# Patient Record
Sex: FEMALE | Race: WHITE | NOT HISPANIC OR LATINO
[De-identification: names, ages, dates, MRNs, and addresses within clinical notes are randomized per-mention and may not be internally consistent; named-entity substitution may affect disease eponyms.]

---

## 2017-01-10 ENCOUNTER — APPOINTMENT (OUTPATIENT)
Dept: INTERNAL MEDICINE | Facility: CLINIC | Age: 27
End: 2017-01-10

## 2017-01-10 ENCOUNTER — LABORATORY RESULT (OUTPATIENT)
Age: 27
End: 2017-01-10

## 2017-01-13 LAB
AMPHET UR-MCNC: NEGATIVE
BARBITURATES UR-MCNC: NEGATIVE
BENZODIAZ UR-MCNC: NEGATIVE
COCAINE METAB.OTHER UR-MCNC: NEGATIVE
CREATININE, URINE: 74.3 MG/DL
METHADONE UR-MCNC: NEGATIVE
METHAQUALONE UR-MCNC: NEGATIVE
OPIATES UR-MCNC: NEGATIVE
PCP UR-MCNC: NEGATIVE
PROPOXYPH UR QL: NEGATIVE
THC UR QL: NEGATIVE

## 2017-01-15 LAB — SUBOXONE: NORMAL

## 2017-02-10 ENCOUNTER — APPOINTMENT (OUTPATIENT)
Dept: INTERNAL MEDICINE | Facility: CLINIC | Age: 27
End: 2017-02-10

## 2017-03-11 ENCOUNTER — APPOINTMENT (OUTPATIENT)
Dept: INTERNAL MEDICINE | Facility: CLINIC | Age: 27
End: 2017-03-11

## 2017-04-08 ENCOUNTER — APPOINTMENT (OUTPATIENT)
Dept: INTERNAL MEDICINE | Facility: CLINIC | Age: 27
End: 2017-04-08

## 2017-05-06 ENCOUNTER — APPOINTMENT (OUTPATIENT)
Dept: INTERNAL MEDICINE | Facility: CLINIC | Age: 27
End: 2017-05-06

## 2017-06-05 ENCOUNTER — APPOINTMENT (OUTPATIENT)
Dept: INTERNAL MEDICINE | Facility: CLINIC | Age: 27
End: 2017-06-05

## 2017-07-03 ENCOUNTER — APPOINTMENT (OUTPATIENT)
Dept: INTERNAL MEDICINE | Facility: CLINIC | Age: 27
End: 2017-07-03

## 2017-08-01 ENCOUNTER — APPOINTMENT (OUTPATIENT)
Dept: INTERNAL MEDICINE | Facility: CLINIC | Age: 27
End: 2017-08-01
Payer: SELF-PAY

## 2017-08-01 PROCEDURE — 99499D: CUSTOM

## 2017-09-05 ENCOUNTER — APPOINTMENT (OUTPATIENT)
Dept: INTERNAL MEDICINE | Facility: CLINIC | Age: 27
End: 2017-09-05
Payer: SELF-PAY

## 2017-09-05 ENCOUNTER — LABORATORY RESULT (OUTPATIENT)
Age: 27
End: 2017-09-05

## 2017-09-05 PROCEDURE — 99499D: CUSTOM

## 2017-09-14 LAB
AMPHET UR-MCNC: NEGATIVE
BARBITURATES UR-MCNC: NEGATIVE
BENZODIAZ UR-MCNC: NEGATIVE
COCAINE METAB.OTHER UR-MCNC: NEGATIVE
CREATININE, URINE: >200 MG/DL
METHADONE UR-MCNC: NEGATIVE
METHAQUALONE UR-MCNC: NEGATIVE
OPIATES UR-MCNC: NEGATIVE
PCP UR-MCNC: NEGATIVE
PROPOXYPH UR QL: NEGATIVE
SUBOXONE: NORMAL
THC UR QL: NEGATIVE

## 2017-10-02 ENCOUNTER — APPOINTMENT (OUTPATIENT)
Dept: INTERNAL MEDICINE | Facility: CLINIC | Age: 27
End: 2017-10-02
Payer: SELF-PAY

## 2017-10-02 PROCEDURE — 99499D: CUSTOM

## 2017-11-04 ENCOUNTER — APPOINTMENT (OUTPATIENT)
Dept: INTERNAL MEDICINE | Facility: CLINIC | Age: 27
End: 2017-11-04
Payer: SELF-PAY

## 2017-11-04 PROCEDURE — 99499D: CUSTOM

## 2017-11-24 ENCOUNTER — APPOINTMENT (OUTPATIENT)
Dept: ULTRASOUND IMAGING | Facility: CLINIC | Age: 27
End: 2017-11-24

## 2017-11-27 ENCOUNTER — APPOINTMENT (OUTPATIENT)
Dept: MAMMOGRAPHY | Facility: CLINIC | Age: 27
End: 2017-11-27

## 2017-12-01 ENCOUNTER — APPOINTMENT (OUTPATIENT)
Dept: INTERNAL MEDICINE | Facility: CLINIC | Age: 27
End: 2017-12-01
Payer: SELF-PAY

## 2017-12-01 PROCEDURE — 99499D: CUSTOM

## 2017-12-19 ENCOUNTER — APPOINTMENT (OUTPATIENT)
Dept: BREAST CENTER | Facility: CLINIC | Age: 27
End: 2017-12-19

## 2018-01-02 ENCOUNTER — APPOINTMENT (OUTPATIENT)
Dept: INTERNAL MEDICINE | Facility: CLINIC | Age: 28
End: 2018-01-02
Payer: SELF-PAY

## 2018-01-02 PROCEDURE — 99499D: CUSTOM

## 2018-02-10 ENCOUNTER — APPOINTMENT (OUTPATIENT)
Dept: INTERNAL MEDICINE | Facility: CLINIC | Age: 28
End: 2018-02-10
Payer: SELF-PAY

## 2018-02-10 PROCEDURE — 99499D: CUSTOM

## 2018-02-27 ENCOUNTER — MEDICATION RENEWAL (OUTPATIENT)
Age: 28
End: 2018-02-27

## 2018-02-27 RX ORDER — CLONAZEPAM 1 MG/1
1 TABLET ORAL
Qty: 60 | Refills: 0 | Status: DISCONTINUED | COMMUNITY
Start: 2018-01-03

## 2018-02-27 RX ORDER — CLONAZEPAM 0.5 MG/1
0.5 TABLET ORAL
Qty: 30 | Refills: 0 | Status: ACTIVE | COMMUNITY
Start: 2018-01-03

## 2018-03-03 ENCOUNTER — LABORATORY RESULT (OUTPATIENT)
Age: 28
End: 2018-03-03

## 2018-03-03 ENCOUNTER — APPOINTMENT (OUTPATIENT)
Dept: INTERNAL MEDICINE | Facility: CLINIC | Age: 28
End: 2018-03-03
Payer: SELF-PAY

## 2018-03-03 PROCEDURE — 99499D: CUSTOM

## 2018-03-10 LAB — SUBOXONE: NORMAL

## 2018-03-11 LAB
AMPHET UR-MCNC: NEGATIVE
BARBITURATES UR-MCNC: NEGATIVE
BENZODIAZ UR-MCNC: NEGATIVE
COCAINE METAB.OTHER UR-MCNC: NEGATIVE
CREATININE, URINE: >200 MG/DL
METHADONE UR-MCNC: NEGATIVE
METHAQUALONE UR-MCNC: NEGATIVE
OPIATES UR-MCNC: NEGATIVE
PCP UR-MCNC: NEGATIVE
PROPOXYPH UR QL: NEGATIVE
THC UR QL: NEGATIVE

## 2018-03-20 ENCOUNTER — APPOINTMENT (OUTPATIENT)
Dept: BREAST CENTER | Facility: CLINIC | Age: 28
End: 2018-03-20
Payer: MEDICARE

## 2018-03-20 VITALS
SYSTOLIC BLOOD PRESSURE: 102 MMHG | HEIGHT: 69 IN | DIASTOLIC BLOOD PRESSURE: 64 MMHG | WEIGHT: 190 LBS | BODY MASS INDEX: 28.14 KG/M2 | HEART RATE: 80 BPM

## 2018-03-20 DIAGNOSIS — F17.200 NICOTINE DEPENDENCE, UNSPECIFIED, UNCOMPLICATED: ICD-10-CM

## 2018-03-20 DIAGNOSIS — Z80.1 FAMILY HISTORY OF MALIGNANT NEOPLASM OF TRACHEA, BRONCHUS AND LUNG: ICD-10-CM

## 2018-03-20 DIAGNOSIS — Z86.59 PERSONAL HISTORY OF OTHER MENTAL AND BEHAVIORAL DISORDERS: ICD-10-CM

## 2018-03-20 DIAGNOSIS — Z80.7 FAMILY HISTORY OF OTHER MALIGNANT NEOPLASMS OF LYMPHOID, HEMATOPOIETIC AND RELATED TISSUES: ICD-10-CM

## 2018-03-20 DIAGNOSIS — Z80.8 FAMILY HISTORY OF MALIGNANT NEOPLASM OF OTHER ORGANS OR SYSTEMS: ICD-10-CM

## 2018-03-20 PROCEDURE — 99204 OFFICE O/P NEW MOD 45 MIN: CPT

## 2018-03-20 RX ORDER — CLONIDINE HYDROCHLORIDE 0.2 MG/1
0.2 TABLET ORAL
Qty: 60 | Refills: 0 | Status: ACTIVE | COMMUNITY
Start: 2017-11-20

## 2018-03-20 RX ORDER — CLONIDINE HYDROCHLORIDE 0.1 MG/1
0.1 TABLET ORAL TWICE DAILY
Qty: 60 | Refills: 2 | Status: DISCONTINUED | COMMUNITY
Start: 2018-01-03 | End: 2018-03-20

## 2018-03-20 RX ORDER — ARIPIPRAZOLE 5 MG/1
5 TABLET ORAL
Qty: 14 | Refills: 0 | Status: DISCONTINUED | COMMUNITY
Start: 2017-12-05

## 2018-03-20 RX ORDER — OXCARBAZEPINE 150 MG/1
150 TABLET, FILM COATED ORAL
Qty: 15 | Refills: 0 | Status: DISCONTINUED | COMMUNITY
Start: 2017-11-03

## 2018-03-20 RX ORDER — MIRTAZAPINE 7.5 MG/1
7.5 TABLET, FILM COATED ORAL
Qty: 15 | Refills: 0 | Status: DISCONTINUED | COMMUNITY
Start: 2017-11-03

## 2018-03-20 RX ORDER — TOPIRAMATE 25 MG/1
25 TABLET, FILM COATED ORAL
Qty: 15 | Refills: 0 | Status: DISCONTINUED | COMMUNITY
Start: 2017-10-27

## 2018-03-30 ENCOUNTER — EMERGENCY (EMERGENCY)
Facility: HOSPITAL | Age: 28
LOS: 0 days | Discharge: HOME | End: 2018-03-30
Attending: EMERGENCY MEDICINE | Admitting: EMERGENCY MEDICINE
Payer: MEDICARE

## 2018-03-30 VITALS
HEART RATE: 98 BPM | OXYGEN SATURATION: 100 % | SYSTOLIC BLOOD PRESSURE: 122 MMHG | DIASTOLIC BLOOD PRESSURE: 80 MMHG | TEMPERATURE: 98 F | RESPIRATION RATE: 15 BRPM

## 2018-03-30 DIAGNOSIS — R44.1 VISUAL HALLUCINATIONS: ICD-10-CM

## 2018-03-30 PROCEDURE — 99283 EMERGENCY DEPT VISIT LOW MDM: CPT

## 2018-03-30 NOTE — ED PROVIDER NOTE - OBJECTIVE STATEMENT
26 y/o female with PMHx of substance abuse, depression presents to the ED for evaluation of visual hallucinations x5 months (since October 2017). Pt reports seeing "something like drops of water" but that they are when she touches them; also reports that the drops are not falling from up, they seem to fall from "wherever." Pt is fatigued. Neurology f/u scheduled Monday (3 days from now) with Dr. Feliciano. Mother reports drug abuse in the past, 6 years clean. Denies SI or HI.

## 2018-03-30 NOTE — ED PROVIDER NOTE - MEDICAL DECISION MAKING DETAILS
Pt without any suicidal or homicidal ideation, has f/u on Monday for outpatient imaging and blood work and neurologist evaluation. Pt is appropriate for d/c at this time as pt does not want any studies done in the ED.

## 2018-03-30 NOTE — ED ADULT TRIAGE NOTE - CHIEF COMPLAINT QUOTE
patient sent from [place] outpatient hospitalization program, to [facility] ER for delusional thoughts.  No suicidal or homicidal ideation.  Needs psych eval. and neuro eval patient sent from [facility] outpatient [facility] program, to [facility] ER for delusional thoughts.  No suicidal or homicidal ideation.  Needs psych eval. and neuro eval.  Mom states they doubled dose of risperidol today, patient with delayed speech.  Chart from [facility] has been faxed to ER

## 2018-03-30 NOTE — ED ADULT NURSE NOTE - CHIEF COMPLAINT QUOTE
patient sent from [facility] outpatient hospitalization program, to [facility] ER for delusional thoughts.  No suicidal or homicidal ideation.  Needs psych eval. and neuro eval.  Mom states they doubled dose of risperidol today, patient with delayed speech.  Chart from [facility] has been faxed to ER

## 2018-03-30 NOTE — ED ADULT NURSE NOTE - OBJECTIVE STATEMENT
Pt states she has been having mild visual/tactile hallucinations for approx. 3 months - sent to ER by out patient clinic at [place] - Pt states that she already has neuro appt on monday and has changed mind and would rather just go to out patient appt.  Pt denies SI and HI.  Pt is alert and oriented x 4 and acting appropriately.

## 2018-04-07 ENCOUNTER — APPOINTMENT (OUTPATIENT)
Dept: INTERNAL MEDICINE | Facility: CLINIC | Age: 28
End: 2018-04-07
Payer: SELF-PAY

## 2018-04-07 PROCEDURE — 99499D: CUSTOM

## 2018-05-05 ENCOUNTER — APPOINTMENT (OUTPATIENT)
Dept: INTERNAL MEDICINE | Facility: CLINIC | Age: 28
End: 2018-05-05
Payer: SELF-PAY

## 2018-05-05 PROCEDURE — 99499D: CUSTOM

## 2018-06-02 ENCOUNTER — RX RENEWAL (OUTPATIENT)
Age: 28
End: 2018-06-02

## 2018-06-07 ENCOUNTER — APPOINTMENT (OUTPATIENT)
Dept: INTERNAL MEDICINE | Facility: CLINIC | Age: 28
End: 2018-06-07
Payer: SELF-PAY

## 2018-06-07 PROCEDURE — 99499D: CUSTOM

## 2018-06-29 ENCOUNTER — APPOINTMENT (OUTPATIENT)
Dept: INTERNAL MEDICINE | Facility: CLINIC | Age: 28
End: 2018-06-29
Payer: SELF-PAY

## 2018-06-29 PROCEDURE — 99499D: CUSTOM

## 2018-07-26 ENCOUNTER — APPOINTMENT (OUTPATIENT)
Dept: INTERNAL MEDICINE | Facility: CLINIC | Age: 28
End: 2018-07-26
Payer: SELF-PAY

## 2018-07-26 PROCEDURE — 99499D: CUSTOM

## 2018-07-30 ENCOUNTER — RX RENEWAL (OUTPATIENT)
Age: 28
End: 2018-07-30

## 2018-08-28 ENCOUNTER — RX RENEWAL (OUTPATIENT)
Age: 28
End: 2018-08-28

## 2018-08-30 ENCOUNTER — LABORATORY RESULT (OUTPATIENT)
Age: 28
End: 2018-08-30

## 2018-08-30 ENCOUNTER — APPOINTMENT (OUTPATIENT)
Dept: INTERNAL MEDICINE | Facility: CLINIC | Age: 28
End: 2018-08-30
Payer: SELF-PAY

## 2018-08-30 PROCEDURE — 99499D: CUSTOM

## 2018-08-31 RX ORDER — BUPRENORPHINE HYDROCHLORIDE 8 MG/1
8 TABLET SUBLINGUAL
Qty: 83 | Refills: 0 | Status: DISCONTINUED | COMMUNITY
Start: 2018-07-26 | End: 2018-08-31

## 2018-09-01 LAB
AMPHET UR-MCNC: NEGATIVE
BARBITURATES UR-MCNC: NEGATIVE
BENZODIAZ UR-MCNC: NEGATIVE
COCAINE METAB.OTHER UR-MCNC: NEGATIVE
CREATININE, URINE: 124.7 MG/DL
METHADONE UR-MCNC: NEGATIVE
METHAQUALONE UR-MCNC: NEGATIVE
OPIATES UR-MCNC: NEGATIVE
PCP UR-MCNC: NEGATIVE
PROPOXYPH UR QL: NEGATIVE
THC UR QL: NEGATIVE

## 2018-09-07 LAB — SUBOXONE: NORMAL

## 2018-09-28 ENCOUNTER — APPOINTMENT (OUTPATIENT)
Dept: INTERNAL MEDICINE | Facility: CLINIC | Age: 28
End: 2018-09-28
Payer: SELF-PAY

## 2018-09-28 PROCEDURE — 99499D: CUSTOM

## 2018-09-28 RX ORDER — BUPRENORPHINE AND NALOXONE 8; 2 MG/1; MG/1
8-2 TABLET SUBLINGUAL
Qty: 83 | Refills: 0 | Status: DISCONTINUED | COMMUNITY
Start: 2018-07-30 | End: 2018-09-28

## 2018-10-25 ENCOUNTER — APPOINTMENT (OUTPATIENT)
Dept: INTERNAL MEDICINE | Facility: CLINIC | Age: 28
End: 2018-10-25
Payer: SELF-PAY

## 2018-10-25 PROCEDURE — 99499D: CUSTOM

## 2018-11-08 ENCOUNTER — APPOINTMENT (OUTPATIENT)
Dept: BREAST CENTER | Facility: CLINIC | Age: 28
End: 2018-11-08
Payer: MEDICARE

## 2018-11-08 VITALS — HEART RATE: 104 BPM | SYSTOLIC BLOOD PRESSURE: 124 MMHG | DIASTOLIC BLOOD PRESSURE: 87 MMHG

## 2018-11-08 DIAGNOSIS — N60.02 SOLITARY CYST OF RIGHT BREAST: ICD-10-CM

## 2018-11-08 DIAGNOSIS — N63.20 UNSPECIFIED LUMP IN THE LEFT BREAST, UNSPECIFIED QUADRANT: ICD-10-CM

## 2018-11-08 DIAGNOSIS — N60.01 SOLITARY CYST OF RIGHT BREAST: ICD-10-CM

## 2018-11-08 PROCEDURE — 99213 OFFICE O/P EST LOW 20 MIN: CPT

## 2018-12-13 ENCOUNTER — APPOINTMENT (OUTPATIENT)
Dept: INTERNAL MEDICINE | Facility: CLINIC | Age: 28
End: 2018-12-13
Payer: SELF-PAY

## 2018-12-13 DIAGNOSIS — R21 RASH AND OTHER NONSPECIFIC SKIN ERUPTION: ICD-10-CM

## 2018-12-13 PROCEDURE — 99499D: CUSTOM

## 2019-01-05 ENCOUNTER — APPOINTMENT (OUTPATIENT)
Dept: INTERNAL MEDICINE | Facility: CLINIC | Age: 29
End: 2019-01-05
Payer: SELF-PAY

## 2019-01-05 PROCEDURE — 99499D: CUSTOM

## 2019-02-02 ENCOUNTER — APPOINTMENT (OUTPATIENT)
Dept: INTERNAL MEDICINE | Facility: CLINIC | Age: 29
End: 2019-02-02
Payer: SELF-PAY

## 2019-02-02 PROCEDURE — 99499D: CUSTOM

## 2019-03-02 ENCOUNTER — APPOINTMENT (OUTPATIENT)
Dept: INTERNAL MEDICINE | Facility: CLINIC | Age: 29
End: 2019-03-02
Payer: SELF-PAY

## 2019-03-02 PROCEDURE — 99499D: CUSTOM

## 2019-03-07 ENCOUNTER — RX RENEWAL (OUTPATIENT)
Age: 29
End: 2019-03-07

## 2019-03-08 RX ORDER — PERPHENAZINE 8 MG
8 TABLET ORAL TWICE DAILY
Refills: 0 | Status: ACTIVE | COMMUNITY

## 2019-03-08 RX ORDER — GABAPENTIN 300 MG/1
300 CAPSULE ORAL TWICE DAILY
Qty: 180 | Refills: 1 | Status: ACTIVE | COMMUNITY
Start: 2018-01-03

## 2019-04-01 ENCOUNTER — APPOINTMENT (OUTPATIENT)
Dept: INTERNAL MEDICINE | Facility: CLINIC | Age: 29
End: 2019-04-01
Payer: SELF-PAY

## 2019-04-01 PROCEDURE — 99499D: CUSTOM

## 2019-04-29 ENCOUNTER — APPOINTMENT (OUTPATIENT)
Dept: INTERNAL MEDICINE | Facility: CLINIC | Age: 29
End: 2019-04-29
Payer: SELF-PAY

## 2019-04-29 PROCEDURE — 99499D: CUSTOM

## 2019-05-30 ENCOUNTER — APPOINTMENT (OUTPATIENT)
Dept: INTERNAL MEDICINE | Facility: CLINIC | Age: 29
End: 2019-05-30
Payer: SELF-PAY

## 2019-05-30 PROCEDURE — 99499D: CUSTOM

## 2019-06-03 LAB — DRUG ABUSE PANEL-9, SERUM: NORMAL

## 2019-06-28 ENCOUNTER — APPOINTMENT (OUTPATIENT)
Dept: INTERNAL MEDICINE | Facility: CLINIC | Age: 29
End: 2019-06-28
Payer: SELF-PAY

## 2019-06-28 ENCOUNTER — LABORATORY RESULT (OUTPATIENT)
Age: 29
End: 2019-06-28

## 2019-06-28 PROCEDURE — 99499D: CUSTOM

## 2019-07-03 LAB
AMPHET UR-MCNC: NEGATIVE
BARBITURATES UR-MCNC: NEGATIVE
BENZODIAZ UR-MCNC: NEGATIVE
COCAINE METAB.OTHER UR-MCNC: NEGATIVE
CREATININE, URINE: 62.6 MG/DL
METHADONE UR-MCNC: NEGATIVE
METHAQUALONE UR-MCNC: NEGATIVE
OPIATES UR-MCNC: NEGATIVE
PCP UR-MCNC: NEGATIVE
PROPOXYPH UR QL: NEGATIVE
THC UR QL: NEGATIVE

## 2019-07-08 LAB — SUBOXONE: NORMAL

## 2019-07-29 ENCOUNTER — APPOINTMENT (OUTPATIENT)
Dept: INTERNAL MEDICINE | Facility: CLINIC | Age: 29
End: 2019-07-29
Payer: SELF-PAY

## 2019-07-29 PROCEDURE — 99499D: CUSTOM

## 2019-08-02 ENCOUNTER — INPATIENT (INPATIENT)
Facility: HOSPITAL | Age: 29
LOS: 1 days | Discharge: LEFT BEFORE DISCHARGE | DRG: 64 | End: 2019-08-04
Attending: HOSPITALIST | Admitting: HOSPITALIST
Payer: MEDICARE

## 2019-08-02 VITALS
SYSTOLIC BLOOD PRESSURE: 119 MMHG | HEART RATE: 88 BPM | DIASTOLIC BLOOD PRESSURE: 68 MMHG | HEIGHT: 69 IN | TEMPERATURE: 98 F | OXYGEN SATURATION: 97 % | RESPIRATION RATE: 17 BRPM | WEIGHT: 205.03 LBS

## 2019-08-02 DIAGNOSIS — I67.1 CEREBRAL ANEURYSM, NONRUPTURED: ICD-10-CM

## 2019-08-02 DIAGNOSIS — R74.0 NONSPECIFIC ELEVATION OF LEVELS OF TRANSAMINASE AND LACTIC ACID DEHYDROGENASE [LDH]: ICD-10-CM

## 2019-08-02 DIAGNOSIS — F19.10 OTHER PSYCHOACTIVE SUBSTANCE ABUSE, UNCOMPLICATED: ICD-10-CM

## 2019-08-02 DIAGNOSIS — F41.9 ANXIETY DISORDER, UNSPECIFIED: ICD-10-CM

## 2019-08-02 DIAGNOSIS — R73.09 OTHER ABNORMAL GLUCOSE: ICD-10-CM

## 2019-08-02 DIAGNOSIS — F32.9 MAJOR DEPRESSIVE DISORDER, SINGLE EPISODE, UNSPECIFIED: ICD-10-CM

## 2019-08-02 DIAGNOSIS — Z98.890 OTHER SPECIFIED POSTPROCEDURAL STATES: Chronic | ICD-10-CM

## 2019-08-02 DIAGNOSIS — I63.9 CEREBRAL INFARCTION, UNSPECIFIED: ICD-10-CM

## 2019-08-02 LAB
ALBUMIN SERPL ELPH-MCNC: 3.8 G/DL — SIGNIFICANT CHANGE UP (ref 3.3–5)
ALP SERPL-CCNC: 90 U/L — SIGNIFICANT CHANGE UP (ref 40–120)
ALT FLD-CCNC: 26 U/L — SIGNIFICANT CHANGE UP (ref 12–78)
ANION GAP SERPL CALC-SCNC: 8 MMOL/L — SIGNIFICANT CHANGE UP (ref 5–17)
APTT BLD: 30.4 SEC — SIGNIFICANT CHANGE UP (ref 27.5–36.3)
AST SERPL-CCNC: 39 U/L — HIGH (ref 15–37)
BASOPHILS # BLD AUTO: 0.04 K/UL — SIGNIFICANT CHANGE UP (ref 0–0.2)
BASOPHILS NFR BLD AUTO: 0.6 % — SIGNIFICANT CHANGE UP (ref 0–2)
BILIRUB SERPL-MCNC: 0.3 MG/DL — SIGNIFICANT CHANGE UP (ref 0.2–1.2)
BUN SERPL-MCNC: 10 MG/DL — SIGNIFICANT CHANGE UP (ref 7–23)
CALCIUM SERPL-MCNC: 9.2 MG/DL — SIGNIFICANT CHANGE UP (ref 8.5–10.1)
CHLORIDE SERPL-SCNC: 101 MMOL/L — SIGNIFICANT CHANGE UP (ref 96–108)
CO2 SERPL-SCNC: 26 MMOL/L — SIGNIFICANT CHANGE UP (ref 22–31)
CREAT SERPL-MCNC: 1.19 MG/DL — SIGNIFICANT CHANGE UP (ref 0.5–1.3)
EOSINOPHIL # BLD AUTO: 0.07 K/UL — SIGNIFICANT CHANGE UP (ref 0–0.5)
EOSINOPHIL NFR BLD AUTO: 1.1 % — SIGNIFICANT CHANGE UP (ref 0–6)
GLUCOSE SERPL-MCNC: 129 MG/DL — HIGH (ref 70–99)
HCT VFR BLD CALC: 37.8 % — SIGNIFICANT CHANGE UP (ref 34.5–45)
HGB BLD-MCNC: 12.6 G/DL — SIGNIFICANT CHANGE UP (ref 11.5–15.5)
IMM GRANULOCYTES NFR BLD AUTO: 0.2 % — SIGNIFICANT CHANGE UP (ref 0–1.5)
INR BLD: 1.03 RATIO — SIGNIFICANT CHANGE UP (ref 0.88–1.16)
LYMPHOCYTES # BLD AUTO: 2.2 K/UL — SIGNIFICANT CHANGE UP (ref 1–3.3)
LYMPHOCYTES # BLD AUTO: 34.3 % — SIGNIFICANT CHANGE UP (ref 13–44)
MCHC RBC-ENTMCNC: 28.4 PG — SIGNIFICANT CHANGE UP (ref 27–34)
MCHC RBC-ENTMCNC: 33.3 GM/DL — SIGNIFICANT CHANGE UP (ref 32–36)
MCV RBC AUTO: 85.1 FL — SIGNIFICANT CHANGE UP (ref 80–100)
MONOCYTES # BLD AUTO: 0.36 K/UL — SIGNIFICANT CHANGE UP (ref 0–0.9)
MONOCYTES NFR BLD AUTO: 5.6 % — SIGNIFICANT CHANGE UP (ref 2–14)
NEUTROPHILS # BLD AUTO: 3.74 K/UL — SIGNIFICANT CHANGE UP (ref 1.8–7.4)
NEUTROPHILS NFR BLD AUTO: 58.2 % — SIGNIFICANT CHANGE UP (ref 43–77)
PLATELET # BLD AUTO: 300 K/UL — SIGNIFICANT CHANGE UP (ref 150–400)
POTASSIUM SERPL-MCNC: 4.6 MMOL/L — SIGNIFICANT CHANGE UP (ref 3.5–5.3)
POTASSIUM SERPL-SCNC: 4.6 MMOL/L — SIGNIFICANT CHANGE UP (ref 3.5–5.3)
PROT SERPL-MCNC: 7.7 GM/DL — SIGNIFICANT CHANGE UP (ref 6–8.3)
PROTHROM AB SERPL-ACNC: 11.5 SEC — SIGNIFICANT CHANGE UP (ref 10–12.9)
RBC # BLD: 4.44 M/UL — SIGNIFICANT CHANGE UP (ref 3.8–5.2)
RBC # FLD: 12.5 % — SIGNIFICANT CHANGE UP (ref 10.3–14.5)
SODIUM SERPL-SCNC: 135 MMOL/L — SIGNIFICANT CHANGE UP (ref 135–145)
WBC # BLD: 6.42 K/UL — SIGNIFICANT CHANGE UP (ref 3.8–10.5)
WBC # FLD AUTO: 6.42 K/UL — SIGNIFICANT CHANGE UP (ref 3.8–10.5)

## 2019-08-02 PROCEDURE — 86901 BLOOD TYPING SEROLOGIC RH(D): CPT

## 2019-08-02 PROCEDURE — 85730 THROMBOPLASTIN TIME PARTIAL: CPT

## 2019-08-02 PROCEDURE — 85025 COMPLETE CBC W/AUTO DIFF WBC: CPT

## 2019-08-02 PROCEDURE — 93306 TTE W/DOPPLER COMPLETE: CPT | Mod: 26

## 2019-08-02 PROCEDURE — 97116 GAIT TRAINING THERAPY: CPT | Mod: GP

## 2019-08-02 PROCEDURE — 80061 LIPID PANEL: CPT

## 2019-08-02 PROCEDURE — 70450 CT HEAD/BRAIN W/O DYE: CPT

## 2019-08-02 PROCEDURE — 84100 ASSAY OF PHOSPHORUS: CPT

## 2019-08-02 PROCEDURE — 70498 CT ANGIOGRAPHY NECK: CPT

## 2019-08-02 PROCEDURE — 99285 EMERGENCY DEPT VISIT HI MDM: CPT

## 2019-08-02 PROCEDURE — 93010 ELECTROCARDIOGRAM REPORT: CPT

## 2019-08-02 PROCEDURE — 97162 PT EVAL MOD COMPLEX 30 MIN: CPT | Mod: GP

## 2019-08-02 PROCEDURE — 80053 COMPREHEN METABOLIC PANEL: CPT

## 2019-08-02 PROCEDURE — 70496 CT ANGIOGRAPHY HEAD: CPT | Mod: 26

## 2019-08-02 PROCEDURE — 96374 THER/PROPH/DIAG INJ IV PUSH: CPT

## 2019-08-02 PROCEDURE — 85027 COMPLETE CBC AUTOMATED: CPT

## 2019-08-02 PROCEDURE — 70498 CT ANGIOGRAPHY NECK: CPT | Mod: 26

## 2019-08-02 PROCEDURE — 80074 ACUTE HEPATITIS PANEL: CPT

## 2019-08-02 PROCEDURE — 86039 ANTINUCLEAR ANTIBODIES (ANA): CPT

## 2019-08-02 PROCEDURE — 85610 PROTHROMBIN TIME: CPT

## 2019-08-02 PROCEDURE — 87040 BLOOD CULTURE FOR BACTERIA: CPT

## 2019-08-02 PROCEDURE — 80307 DRUG TEST PRSMV CHEM ANLYZR: CPT

## 2019-08-02 PROCEDURE — 93005 ELECTROCARDIOGRAM TRACING: CPT

## 2019-08-02 PROCEDURE — 93306 TTE W/DOPPLER COMPLETE: CPT

## 2019-08-02 PROCEDURE — 70496 CT ANGIOGRAPHY HEAD: CPT

## 2019-08-02 PROCEDURE — 71045 X-RAY EXAM CHEST 1 VIEW: CPT

## 2019-08-02 PROCEDURE — 84702 CHORIONIC GONADOTROPIN TEST: CPT

## 2019-08-02 PROCEDURE — 81001 URINALYSIS AUTO W/SCOPE: CPT

## 2019-08-02 PROCEDURE — 36415 COLL VENOUS BLD VENIPUNCTURE: CPT

## 2019-08-02 PROCEDURE — 86900 BLOOD TYPING SEROLOGIC ABO: CPT

## 2019-08-02 PROCEDURE — 83036 HEMOGLOBIN GLYCOSYLATED A1C: CPT

## 2019-08-02 PROCEDURE — 80048 BASIC METABOLIC PNL TOTAL CA: CPT

## 2019-08-02 PROCEDURE — 86850 RBC ANTIBODY SCREEN: CPT

## 2019-08-02 PROCEDURE — 83735 ASSAY OF MAGNESIUM: CPT

## 2019-08-02 PROCEDURE — 85652 RBC SED RATE AUTOMATED: CPT

## 2019-08-02 PROCEDURE — 86140 C-REACTIVE PROTEIN: CPT

## 2019-08-02 PROCEDURE — 96361 HYDRATE IV INFUSION ADD-ON: CPT

## 2019-08-02 RX ORDER — NICOTINE POLACRILEX 2 MG
1 GUM BUCCAL DAILY
Refills: 0 | Status: DISCONTINUED | OUTPATIENT
Start: 2019-08-02 | End: 2019-08-04

## 2019-08-02 RX ORDER — VENLAFAXINE HCL 75 MG
75 CAPSULE, EXT RELEASE 24 HR ORAL DAILY
Refills: 0 | Status: DISCONTINUED | OUTPATIENT
Start: 2019-08-02 | End: 2019-08-04

## 2019-08-02 RX ORDER — CLONAZEPAM 1 MG
0.25 TABLET ORAL DAILY
Refills: 0 | Status: DISCONTINUED | OUTPATIENT
Start: 2019-08-02 | End: 2019-08-04

## 2019-08-02 RX ORDER — GABAPENTIN 400 MG/1
600 CAPSULE ORAL AT BEDTIME
Refills: 0 | Status: DISCONTINUED | OUTPATIENT
Start: 2019-08-02 | End: 2019-08-04

## 2019-08-02 RX ORDER — VENLAFAXINE HCL 75 MG
150 CAPSULE, EXT RELEASE 24 HR ORAL DAILY
Refills: 0 | Status: DISCONTINUED | OUTPATIENT
Start: 2019-08-02 | End: 2019-08-04

## 2019-08-02 RX ORDER — BUPRENORPHINE AND NALOXONE 2; .5 MG/1; MG/1
1 TABLET SUBLINGUAL
Refills: 0 | Status: DISCONTINUED | OUTPATIENT
Start: 2019-08-02 | End: 2019-08-04

## 2019-08-02 RX ORDER — SODIUM CHLORIDE 9 MG/ML
1000 INJECTION INTRAMUSCULAR; INTRAVENOUS; SUBCUTANEOUS ONCE
Refills: 0 | Status: COMPLETED | OUTPATIENT
Start: 2019-08-02 | End: 2019-08-02

## 2019-08-02 RX ORDER — LAMOTRIGINE 25 MG/1
150 TABLET, ORALLY DISINTEGRATING ORAL
Refills: 0 | Status: DISCONTINUED | OUTPATIENT
Start: 2019-08-02 | End: 2019-08-04

## 2019-08-02 RX ORDER — ATORVASTATIN CALCIUM 80 MG/1
20 TABLET, FILM COATED ORAL AT BEDTIME
Refills: 0 | Status: DISCONTINUED | OUTPATIENT
Start: 2019-08-02 | End: 2019-08-04

## 2019-08-02 RX ORDER — PANTOPRAZOLE SODIUM 20 MG/1
40 TABLET, DELAYED RELEASE ORAL
Refills: 0 | Status: DISCONTINUED | OUTPATIENT
Start: 2019-08-02 | End: 2019-08-04

## 2019-08-02 RX ORDER — CLONAZEPAM 1 MG
1 TABLET ORAL AT BEDTIME
Refills: 0 | Status: DISCONTINUED | OUTPATIENT
Start: 2019-08-02 | End: 2019-08-04

## 2019-08-02 RX ORDER — GABAPENTIN 400 MG/1
300 CAPSULE ORAL
Refills: 0 | Status: DISCONTINUED | OUTPATIENT
Start: 2019-08-02 | End: 2019-08-04

## 2019-08-02 RX ORDER — PERPHENAZINE 8 MG/1
16 TABLET, FILM COATED ORAL
Refills: 0 | Status: DISCONTINUED | OUTPATIENT
Start: 2019-08-02 | End: 2019-08-04

## 2019-08-02 RX ADMIN — Medication 1 PATCH: at 17:44

## 2019-08-02 RX ADMIN — ATORVASTATIN CALCIUM 20 MILLIGRAM(S): 80 TABLET, FILM COATED ORAL at 23:30

## 2019-08-02 RX ADMIN — SODIUM CHLORIDE 1000 MILLILITER(S): 9 INJECTION INTRAMUSCULAR; INTRAVENOUS; SUBCUTANEOUS at 19:00

## 2019-08-02 RX ADMIN — Medication 1 PATCH: at 20:56

## 2019-08-02 NOTE — H&P ADULT - NSICDXPASTMEDICALHX_GEN_ALL_CORE_FT
PAST MEDICAL HISTORY:  Aneurysm of right internal carotid artery     Anxiety     Cerebrovascular accident (CVA)     Cyst, breast     Depression     PTSD (post-traumatic stress disorder)     Substance abuse     Transaminitis

## 2019-08-02 NOTE — H&P ADULT - HISTORY OF PRESENT ILLNESS
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 of facial numbness and tingling on left side. Pt boyfriend at bedside states that she is normally AAOx4 and speaks w/o difficulty however en route to ED pt admits to taking Klonopin and clonidine due to 'nerves' and is now lethargic. History mostly taken from boyfriend. Pt expresses concern over receiving more Klonopin and suboxone and clonidine for her anxiety and after explanation that due to hypotension and lethargy that sedative medications would be held, pt states she wanted to leave AMA. Pt later stayed for admission after in depth conversation with boyfriend and patient regarding increased risk of death and further complications. Pt declined further questions and physical exam. Declined to answer ROS.

## 2019-08-02 NOTE — H&P ADULT - PROBLEM SELECTOR PLAN 2
Vascular consult appreciated  US carotid ordered  hold antiplts/ASA due to concern for conversion given micro hemorrhage, f/u with neurology in AM for further recs

## 2019-08-02 NOTE — ED PROVIDER NOTE - TIMING
gradual onset
I will START or STAY ON the medications listed below when I get home from the hospital:    finasteride 5 mg oral tablet  -- 1 tab(s) by mouth once a day  -- Indication: For BPH     budesonide 0.5 mg/2 mL inhalation suspension  -- 1 puff(s) inhaled 2 times a day   -- Indication: For SOB / Wheezing     oxyCODONE-acetaminophen 5 mg-325 mg oral tablet  -- 2 tab(s) by mouth every 6 hours, As needed, Moderate Pain (4 - 6) MDD:8 tabs  -- Indication: For Pain     aspirin 81 mg oral delayed release tablet  -- 1 tab(s) by mouth once a day  -- Indication: For Blood thinner     cloNIDine 0.1 mg oral tablet  -- 1 tab(s) by mouth every 8 hours  -- Indication: For Blood Pressure control     tamsulosin 0.4 mg oral capsule  -- 1 cap(s) by mouth once a day (at bedtime)  -- Indication: For BPH     verapamil 240 mg/12 hours oral tablet, extended release  -- 1 tab(s) by mouth once a day  -- Indication: For Blood pressure and heart rate control     rOPINIRole 0.25 mg oral tablet  -- 1 tab(s) by mouth 3 times a day  -- Indication: For Parkinson     ProAir HFA 90 mcg/inh inhalation aerosol  -- 2 puff(s) inhaled 4 times a day, As Needed -for bronchospasm   -- For inhalation only.  It is very important that you take or use this exactly as directed.  Do not skip doses or discontinue unless directed by your doctor.  Obtain medical advice before taking any non-prescription drugs as some may affect the action of this medication.  Shake well before use.    -- Indication: For SOB / Wheeze as needed     docusate sodium 100 mg oral capsule  -- 1 cap(s) by mouth 3 times a day, As Needed -for constipation   -- Indication: For Stool Softner as needed for constipation     pantoprazole 40 mg oral delayed release tablet  -- 1 tab(s) by mouth once a day (before a meal)  -- Indication: For Acid Reflux

## 2019-08-02 NOTE — ED PROVIDER NOTE - NS_ ATTENDINGSCRIBEDETAILS _ED_A_ED_FT
I, Nimesh Shen MD,  performed the initial face to face bedside interview with this patient regarding history of present illness, review of symptoms and relevant past medical, social and family history.  I completed an independent physical examination.    The history, relevant review of systems, past medical and surgical history, medical decision making, and physical examination was documented by the scribe in my presence and I attest to the accuracy of the documentation.

## 2019-08-02 NOTE — H&P ADULT - PROBLEM SELECTOR PLAN 1
sub acute  admit to Tele  Neurology consult appreciated  Cardiology consult placed-h/o IVDA, concern for vegetation as etiology  f/u CT head in AM  TTE ordered  Neurochecks q 4 hrs  hold sedative medications in setting of lethargy  PT consult  SW consult  f/u lipid panel and HbA1c  low dose statin started  ASA and plavix held due to microhemorrhage  f/u AM cbc, cmp  u/a + tox screen  fall and aspiration precautions sub acute  admit to Tele  Neurology consult appreciated  Cardiology consult placed-h/o IVDA, concern for vegetation as etiology  EKG with non specific T wave inversions in anterolateral and inferior leads when compared to prior  f/u CT head in AM  TTE ordered  Neurochecks q 4 hrs  hold sedative medications in setting of lethargy  PT consult  SW consult  f/u lipid panel and HbA1c  low dose statin started  ASA and plavix held due to microhemorrhage  f/u AM cbc, cmp  u/a + tox screen  fall and aspiration precautions

## 2019-08-02 NOTE — H&P ADULT - ADDITIONAL PE
PT DECLINED PHYSICAL EXAM  Pt notably lethargic during interview, easily arousable and intermittently closing eyes, Pt AAOX4

## 2019-08-02 NOTE — ED PROVIDER NOTE - CONSTITUTIONAL, MLM
normal... sleepy, well nourished, awake, alert, oriented to person, place, time/situation and in no apparent distress.

## 2019-08-02 NOTE — H&P ADULT - NSICDXFAMILYHX_GEN_ALL_CORE_FT
FAMILY HISTORY:  Family history of drug use, IN MOTHER, LIVING FAMILY HISTORY:  Family history of drug use, IN MOTHER, LIVING  Medical history unknown, in father, unknown if living or

## 2019-08-02 NOTE — H&P ADULT - ASSESSMENT
Pt is a 28 yo female who is an everyday smoker and has a h/o IVDA, who is admitted for acute CVA with micro hemorrhage. Pt is a 30 yo female who is an everyday smoker and has a h/o IVDA, who is admitted for sub-acute CVA with micro hemorrhage and chronic proximal R ICA high grade stenosis/dissection.

## 2019-08-02 NOTE — H&P ADULT - PROBLEM SELECTOR PROBLEM 2
Subjective:       Tamanna Waters is a 11 y.o. female who presents for evaluation of fatigue. Symptoms began a week ago. The patient feels the fatigue began with: nasal congestion, possibly a cold symptoms.  . Symptoms of her fatigue have been change in appetite and not eating dinner in the evening.  eats slowly and for the last few weeks hasn't been finishing meal. Patient describes the following psychological symptoms: none.  Self conscious a lot regarding body and weight.  Drinking ok.  Patient denies no change in activity.   Symptoms have progressed to a point and plateaued. Symptom severity: symptoms bothersome, but easily able to carry out all usual work/school/family activities. Previous visits for this problem: none.   Still having acne, more vaginal clear/mucus discharge no menses yet.    Review of Systems  no vomiting, diarrhea, no joint swelling, erythema or pain in upper or lower extremities noted      Objective:        /69   Pulse 81   Temp 98.4 °F (36.9 °C) (Oral)   Resp 20   Wt 42.7 kg (94 lb 2.2 oz)     General Appearance:    Alert, cooperative, no distress, appears stated age, no pallor, awake interactive, smiling, talkative.     Head:    Normocephalic, without obvious abnormality, atraumatic   Eyes:    PERRL, conjunctiva/corneas clear, EOM's intact, fundi     benign, both eyes   Ears:    Normal TM's and external ear canals, both ears   Nose:   Nares normal, septum midline, mucosa normal, no drainage    or sinus tenderness   Throat:   Lips, mucosa, and tongue normal; teeth and gums normal           Lungs:     Clear to auscultation bilaterally, respirations unlabored        Heart:    Regular rate and rhythm, S1 and S2 normal, no murmur, rub   or gallop       Abdomen:     Soft, non-tender, bowel sounds active all four quadrants,     no masses, no organomegaly           Extremities:   Extremities normal, atraumatic, no cyanosis or edema   Pulses:   2+ and symmetric all extremities   Skin:    Skin color, texture, turgor normal, + combination acne forehead, nasal bridge, chin  Oily skin   Lymph nodes:   Cervical, supraclavicular, and axillary nodes normal   Neurologic:   CNII-XII intact, normal strength, sensation and reflexes     throughout         Assessment:      Fatigue nonspecific    Premenstrual      Plan:      Discussed diagnosis with patient.  Reassured that serious underlying cause for the fatigue is very unlikely.  Discussed lifestyle modification as means of resolving problem.  note given for snack at school midmorning, encouraged breakfast daily with protein    Fluids, salty snacks, reasonable bedtime.   I expect menses to begin this schoolyear.     Aneurysm of right internal carotid artery

## 2019-08-02 NOTE — H&P ADULT - PROBLEM SELECTOR PLAN 6
cont klonopin as directed  clonidine 0.2 bid being used by pt for anxiety- pt took dose at 2:30 pm prior to arrival in ED, since BP has been 90's over 50's  1L bolus ordered STAT

## 2019-08-02 NOTE — ED ADULT TRIAGE NOTE - CHIEF COMPLAINT QUOTE
pt sent in by micheal ortega  for stroke ( MRI this morning) dr. bhatt symptoms have been for two months, nothing new today pt just went for an MRI, left sided facial numbness and left hand numbness . Pt has slowed speech 2* psych medications Effexor, lamictal, and neurotin

## 2019-08-02 NOTE — ED PROVIDER NOTE - OBJECTIVE STATEMENT
30 y/o female with a PMHx of anxiety, depression, PTSD, substance abuse presents to the ED c/o left facial numbness and left hand numbness. +slurring speech +trouble walking Pt got an MRI today at [facility] and Dr. Ahumada (neurologist) called pt and said she had two new strokes and she should go to the hospital. PCP: Dr. Villanueva

## 2019-08-02 NOTE — ED ADULT NURSE NOTE - OBJECTIVE STATEMENT
pt a/ox3 BIB mother for c/o of left sided facial numbness and left sided hand numbness. pt reports slurred speech and difficulty walking. pt reports getting MRI today out-pt. pt reports neurologist then instructed pt to come to ED. pt reports "having two new strokes". upon assessment in ED stroke scale negative. pt denies SOB,CP,dizziness, fever, chills, N/V/D. code stroke NOT initiated s/p evaluation by ED MD/RN. pt direct bed to MAIN.

## 2019-08-02 NOTE — ED PROVIDER NOTE - PROGRESS NOTE DETAILS
Scribe Alexa Bromberg for attending Dr. Shen: Spoke with Dr. Durán neur pt get CTA head and neck for potential dissection which was the concern to Dr. Mckenzie

## 2019-08-03 LAB
ADD ON TEST-SPECIMEN IN LAB: SIGNIFICANT CHANGE UP
ALBUMIN SERPL ELPH-MCNC: 3.7 G/DL — SIGNIFICANT CHANGE UP (ref 3.3–5)
ALP SERPL-CCNC: 83 U/L — SIGNIFICANT CHANGE UP (ref 40–120)
ALT FLD-CCNC: 27 U/L — SIGNIFICANT CHANGE UP (ref 12–78)
ANION GAP SERPL CALC-SCNC: 6 MMOL/L — SIGNIFICANT CHANGE UP (ref 5–17)
ANION GAP SERPL CALC-SCNC: 6 MMOL/L — SIGNIFICANT CHANGE UP (ref 5–17)
APPEARANCE UR: CLEAR — SIGNIFICANT CHANGE UP
APTT BLD: 31.5 SEC — SIGNIFICANT CHANGE UP (ref 27.5–36.3)
AST SERPL-CCNC: 22 U/L — SIGNIFICANT CHANGE UP (ref 15–37)
BILIRUB SERPL-MCNC: 0.2 MG/DL — SIGNIFICANT CHANGE UP (ref 0.2–1.2)
BILIRUB UR-MCNC: NEGATIVE — SIGNIFICANT CHANGE UP
BUN SERPL-MCNC: 8 MG/DL — SIGNIFICANT CHANGE UP (ref 7–23)
BUN SERPL-MCNC: 8 MG/DL — SIGNIFICANT CHANGE UP (ref 7–23)
CALCIUM SERPL-MCNC: 8.8 MG/DL — SIGNIFICANT CHANGE UP (ref 8.5–10.1)
CALCIUM SERPL-MCNC: 8.9 MG/DL — SIGNIFICANT CHANGE UP (ref 8.5–10.1)
CHLORIDE SERPL-SCNC: 104 MMOL/L — SIGNIFICANT CHANGE UP (ref 96–108)
CHLORIDE SERPL-SCNC: 106 MMOL/L — SIGNIFICANT CHANGE UP (ref 96–108)
CHOLEST SERPL-MCNC: 209 MG/DL — HIGH (ref 10–199)
CO2 SERPL-SCNC: 29 MMOL/L — SIGNIFICANT CHANGE UP (ref 22–31)
CO2 SERPL-SCNC: 29 MMOL/L — SIGNIFICANT CHANGE UP (ref 22–31)
COLOR SPEC: YELLOW — SIGNIFICANT CHANGE UP
CREAT SERPL-MCNC: 1.02 MG/DL — SIGNIFICANT CHANGE UP (ref 0.5–1.3)
CREAT SERPL-MCNC: 1.05 MG/DL — SIGNIFICANT CHANGE UP (ref 0.5–1.3)
CRP SERPL-MCNC: 1.18 MG/DL — HIGH (ref 0–0.4)
DIFF PNL FLD: NEGATIVE — SIGNIFICANT CHANGE UP
ERYTHROCYTE [SEDIMENTATION RATE] IN BLOOD: 22 MM/HR — HIGH (ref 0–15)
ESTIMATED AVERAGE GLUCOSE: 114 MG/DL — SIGNIFICANT CHANGE UP (ref 68–114)
GLUCOSE SERPL-MCNC: 102 MG/DL — HIGH (ref 70–99)
GLUCOSE SERPL-MCNC: 97 MG/DL — SIGNIFICANT CHANGE UP (ref 70–99)
GLUCOSE UR QL: NEGATIVE MG/DL — SIGNIFICANT CHANGE UP
HBA1C BLD-MCNC: 5.6 % — SIGNIFICANT CHANGE UP (ref 4–5.6)
HBA1C BLD-MCNC: 5.6 % — SIGNIFICANT CHANGE UP (ref 4–5.6)
HCT VFR BLD CALC: 35.5 % — SIGNIFICANT CHANGE UP (ref 34.5–45)
HDLC SERPL-MCNC: 27 MG/DL — LOW
HGB BLD-MCNC: 11.7 G/DL — SIGNIFICANT CHANGE UP (ref 11.5–15.5)
INR BLD: 1.09 RATIO — SIGNIFICANT CHANGE UP (ref 0.88–1.16)
KETONES UR-MCNC: NEGATIVE — SIGNIFICANT CHANGE UP
LEUKOCYTE ESTERASE UR-ACNC: ABNORMAL
LIPID PNL WITH DIRECT LDL SERPL: 132 MG/DL — HIGH
MAGNESIUM SERPL-MCNC: 2.4 MG/DL — SIGNIFICANT CHANGE UP (ref 1.6–2.6)
MAGNESIUM SERPL-MCNC: 2.5 MG/DL — SIGNIFICANT CHANGE UP (ref 1.6–2.6)
MCHC RBC-ENTMCNC: 28.3 PG — SIGNIFICANT CHANGE UP (ref 27–34)
MCHC RBC-ENTMCNC: 33 GM/DL — SIGNIFICANT CHANGE UP (ref 32–36)
MCV RBC AUTO: 86 FL — SIGNIFICANT CHANGE UP (ref 80–100)
NITRITE UR-MCNC: NEGATIVE — SIGNIFICANT CHANGE UP
PCP SPEC-MCNC: SIGNIFICANT CHANGE UP
PCP SPEC-MCNC: SIGNIFICANT CHANGE UP
PH UR: 6 — SIGNIFICANT CHANGE UP (ref 5–8)
PHOSPHATE SERPL-MCNC: 4.4 MG/DL — SIGNIFICANT CHANGE UP (ref 2.5–4.5)
PHOSPHATE SERPL-MCNC: 4.5 MG/DL — SIGNIFICANT CHANGE UP (ref 2.5–4.5)
PLATELET # BLD AUTO: 260 K/UL — SIGNIFICANT CHANGE UP (ref 150–400)
POTASSIUM SERPL-MCNC: 4.1 MMOL/L — SIGNIFICANT CHANGE UP (ref 3.5–5.3)
POTASSIUM SERPL-MCNC: 4.1 MMOL/L — SIGNIFICANT CHANGE UP (ref 3.5–5.3)
POTASSIUM SERPL-SCNC: 4.1 MMOL/L — SIGNIFICANT CHANGE UP (ref 3.5–5.3)
POTASSIUM SERPL-SCNC: 4.1 MMOL/L — SIGNIFICANT CHANGE UP (ref 3.5–5.3)
PROT SERPL-MCNC: 7 GM/DL — SIGNIFICANT CHANGE UP (ref 6–8.3)
PROT UR-MCNC: NEGATIVE MG/DL — SIGNIFICANT CHANGE UP
PROTHROM AB SERPL-ACNC: 12.1 SEC — SIGNIFICANT CHANGE UP (ref 10–12.9)
RBC # BLD: 4.13 M/UL — SIGNIFICANT CHANGE UP (ref 3.8–5.2)
RBC # FLD: 12.5 % — SIGNIFICANT CHANGE UP (ref 10.3–14.5)
SODIUM SERPL-SCNC: 139 MMOL/L — SIGNIFICANT CHANGE UP (ref 135–145)
SODIUM SERPL-SCNC: 141 MMOL/L — SIGNIFICANT CHANGE UP (ref 135–145)
SP GR SPEC: 1.01 — SIGNIFICANT CHANGE UP (ref 1.01–1.02)
TOTAL CHOLESTEROL/HDL RATIO MEASUREMENT: 7.7 RATIO — HIGH (ref 3.3–7.1)
TRIGL SERPL-MCNC: 249 MG/DL — HIGH (ref 10–149)
UROBILINOGEN FLD QL: NEGATIVE MG/DL — SIGNIFICANT CHANGE UP
WBC # BLD: 5.17 K/UL — SIGNIFICANT CHANGE UP (ref 3.8–10.5)
WBC # FLD AUTO: 5.17 K/UL — SIGNIFICANT CHANGE UP (ref 3.8–10.5)

## 2019-08-03 PROCEDURE — 99233 SBSQ HOSP IP/OBS HIGH 50: CPT

## 2019-08-03 PROCEDURE — 70450 CT HEAD/BRAIN W/O DYE: CPT | Mod: 26

## 2019-08-03 PROCEDURE — 93010 ELECTROCARDIOGRAM REPORT: CPT

## 2019-08-03 PROCEDURE — 71045 X-RAY EXAM CHEST 1 VIEW: CPT | Mod: 26

## 2019-08-03 RX ORDER — ASPIRIN/CALCIUM CARB/MAGNESIUM 324 MG
81 TABLET ORAL DAILY
Refills: 0 | Status: DISCONTINUED | OUTPATIENT
Start: 2019-08-03 | End: 2019-08-04

## 2019-08-03 RX ADMIN — PERPHENAZINE 16 MILLIGRAM(S): 8 TABLET, FILM COATED ORAL at 07:06

## 2019-08-03 RX ADMIN — PERPHENAZINE 16 MILLIGRAM(S): 8 TABLET, FILM COATED ORAL at 17:21

## 2019-08-03 RX ADMIN — Medication 1 PATCH: at 09:41

## 2019-08-03 RX ADMIN — Medication 150 MILLIGRAM(S): at 09:43

## 2019-08-03 RX ADMIN — BUPRENORPHINE AND NALOXONE 1 TABLET(S): 2; .5 TABLET SUBLINGUAL at 21:06

## 2019-08-03 RX ADMIN — GABAPENTIN 300 MILLIGRAM(S): 400 CAPSULE ORAL at 17:22

## 2019-08-03 RX ADMIN — BUPRENORPHINE AND NALOXONE 1 TABLET(S): 2; .5 TABLET SUBLINGUAL at 07:06

## 2019-08-03 RX ADMIN — PANTOPRAZOLE SODIUM 40 MILLIGRAM(S): 20 TABLET, DELAYED RELEASE ORAL at 07:06

## 2019-08-03 RX ADMIN — LAMOTRIGINE 150 MILLIGRAM(S): 25 TABLET, ORALLY DISINTEGRATING ORAL at 07:06

## 2019-08-03 RX ADMIN — Medication 0.25 MILLIGRAM(S): at 17:21

## 2019-08-03 RX ADMIN — BUPRENORPHINE AND NALOXONE 1 TABLET(S): 2; .5 TABLET SUBLINGUAL at 15:58

## 2019-08-03 RX ADMIN — Medication 0.2 MILLIGRAM(S): at 17:22

## 2019-08-03 RX ADMIN — ATORVASTATIN CALCIUM 20 MILLIGRAM(S): 80 TABLET, FILM COATED ORAL at 20:59

## 2019-08-03 RX ADMIN — LAMOTRIGINE 150 MILLIGRAM(S): 25 TABLET, ORALLY DISINTEGRATING ORAL at 17:22

## 2019-08-03 RX ADMIN — Medication 1 PATCH: at 21:08

## 2019-08-03 RX ADMIN — Medication 75 MILLIGRAM(S): at 09:42

## 2019-08-03 RX ADMIN — Medication 1 MILLIGRAM(S): at 20:59

## 2019-08-03 RX ADMIN — GABAPENTIN 600 MILLIGRAM(S): 400 CAPSULE ORAL at 20:59

## 2019-08-03 RX ADMIN — GABAPENTIN 300 MILLIGRAM(S): 400 CAPSULE ORAL at 07:10

## 2019-08-03 NOTE — PROGRESS NOTE ADULT - SUBJECTIVE AND OBJECTIVE BOX
No new complaints since consult last night. Continued Left upper extremity weakness, pending carotid duplex.      Vital Signs Last 24 Hrs  T(C): 36.4 (03 Aug 2019 05:18), Max: 36.9 (02 Aug 2019 15:22)  T(F): 97.5 (03 Aug 2019 05:18), Max: 98.5 (02 Aug 2019 15:22)  HR: 76 (03 Aug 2019 05:18) (62 - 88)  BP: 96/46 (03 Aug 2019 05:18) (93/50 - 119/68)  BP(mean): --  RR: 16 (03 Aug 2019 05:18) (14 - 17)  SpO2: 95% (03 Aug 2019 05:18) (94% - 97%)    Gen: NAD, AAOx3  HEENT: PEARLA, EOMI, no facial droop noted  CV: RRR, normal S1 and S2  Pulm: CTAB, no wheezing  Abd: soft, obese, non tender  Ext: 4/5 strength LUE, 5/5 strength RUE/BLE, warm, well perfused

## 2019-08-03 NOTE — PHYSICAL THERAPY INITIAL EVALUATION ADULT - PERTINENT HX OF CURRENT PROBLEM, REHAB EVAL
28 yo F admitted  on referral of Neurologist due to abnormal outpatient imaging which was performed due to >6mo. of facial numbness and tingling on left side.   MRi performed as outpt that showed R watershed infarct and possible R ICA occlusion vs dissection. CT head in house: Small right MCA branch infarcts is trace petechial hemorrhage, stable

## 2019-08-03 NOTE — CHART NOTE - NSCHARTNOTEFT_GEN_A_CORE
RN called due to tachy > 155 in telemetry     28 y/o F with hx of anxiety and IVDA on heroin and admitted due to CVA is evaluated bedside and found sleeping and in no acute distress.  Telemetry have reported multiple events of tachy > 155 that resolved spontaneously. Pt reports palpitation on/off when she has anxiety but denies palpitations, SOB and chest pain at evaluation.  EKG , BMP , Mg , Phos levels was ordered and we will continuing monitoring pt closely.     VS:   BP 96/53   HR 62     Physical examination:   General:  Sleepy, cooperative  Heart: RRR  Lungs : CTAx 2     Plan:  - EKG stat  - BMP, Mg, Phos level   - Continue monitoring pt closely

## 2019-08-03 NOTE — PHYSICAL THERAPY INITIAL EVALUATION ADULT - MODALITIES TREATMENT COMMENTS
Patient returned to bed by request, call bell in reach. Patient independent in functional mobility, no skilled physical therapy need in this setting.  May ambulate per nursing.  Will d/c from PT. RN,  informed.

## 2019-08-03 NOTE — PHYSICAL THERAPY INITIAL EVALUATION ADULT - MANUAL MUSCLE TESTING RESULTS, REHAB EVAL
except L hand intrinsics 3+/5, opposition grasp: 3+/5  Facial muscles intact./no strength deficits were identified

## 2019-08-03 NOTE — PROVIDER CONTACT NOTE (MEDICATION) - ACTION/TREATMENT ORDERED:
Physician stated to continue to monitor and hold medications until patient is more awake Physician stated to continue to monitor and hold medications until patient is more awake. Afterwards also spoke with Dr. Rollins about situation and no new orders given, will come to assess patient.

## 2019-08-03 NOTE — PROVIDER CONTACT NOTE (MEDICATION) - SITUATION
Patient having short periods- 30 seconds or less of tachycardia then resuming back to normal sinus rhythm. Patient is asymptomatic. Patient is also very lethargic

## 2019-08-03 NOTE — PROGRESS NOTE ADULT - SUBJECTIVE AND OBJECTIVE BOX
HPI: Pt admitted with 2 month H/o Left sided numbness and seen by out side Neurologist and sent to ED with Abnormal MRI scan     ROS:   Pt admitted with Left side Numbness arm and face  for 2 months seen by Dr. Servin and MRI done reported Rt side infarcts with Hemorrhagic component and Rt carotid stenosis or Occlusion. Admitted seen and evaluated by Dr. Melvin.  No new c/o today. Repeat CT done. No new changes. Pt wants to go home. No Headaches, dizziness ambulating. Seen by Vascular surgery and Cardiology. No intervention recommended. Medical management with Antiplatelet therapy. No change in her symptoms for 2 months.     MEDICATIONS  (STANDING):  atorvastatin 20 milliGRAM(s) Oral at bedtime  buprenorphine 8 mG/naloxone 2 mG SL  Tablet 1 Tablet(s) SubLingual <User Schedule>  clonazePAM  Tablet 1 milliGRAM(s) Oral at bedtime  cloNIDine 0.2 milliGRAM(s) Oral two times a day  gabapentin 300 milliGRAM(s) Oral two times a day  gabapentin 600 milliGRAM(s) Oral at bedtime  lamoTRIgine 150 milliGRAM(s) Oral two times a day  nicotine - 21 mG/24Hr(s) Patch 1 patch Transdermal daily  pantoprazole    Tablet 40 milliGRAM(s) Oral before breakfast  perphenazine 16 milliGRAM(s) Oral two times a day  venlafaxine 75 milliGRAM(s) Oral daily  venlafaxine XR. 150 milliGRAM(s) Oral daily      Vital Signs Last 24 Hrs  T(C): 36.4 (03 Aug 2019 05:18), Max: 36.9 (02 Aug 2019 15:22)  T(F): 97.5 (03 Aug 2019 05:18), Max: 98.5 (02 Aug 2019 15:22)  HR: 76 (03 Aug 2019 05:18) (62 - 88)  BP: 96/46 (03 Aug 2019 05:18) (93/50 - 119/68)  BP(mean): --  RR: 16 (03 Aug 2019 05:18) (14 - 17)  SpO2: 95% (03 Aug 2019 05:18) (94% - 97%)    Neurological exam:  HF: A x O x 3. Appropriately interactive, normal affect. Speech fluent, No Aphasia or paraphasic errors. Naming /repetition intact   CN: ARIA, EOMI, VFF, decreased facial sensation on left, no NLFD, tongue midline, Palate moves equally, SCM equal bilaterally, + dysarthria  Motor: No pronator drift, Strength 5/5 in all 4 ext, normal bulk and tone, no tremor, rigidity or bradykinesia.    Sens: Patchy decreased sensation on left upper extremity  Reflexes: Symmetric and normal . BJ 2+, BR 2+, KJ 2+, AJ 2+, downgoing toes b/l  Coord:  No FNFA, dysmetria, YULI intact                         11.7   5.17  )-----------( 260      ( 03 Aug 2019 07:59 )             35.5     08-03    139  |  104  |  8   ----------------------------<  102<H>  4.1   |  29  |  1.02    Ca    8.8      03 Aug 2019 07:59  Phos  4.4     08-03  Mg     2.5     08-03    TPro  7.0  /  Alb  3.7  /  TBili  0.2  /  DBili  x   /  AST  22  /  ALT  27  /  AlkPhos  83  08-03      Radiology report:  - CT Head  < from: CT Head No Cont (08.03.19 @ 08:37) >  IMPRESSION:    Small right MCA branch infarcts is trace petechial hemorrhage, stable    < from: CT Angio Neck w/ IV Cont (08.02.19 @ 17:32) >  IMPRESSION:      Focal eccentric high-grade stenosis of the proximal right internal   carotid artery of approximately 75%, most suspicious for a stenosis   related to dissection. There is normal reconstitution of flow distal to   this stenosis.    Rest of the cervical and intracranial arteries appear unremarkable.    Small focal area of hypoattenuation in the right centrum semiovale,   suggestive of a chronic appearing infarct. There is a small hyperdensity   in the region of hypoattenuation measuring approximately 0.6 x 0.4 cm,   and may reflect hemorrhage versus a calcified vessel, and comparison with   prior outside CT head imaging is needed.

## 2019-08-03 NOTE — PROGRESS NOTE ADULT - SUBJECTIVE AND OBJECTIVE BOX
Pt seen and evaluated with resident. Admitted for recently discovered R sided cortical stroke.   From questioning, symptoms of slurred speech and L hand weakness began 2 months ago, likely indicating the time of stroke. Symptoms have persisted without worsening or improvement.  Embolic workup demonstrates evidence of R proximal ICA mild stenosis with what likely is a chronic and occluded focal dissection  Previously on no medical therapy, now started on ASA  Pending carotid duplex  Also awaiting BRENTON to r/o cardiac embolic source    Plan:  Medical management of her carotid disease assuming duplex velocities are not in high grade range  Would also add statin  Will follow Vascular surgery attending:    Pt seen and evaluated with resident. Admitted for recently discovered R sided cortical stroke.   From questioning, symptoms of slurred speech and L hand weakness began 2 months ago, likely indicating the time of stroke. Symptoms have persisted without worsening or improvement.  Embolic workup demonstrates evidence of R proximal ICA mild stenosis with what likely is a chronic and occluded focal dissection  Previously on no medical therapy, now started on ASA  Pending carotid duplex  Also awaiting BR[place] to r/o cardiac embolic source    Plan:  Medical management of her carotid disease assuming duplex velocities are not in high grade range  Would also add statin  Encourage smoking cessation  Will follow

## 2019-08-03 NOTE — CONSULT NOTE ADULT - ASSESSMENT
Ms. Freeman is a 29 year old woman with left sided paresthesias found to have right sided infarcts related to carotid stenosis/dissection.  At this time there is a question of a hemorrhagic component in the right centrum semiovale.  The patient had a CT scan in June at [facility] and there was no report of hyperdensity which suggests that this is hemorrhage as opposed to calcification.  As per Dr. Mckenzie, MRI performed at [facility] today also raised question of hemorrhagic component.  Would get non-contrast head CT in AM for confirmation.  Hold off on anticoagulation and anti-platelets for now.  Vascular surgery consult.  Will sign out to Dr. Stack who will be covering neuro service as of 8/3/19.
30 yo F presenting with a stroke, subacute rather than acute. Found to have possible R ICA stenosis vs dissection.     - recc Neurology evaluation   - recc anti-platelet therapy (at least aspirin), but based on neurology comment on possible hemorrhagic conversion of stroke  - please obtain a carotid duplex study to visualize anatomy  - F/U ECHO, r/o endocarditis  - no acute vascular surgical intervention indicated at this time. Will follow along  - medical mgmt per primary team    Plan discussed with Dr. Orr
CVA- CT head recommended per neurology team.  Vascular team consultation for carotid artery occlusion vs dissection.  so far pt does not have any fever.  Recommend blood cx check if there is concern for endocarditis.  Check 2 D echo.  Rest of management per neurology team.    SVT- long RP. looks like AT vs AVRT.  one episode.  She is on clonidine  Will not contemplate any AVnodal blockers since she has episodes of bradycardia as well.'No concern for tachy johnathan syndrome now.  Observation on tele recommended.    Discussed plan of care with pt and mother at length.     Other medical issues- Management per primary team.    Thank you for allowing me to participate in the care of this patient. Please feel free to contact me with any questions.

## 2019-08-03 NOTE — PHYSICAL THERAPY INITIAL EVALUATION ADULT - GENERAL OBSERVATIONS, REHAB EVAL
Patient received in bed, +HM.  Patient denied pain, c/o L facial numbness and R hand numbness x 2 months.

## 2019-08-03 NOTE — PROGRESS NOTE ADULT - SUBJECTIVE AND OBJECTIVE BOX
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 of facial numbness and tingling on left side.  As per Pts boyfriend,  she is normally AAOx4 and speaks w/o difficulty however en route to ED had spurred speech and lethargic,  pt admitted  to taking Klonopin and clonidine due to 'nerves' and is now lethargic. History mostly taken from boyfriend. Pt expressed  concern over receiving more Klonopin and suboxone and clonidine for her anxiety and after explanation that due to hypotension and lethargy that sedative medications would be held, pt stated  she wanted to leave AMA. Pt later stayed for admission after in depth conversation with boyfriend and patient regarding increased risk of death and further complications. Pt declined further questions and physical exam by admitting physician  INTERVAL HPI/ OVERNIGHT EVENTS:  Chart reviewed, Pt was seen and examined, lethargic, wakes up, but has difficulty to stay alert and keep conversation, but answers qs appropriately.  Confirms that has L sided numbness of L face and UE for at least few months. Aware of stroke diagnosis. Asking about discharge. Explained that still has pending work up   Denies fevers ot chills, no recent illness     Vital Signs Last 24 Hrs  T(C): 36.8 (03 Aug 2019 11:33), Max: 36.8 (03 Aug 2019 11:33)  T(F): 98.2 (03 Aug 2019 11:33), Max: 98.2 (03 Aug 2019 11:33)  HR: 72 (03 Aug 2019 11:33) (62 - 83)  BP: 113/60 (03 Aug 2019 11:33) (93/50 - 113/60)  RR: 17 (03 Aug 2019 11:33) (14 - 17)  SpO2: 96% (03 Aug 2019 11:33) (94% - 97%)      REVIEW OF SYSTEMS:   Unable to fully obtain due to drowsiness     PHYSICAL EXAM:  General: Well developed; obese,  in no acute distress  Eyes: EOMI; conjunctiva and sclera clear  Head: Normocephalic; atraumatic  ENMT: No nasal discharge; airway clear  Neck: Supple; non tender; no masses  Respiratory: Good air entry. No wheezes, rales or rhonchi  Cardiovascular: Regular rate and rhythm. S1 and S2 Normal; No murmurs  Gastrointestinal: Soft non-tender non-distended; Normal bowel sounds  Genitourinary: No suprapubic  tenderness  Extremities: Normal range of motion, No  edema  Vascular: Peripheral pulses palpable 2+ bilaterally  Neurological: Alert and oriented x3, non focal, + l facial and UE numbness   Skin: Warm and dry. No acute rash  Musculoskeletal: Normal muscle  tone, without deformities  Psychiatric: Cooperative and appropriate    LAbs:                           11.7   5.17  )-----------( 260      ( 03 Aug 2019 07:59 )             35.5     03 Aug 2019 07:59    139    |  104    |  8      ----------------------------<  102    4.1     |  29     |  1.02     Ca    8.8        03 Aug 2019 07:59  Phos  4.4       03 Aug 2019 07:59  Mg     2.5       03 Aug 2019 07:59    TPro  7.0    /  Alb  3.7    /  TBili  0.2    /  DBili  x      /  AST  22     /  ALT  27     /  AlkPhos  83     03 Aug 2019 07:59    PT/INR - ( 03 Aug 2019 07:59 )   PT: 12.1 sec;   INR: 1.09 ratio         PTT - ( 03 Aug 2019 07:59 )  PTT:31.5 sec  CAPILLARY BLOOD GLUCOSE        LIVER FUNCTIONS - ( 03 Aug 2019 07:59 )  Alb: 3.7 g/dL / Pro: 7.0 gm/dL / ALK PHOS: 83 U/L / ALT: 27 U/L / AST: 22 U/L / GGT: x               MEDICATIONS  (STANDING):  aspirin  chewable 81 milliGRAM(s) Oral daily  atorvastatin 20 milliGRAM(s) Oral at bedtime  buprenorphine 8 mG/naloxone 2 mG SL  Tablet 1 Tablet(s) SubLingual <User Schedule>  clonazePAM  Tablet 1 milliGRAM(s) Oral at bedtime  cloNIDine 0.2 milliGRAM(s) Oral two times a day  gabapentin 300 milliGRAM(s) Oral two times a day  gabapentin 600 milliGRAM(s) Oral at bedtime  lamoTRIgine 150 milliGRAM(s) Oral two times a day  nicotine - 21 mG/24Hr(s) Patch 1 patch Transdermal daily  pantoprazole    Tablet 40 milliGRAM(s) Oral before breakfast  perphenazine 16 milliGRAM(s) Oral two times a day  venlafaxine 75 milliGRAM(s) Oral daily  venlafaxine XR. 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 of facial numbness and tingling on left side.  As per Pts boyfriend,  she is normally AAOx4 and speaks w/o difficulty however en route to ED had spurred speech and lethargic,  pt admitted  to taking Klonopin and clonidine due to 'nerves' and is now lethargic. History mostly taken from boyfriend. Pt expressed  concern over receiving more Klonopin and suboxone and clonidine for her anxiety and after explanation that due to hypotension and lethargy that sedative medications would be held, pt stated  she wanted to leave AMA. Pt later stayed for admission after in depth conversation with boyfriend and patient regarding increased risk of death and further complications. Pt declined further questions and physical exam by admitting physician  INTERVAL HPI/ OVERNIGHT EVENTS:  Chart reviewed, Pt was seen and examined, lethargic, wakes up, but has difficulty to stay alert and keep conversation, but answers qs appropriately.  Confirms that has L sided numbness of L face and UE for at least few months. Aware of stroke diagnosis. Asking about discharge. Explained that still has pending work up   Denies fevers ot chills, no recent illness     Vital Signs Last 24 Hrs  T(C): 36.8 (03 Aug 2019 11:33), Max: 36.8 (03 Aug 2019 11:33)  T(F): 98.2 (03 Aug 2019 11:33), Max: 98.2 (03 Aug 2019 11:33)  HR: 72 (03 Aug 2019 11:33) (62 - 83)  BP: 113/60 (03 Aug 2019 11:33) (93/50 - 113/60)  RR: 17 (03 Aug 2019 11:33) (14 - 17)  SpO2: 96% (03 Aug 2019 11:33) (94% - 97%)      REVIEW OF SYSTEMS:   Unable to fully obtain due to drowsiness     PHYSICAL EXAM:  General: Well developed; obese,  in no acute distress  Eyes: EOMI; conjunctiva and sclera clear  Head: Normocephalic; atraumatic  ENMT: No nasal discharge; airway clear  Neck: Supple; non tender; no masses  Respiratory: Good air entry. No wheezes, rales or rhonchi  Cardiovascular: Regular rate and rhythm. S1 and S2 Normal; No murmurs  Gastrointestinal: Soft non-tender non-distended; Normal bowel sounds  Genitourinary: No suprapubic  tenderness  Extremities: Normal range of motion, No  edema  Vascular: Peripheral pulses palpable 2+ bilaterally  Neurological: Alert and oriented x3, somnolent,  , + l facial and UE numbness   Skin: Warm and dry. No acute rash  Musculoskeletal: Normal muscle  tone, without deformities  Psychiatric: Cooperative and appropriate    LAbs:                           11.7   5.17  )-----------( 260      ( 03 Aug 2019 07:59 )             35.5     03 Aug 2019 07:59    139    |  104    |  8      ----------------------------<  102    4.1     |  29     |  1.02     Ca    8.8        03 Aug 2019 07:59  Phos  4.4       03 Aug 2019 07:59  Mg     2.5       03 Aug 2019 07:59    TPro  7.0    /  Alb  3.7    /  TBili  0.2    /  DBili  x      /  AST  22     /  ALT  27     /  AlkPhos  83     03 Aug 2019 07:59    PT/INR - ( 03 Aug 2019 07:59 )   PT: 12.1 sec;   INR: 1.09 ratio      PTT - ( 03 Aug 2019 07:59 )  PTT:31.5 sec    LIVER FUNCTIONS - ( 03 Aug 2019 07:59 )  Alb: 3.7 g/dL / Pro: 7.0 gm/dL / ALK PHOS: 83 U/L / ALT: 27 U/L / AST: 22 U/L / GGT: x               MEDICATIONS  (STANDING):  aspirin  chewable 81 milliGRAM(s) Oral daily  atorvastatin 20 milliGRAM(s) Oral at bedtime  buprenorphine 8 mG/naloxone 2 mG SL  Tablet 1 Tablet(s) SubLingual <User Schedule>  clonazePAM  Tablet 1 milliGRAM(s) Oral at bedtime  cloNIDine 0.2 milliGRAM(s) Oral two times a day  gabapentin 300 milliGRAM(s) Oral two times a day  gabapentin 600 milliGRAM(s) Oral at bedtime  lamoTRIgine 150 milliGRAM(s) Oral two times a day  nicotine - 21 mG/24Hr(s) Patch 1 patch Transdermal daily  pantoprazole    Tablet 40 milliGRAM(s) Oral before breakfast  perphenazine 16 milliGRAM(s) Oral two times a day  venlafaxine 75 milliGRAM(s) Oral daily  venlafaxine XR. 150 milliGRAM(s) Oral daily    MEDICATIONS  (PRN):  clonazePAM Oral Disintegrating Tablet 0.25 milliGRAM(s) Oral daily PRN anxiety        RADIOLOGY & ADDITIONAL TESTS:  < from: CT Head No Cont (08.03.19 @ 08:37) >  EXAM:  CT BRAIN                            PROCEDURE DATE:  08/03/2019          INTERPRETATION:  CT brain without contrast    History hemorrhagic infarct    Comparison yesterday    Mild right high parietal cytotoxic edema and trace petechial hemorrhage   is again noted, not significantly changed since the prior exam. There is   maturation of mild cytotoxic edema involving the posterior operculum   without hemorrhage. There is no mass effect or hydrocephalus or skull   fracture.    IMPRESSION:    Small right MCA branch infarcts is trace petechial hemorrhage, stable

## 2019-08-03 NOTE — CONSULT NOTE ADULT - SUBJECTIVE AND OBJECTIVE BOX
Patient is a 29y old  Female who presents with a chief complaint of CVA.     HPI:  Pt is a 28 yo female with a pmh/o Anxiety on clonidine bid, IVDA with heroin now on suboxone (last used in her 20's), ETOHism (last drink >1 yr ago), who presents on referral of Neurologist due to abnormal outpatient imaging which was performed due to >6mo. of facial numbness and tingling on left side.   Per neurolgy team documentation she had a MRi performed as outpt that showed R watershed infarct adn possible R ICA occlusion vs dissection.  Pt c/o Facial numbness and L UE numbness.  she denies any cardiac history in past.  Mother at bedside assisting in history.     PAST MEDICAL & SURGICAL HISTORY:  Cyst, breast  Transaminitis  Aneurysm of right internal carotid artery  Cerebrovascular accident (CVA)  Substance abuse  Anxiety  Depression  PTSD (post-traumatic stress disorder)  H/O breast biopsy      MEDICATIONS  (STANDING):  atorvastatin 20 milliGRAM(s) Oral at bedtime  buprenorphine 8 mG/naloxone 2 mG SL  Tablet 1 Tablet(s) SubLingual <User Schedule>  clonazePAM  Tablet 1 milliGRAM(s) Oral at bedtime  cloNIDine 0.2 milliGRAM(s) Oral two times a day  gabapentin 300 milliGRAM(s) Oral two times a day  gabapentin 600 milliGRAM(s) Oral at bedtime  lamoTRIgine 150 milliGRAM(s) Oral two times a day  nicotine - 21 mG/24Hr(s) Patch 1 patch Transdermal daily  pantoprazole    Tablet 40 milliGRAM(s) Oral before breakfast  perphenazine 16 milliGRAM(s) Oral two times a day  venlafaxine 75 milliGRAM(s) Oral daily  venlafaxine XR. 150 milliGRAM(s) Oral daily    MEDICATIONS  (PRN):  clonazePAM Oral Disintegrating Tablet 0.25 milliGRAM(s) Oral daily PRN anxiety      FAMILY HISTORY:  Medical history unknown: in father, unknown if living or   Family history of drug use: IN MOTHER, LIVING      SOCIAL HISTORY:  active smoker    REVIEW OF SYSTEMS:  CONSTITUTIONAL:    No fatigue, malaise, lethargy.  No fever or chills.  HEENT:  Eyes:  No visual changes.     ENT:  No epistaxis.  No sinus pain.    RESPIRATORY:  No cough.  No wheeze.  No hemoptysis.  No shortness of breath.  CARDIOVASCULAR:  No chest pains.  No palpitations. No shortness of breath, No orthopnea or PND.  GASTROINTESTINAL:  No abdominal pain.  No nausea or vomiting.    GENITOURINARY:    No hematuria.    MUSCULOSKELETAL:  No musculoskeletal pain.  No joint swelling.  No arthritis.  NEUROLOGICAL:  per HPI        Vital Signs Last 24 Hrs  T(C): 36.4 (03 Aug 2019 05:18), Max: 36.9 (02 Aug 2019 15:22)  T(F): 97.5 (03 Aug 2019 05:18), Max: 98.5 (02 Aug 2019 15:22)  HR: 76 (03 Aug 2019 05:18) (62 - 88)  BP: 96/46 (03 Aug 2019 05:18) (93/50 - 119/68)  BP(mean): --  RR: 16 (03 Aug 2019 05:18) (14 - 17)  SpO2: 95% (03 Aug 2019 05:18) (94% - 97%)    PHYSICAL EXAM-    Constitutional: The patient appears to be normal, well developed, well nourished and alert and oriented to time, place and person. The patient does not appear acutely ill.      Head: Head is normocephalic and atraumatic.      Neck:  No JVD.     Cardiovascular: Regular rate and rhythm without S3, S4. No murmurs or rubs are appreciated.      Respiratory: Breathsounds are normal. No rales. No wheezing.    Abdomen: Soft, nontender, nondistended with positive bowel sounds.      Extremity: No tenderness. No  pitting edema     Neurologic: The patient is alert and oriented.      Skin: No rash, no obvious lesions noted.      Psychiatric: The patient appears to be emotionally stable. Flat effect       INTERPRETATION OF TELEMETRY: Sr and sinus johnathan /min , SVT    ECG: Sinus rythm , L axis     I&O's Detail      LABS:                        11.7   5.17  )-----------( 260      ( 03 Aug 2019 07:59 )             35.5     08-03    139  |  104  |  8   ----------------------------<  102<H>  4.1   |  29  |  1.02    Ca    8.8      03 Aug 2019 07:59  Phos  4.4     08-03  Mg     2.5     08-03    TPro  7.0  /  Alb  3.7  /  TBili  0.2  /  DBili  x   /  AST  22  /  ALT  27  /  AlkPhos  83  08-03        PT/INR - ( 03 Aug 2019 07:59 )   PT: 12.1 sec;   INR: 1.09 ratio         PTT - ( 03 Aug 2019 07:59 )  PTT:31.5 sec    I&O's Summary    BNP  RADIOLOGY & ADDITIONAL STUDIES:
Patient is a 30 yo F who was sent to the ED by her neurologist due to MRI findings of stroke, possible hemorrhagic component. Patient and partner present to give history. She states that she has been feeling left face and left hand numbness for several months, as well as left sided weakness. She was being worked up outpatient by her neurologist (Dr. Durán) to r/o MS, and was getting an outpatient MRI performed today. She has not had any acute changes in her symptoms in the past few hours/days. Patient states she currently has a headache, but no vision changes or neck pain. She is able to ambulate, although with occasional imbalance.     PAST MEDICAL & SURGICAL HISTORY:  Cyst, breast  Transaminitis  Aneurysm of right internal carotid artery  Cerebrovascular accident (CVA)  Substance abuse (10yrs ago)  Anxiety  Depression  PTSD (post-traumatic stress disorder)  H/O breast biopsy    Vital Signs Last 24 Hrs  T(C): 36.6 (02 Aug 2019 18:23), Max: 36.9 (02 Aug 2019 15:22)  T(F): 97.8 (02 Aug 2019 18:23), Max: 98.5 (02 Aug 2019 15:22)  HR: 74 (02 Aug 2019 19:58) (72 - 88)  BP: 100/63 (02 Aug 2019 19:58) (96/61 - 119/68)  BP(mean): --  RR: 16 (02 Aug 2019 19:42) (16 - 17)  SpO2: 97% (02 Aug 2019 19:42) (97% - 97%)    Gen: NAD, AAOx3  HEENT: PEARLA, EOMI, no facial droop noted  CV: RRR, normal S1 and S2  Pulm: CTAB, no wheezing  Abd: soft, obese, non tender  Ext: 4/5 strength LUE, 5/5 strength RUE/BLE, warm, well perfused                          12.6   6.42  )-----------( 300      ( 02 Aug 2019 15:56 )             37.8   08-02    135  |  101  |  10  ----------------------------<  129<H>  4.6   |  26  |  1.19    Ca    9.2      02 Aug 2019 15:56    TPro  7.7  /  Alb  3.8  /  TBili  0.3  /  DBili  x   /  AST  39<H>  /  ALT  26  /  AlkPhos  90  08-02          < from: CT Angio Neck w/ IV Cont (08.02.19 @ 17:32) >  EXAM:  CT ANGIO NECK (W)AW IC                          EXAM:  CT ANGIO BRAIN (W)AW IC                            PROCEDURE DATE:  08/02/2019          INTERPRETATION:  Exam Date: 8/2/2019 5:31 PM    Three examinations were performed on this patient:  1. CT Angiography of the carotid arteries with and without IV contrast   2. CT Angiography of the intracranial circulation with and without IV   contrast      CLINICAL INFORMATION:  Weakness    TECHNIQUE:   Preceding intravenous contrast contiguous axial 4 mm   sections were obtained through the head. CT angiography images at 1 mm   thickness were acquired from the aortic arch to the vertex of the skull.   3-D reformatted images were obtained. Images were acquired during rapid   bolus intravenous administration of 90 mL of Omnipaque 350 contrast with   10 mL discarded.  This data set was reconstructed axial 1 mm sections.   Post processing angiographic 3D MIP reconstruction of images was   performed. This data set was  reconstructed  and reviewed in multiple   projections to evaluate carotid morphology and intracranial vessels.    This scan was performed using automatic exposure control (radiation dose   reduction software) to obtain a diagnostic image quality scan with   patient dose aslow as reasonably achievable.    FINDINGS:   No previous examinations are available for review.    The right common carotid artery is unremarkable. There is a focal   eccentric high-grade stenosis of the proximal right internal carotid   artery of approximately 75%, most suspicious for a stenosis related to   dissection. There is normal reconstitution of flow distal to this   stenosis. The right external carotid artery is unremarkable.    The left common carotid artery, left carotid bulb, left internal carotid   artery, and left external carotid artery are unremarkable without   hemodynamically significant stenosis or dissection.    Bilateral cervical vertebral arteries appear unremarkable without   hemodynamically significant stenosis or dissection.    Intracranial vertebral arteries are intact without evidence of dissection   or hemodynamically significant stenosis. The basilar artery and posterior   cerebral arteries and are normal without hemodynamically significant   stenosis. Bilateral superior cerebellar arteries are intact. The   intracranial internal carotid arteries, middle cerebral arteries, and   anterior cerebral arteries are intact without hemodynamically significant   stenosis.  There is no evidence of aneurysm or vascular malformation.    Small focal area of hypoattenuation in the right centrum semiovale,   suggestive of a chronic appearing infarct. There is a small hyperdensity   in the region of hypoattenuation measuring approximately 0.6 x 0.4 cm,   and may reflect hemorrhage versus a calcified vessel, and comparison with   prior outside CT head imaging is needed. No evidence of midline shift.    The ventricles, sulci and basal cisterns appear unremarkable.      The paranasal sinuses are clear.        IMPRESSION:      Focal eccentric high-grade stenosis of the proximal right internal   carotid artery of approximately 75%, most suspicious for a stenosis   related to dissection. There is normal reconstitution of flow distal to   this stenosis.    Rest of the cervical and intracranial arteries appear unremarkable.    Small focal area of hypoattenuation in the right centrum semiovale,   suggestive of a chronic appearing infarct. There is a small hyperdensity   in the region of hypoattenuation measuring approximately 0.6 x 0.4 cm,   and may reflect hemorrhage versus a calcified vessel, and comparison with   prior outside CT head imaging is needed.     < end of copied text >
Patient is a 29y old  Female who presents with a chief complaint of numbness.    HPI: Ms. Freeman is a 29 year old woman who was sent to the ED by Dr. Mckenzie. She recently saw Dr. Mckenzie for numbness of her left face and upper extremity.  She was sent for MRI brain and c-spine with and without contrast to rule out demyelinating disease.  She had MRIs today at [facility] which showed right sided watershed infarcts with a possible hemorrhagic component and possible right carotid occlusion vs dissection. She was sent to ED for CTA head and neck which confirms high grade stenosis of right carotid artery and possible dissection.  The patient denies any new symptoms since her symptoms first began about 1.5 months ago.       PAST MEDICAL & SURGICAL HISTORY:  Substance abuse  Anxiety  Depression  PTSD (post-traumatic stress disorder)  No significant past surgical history      FAMILY HISTORY: maternal cousin with MS      Social Hx:  denies current etoh use. + cigarette + vape use    MEDICATIONS  (STANDING):  Effexor 225 mg daily  Lamotrigine 150 mg BID  gabapentin 300-300-600  clonidine  Klonopin 0.25 mg in the afternoon and 1 mg at night  suboxone 24 mg daily       Allergies    Geodon (Other)  Seroquel (Unknown)  Zyprexa (Other)    Intolerances        ROS: Pertinent positives in HPI, all other ROS were reviewed and are negative.      Vital Signs Last 24 Hrs  T(C): 36.9 (02 Aug 2019 15:22), Max: 36.9 (02 Aug 2019 15:22)  T(F): 98.5 (02 Aug 2019 15:22), Max: 98.5 (02 Aug 2019 15:22)  HR: 88 (02 Aug 2019 15:22) (88 - 88)  BP: 119/68 (02 Aug 2019 15:22) (119/68 - 119/68)  BP(mean): --  RR: 17 (02 Aug 2019 15:22) (17 - 17)  SpO2: 97% (02 Aug 2019 15:22) (97% - 97%)        Constitutional: awake and alert.  HEENT: PERRLA, EOMI,   Neck: Supple.  Respiratory: Breath sounds are clear bilaterally  Cardiovascular: S1 and S2, regular / irregular rhythm  Gastrointestinal: soft, nontender  Extremities:  no edema  Vascular: Caritid Bruit - no  Musculoskeletal: no joint swelling/tenderness, no abnormal movements  Skin: No rashes    Neurological exam:  HF: A x O x 3. Appropriately interactive, normal affect. Speech fluent, No Aphasia or paraphasic errors. Naming /repetition intact   CN: ARIA, EOMI, VFF, decreased facial sensation on left, no NLFD, tongue midline, Palate moves equally, SCM equal bilaterally, + dysarthria  Motor: No pronator drift, Strength 5/5 in all 4 ext, normal bulk and tone, no tremor, rigidity or bradykinesia.    Sens: Patchy decreased sensation on left upper extremity  Reflexes: Symmetric and normal . BJ 2+, BR 2+, KJ 2+, AJ 2+, downgoing toes b/l  Coord:  No FNFA, dysmetria, YULI intact     Labs:   08-02    135  |  101  |  10  ----------------------------<  129<H>  4.6   |  26  |  1.19    Ca    9.2      02 Aug 2019 15:56    TPro  7.7  /  Alb  3.8  /  TBili  0.3  /  DBili  x   /  AST  39<H>  /  ALT  26  /  AlkPhos  90  08-02                              12.6   6.42  )-----------( 300      ( 02 Aug 2019 15:56 )             37.8       Radiology:    Focal eccentric high-grade stenosis of the proximal right internal   carotid artery of approximately 75%, most suspicious for a stenosis   related to dissection. There is normal reconstitution of flow distal to   this stenosis.    Rest of the cervical and intracranial arteries appear unremarkable.    Small focal area of hypoattenuation in the right centrum semiovale,   suggestive of a chronic appearing infarct. There is a small hyperdensity   in the region of hypoattenuation measuring approximately 0.6 x 0.4 cm,   and may reflect hemorrhage versus a calcified vessel, and comparison with   prior outside CT head imaging is needed.

## 2019-08-04 VITALS
SYSTOLIC BLOOD PRESSURE: 140 MMHG | HEART RATE: 99 BPM | RESPIRATION RATE: 18 BRPM | DIASTOLIC BLOOD PRESSURE: 86 MMHG | OXYGEN SATURATION: 99 %

## 2019-08-04 DIAGNOSIS — F11.20 OPIOID DEPENDENCE, UNCOMPLICATED: ICD-10-CM

## 2019-08-04 DIAGNOSIS — G46.4 CEREBELLAR STROKE SYNDROME: ICD-10-CM

## 2019-08-04 DIAGNOSIS — F43.10 POST-TRAUMATIC STRESS DISORDER, UNSPECIFIED: ICD-10-CM

## 2019-08-04 DIAGNOSIS — F33.42 MAJOR DEPRESSIVE DISORDER, RECURRENT, IN FULL REMISSION: ICD-10-CM

## 2019-08-04 LAB
AMMONIA BLD-MCNC: 36 UMOL/L — HIGH (ref 11–32)
ANION GAP SERPL CALC-SCNC: 4 MMOL/L — LOW (ref 5–17)
BUN SERPL-MCNC: 8 MG/DL — SIGNIFICANT CHANGE UP (ref 7–23)
CALCIUM SERPL-MCNC: 9.2 MG/DL — SIGNIFICANT CHANGE UP (ref 8.5–10.1)
CHLORIDE SERPL-SCNC: 104 MMOL/L — SIGNIFICANT CHANGE UP (ref 96–108)
CO2 SERPL-SCNC: 31 MMOL/L — SIGNIFICANT CHANGE UP (ref 22–31)
CREAT SERPL-MCNC: 1.16 MG/DL — SIGNIFICANT CHANGE UP (ref 0.5–1.3)
GLUCOSE SERPL-MCNC: 105 MG/DL — HIGH (ref 70–99)
POTASSIUM SERPL-MCNC: 4 MMOL/L — SIGNIFICANT CHANGE UP (ref 3.5–5.3)
POTASSIUM SERPL-SCNC: 4 MMOL/L — SIGNIFICANT CHANGE UP (ref 3.5–5.3)
SODIUM SERPL-SCNC: 139 MMOL/L — SIGNIFICANT CHANGE UP (ref 135–145)

## 2019-08-04 PROCEDURE — 99233 SBSQ HOSP IP/OBS HIGH 50: CPT

## 2019-08-04 PROCEDURE — 90792 PSYCH DIAG EVAL W/MED SRVCS: CPT

## 2019-08-04 RX ORDER — BUPRENORPHINE AND NALOXONE 2; .5 MG/1; MG/1
1 TABLET SUBLINGUAL
Refills: 0 | Status: DISCONTINUED | OUTPATIENT
Start: 2019-08-04 | End: 2019-08-04

## 2019-08-04 RX ORDER — GABAPENTIN 400 MG/1
300 CAPSULE ORAL THREE TIMES A DAY
Refills: 0 | Status: DISCONTINUED | OUTPATIENT
Start: 2019-08-04 | End: 2019-08-04

## 2019-08-04 RX ORDER — CLONAZEPAM 1 MG
0.5 TABLET ORAL EVERY 12 HOURS
Refills: 0 | Status: DISCONTINUED | OUTPATIENT
Start: 2019-08-04 | End: 2019-08-04

## 2019-08-04 RX ORDER — PERPHENAZINE 8 MG/1
16 TABLET, FILM COATED ORAL AT BEDTIME
Refills: 0 | Status: DISCONTINUED | OUTPATIENT
Start: 2019-08-04 | End: 2019-08-04

## 2019-08-04 RX ADMIN — LAMOTRIGINE 150 MILLIGRAM(S): 25 TABLET, ORALLY DISINTEGRATING ORAL at 05:45

## 2019-08-04 RX ADMIN — Medication 0.5 MILLIGRAM(S): at 17:53

## 2019-08-04 RX ADMIN — Medication 75 MILLIGRAM(S): at 12:45

## 2019-08-04 RX ADMIN — GABAPENTIN 300 MILLIGRAM(S): 400 CAPSULE ORAL at 05:44

## 2019-08-04 RX ADMIN — Medication 0.2 MILLIGRAM(S): at 17:50

## 2019-08-04 RX ADMIN — GABAPENTIN 300 MILLIGRAM(S): 400 CAPSULE ORAL at 17:53

## 2019-08-04 RX ADMIN — Medication 1 PATCH: at 12:45

## 2019-08-04 RX ADMIN — BUPRENORPHINE AND NALOXONE 1 TABLET(S): 2; .5 TABLET SUBLINGUAL at 17:48

## 2019-08-04 RX ADMIN — Medication 81 MILLIGRAM(S): at 12:46

## 2019-08-04 RX ADMIN — Medication 1 PATCH: at 05:51

## 2019-08-04 RX ADMIN — Medication 150 MILLIGRAM(S): at 12:45

## 2019-08-04 RX ADMIN — GABAPENTIN 300 MILLIGRAM(S): 400 CAPSULE ORAL at 17:52

## 2019-08-04 RX ADMIN — PERPHENAZINE 16 MILLIGRAM(S): 8 TABLET, FILM COATED ORAL at 05:44

## 2019-08-04 RX ADMIN — BUPRENORPHINE AND NALOXONE 1 TABLET(S): 2; .5 TABLET SUBLINGUAL at 08:27

## 2019-08-04 RX ADMIN — PANTOPRAZOLE SODIUM 40 MILLIGRAM(S): 20 TABLET, DELAYED RELEASE ORAL at 05:44

## 2019-08-04 RX ADMIN — PERPHENAZINE 16 MILLIGRAM(S): 8 TABLET, FILM COATED ORAL at 17:50

## 2019-08-04 RX ADMIN — LAMOTRIGINE 150 MILLIGRAM(S): 25 TABLET, ORALLY DISINTEGRATING ORAL at 17:49

## 2019-08-04 NOTE — BEHAVIORAL HEALTH ASSESSMENT NOTE - RISK ASSESSMENT
MODERATE long term risk, considering hx fo multiple hospitalizations, hx of a SA, psychiatric illness, chr anxiety and insomnia.   On protective side pt is complaint with treatment, sees her psychiatrist, Has supportive network, residential stability,. no current SI, No behavioral disturbances, future oriented, wants help.

## 2019-08-04 NOTE — PROGRESS NOTE ADULT - SUBJECTIVE AND OBJECTIVE BOX
Patient is a 29y old  Female who presents with a chief complaint of CVA.     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 of facial numbness and tingling on left side.   Per neurolgy team documentation she had a MRi performed as outpt that showed R watershed infarct adn possible R ICA occlusion vs dissection.  8/3-   Pt c/o Facial numbness and L UE numbness.  she denies any cardiac history in past.  Mother at bedside assisting in history.     - pt seen and examined by me this am. She is somnolent but arousable.  Per staff when pts boyfriend comes she had been doing vap smoking yesterday and her HR noted to increase at that time on tele.    PAST MEDICAL & SURGICAL HISTORY:  Cyst, breast  Transaminitis  Aneurysm of right internal carotid artery  Cerebrovascular accident (CVA)  Substance abuse  Anxiety  Depression  PTSD (post-traumatic stress disorder)  H/O breast biopsy      MEDICATIONS  (STANDING):  atorvastatin 20 milliGRAM(s) Oral at bedtime  buprenorphine 8 mG/naloxone 2 mG SL  Tablet 1 Tablet(s) SubLingual <User Schedule>  clonazePAM  Tablet 1 milliGRAM(s) Oral at bedtime  cloNIDine 0.2 milliGRAM(s) Oral two times a day  gabapentin 300 milliGRAM(s) Oral two times a day  gabapentin 600 milliGRAM(s) Oral at bedtime  lamoTRIgine 150 milliGRAM(s) Oral two times a day  nicotine - 21 mG/24Hr(s) Patch 1 patch Transdermal daily  pantoprazole    Tablet 40 milliGRAM(s) Oral before breakfast  perphenazine 16 milliGRAM(s) Oral two times a day  venlafaxine 75 milliGRAM(s) Oral daily  venlafaxine XR. 150 milliGRAM(s) Oral daily    MEDICATIONS  (PRN):  clonazePAM Oral Disintegrating Tablet 0.25 milliGRAM(s) Oral daily PRN anxiety      FAMILY HISTORY:  Medical history unknown: in father, unknown if living or   Family history of drug use: IN MOTHER, LIVING      SOCIAL HISTORY:  active smoker    REVIEW OF SYSTEMS:  CONSTITUTIONAL:    No fatigue, malaise, lethargy.  No fever or chills.  HEENT:  Eyes:  No visual changes.     ENT:  No epistaxis.  No sinus pain.    RESPIRATORY:  No cough.  No wheeze.  No hemoptysis.  No shortness of breath.  CARDIOVASCULAR:  No chest pains.  No palpitations. No shortness of breath, No orthopnea or PND.  GASTROINTESTINAL:  No abdominal pain.  No nausea or vomiting.    GENITOURINARY:    No hematuria.    MUSCULOSKELETAL:  No musculoskeletal pain.  No joint swelling.  No arthritis.  NEUROLOGICAL:  per HPI    ICU Vital Signs Last 24 Hrs  T(C): 36.7 (04 Aug 2019 10:44), Max: 36.9 (03 Aug 2019 22:22)  T(F): 98.1 (04 Aug 2019 10:44), Max: 98.5 (03 Aug 2019 22:22)  HR: 68 (04 Aug 2019 10:44) (54 - 74)  BP: 107/56 (04 Aug 2019 10:44) (105/54 - 121/64)  BP(mean): --  ABP: --  ABP(mean): --  RR: 18 (04 Aug 2019 10:44) (17 - 18)  SpO2: 99% (04 Aug 2019 10:44) (96% - 99%)      PHYSICAL EXAM-    Constitutional: no acute distress , somnolent      Head: Head is normocephalic and atraumatic.      Neck:  No JVD.     Cardiovascular: Regular rate and rhythm without S3, S4. No murmurs or rubs are appreciated.      Respiratory: Breathsounds are normal. No rales. No wheezing.    Abdomen: Soft, nontender, nondistended with positive bowel sounds.      Extremity: No tenderness. No  pitting edema     Neurologic: The patient is alert and oriented.      Skin: No rash, no obvious lesions noted.      Psychiatric: The patient appears to be emotionally stable. Flat effect       INTERPRETATION OF TELEMETRY: Sr and sinus johnathan /min , SVT- long RP tachycardia     ECG: Sinus rythm , L axis     I&O's Detail      LABS:                                   11.7   5.17  )-----------( 260      ( 03 Aug 2019 07:59 )             35.5     08-04    139  |  104  |  8   ----------------------------<  105<H>  4.0   |  31  |  1.16    Ca    9.2      04 Aug 2019 06:47  Phos  4.4     08-  Mg     2.5     08-    TPro  7.0  /  Alb  3.7  /  TBili  0.2  /  DBili  x   /  AST  22  /  ALT  27  /  AlkPhos  83  08-03        LIVER FUNCTIONS - ( 03 Aug 2019 07:59 )  Alb: 3.7 g/dL / Pro: 7.0 gm/dL / ALK PHOS: 83 U/L / ALT: 27 U/L / AST: 22 U/L / GGT: x           PT/INR - ( 03 Aug 2019 07:59 )   PT: 12.1 sec;   INR: 1.09 ratio         PTT - ( 03 Aug 2019 07:59 )  PTT:31.5 sec  08- Chol 209<H> <H> HDL 27<L> Trig 249<H>         PTT - ( 03 Aug 2019 07:59 )  PTT:31.5 sec    I&O's Summary    BNP  RADIOLOGY & ADDITIONAL STUDIES:  < from: Transthoracic Echocardiogram (19 @ 21:32) >     EXAM:  ECHO TTE WO CON COMP W DOP         PROCEDURE DATE:  2019        INTERPRETATION:  Transthoracic Echocardiography Report (TTE)     Demographics     Patient name        BRYAN GREEN      Age           29 year(s)     Med Rec #           [id number]         Gender        Female     Account #           [id number]           Date of Birth 1990     Interpreting        Fer Gomez MD     Room Number   0001   Physician     Referring Physician PROMISE HIGGINS Sonographer   Jason Russo                                                       [facility]     Date of study       2019 08:19                       PM     Height              68.9 in           Weight        209.44 pounds    Type of Study:     TTE procedure: ECHO TTE WO CON COMP W DOP     BP: 96/46 mmHg     [facility]    Indications   1) I63.9 - Cerebral infarction    M-Mode Measurements (cm)     LVEDd: 4.55 cm              LVESd: 3 cm   IVSEd: 0.94 cm   LVPWd: 0.72 cm              AO Root Dimension: 3 cm                               ACS: 1.9 cm                               LA: 3.8 cm    Doppler Measurements:     AV Velocity:133 cm/s                MV Peak E-Wave: 91.8 cm/s   AV Peak Gradient: 7.08 mmHg         MV Peak A-Wave: 56.8 cm/s                                       MV E/A Ratio: 1.62 %                                       MV Peak Gradient: 3.37 mmHg     Findings     Mitral Valve   Normal appearing mitral valve structure and function.   Mild (1+) mitral regurgitation is present.     Aortic Valve   Normal aortic valve structure and function.     Tricuspid Valve   Normal appearing tricuspid valve structure and function.     Pulmonic Valve   Normal appearing pulmonic valve structure and function.   Trace pulmonic valvular regurgitation is present.     Left Atrium   Normal appearing left atrium.     Left Ventricle   The left ventricle is normal in size, wall thickness, wall motion and   contractility.   Estimated left ventricular ejection fraction is 55 -60%.     Right Atrium   Normal appearing right atrium.     Right Ventricle   Normal appearing right ventricle structure and function.     Pericardial Effusion   No evidence of pericardial effusion.     Pleural Effusion   No evidence of pleural effusion.     Miscellaneous   The IVC appears normal.     Impression     Summary     The left ventricle is normal in size, wall thickness, wall motion and   contractility.   Estimated left ventricular ejection fraction is 55 -60%.   Normal appearing left atrium.   Normalappearing right atrium.   Normal appearing right ventricle structure and function.   Normal aortic valve structure and function.   Normal appearing mitral valve structure and function.   Mild (1+) mitral regurgitation is present.   Normal appearing tricuspid valve structure and function.   No evidence of pericardial effusion.     Signature     ----------------------------------------------------------------   Electronically signed by Fer Gomez MD(Interpreting physician)   on 2019 02:14 PM   ----------------------------------------------------------------    < end of copied text >  < from: Transthoracic Echocardiogram (19 @ 21:32) >

## 2019-08-04 NOTE — PROGRESS NOTE ADULT - SUBJECTIVE AND OBJECTIVE BOX
No new complaints. Continued Left upper extremity weakness, pending carotid duplex.    Vital Signs Last 24 Hrs  T(C): 36.7 (04 Aug 2019 05:40), Max: 36.9 (03 Aug 2019 22:22)  T(F): 98 (04 Aug 2019 05:40), Max: 98.5 (03 Aug 2019 22:22)  HR: 54 (04 Aug 2019 05:40) (54 - 74)  BP: 105/54 (04 Aug 2019 05:40) (105/54 - 121/64)  BP(mean): --  RR: 18 (04 Aug 2019 05:40) (17 - 18)  SpO2: 98% (04 Aug 2019 05:40) (96% - 98%)    Gen: NAD, AAOx3  HEENT: PEARLA, EOMI, no facial droop noted  CV: RRR, normal S1 and S2  Pulm: CTAB, no wheezing  Abd: soft, obese, non tender  Ext: 4/5 strength LUE, 5/5 strength RUE/BLE, warm, well perfused

## 2019-08-04 NOTE — BEHAVIORAL HEALTH ASSESSMENT NOTE - NSBHCONSULTRECOMMENDOTHER_PSY_A_CORE FT
change Klonopin to 0.5 mg po q 12.   d/c Klonpin PRN,   Lower neurontin to 300mg po TID,     Continue Effexor  mg po qd,   Continue Trilafon 16 mg po BID   Continue Lamictal 150mg po BID

## 2019-08-04 NOTE — BEHAVIORAL HEALTH ASSESSMENT NOTE - HPI (INCLUDE ILLNESS QUALITY, SEVERITY, DURATION, TIMING, CONTEXT, MODIFYING FACTORS, ASSOCIATED SIGNS AND SYMPTOMS)
Pt is a 29 YOWSW with hx fo PTSD, MDD, anxiety, ON Suboxone, hx fo multiple admission (per pt 15-20) in the past, including [facility] and HH (5 years ago) and one SA at age 16, admitted to medicine s/p stroke wit residual left side face and arm numbness and tingling. Pt is followed by Dr. Anton in [facility], she takes Effexor XR, Lamictal, Neurontin, Klonopin, Perphenazine (Trilafon) , Clonidine, Suboxone   She denies feeling depressed , hopeless, helpless, denies appetite problems sleep is interrupted. Denies any type of SI, HI, AH, Vh. Denies PI.   Feels tired and sleepy , thinks it is from medication.   Reports compliance,. Pt is a 29 YOWSW with hx fo PTSD, MDD, anxiety, ON Suboxone8/2mg BID, hx fo multiple admission (per pt 15-20) in the past, including [facility] and HH (5 years ago) and one SA at age 16, admitted to medicine s/p stroke wit residual left side face and arm numbness and tingling. Pt is followed by Dr. Anton in [facility], she takes Effexor XR 225mg pom qd, Lamictal 150mg PO BID, Neurontin 300mg BID and 600mg at HS, Klonopin 1mg po QHS and 0.25 mg PRN anxiety, , Perphenazine (Trilafon)  16mg po BID , Clonidine 0.4 mg, Suboxone 8mg/2mg BID   She denies feeling depressed , hopeless, helpless, denies appetite problems sleep is interrupted. Denies any type of SI, HI, AH, Vh. Denies PI.   Feels tired and sleepy , thinks it is from medication.   Reports compliance,.

## 2019-08-04 NOTE — PROGRESS NOTE ADULT - ASSESSMENT
1. CVA- CT head findings showed R MCA infarct.  Vascular team consultation for  R carotid artery occlusion vs dissection reviewed.  Carotid US pending.   so far pt does not have any fever.  Recommend blood cx check if there is concern for endocarditis.  2 D echo did not reveal any intracardiac masses.  I discussed the case and the need for BRENTON extensively with Dr Stack this am - neurology attending.  She expressed the need for BRENTON and hypercoaguable workup for this pt.  Keep pt NPO PMN.  Not clear if pt will be able to consent with her somnolence.     2. SVT- long RP. looks like AT vs AVRT.  She is on clonidine  Will not contemplate any AVnodal blockers since she has episodes of bradycardia as well. No concern for tachy johnathan syndrome now.  Observation on tele recommended.  Smoking cessation of the vap cigarettes here recommend - not clear of the content of these.     3. Neurology team recommending psychiatry evaluation to adjust her psych meds.     Other medical issues- Management per primary team.    Thank you for allowing me to participate in the care of this patient. Please feel free to contact me with any questions.
· Assessment		  Ms. Freeman is a 29 year old woman with left sided paresthesias found to have right sided infarcts related to carotid stenosis/dissection.  Repeat CT brain done this morning + ve for small RT MCA branch infarcts with petechial  hemorrhage  which is stable.  Seen by Vascular surgery and recommended no surgical intervention but medical management.  Pt appears  Lethargic today Medication effect / underlying neurological pathology  REc start on Aspirin 81 mg daily if no other contraindication.  Pt needs Carotid dopplers per vascular surgery.  Rec Repeat MRI with and without conrast  BRENTON to be done prior to D/c.  Psych consult for adjusting meds.  reduce Suboxone dose.  OOB ambulate Patient.  Discussed with Pt and Gil Booth
28 yo F presenting with subacute stroke. Found to have possible R ICA stenosis vs dissection on CTA    - Continue medical management  - f/u Neurology recs  - recc anti-platelet therapy if cleared by neurology  - Recommend statin  - Smoking cessation  - please obtain a carotid duplex study to visualize anatomy  - F/U ECHO, r/o endocarditis  - no acute vascular surgical intervention indicated at this time. Will follow along  - medical mgmt per primary team    Discussed with Dr. Orr
28 yo F presenting with subacute stroke. Found to have possible R ICA stenosis vs dissection on CTA    - f/u Neurology recs  - recc anti-platelet therapy if cleared by neurology  - please obtain a carotid duplex study to visualize anatomy  - F/U ECHO, r/o endocarditis  - no acute vascular surgical intervention indicated at this time. Will follow along  - medical mgmt per primary team    Discussed with Dr. Orr
Ms. Freeman is a 29 year old woman with left sided paresthesias found to have right sided infarcts related to carotid stenosis/dissection.  Repeat CT brain done this morning + ve for small RT MCA branch infarcts with petechial  hemorrhage  which is stable.  Seen by Vascular surgery and recommended no surgical intervention but medical management.   REc start on Aspirin 81 mg daily if no other contraindication.  Pt needs Carotid dopplers per vascular surgery.  BRENTON/ Hyper coag w/u can be done as out Pt.  OOB ambulate Patient.  Needs f/u with her Neurologist after d/c.  Discussed with Pt and Hospitalist.
30 yo female who is an everyday smoker and has a h/o IVDA in the past, who is admitted for chronic  CVA with micro hemorrhage and chronic proximal R ICA high grade stenosis/dissection.        1. Cerebrovascular accident (CVA), not new, with small  petechial hemorrhage, likely new   due to R ICA stenosis/dissection, r/o embolic etiology   Tele: episodes of PAT   TTE neg for vegetation. Normal EF, + 1MR   CT head repeat: stable   Neurochecks q 4 hrs  hold sedative medications in setting of lethargy  lipid panel reviewed,  HLD, Pt was started on Lipitor   HbA1c pending   Will order ESR/CRP, RORO, Lupus work up. BCX sent   D/w DR Stack, start low dose ASA, will need hypercoagulable work up  outPt   CArdio eval appreciated, will continue to monitor on tele as Pt has paroxysms ot atrial tachycardia . BRENTON?         2Aneurysm of right internal carotid artery.    US carotid ordered  Started on ASA/statins   VAscular f/u appreciated         3. Altered mentation   unlcear etiology, mental status waxing and waning, meds misuse?   Klonopin was held yesterday   tox screen sent this am neg? Will re send    Pt reports that takes her Klonopin daily   R/o infection Check UA and CXR, BCX sent   Check ammonia level     4.  Elevated glucose,  w/o h/o DMII  f/u HbA1c      5. Minimal elevation of AST  back to normal this am   f/u hepatitis panel     6. Substance abuse.    cont suboxone if not lethargic   SW consult  Nicotine patch   cessation counseling refused by pt for tobacco.     7. Anxiety.  Cont klonopin as directed  clonidine 0.2 bid being used by pt for anxiety   QUIN borderline but improved       8.  Depression, unspecified depression type.  Plan: cont venlafaxine  SW consulted.       Dispo: c/w tele, labs in am,  check UA and Utox, carotis duplex
30 yo female who is an everyday smoker and has a h/o IVDA in the past, who is admitted for chronic  CVA with micro hemorrhage and chronic proximal R ICA high grade stenosis/dissection.        1. Cerebrovascular accident (CVA), not acute,  with small  petechial hemorrhage, likely new   due to R ICA stenosis/dissection, r/o embolic etiology   Tele: episodes of PAT    TTE neg for vegetation. Normal EF, + 1MR   CT head repeat: stable   Neurochecks q 4 hrs  hold sedative medications in setting of lethargy  lipid panel reviewed,  HLD, Pt was started on Lipitor   Started on low dose ASA  ESR/CRP slightly elevated, f/u RORO, Lupus work up. BCX sent   D/w DR Stack, will  repeat MRI w and w/o contrast , pt will  need hypercoagulable work up  outPt   D/w Dr Akers: plan for BRENTON in am         2. Aneurysm of right internal carotid artery.    US carotid ordered  Started on ASA/statins   VAscular f/u appreciated         3. Altered mentation   suspect overmedication, unclear if Pt is compliant with all her meds  outPt. Mental status waxing and waning, as per RN became sedated after dose of Suboxone   I cant explain her utox screen neg x 2?  meds tapered down with psych recs   UA  and CXR neg   BCX : NGTD   Ammonia level min elevated, doubt hepatic etiology of mentals status changes     4.  Elevated glucose, resolved   f/u HbA1c: 5.6  Monitor       5. Minimal elevation of AST  back to normal   f/u hepatitis panel: negative     6. Substance abuse.    cont suboxone dose titrated to BID, hold if Pt sedated   Klonopin, gabapentin and pherphenazine tapered down   SW consult  Nicotine patch   cessation counseling refused by pt for tobacco.     7. Bipolar Depression.  Anxiety. PTSD  Cont klonopin decreased to 0.5mg BOD, d/c PRN dose   C/w clonidine 0.2 bid being used for craving   Perphenazine 16mg QD   Gabapentin 300mg BID   and C/w lamotrigine and Effexor same dose   D/w DR Logan and recs appreciated             Dispo: c/w tele, MRI, carotid  duplex, BRENTON

## 2019-08-04 NOTE — BEHAVIORAL HEALTH ASSESSMENT NOTE - NSBHCHARTREVIEWLAB_PSY_A_CORE FT
11.7   5.17  )-----------( 260      ( 03 Aug 2019 07:59 )             35.5   08-04    139  |  104  |  8   ----------------------------<  105<H>  4.0   |  31  |  1.16    Ca    9.2      04 Aug 2019 06:47  Phos  4.4     08-03  Mg     2.5     08-03    TPro  7.0  /  Alb  3.7  /  TBili  0.2  /  DBili  x   /  AST  22  /  ALT  27  /  AlkPhos  83  08-03

## 2019-08-04 NOTE — BEHAVIORAL HEALTH ASSESSMENT NOTE - NSBHCONSULTFOLLOWAFTERCARE_PSY_A_CORE FT
PT CAN CONTINUE TREATEMENT IN [facility] (dR [facility]),., csw TO ARRANGE F/U looks stated age; appropriate grooming and hygiene; well-nourished. PT CAN CONTINUE TREATEMENT IN [facility] (dR [facility]),., CSW TO ARRANGE F/U looks stated age; appropriate grooming and hygiene; well-nourished.

## 2019-08-04 NOTE — PROGRESS NOTE ADULT - SUBJECTIVE AND OBJECTIVE BOX
CC: CVA (03 Aug 2019 13:09)    HPI:  30 yo female with a pmh/o  HTN,  Anxiety on Clonopin, IVDA with heroin in the past  now on suboxone (last used in her 20's), alcoholism  (last drink >1 yr ago),  who was referred by  Neurologist due to abnormal outpatient imaging which was performed due to >6mo. of facial numbness and tingling on left side.  As per Pts boyfriend,  she is normally AAOx4 and speaks w/o difficulty however en route to ED had spurred speech and lethargic,  pt admitted  to taking Klonopin and clonidine due to 'nerves' and is now lethargic. History mostly taken from boyfriend. Pt expressed  concern over receiving more Klonopin and suboxone and clonidine for her anxiety and after explanation that due to hypotension and lethargy that sedative medications would be held, pt stated  she wanted to leave AMA. Pt later stayed for admission after in depth conversation with boyfriend and patient regarding increased risk of death and further complications. Pt declined further questions and physical exam by admitting physician      INTERVAL HPI/ OVERNIGHT EVENTS:   Pt was seen and examined, was again drowsy and had difficulty to keep conversation but reported no new complains Also Pt was seen later and Pts mother was at bedside. Pt awake and c/o feeling tired and it always happens when she is in the hospital. Meds discussed explained that likely due to overmedicated and needs titration of her meds. Psych will manage meds. Pt is very anxious about her suboxon and klonopin.       Vital Signs Last 24 Hrs  T(C): 36.7 (04 Aug 2019 10:44), Max: 36.9 (03 Aug 2019 22:22)  T(F): 98.1 (04 Aug 2019 10:44), Max: 98.5 (03 Aug 2019 22:22)  HR: 68 (04 Aug 2019 10:44) (54 - 68)  BP: 107/56 (04 Aug 2019 10:44) (105/54 - 121/64)  RR: 18 (04 Aug 2019 10:44) (18 - 18)  SpO2: 99% (04 Aug 2019 10:44) (96% - 99%)      REVIEW OF SYSTEMS:   Unable to fully obtain due to drowsiness     PHYSICAL EXAM:  General: Well developed; obese,  in no acute distress  Eyes: EOMI; conjunctiva and sclera clear  Head: Normocephalic; atraumatic  ENMT: No nasal discharge; airway clear  Neck: Supple; non tender; no masses  Respiratory: Good air entry. No wheezes, rales or rhonchi  Cardiovascular: Regular rate and rhythm. S1 and S2 Normal; No murmurs  Gastrointestinal: Soft non-tender non-distended; Normal bowel sounds  Genitourinary: No suprapubic  tenderness  Extremities: Normal range of motion, No  edema  Vascular: Peripheral pulses palpable 2+ bilaterally  Neurological: Alert and oriented x3, awake  + l facial and UE numbness   Skin: Warm and dry. No acute rash  Musculoskeletal: Normal muscle  tone, without deformities  Psychiatric: Cooperative and appropriate    LAbs:                         11.7   5.17  )-----------( 260      ( 03 Aug 2019 07:59 )             35.5     08-04    139  |  104  |  8   ----------------------------<  105<H>  4.0   |  31  |  1.16    Ca    9.2      04 Aug 2019 06:47  Phos  4.4     08-03  Mg     2.5     08-03    TPro  7.0  /  Alb  3.7  /  TBili  0.2  /  DBili  x   /  AST  22  /  ALT  27  /  AlkPhos  83  08-03    LIVER FUNCTIONS - ( 03 Aug 2019 07:59 )  Alb: 3.7 g/dL / Pro: 7.0 gm/dL / ALK PHOS: 83 U/L / ALT: 27 U/L / AST: 22 U/L / GGT: x           PT/INR - ( 03 Aug 2019 07:59 )   PT: 12.1 sec;   INR: 1.09 ratio                                 11.7   5.17  )-----------( 260      ( 03 Aug 2019 07:59 )             35.5     03 Aug 2019 07:59    139    |  104    |  8      ----------------------------<  102    4.1     |  29     |  1.02     Ca    8.8        03 Aug 2019 07:59  Phos  4.4       03 Aug 2019 07:59  Mg     2.5       03 Aug 2019 07:59    TPro  7.0    /  Alb  3.7    /  TBili  0.2    /  DBili  x      /  AST  22     /  ALT  27     /  AlkPhos  83     03 Aug 2019 07:59    PT/INR - ( 03 Aug 2019 07:59 )   PT: 12.1 sec;   INR: 1.09 ratio      PTT - ( 03 Aug 2019 07:59 )  PTT:31.5 sec    LIVER FUNCTIONS - ( 03 Aug 2019 07:59 )  Alb: 3.7 g/dL / Pro: 7.0 gm/dL / ALK PHOS: 83 U/L / ALT: 27 U/L / AST: 22 U/L / GGT: x               MEDICATIONS  (STANDING):  aspirin  chewable 81 milliGRAM(s) Oral daily  atorvastatin 20 milliGRAM(s) Oral at bedtime  buprenorphine 8 mG/naloxone 2 mG SL  Tablet 1 Tablet(s) SubLingual <User Schedule>  clonazePAM  Tablet 0.5 milliGRAM(s) Oral every 12 hours  cloNIDine 0.2 milliGRAM(s) Oral two times a day  gabapentin 300 milliGRAM(s) Oral three times a day  gabapentin 300 milliGRAM(s) Oral two times a day  lamoTRIgine 150 milliGRAM(s) Oral two times a day  nicotine - 21 mG/24Hr(s) Patch 1 patch Transdermal daily  pantoprazole    Tablet 40 milliGRAM(s) Oral before breakfast  perphenazine 16 milliGRAM(s) Oral at bedtime  perphenazine 16 milliGRAM(s) Oral two times a day  venlafaxine 75 milliGRAM(s) Oral daily  venlafaxine XR. 150 milliGRAM(s) Oral daily    MEDICATIONS  (PRN):      RADIOLOGY & ADDITIONAL TESTS:    EXAM:  CT BRAIN                        PROCEDURE DATE:  08/03/2019      INTERPRETATION:  CT brain without contrast    History hemorrhagic infarct    Comparison yesterday    Mild right high parietal cytotoxic edema and trace petechial hemorrhage   is again noted, not significantly changed since the prior exam. There is   maturation of mild cytotoxic edema involving the posterior operculum   without hemorrhage. There is no mass effect or hydrocephalus or skull   fracture.    IMPRESSION:    Small right MCA branch infarcts is trace petechial hemorrhage, stable

## 2019-08-04 NOTE — BEHAVIORAL HEALTH ASSESSMENT NOTE - NSBHCONSULTMEDS_PSY_A_CORE FT
Plan:  change Klonopin to 0.5 mg po q 12.   d/c Klonpin PRN,   Lower neurontin to 300mg po TID,   Lower  Trilafon  to 16 mg po QHS      Continue Effexor  mg po qd,   Continue Lamictal 150mg po BID

## 2019-08-04 NOTE — BEHAVIORAL HEALTH ASSESSMENT NOTE - NSBHMEDSOTHERFT_PSY_A_CORE
Home Medications:  albuterol 90 mcg/inh inhalation aerosol: 2 puff(s) inhaled 4 times a day, As Needed - for shortness of breath and/or wheezing (02 Aug 2019 19:04)  buprenorphine-naloxone 8 mg-2 mg sublingual tablet: 1 tab(s) sublingual 3 times a day (02 Aug 2019 19:04)  clonazePAM 0.25 mg oral tablet, disintegratin tab(s) orally once a day (02 Aug 2019 19:04)  clonazePAM 1 mg oral tablet: 1 tab(s) orally once a day (at bedtime) (02 Aug 2019 19:04)  cloNIDine 0.2 mg oral tablet: 1 tab(s) orally 2 times a day (02 Aug 2019 19:04)  gabapentin 300 mg oral capsule: 1 cap(s) orally 2 times a day (02 Aug 2019 19:04)  gabapentin 600 mg oral tablet: 1 tab(s) orally once a day (at bedtime) (02 Aug 2019 19:04)  lamoTRIgine 150 mg oral tablet: 1 tab(s) orally 2 times a day (02 Aug 2019 19:04)  perphenazine 16 mg oral tablet: 1 tab(s) orally 2 times a day (02 Aug 2019 19:04)  tretinoin 0.1% topical cream: Apply topically to affected area once a day (at bedtime)  ***Face*** (02 Aug 2019 19:04)  venlafaxine 150 mg oral capsule, extended release: 1 cap(s) orally once a day  ***combined with the 75mg to equal 225mg*** (02 Aug 2019 19:04)  venlafaxine 75 mg oral tablet: 1 tab(s) orally once a day  ***combined with the 150mg to equal 225mg*** (02 Aug 2019 19:04)

## 2019-08-04 NOTE — BEHAVIORAL HEALTH ASSESSMENT NOTE - NSBHCHARTREVIEWVS_PSY_A_CORE FT
Vital Signs Last 24 Hrs  T(C): 36.7 (04 Aug 2019 10:44), Max: 36.9 (03 Aug 2019 22:22)  T(F): 98.1 (04 Aug 2019 10:44), Max: 98.5 (03 Aug 2019 22:22)  HR: 68 (04 Aug 2019 10:44) (54 - 74)  BP: 107/56 (04 Aug 2019 10:44) (105/54 - 121/64)  BP(mean): --  RR: 18 (04 Aug 2019 10:44) (18 - 18)  SpO2: 99% (04 Aug 2019 10:44) (96% - 99%)
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
## 2019-08-04 NOTE — PROGRESS NOTE ADULT - SUBJECTIVE AND OBJECTIVE BOX
· Subjective and Objective: 	  HPI: Pt admitted with 2 month H/o Left sided numbness and seen by out side Neurologist and sent to ED with Abnormal MRI scan   Pt admitted with Left side Numbness arm and face  for 2 months seen by Dr. Servin and MRI done reported Rt side infarcts with Hemorrhagic component and Rt carotid stenosis or Occlusion. Admitted seen and evaluated by Dr. Melvin.  No new c/o today. Repeat CT done. No new changes. Pt wants to go home. No Headaches, dizziness ambulating. Seen by Vascular surgery and Cardiology. No intervention recommended. Medical management with Antiplatelet therapy. No change in her symptoms for 2 months.   8/4/19 : Pt drowsy, was given dose of suboxone and very tired. Pt Denies of Headaches. Left side numbness persisting . Forgetful and can not remeber.    MEDICATIONS  (STANDING):  aspirin  chewable 81 milliGRAM(s) Oral daily  atorvastatin 20 milliGRAM(s) Oral at bedtime  buprenorphine 8 mG/naloxone 2 mG SL  Tablet 1 Tablet(s) SubLingual <User Schedule>  clonazePAM  Tablet 1 milliGRAM(s) Oral at bedtime  cloNIDine 0.2 milliGRAM(s) Oral two times a day  gabapentin 300 milliGRAM(s) Oral two times a day  gabapentin 600 milliGRAM(s) Oral at bedtime  lamoTRIgine 150 milliGRAM(s) Oral two times a day  nicotine - 21 mG/24Hr(s) Patch 1 patch Transdermal daily  pantoprazole    Tablet 40 milliGRAM(s) Oral before breakfast  perphenazine 16 milliGRAM(s) Oral two times a day  venlafaxine 75 milliGRAM(s) Oral daily  venlafaxine XR. 150 milliGRAM(s) Oral daily      Vital Signs Last 24 Hrs  T(C): 36.7 (04 Aug 2019 05:40), Max: 36.9 (03 Aug 2019 22:22)  T(F): 98 (04 Aug 2019 05:40), Max: 98.5 (03 Aug 2019 22:22)  HR: 54 (04 Aug 2019 05:40) (54 - 74)  BP: 105/54 (04 Aug 2019 05:40) (105/54 - 121/64)  BP(mean): --  RR: 18 (04 Aug 2019 05:40) (17 - 18)  SpO2: 98% (04 Aug 2019 05:40) (96% - 98%)    Neurological exam:  HF: Lethargic, mumbling words, able to follow simple commands, does not recall what she did this morning.  CN: ARIA, EOMI, VFF, decreased facial sensation on left, no NLFD, tongue midline, Palate moves equally, SCM equal bilaterally, + dysarthria  Motor: No pronator drift, Strength 5/5 in all 4 ext, normal bulk and tone, no tremor, rigidity or bradykinesia.    Sens: Patchy decreased sensation on left upper extremity  Reflexes: Symmetric and normal . BJ 2+, BR 2+, KJ 2+, AJ 2+, downgoing toes b/l  Coord:  No FNFA, dysmetria, YULI intact                                    11.7   5.17  )-----------( 260      ( 03 Aug 2019 07:59 )             35.5     08-04    139  |  104  |  8   ----------------------------<  105<H>  4.0   |  31  |  1.16    Ca    9.2      04 Aug 2019 06:47  Phos  4.4     08-03  Mg     2.5     08-03    TPro  7.0  /  Alb  3.7  /  TBili  0.2  /  DBili  x   /  AST  22  /  ALT  27  /  AlkPhos  83  08-03    08-03 [id number] 5.6    08-03 Chol 209<H> <H> HDL 27<L> Trig 249<H>    Radiology report:  - CT Head  < from: CT Head No Cont (08.03.19 @ 08:37) >  IMPRESSION:    Small right MCA branch infarcts is trace petechial hemorrhage, stable    < from: CT Angio Neck w/ IV Cont (08.02.19 @ 17:32) >  IMPRESSION:      Focal eccentric high-grade stenosis of the proximal right internal   carotid artery of approximately 75%, most suspicious for a stenosis   related to dissection. There is normal reconstitution of flow distal to   this stenosis.    Rest of the cervical and intracranial arteries appear unremarkable.    Small focal area of hypoattenuation in the right centrum semiovale,   suggestive of a chronic appearing infarct. There is a small hyperdensity   in the region of hypoattenuation measuring approximately 0.6 x 0.4 cm,   and may reflect hemorrhage versus a calcified vessel, and comparison with   prior outside CT head imaging is needed.

## 2019-08-05 ENCOUNTER — EMERGENCY (EMERGENCY)
Facility: HOSPITAL | Age: 29
LOS: 0 days | Discharge: HOME | End: 2019-08-05
Attending: EMERGENCY MEDICINE
Payer: MEDICARE

## 2019-08-05 VITALS
HEART RATE: 73 BPM | RESPIRATION RATE: 18 BRPM | TEMPERATURE: 98 F | DIASTOLIC BLOOD PRESSURE: 56 MMHG | SYSTOLIC BLOOD PRESSURE: 102 MMHG | OXYGEN SATURATION: 98 %

## 2019-08-05 VITALS
OXYGEN SATURATION: 98 % | TEMPERATURE: 98 F | SYSTOLIC BLOOD PRESSURE: 110 MMHG | HEART RATE: 70 BPM | DIASTOLIC BLOOD PRESSURE: 62 MMHG | RESPIRATION RATE: 18 BRPM

## 2019-08-05 DIAGNOSIS — I63.9 CEREBRAL INFARCTION, UNSPECIFIED: ICD-10-CM

## 2019-08-05 DIAGNOSIS — R53.1 WEAKNESS: ICD-10-CM

## 2019-08-05 DIAGNOSIS — F19.10 OTHER PSYCHOACTIVE SUBSTANCE ABUSE, UNCOMPLICATED: ICD-10-CM

## 2019-08-05 DIAGNOSIS — Z86.73 PERSONAL HISTORY OF TRANSIENT ISCHEMIC ATTACK (TIA), AND CEREBRAL INFARCTION WITHOUT RESIDUAL DEFICITS: ICD-10-CM

## 2019-08-05 DIAGNOSIS — Z98.890 OTHER SPECIFIED POSTPROCEDURAL STATES: Chronic | ICD-10-CM

## 2019-08-05 PROBLEM — I67.1 CEREBRAL ANEURYSM, NONRUPTURED: Chronic | Status: ACTIVE | Noted: 2019-08-02

## 2019-08-05 PROBLEM — N60.09 SOLITARY CYST OF UNSPECIFIED BREAST: Chronic | Status: ACTIVE | Noted: 2019-08-02

## 2019-08-05 PROBLEM — R74.0 NONSPECIFIC ELEVATION OF LEVELS OF TRANSAMINASE AND LACTIC ACID DEHYDROGENASE [LDH]: Chronic | Status: ACTIVE | Noted: 2019-08-02

## 2019-08-05 LAB
ALBUMIN SERPL ELPH-MCNC: 4.1 G/DL — SIGNIFICANT CHANGE UP (ref 3.3–5)
ALP SERPL-CCNC: 94 U/L — SIGNIFICANT CHANGE UP (ref 40–120)
ALT FLD-CCNC: 27 U/L — SIGNIFICANT CHANGE UP (ref 12–78)
ANION GAP SERPL CALC-SCNC: 4 MMOL/L — LOW (ref 5–17)
APTT BLD: 28.7 SEC — SIGNIFICANT CHANGE UP (ref 27.5–36.3)
AST SERPL-CCNC: 23 U/L — SIGNIFICANT CHANGE UP (ref 15–37)
BASOPHILS # BLD AUTO: 0.03 K/UL — SIGNIFICANT CHANGE UP (ref 0–0.2)
BASOPHILS NFR BLD AUTO: 0.4 % — SIGNIFICANT CHANGE UP (ref 0–2)
BILIRUB SERPL-MCNC: 0.2 MG/DL — SIGNIFICANT CHANGE UP (ref 0.2–1.2)
BUN SERPL-MCNC: 8 MG/DL — SIGNIFICANT CHANGE UP (ref 7–23)
CALCIUM SERPL-MCNC: 9.4 MG/DL — SIGNIFICANT CHANGE UP (ref 8.5–10.1)
CHLORIDE SERPL-SCNC: 104 MMOL/L — SIGNIFICANT CHANGE UP (ref 96–108)
CO2 SERPL-SCNC: 30 MMOL/L — SIGNIFICANT CHANGE UP (ref 22–31)
CREAT SERPL-MCNC: 1.09 MG/DL — SIGNIFICANT CHANGE UP (ref 0.5–1.3)
DRVVT SCREEN TO CONFIRM RATIO: SIGNIFICANT CHANGE UP
EOSINOPHIL # BLD AUTO: 0.06 K/UL — SIGNIFICANT CHANGE UP (ref 0–0.5)
EOSINOPHIL NFR BLD AUTO: 0.8 % — SIGNIFICANT CHANGE UP (ref 0–6)
GLUCOSE SERPL-MCNC: 100 MG/DL — HIGH (ref 70–99)
HCT VFR BLD CALC: 37.7 % — SIGNIFICANT CHANGE UP (ref 34.5–45)
HGB BLD-MCNC: 12.7 G/DL — SIGNIFICANT CHANGE UP (ref 11.5–15.5)
IMM GRANULOCYTES NFR BLD AUTO: 0.1 % — SIGNIFICANT CHANGE UP (ref 0–1.5)
INR BLD: 1.03 RATIO — SIGNIFICANT CHANGE UP (ref 0.88–1.16)
LA NT DPL PPP QL: 29.6 SEC — SIGNIFICANT CHANGE UP
LYMPHOCYTES # BLD AUTO: 2.1 K/UL — SIGNIFICANT CHANGE UP (ref 1–3.3)
LYMPHOCYTES # BLD AUTO: 28.5 % — SIGNIFICANT CHANGE UP (ref 13–44)
MCHC RBC-ENTMCNC: 28.5 PG — SIGNIFICANT CHANGE UP (ref 27–34)
MCHC RBC-ENTMCNC: 33.7 GM/DL — SIGNIFICANT CHANGE UP (ref 32–36)
MCV RBC AUTO: 84.7 FL — SIGNIFICANT CHANGE UP (ref 80–100)
MONOCYTES # BLD AUTO: 0.5 K/UL — SIGNIFICANT CHANGE UP (ref 0–0.9)
MONOCYTES NFR BLD AUTO: 6.8 % — SIGNIFICANT CHANGE UP (ref 2–14)
NEUTROPHILS # BLD AUTO: 4.66 K/UL — SIGNIFICANT CHANGE UP (ref 1.8–7.4)
NEUTROPHILS NFR BLD AUTO: 63.4 % — SIGNIFICANT CHANGE UP (ref 43–77)
NORMALIZED SCT PPP-RTO: 0.93 RATIO — SIGNIFICANT CHANGE UP (ref 0–1.16)
NORMALIZED SCT PPP-RTO: SIGNIFICANT CHANGE UP
PLATELET # BLD AUTO: 295 K/UL — SIGNIFICANT CHANGE UP (ref 150–400)
POTASSIUM SERPL-MCNC: 4.3 MMOL/L — SIGNIFICANT CHANGE UP (ref 3.5–5.3)
POTASSIUM SERPL-SCNC: 4.3 MMOL/L — SIGNIFICANT CHANGE UP (ref 3.5–5.3)
PROT SERPL-MCNC: 7.8 GM/DL — SIGNIFICANT CHANGE UP (ref 6–8.3)
PROTHROM AB SERPL-ACNC: 11.5 SEC — SIGNIFICANT CHANGE UP (ref 10–12.9)
RBC # BLD: 4.45 M/UL — SIGNIFICANT CHANGE UP (ref 3.8–5.2)
RBC # FLD: 12.5 % — SIGNIFICANT CHANGE UP (ref 10.3–14.5)
SODIUM SERPL-SCNC: 138 MMOL/L — SIGNIFICANT CHANGE UP (ref 135–145)
WBC # BLD: 7.36 K/UL — SIGNIFICANT CHANGE UP (ref 3.8–10.5)
WBC # FLD AUTO: 7.36 K/UL — SIGNIFICANT CHANGE UP (ref 3.8–10.5)

## 2019-08-05 PROCEDURE — 86900 BLOOD TYPING SEROLOGIC ABO: CPT

## 2019-08-05 PROCEDURE — 86850 RBC ANTIBODY SCREEN: CPT

## 2019-08-05 PROCEDURE — 86901 BLOOD TYPING SEROLOGIC RH(D): CPT

## 2019-08-05 PROCEDURE — 85025 COMPLETE CBC W/AUTO DIFF WBC: CPT

## 2019-08-05 PROCEDURE — 85730 THROMBOPLASTIN TIME PARTIAL: CPT

## 2019-08-05 PROCEDURE — 99284 EMERGENCY DEPT VISIT MOD MDM: CPT

## 2019-08-05 PROCEDURE — 99283 EMERGENCY DEPT VISIT LOW MDM: CPT

## 2019-08-05 PROCEDURE — 85610 PROTHROMBIN TIME: CPT

## 2019-08-05 PROCEDURE — 36415 COLL VENOUS BLD VENIPUNCTURE: CPT

## 2019-08-05 PROCEDURE — 80053 COMPREHEN METABOLIC PANEL: CPT

## 2019-08-05 NOTE — ED PROVIDER NOTE - NSFOLLOWUPINSTRUCTIONS_ED_ALL_ED_FT
Please Call and follow up with Dr. Servin this Friday.  Please call and follow up with your doctor.    You need to have a repeat MRI brain and a Transesophageal echocardiography ([facility]).

## 2019-08-05 NOTE — ED PROVIDER NOTE - CLINICAL SUMMARY MEDICAL DECISION MAKING FREE TEXT BOX
30 y/o female sent by MD for inpatient workup of stroke. Pt appears stable, accompanied by mother. Will discuss with neuro team and hospitalist team regarding indication for inpatient vs outpatient workup.

## 2019-08-05 NOTE — ED PROVIDER NOTE - PROGRESS NOTE DETAILS
Rashad Zhou for attending Dr. Flores: Discussed with Dr. Stack. MRI can be done outpatient. Rashad Zhou for attending Dr. Flores: Discussed with Dr. Pritchett. TE can be done outpatient. Rashad Zhou for attending Dr. Flores: Case discussed with Dr. Mckenzie who hopes to get copies of labs. Has follow up with pt next Friday. Rashad Zhou for attending Dr. Flores: 28 y/o female sent by neurologist for further testing. Both neurology and cardiology agree that further testing can be done outpatient.

## 2019-08-05 NOTE — ED PROVIDER NOTE - PMH
Aneurysm of right internal carotid artery    Anxiety    Cerebrovascular accident (CVA)    Cyst, breast    Depression    PTSD (post-traumatic stress disorder)    Substance abuse    Transaminitis

## 2019-08-05 NOTE — ED ADULT TRIAGE NOTE - CHIEF COMPLAINT QUOTE
pt sent to ED by Dr syed for DVT to carotid artery and tear in brain. pt was seen in ED last week for stroke like symptoms, advised she had two old strokes and two new strokes causing slurred speech and impaired motor and cogntiive function. pt had multiple test done and eived call back advising pt they needed to be reevaluated in ED tpday

## 2019-08-05 NOTE — ED ADULT NURSE NOTE - OBJECTIVE STATEMENT
A&Ox3, Patient sent in from neurologist stating she has a clot in her carotid artery and has a "tear in her brain". Patient states she had a stroke 2 weeks ago and has L sided facial and L arm numbness since stroke. No signs of acute distress noted at this time. Mother at bedside.

## 2019-08-05 NOTE — ED ADULT NURSE NOTE - NSIMPLEMENTINTERV_GEN_ALL_ED
Implemented All Fall Risk Interventions:  [place] to call system. Call bell, personal items and telephone within reach. Instruct patient to call for assistance. Room bathroom lighting operational. Non-slip footwear when patient is off stretcher. Physically safe environment: no spills, clutter or unnecessary equipment. Stretcher in lowest position, wheels locked, appropriate side rails in place. Provide visual cue, wrist band, yellow gown, etc. Monitor gait and stability. Monitor for mental status changes and reorient to person, place, and time. Review medications for side effects contributing to fall risk. Reinforce activity limits and safety measures with patient and family.

## 2019-08-06 DIAGNOSIS — F17.200 NICOTINE DEPENDENCE, UNSPECIFIED, UNCOMPLICATED: ICD-10-CM

## 2019-08-06 DIAGNOSIS — E66.9 OBESITY, UNSPECIFIED: ICD-10-CM

## 2019-08-06 DIAGNOSIS — F41.9 ANXIETY DISORDER, UNSPECIFIED: ICD-10-CM

## 2019-08-06 DIAGNOSIS — I61.1 NONTRAUMATIC INTRACEREBRAL HEMORRHAGE IN HEMISPHERE, CORTICAL: ICD-10-CM

## 2019-08-06 DIAGNOSIS — I47.1 SUPRAVENTRICULAR TACHYCARDIA: ICD-10-CM

## 2019-08-06 DIAGNOSIS — R73.9 HYPERGLYCEMIA, UNSPECIFIED: ICD-10-CM

## 2019-08-06 DIAGNOSIS — F33.42 MAJOR DEPRESSIVE DISORDER, RECURRENT, IN FULL REMISSION: ICD-10-CM

## 2019-08-06 DIAGNOSIS — I67.1 CEREBRAL ANEURYSM, NONRUPTURED: ICD-10-CM

## 2019-08-06 DIAGNOSIS — R74.0 NONSPECIFIC ELEVATION OF LEVELS OF TRANSAMINASE AND LACTIC ACID DEHYDROGENASE [LDH]: ICD-10-CM

## 2019-08-06 DIAGNOSIS — I77.71 DISSECTION OF CAROTID ARTERY: ICD-10-CM

## 2019-08-06 DIAGNOSIS — I65.21 OCCLUSION AND STENOSIS OF RIGHT CAROTID ARTERY: ICD-10-CM

## 2019-08-06 DIAGNOSIS — I69.30 UNSPECIFIED SEQUELAE OF CEREBRAL INFARCTION: ICD-10-CM

## 2019-08-06 DIAGNOSIS — R00.1 BRADYCARDIA, UNSPECIFIED: ICD-10-CM

## 2019-08-06 DIAGNOSIS — Z88.8 ALLERGY STATUS TO OTHER DRUGS, MEDICAMENTS AND BIOLOGICAL SUBSTANCES STATUS: ICD-10-CM

## 2019-08-06 DIAGNOSIS — R20.0 ANESTHESIA OF SKIN: ICD-10-CM

## 2019-08-06 DIAGNOSIS — F43.10 POST-TRAUMATIC STRESS DISORDER, UNSPECIFIED: ICD-10-CM

## 2019-08-06 DIAGNOSIS — F11.20 OPIOID DEPENDENCE, UNCOMPLICATED: ICD-10-CM

## 2019-08-07 LAB — ANA TITR SER: NEGATIVE — SIGNIFICANT CHANGE UP

## 2019-08-08 LAB
CULTURE RESULTS: SIGNIFICANT CHANGE UP
CULTURE RESULTS: SIGNIFICANT CHANGE UP
SPECIMEN SOURCE: SIGNIFICANT CHANGE UP
SPECIMEN SOURCE: SIGNIFICANT CHANGE UP

## 2019-08-20 NOTE — ED ADULT NURSE NOTE - CAS TRG GENERAL NORM CIRC DET
Problem: Potential for Falls  Goal: Patient will remain free of falls  Description  INTERVENTIONS:  - Assess patient frequently for physical needs  -  Identify cognitive and physical deficits and behaviors that affect risk of falls  -  Lysite fall precautions as indicated by assessment   - Educate patient/family on patient safety including physical limitations  - Instruct patient to call for assistance with activity based on assessment  - Modify environment to reduce risk of injury  - Consider OT/PT consult to assist with strengthening/mobility  Outcome: Progressing     Problem: Nutrition/Hydration-ADULT  Goal: Nutrient/Hydration intake appropriate for improving, restoring or maintaining nutritional needs  Description  Monitor and assess patient's nutrition/hydration status for malnutrition  Collaborate with interdisciplinary team and initiate plan and interventions as ordered  Monitor patient's weight and dietary intake as ordered or per policy  Utilize nutrition screening tool and intervene as necessary  Determine patient's food preferences and provide high-protein, high-caloric foods as appropriate       INTERVENTIONS:  - Monitor oral intake, urinary output, labs, and treatment plans  - Assess nutrition and hydration status and recommend course of action  - Evaluate amount of meals eaten  - Assist patient with eating if necessary   - Allow adequate time for meals  - Recommend/ encourage appropriate diets, oral nutritional supplements, and vitamin/mineral supplements  - Order, calculate, and assess calorie counts as needed  - Recommend, monitor, and adjust tube feedings and TPN/PPN based on assessed needs  - Assess need for intravenous fluids  - Provide specific nutrition/hydration education as appropriate  - Include patient/family/caregiver in decisions related to nutrition  Outcome: Progressing Capillary refill less/equal to 2 seconds/Bounding peripheral pulses/Strong peripheral pulses

## 2019-08-22 ENCOUNTER — APPOINTMENT (OUTPATIENT)
Dept: INTERNAL MEDICINE | Facility: CLINIC | Age: 29
End: 2019-08-22
Payer: SELF-PAY

## 2019-08-22 PROCEDURE — 99499D: CUSTOM

## 2019-09-02 PROBLEM — Z86.59 HISTORY OF POST TRAUMATIC STRESS DISORDER: Status: RESOLVED | Noted: 2018-03-20 | Resolved: 2019-09-02

## 2019-09-20 ENCOUNTER — APPOINTMENT (OUTPATIENT)
Dept: INTERNAL MEDICINE | Facility: CLINIC | Age: 29
End: 2019-09-20
Payer: SELF-PAY

## 2019-09-20 PROCEDURE — 99499D: CUSTOM

## 2019-10-21 ENCOUNTER — APPOINTMENT (OUTPATIENT)
Dept: INTERNAL MEDICINE | Facility: CLINIC | Age: 29
End: 2019-10-21
Payer: SELF-PAY

## 2019-10-21 PROCEDURE — 99499D: CUSTOM

## 2019-11-18 ENCOUNTER — RX RENEWAL (OUTPATIENT)
Age: 29
End: 2019-11-18

## 2019-11-19 ENCOUNTER — APPOINTMENT (OUTPATIENT)
Dept: INTERNAL MEDICINE | Facility: CLINIC | Age: 29
End: 2019-11-19
Payer: SELF-PAY

## 2019-11-19 PROCEDURE — 99499D: CUSTOM

## 2019-12-14 ENCOUNTER — APPOINTMENT (OUTPATIENT)
Dept: INTERNAL MEDICINE | Facility: CLINIC | Age: 29
End: 2019-12-14
Payer: SELF-PAY

## 2019-12-14 PROCEDURE — 99499D: CUSTOM

## 2020-01-04 ENCOUNTER — RX RENEWAL (OUTPATIENT)
Age: 30
End: 2020-01-04

## 2020-01-14 ENCOUNTER — APPOINTMENT (OUTPATIENT)
Dept: INTERNAL MEDICINE | Facility: CLINIC | Age: 30
End: 2020-01-14
Payer: SELF-PAY

## 2020-01-14 PROCEDURE — 99499D: CUSTOM

## 2020-02-01 ENCOUNTER — OUTPATIENT (OUTPATIENT)
Dept: OUTPATIENT SERVICES | Facility: HOSPITAL | Age: 30
LOS: 1 days | End: 2020-02-01
Payer: MEDICARE

## 2020-02-01 DIAGNOSIS — Z98.890 OTHER SPECIFIED POSTPROCEDURAL STATES: Chronic | ICD-10-CM

## 2020-02-01 PROCEDURE — G9001: CPT

## 2020-02-11 ENCOUNTER — APPOINTMENT (OUTPATIENT)
Dept: INTERNAL MEDICINE | Facility: CLINIC | Age: 30
End: 2020-02-11
Payer: SELF-PAY

## 2020-02-11 PROCEDURE — 99499D: CUSTOM

## 2020-02-20 ENCOUNTER — INPATIENT (INPATIENT)
Facility: HOSPITAL | Age: 30
LOS: 14 days | Discharge: NURSING, REHAB OR LONG-TERM CARE | DRG: 492 | End: 2020-03-06
Attending: INTERNAL MEDICINE | Admitting: INTERNAL MEDICINE
Payer: MEDICARE

## 2020-02-20 VITALS
TEMPERATURE: 98 F | RESPIRATION RATE: 18 BRPM | SYSTOLIC BLOOD PRESSURE: 84 MMHG | HEIGHT: 68 IN | DIASTOLIC BLOOD PRESSURE: 43 MMHG | HEART RATE: 92 BPM | OXYGEN SATURATION: 100 % | WEIGHT: 199.96 LBS

## 2020-02-20 DIAGNOSIS — Z98.890 OTHER SPECIFIED POSTPROCEDURAL STATES: Chronic | ICD-10-CM

## 2020-02-20 LAB
ANION GAP SERPL CALC-SCNC: 4 MMOL/L — LOW (ref 5–17)
APTT BLD: 30.5 SEC — SIGNIFICANT CHANGE UP (ref 27.5–36.3)
BASOPHILS # BLD AUTO: 0.06 K/UL — SIGNIFICANT CHANGE UP (ref 0–0.2)
BASOPHILS NFR BLD AUTO: 0.5 % — SIGNIFICANT CHANGE UP (ref 0–2)
BUN SERPL-MCNC: 7 MG/DL — SIGNIFICANT CHANGE UP (ref 7–23)
CALCIUM SERPL-MCNC: 9.1 MG/DL — SIGNIFICANT CHANGE UP (ref 8.5–10.1)
CHLORIDE SERPL-SCNC: 104 MMOL/L — SIGNIFICANT CHANGE UP (ref 96–108)
CO2 SERPL-SCNC: 30 MMOL/L — SIGNIFICANT CHANGE UP (ref 22–31)
CREAT SERPL-MCNC: 1.03 MG/DL — SIGNIFICANT CHANGE UP (ref 0.5–1.3)
EOSINOPHIL # BLD AUTO: 0.1 K/UL — SIGNIFICANT CHANGE UP (ref 0–0.5)
EOSINOPHIL NFR BLD AUTO: 0.8 % — SIGNIFICANT CHANGE UP (ref 0–6)
GLUCOSE SERPL-MCNC: 100 MG/DL — HIGH (ref 70–99)
HCT VFR BLD CALC: 40.2 % — SIGNIFICANT CHANGE UP (ref 34.5–45)
HGB BLD-MCNC: 12.9 G/DL — SIGNIFICANT CHANGE UP (ref 11.5–15.5)
IMM GRANULOCYTES NFR BLD AUTO: 0.3 % — SIGNIFICANT CHANGE UP (ref 0–1.5)
INR BLD: 1.07 RATIO — SIGNIFICANT CHANGE UP (ref 0.88–1.16)
LYMPHOCYTES # BLD AUTO: 2.53 K/UL — SIGNIFICANT CHANGE UP (ref 1–3.3)
LYMPHOCYTES # BLD AUTO: 21.4 % — SIGNIFICANT CHANGE UP (ref 13–44)
MCHC RBC-ENTMCNC: 27.9 PG — SIGNIFICANT CHANGE UP (ref 27–34)
MCHC RBC-ENTMCNC: 32.1 GM/DL — SIGNIFICANT CHANGE UP (ref 32–36)
MCV RBC AUTO: 87 FL — SIGNIFICANT CHANGE UP (ref 80–100)
MONOCYTES # BLD AUTO: 0.63 K/UL — SIGNIFICANT CHANGE UP (ref 0–0.9)
MONOCYTES NFR BLD AUTO: 5.3 % — SIGNIFICANT CHANGE UP (ref 2–14)
NEUTROPHILS # BLD AUTO: 8.48 K/UL — HIGH (ref 1.8–7.4)
NEUTROPHILS NFR BLD AUTO: 71.7 % — SIGNIFICANT CHANGE UP (ref 43–77)
PLATELET # BLD AUTO: 377 K/UL — SIGNIFICANT CHANGE UP (ref 150–400)
POTASSIUM SERPL-MCNC: 4.6 MMOL/L — SIGNIFICANT CHANGE UP (ref 3.5–5.3)
POTASSIUM SERPL-SCNC: 4.6 MMOL/L — SIGNIFICANT CHANGE UP (ref 3.5–5.3)
PROTHROM AB SERPL-ACNC: 11.9 SEC — SIGNIFICANT CHANGE UP (ref 10–12.9)
RBC # BLD: 4.62 M/UL — SIGNIFICANT CHANGE UP (ref 3.8–5.2)
RBC # FLD: 14.1 % — SIGNIFICANT CHANGE UP (ref 10.3–14.5)
SODIUM SERPL-SCNC: 138 MMOL/L — SIGNIFICANT CHANGE UP (ref 135–145)
WBC # BLD: 11.83 K/UL — HIGH (ref 3.8–10.5)
WBC # FLD AUTO: 11.83 K/UL — HIGH (ref 3.8–10.5)

## 2020-02-20 PROCEDURE — 73590 X-RAY EXAM OF LOWER LEG: CPT | Mod: 26,RT

## 2020-02-20 PROCEDURE — 73610 X-RAY EXAM OF ANKLE: CPT | Mod: 26,RT

## 2020-02-20 PROCEDURE — 73610 X-RAY EXAM OF ANKLE: CPT | Mod: 26,RT,77

## 2020-02-20 PROCEDURE — 93010 ELECTROCARDIOGRAM REPORT: CPT

## 2020-02-20 PROCEDURE — 73590 X-RAY EXAM OF LOWER LEG: CPT | Mod: 26,RT,77

## 2020-02-20 PROCEDURE — 71045 X-RAY EXAM CHEST 1 VIEW: CPT | Mod: 26

## 2020-02-20 RX ORDER — ACETAMINOPHEN 500 MG
1000 TABLET ORAL ONCE
Refills: 0 | Status: COMPLETED | OUTPATIENT
Start: 2020-02-20 | End: 2020-02-20

## 2020-02-20 RX ORDER — SODIUM CHLORIDE 9 MG/ML
1000 INJECTION INTRAMUSCULAR; INTRAVENOUS; SUBCUTANEOUS ONCE
Refills: 0 | Status: COMPLETED | OUTPATIENT
Start: 2020-02-20 | End: 2020-02-20

## 2020-02-20 RX ADMIN — SODIUM CHLORIDE 1000 MILLILITER(S): 9 INJECTION INTRAMUSCULAR; INTRAVENOUS; SUBCUTANEOUS at 23:56

## 2020-02-20 RX ADMIN — Medication 400 MILLIGRAM(S): at 23:49

## 2020-02-20 RX ADMIN — SODIUM CHLORIDE 1000 MILLILITER(S): 9 INJECTION INTRAMUSCULAR; INTRAVENOUS; SUBCUTANEOUS at 22:56

## 2020-02-20 RX ADMIN — SODIUM CHLORIDE 1000 MILLILITER(S): 9 INJECTION INTRAMUSCULAR; INTRAVENOUS; SUBCUTANEOUS at 23:49

## 2020-02-20 NOTE — ED ADULT NURSE NOTE - CHIEF COMPLAINT QUOTE
tripped and fell on carpeted floor. + head injury or loc. c/o right ankle pain. Took Seroquel @ 6:30pm tonight. no obvious deformity noted. hx stroke

## 2020-02-20 NOTE — ED PROVIDER NOTE - PROGRESS NOTE DETAILS
JG:  Received signout from Dr. Shen to follow up Ortho dispo.  Ortho ordered post reduction Xray as reduction of ankle attempted.  Patient awake, alert and in a lot of pain.  Patient took multiple medications prior to arrival including opiates (Suboxone),  clonidine, benzodiazepines, trazodone, gabapentin.  IV ofirmev ordered for pain control at this time. JG:  Patient with multiple comorbidities with ankle fracture.  Ortho needs medical clearance. Possible OR tomorrow to repair tib/fib fracture. Requests CT of lower extremity prior to admission. JG:  CT head and C spine negative for traumatic injury.  Apices of lungs with patchy opacities. CT chest/abd/pelvis ordered as well as patient has elevated WBC and was intoxicated from multiple med ingestion on arrival.  No known vomiting, but will order Zosyn to cover for possible aspiration pneumonia. JG:  Empiric dose of antibiotics given, but CT chest/abd/pelvis without any injury and without evidence of opacities.  Likely just atelectasis.  This was discussed with Dr. Whittington, hospitalist. Ok to bridge patient with adequate pain control.  Patient to be seen by hospitalist team in AM on the floor.

## 2020-02-20 NOTE — ED PROVIDER NOTE - PRINCIPAL DIAGNOSIS
Closed fracture of distal end of tibia, unspecified fracture morphology, unspecified laterality, initial encounter Closed fracture of ankle, trimalleolar, right, initial encounter Closed displaced pilon fracture of right tibia, initial encounter

## 2020-02-20 NOTE — ED PROVIDER NOTE - CARE PLAN
Principal Discharge DX:	Closed fracture of distal end of tibia, unspecified fracture morphology, unspecified laterality, initial encounter Principal Discharge DX:	Closed fracture of ankle, trimalleolar, right, initial encounter Principal Discharge DX:	Closed fracture of ankle, trimalleolar, right, initial encounter  Secondary Diagnosis:	Polysubstance abuse Principal Discharge DX:	Closed displaced pilon fracture of right tibia, initial encounter  Secondary Diagnosis:	Polysubstance abuse  Secondary Diagnosis:	Other closed fracture of distal end of right fibula, initial encounter Principal Discharge DX:	Closed displaced pilon fracture of right tibia, initial encounter  Secondary Diagnosis:	Polysubstance abuse  Secondary Diagnosis:	Other closed fracture of distal end of right fibula, initial encounter  Secondary Diagnosis:	Atelectasis

## 2020-02-20 NOTE — ED ADULT NURSE NOTE - OBJECTIVE STATEMENT
pt on stretcher, c/o fall in kitchen.  Pt on several medications, slurring words, lethargic, low BP noted and attending aware.  Pt c/o right ankle pain.

## 2020-02-20 NOTE — ED ADULT TRIAGE NOTE - CHIEF COMPLAINT QUOTE
tripped and fell on carpeted floor. + head injury or loc. c/o right ankle pain. Took Seroquel @ 6:30pm tonight. no obvious deformity noted. tripped and fell on carpeted floor. + head injury or loc. c/o right ankle pain. Took Seroquel @ 6:30pm tonight. no obvious deformity noted. hx stroke

## 2020-02-20 NOTE — ED PROVIDER NOTE - SECONDARY DIAGNOSIS.
Polysubstance abuse Other closed fracture of distal end of right fibula, initial encounter Atelectasis

## 2020-02-21 DIAGNOSIS — S82.871A DISPLACED PILON FRACTURE OF RIGHT TIBIA, INITIAL ENCOUNTER FOR CLOSED FRACTURE: ICD-10-CM

## 2020-02-21 DIAGNOSIS — F43.10 POST-TRAUMATIC STRESS DISORDER, UNSPECIFIED: ICD-10-CM

## 2020-02-21 DIAGNOSIS — F11.21 OPIOID DEPENDENCE, IN REMISSION: ICD-10-CM

## 2020-02-21 DIAGNOSIS — F41.0 PANIC DISORDER [EPISODIC PAROXYSMAL ANXIETY]: ICD-10-CM

## 2020-02-21 DIAGNOSIS — F32.5 MAJOR DEPRESSIVE DISORDER, SINGLE EPISODE, IN FULL REMISSION: ICD-10-CM

## 2020-02-21 DIAGNOSIS — F41.1 GENERALIZED ANXIETY DISORDER: ICD-10-CM

## 2020-02-21 LAB
ADD ON TEST-SPECIMEN IN LAB: SIGNIFICANT CHANGE UP
ANION GAP SERPL CALC-SCNC: 5 MMOL/L — SIGNIFICANT CHANGE UP (ref 5–17)
APPEARANCE UR: CLEAR — SIGNIFICANT CHANGE UP
APTT BLD: 30.8 SEC — SIGNIFICANT CHANGE UP (ref 27.5–36.3)
BILIRUB UR-MCNC: NEGATIVE — SIGNIFICANT CHANGE UP
BUN SERPL-MCNC: 6 MG/DL — LOW (ref 7–23)
CALCIUM SERPL-MCNC: 8 MG/DL — LOW (ref 8.5–10.1)
CHLORIDE SERPL-SCNC: 108 MMOL/L — SIGNIFICANT CHANGE UP (ref 96–108)
CO2 SERPL-SCNC: 26 MMOL/L — SIGNIFICANT CHANGE UP (ref 22–31)
COLOR SPEC: YELLOW — SIGNIFICANT CHANGE UP
CREAT SERPL-MCNC: 0.83 MG/DL — SIGNIFICANT CHANGE UP (ref 0.5–1.3)
DIFF PNL FLD: NEGATIVE — SIGNIFICANT CHANGE UP
GLUCOSE SERPL-MCNC: 107 MG/DL — HIGH (ref 70–99)
GLUCOSE UR QL: NEGATIVE MG/DL — SIGNIFICANT CHANGE UP
HCT VFR BLD CALC: 37.6 % — SIGNIFICANT CHANGE UP (ref 34.5–45)
HCT VFR BLD CALC: 38 % — SIGNIFICANT CHANGE UP (ref 34.5–45)
HGB BLD-MCNC: 11.8 G/DL — SIGNIFICANT CHANGE UP (ref 11.5–15.5)
HGB BLD-MCNC: 12.3 G/DL — SIGNIFICANT CHANGE UP (ref 11.5–15.5)
INR BLD: 1.13 RATIO — SIGNIFICANT CHANGE UP (ref 0.88–1.16)
KETONES UR-MCNC: NEGATIVE — SIGNIFICANT CHANGE UP
LACTATE SERPL-SCNC: 1 MMOL/L — SIGNIFICANT CHANGE UP (ref 0.7–2)
LEUKOCYTE ESTERASE UR-ACNC: ABNORMAL
MCHC RBC-ENTMCNC: 27.4 PG — SIGNIFICANT CHANGE UP (ref 27–34)
MCHC RBC-ENTMCNC: 28.2 PG — SIGNIFICANT CHANGE UP (ref 27–34)
MCHC RBC-ENTMCNC: 31.4 GM/DL — LOW (ref 32–36)
MCHC RBC-ENTMCNC: 32.4 GM/DL — SIGNIFICANT CHANGE UP (ref 32–36)
MCV RBC AUTO: 87.2 FL — SIGNIFICANT CHANGE UP (ref 80–100)
MCV RBC AUTO: 87.2 FL — SIGNIFICANT CHANGE UP (ref 80–100)
NITRITE UR-MCNC: NEGATIVE — SIGNIFICANT CHANGE UP
PCP SPEC-MCNC: SIGNIFICANT CHANGE UP
PH UR: 6 — SIGNIFICANT CHANGE UP (ref 5–8)
PLATELET # BLD AUTO: 341 K/UL — SIGNIFICANT CHANGE UP (ref 150–400)
PLATELET # BLD AUTO: 353 K/UL — SIGNIFICANT CHANGE UP (ref 150–400)
POTASSIUM SERPL-MCNC: 3.9 MMOL/L — SIGNIFICANT CHANGE UP (ref 3.5–5.3)
POTASSIUM SERPL-SCNC: 3.9 MMOL/L — SIGNIFICANT CHANGE UP (ref 3.5–5.3)
PROT UR-MCNC: NEGATIVE MG/DL — SIGNIFICANT CHANGE UP
PROTHROM AB SERPL-ACNC: 12.6 SEC — SIGNIFICANT CHANGE UP (ref 10–12.9)
RBC # BLD: 4.31 M/UL — SIGNIFICANT CHANGE UP (ref 3.8–5.2)
RBC # BLD: 4.36 M/UL — SIGNIFICANT CHANGE UP (ref 3.8–5.2)
RBC # FLD: 14 % — SIGNIFICANT CHANGE UP (ref 10.3–14.5)
RBC # FLD: 14.2 % — SIGNIFICANT CHANGE UP (ref 10.3–14.5)
SODIUM SERPL-SCNC: 139 MMOL/L — SIGNIFICANT CHANGE UP (ref 135–145)
SP GR SPEC: 1 — LOW (ref 1.01–1.02)
UROBILINOGEN FLD QL: NEGATIVE MG/DL — SIGNIFICANT CHANGE UP
WBC # BLD: 9.34 K/UL — SIGNIFICANT CHANGE UP (ref 3.8–10.5)
WBC # BLD: 9.6 K/UL — SIGNIFICANT CHANGE UP (ref 3.8–10.5)
WBC # FLD AUTO: 9.34 K/UL — SIGNIFICANT CHANGE UP (ref 3.8–10.5)
WBC # FLD AUTO: 9.6 K/UL — SIGNIFICANT CHANGE UP (ref 3.8–10.5)

## 2020-02-21 PROCEDURE — 71045 X-RAY EXAM CHEST 1 VIEW: CPT

## 2020-02-21 PROCEDURE — 97164 PT RE-EVAL EST PLAN CARE: CPT | Mod: GP

## 2020-02-21 PROCEDURE — 97530 THERAPEUTIC ACTIVITIES: CPT | Mod: GP

## 2020-02-21 PROCEDURE — 85730 THROMBOPLASTIN TIME PARTIAL: CPT

## 2020-02-21 PROCEDURE — 86901 BLOOD TYPING SEROLOGIC RH(D): CPT

## 2020-02-21 PROCEDURE — 85027 COMPLETE CBC AUTOMATED: CPT

## 2020-02-21 PROCEDURE — 99221 1ST HOSP IP/OBS SF/LOW 40: CPT

## 2020-02-21 PROCEDURE — 70498 CT ANGIOGRAPHY NECK: CPT

## 2020-02-21 PROCEDURE — 99233 SBSQ HOSP IP/OBS HIGH 50: CPT

## 2020-02-21 PROCEDURE — 71260 CT THORAX DX C+: CPT | Mod: 26

## 2020-02-21 PROCEDURE — 74177 CT ABD & PELVIS W/CONTRAST: CPT | Mod: 26

## 2020-02-21 PROCEDURE — 85610 PROTHROMBIN TIME: CPT

## 2020-02-21 PROCEDURE — C1713: CPT

## 2020-02-21 PROCEDURE — 71275 CT ANGIOGRAPHY CHEST: CPT

## 2020-02-21 PROCEDURE — 70498 CT ANGIOGRAPHY NECK: CPT | Mod: 26

## 2020-02-21 PROCEDURE — 73700 CT LOWER EXTREMITY W/O DYE: CPT | Mod: RT

## 2020-02-21 PROCEDURE — 36415 COLL VENOUS BLD VENIPUNCTURE: CPT

## 2020-02-21 PROCEDURE — C1889: CPT

## 2020-02-21 PROCEDURE — 81025 URINE PREGNANCY TEST: CPT

## 2020-02-21 PROCEDURE — 86850 RBC ANTIBODY SCREEN: CPT

## 2020-02-21 PROCEDURE — 93005 ELECTROCARDIOGRAM TRACING: CPT

## 2020-02-21 PROCEDURE — 80307 DRUG TEST PRSMV CHEM ANLYZR: CPT

## 2020-02-21 PROCEDURE — 84100 ASSAY OF PHOSPHORUS: CPT

## 2020-02-21 PROCEDURE — 70450 CT HEAD/BRAIN W/O DYE: CPT | Mod: 26

## 2020-02-21 PROCEDURE — 72125 CT NECK SPINE W/O DYE: CPT | Mod: 26

## 2020-02-21 PROCEDURE — 82962 GLUCOSE BLOOD TEST: CPT

## 2020-02-21 PROCEDURE — 97162 PT EVAL MOD COMPLEX 30 MIN: CPT | Mod: GP

## 2020-02-21 PROCEDURE — 76376 3D RENDER W/INTRP POSTPROCES: CPT

## 2020-02-21 PROCEDURE — 76000 FLUOROSCOPY <1 HR PHYS/QHP: CPT

## 2020-02-21 PROCEDURE — 81001 URINALYSIS AUTO W/SCOPE: CPT

## 2020-02-21 PROCEDURE — 97116 GAIT TRAINING THERAPY: CPT | Mod: GP

## 2020-02-21 PROCEDURE — 80048 BASIC METABOLIC PNL TOTAL CA: CPT

## 2020-02-21 PROCEDURE — 99223 1ST HOSP IP/OBS HIGH 75: CPT

## 2020-02-21 PROCEDURE — 73700 CT LOWER EXTREMITY W/O DYE: CPT | Mod: 26,RT

## 2020-02-21 PROCEDURE — 86900 BLOOD TYPING SEROLOGIC ABO: CPT

## 2020-02-21 PROCEDURE — 83735 ASSAY OF MAGNESIUM: CPT

## 2020-02-21 PROCEDURE — 84484 ASSAY OF TROPONIN QUANT: CPT

## 2020-02-21 RX ORDER — ALBUTEROL 90 UG/1
2 AEROSOL, METERED ORAL
Qty: 0 | Refills: 0 | DISCHARGE

## 2020-02-21 RX ORDER — ONDANSETRON 8 MG/1
4 TABLET, FILM COATED ORAL EVERY 6 HOURS
Refills: 0 | Status: DISCONTINUED | OUTPATIENT
Start: 2020-02-21 | End: 2020-02-21

## 2020-02-21 RX ORDER — VENLAFAXINE HCL 75 MG
75 CAPSULE, EXT RELEASE 24 HR ORAL DAILY
Refills: 0 | Status: DISCONTINUED | OUTPATIENT
Start: 2020-02-21 | End: 2020-02-21

## 2020-02-21 RX ORDER — ACETAMINOPHEN 500 MG
650 TABLET ORAL EVERY 4 HOURS
Refills: 0 | Status: DISCONTINUED | OUTPATIENT
Start: 2020-02-21 | End: 2020-02-21

## 2020-02-21 RX ORDER — PIPERACILLIN AND TAZOBACTAM 4; .5 G/20ML; G/20ML
3.38 INJECTION, POWDER, LYOPHILIZED, FOR SOLUTION INTRAVENOUS ONCE
Refills: 0 | Status: COMPLETED | OUTPATIENT
Start: 2020-02-21 | End: 2020-02-21

## 2020-02-21 RX ORDER — ASPIRIN/CALCIUM CARB/MAGNESIUM 324 MG
81 TABLET ORAL DAILY
Refills: 0 | Status: DISCONTINUED | OUTPATIENT
Start: 2020-02-21 | End: 2020-02-21

## 2020-02-21 RX ORDER — VENLAFAXINE HCL 75 MG
375 CAPSULE, EXT RELEASE 24 HR ORAL DAILY
Refills: 0 | Status: DISCONTINUED | OUTPATIENT
Start: 2020-02-21 | End: 2020-02-21

## 2020-02-21 RX ORDER — BUPRENORPHINE AND NALOXONE 2; .5 MG/1; MG/1
1 TABLET SUBLINGUAL
Refills: 0 | Status: DISCONTINUED | OUTPATIENT
Start: 2020-02-22 | End: 2020-02-24

## 2020-02-21 RX ORDER — OXYCODONE HYDROCHLORIDE 5 MG/1
5 TABLET ORAL EVERY 4 HOURS
Refills: 0 | Status: DISCONTINUED | OUTPATIENT
Start: 2020-02-22 | End: 2020-02-28

## 2020-02-21 RX ORDER — OXYCODONE HYDROCHLORIDE 5 MG/1
10 TABLET ORAL ONCE
Refills: 0 | Status: DISCONTINUED | OUTPATIENT
Start: 2020-02-21 | End: 2020-02-21

## 2020-02-21 RX ORDER — TRAZODONE HCL 50 MG
50 TABLET ORAL AT BEDTIME
Refills: 0 | Status: DISCONTINUED | OUTPATIENT
Start: 2020-02-21 | End: 2020-02-21

## 2020-02-21 RX ORDER — CLONAZEPAM 1 MG
1 TABLET ORAL DAILY
Refills: 0 | Status: DISCONTINUED | OUTPATIENT
Start: 2020-02-21 | End: 2020-02-21

## 2020-02-21 RX ORDER — GABAPENTIN 400 MG/1
600 CAPSULE ORAL AT BEDTIME
Refills: 0 | Status: DISCONTINUED | OUTPATIENT
Start: 2020-02-22 | End: 2020-03-06

## 2020-02-21 RX ORDER — LAMOTRIGINE 25 MG/1
150 TABLET, ORALLY DISINTEGRATING ORAL EVERY 12 HOURS
Refills: 0 | Status: DISCONTINUED | OUTPATIENT
Start: 2020-02-21 | End: 2020-02-21

## 2020-02-21 RX ORDER — GABAPENTIN 400 MG/1
300 CAPSULE ORAL
Refills: 0 | Status: DISCONTINUED | OUTPATIENT
Start: 2020-02-21 | End: 2020-02-21

## 2020-02-21 RX ORDER — HYDROMORPHONE HYDROCHLORIDE 2 MG/ML
0.5 INJECTION INTRAMUSCULAR; INTRAVENOUS; SUBCUTANEOUS
Refills: 0 | Status: DISCONTINUED | OUTPATIENT
Start: 2020-02-21 | End: 2020-02-22

## 2020-02-21 RX ORDER — SENNA PLUS 8.6 MG/1
2 TABLET ORAL AT BEDTIME
Refills: 0 | Status: DISCONTINUED | OUTPATIENT
Start: 2020-02-21 | End: 2020-02-21

## 2020-02-21 RX ORDER — CLONAZEPAM 1 MG
0.5 TABLET ORAL DAILY
Refills: 0 | Status: DISCONTINUED | OUTPATIENT
Start: 2020-02-22 | End: 2020-02-23

## 2020-02-21 RX ORDER — QUETIAPINE FUMARATE 200 MG/1
25 TABLET, FILM COATED ORAL
Refills: 0 | Status: DISCONTINUED | OUTPATIENT
Start: 2020-02-22 | End: 2020-02-24

## 2020-02-21 RX ORDER — LAMOTRIGINE 25 MG/1
1 TABLET, ORALLY DISINTEGRATING ORAL
Qty: 0 | Refills: 0 | DISCHARGE

## 2020-02-21 RX ORDER — HYDROMORPHONE HYDROCHLORIDE 2 MG/ML
0.5 INJECTION INTRAMUSCULAR; INTRAVENOUS; SUBCUTANEOUS EVERY 4 HOURS
Refills: 0 | Status: DISCONTINUED | OUTPATIENT
Start: 2020-02-21 | End: 2020-02-21

## 2020-02-21 RX ORDER — CALCIUM CARBONATE 500(1250)
3 TABLET ORAL EVERY 6 HOURS
Refills: 0 | Status: DISCONTINUED | OUTPATIENT
Start: 2020-02-21 | End: 2020-02-21

## 2020-02-21 RX ORDER — CALCIUM CARBONATE 500(1250)
3 TABLET ORAL EVERY 6 HOURS
Refills: 0 | Status: DISCONTINUED | OUTPATIENT
Start: 2020-02-22 | End: 2020-03-06

## 2020-02-21 RX ORDER — FOLIC ACID 0.8 MG
1 TABLET ORAL DAILY
Refills: 0 | Status: DISCONTINUED | OUTPATIENT
Start: 2020-02-22 | End: 2020-03-06

## 2020-02-21 RX ORDER — SODIUM CHLORIDE 9 MG/ML
1000 INJECTION, SOLUTION INTRAVENOUS
Refills: 0 | Status: DISCONTINUED | OUTPATIENT
Start: 2020-02-21 | End: 2020-02-21

## 2020-02-21 RX ORDER — VENLAFAXINE HCL 75 MG
375 CAPSULE, EXT RELEASE 24 HR ORAL DAILY
Refills: 0 | Status: DISCONTINUED | OUTPATIENT
Start: 2020-02-22 | End: 2020-03-04

## 2020-02-21 RX ORDER — GABAPENTIN 400 MG/1
300 CAPSULE ORAL
Refills: 0 | Status: DISCONTINUED | OUTPATIENT
Start: 2020-02-22 | End: 2020-03-06

## 2020-02-21 RX ORDER — OXYCODONE HYDROCHLORIDE 5 MG/1
10 TABLET ORAL EVERY 4 HOURS
Refills: 0 | Status: DISCONTINUED | OUTPATIENT
Start: 2020-02-22 | End: 2020-02-28

## 2020-02-21 RX ORDER — SODIUM CHLORIDE 9 MG/ML
1000 INJECTION, SOLUTION INTRAVENOUS
Refills: 0 | Status: DISCONTINUED | OUTPATIENT
Start: 2020-02-21 | End: 2020-02-22

## 2020-02-21 RX ORDER — ENOXAPARIN SODIUM 100 MG/ML
40 INJECTION SUBCUTANEOUS EVERY 24 HOURS
Refills: 0 | Status: DISCONTINUED | OUTPATIENT
Start: 2020-02-22 | End: 2020-02-22

## 2020-02-21 RX ORDER — BUPRENORPHINE AND NALOXONE 2; .5 MG/1; MG/1
1 TABLET SUBLINGUAL
Refills: 0 | Status: DISCONTINUED | OUTPATIENT
Start: 2020-02-21 | End: 2020-02-21

## 2020-02-21 RX ORDER — PERPHENAZINE 8 MG/1
1 TABLET, FILM COATED ORAL
Qty: 0 | Refills: 0 | DISCHARGE

## 2020-02-21 RX ORDER — ATORVASTATIN CALCIUM 80 MG/1
10 TABLET, FILM COATED ORAL AT BEDTIME
Refills: 0 | Status: DISCONTINUED | OUTPATIENT
Start: 2020-02-22 | End: 2020-03-06

## 2020-02-21 RX ORDER — ONDANSETRON 8 MG/1
4 TABLET, FILM COATED ORAL EVERY 6 HOURS
Refills: 0 | Status: DISCONTINUED | OUTPATIENT
Start: 2020-02-22 | End: 2020-03-06

## 2020-02-21 RX ORDER — CLONAZEPAM 1 MG
0.25 TABLET ORAL DAILY
Refills: 0 | Status: DISCONTINUED | OUTPATIENT
Start: 2020-02-21 | End: 2020-02-21

## 2020-02-21 RX ORDER — FERROUS SULFATE 325(65) MG
325 TABLET ORAL
Refills: 0 | Status: DISCONTINUED | OUTPATIENT
Start: 2020-02-22 | End: 2020-03-06

## 2020-02-21 RX ORDER — VENLAFAXINE HCL 75 MG
300 CAPSULE, EXT RELEASE 24 HR ORAL DAILY
Refills: 0 | Status: DISCONTINUED | OUTPATIENT
Start: 2020-02-21 | End: 2020-02-21

## 2020-02-21 RX ORDER — MORPHINE SULFATE 50 MG/1
4 CAPSULE, EXTENDED RELEASE ORAL EVERY 4 HOURS
Refills: 0 | Status: DISCONTINUED | OUTPATIENT
Start: 2020-02-21 | End: 2020-02-21

## 2020-02-21 RX ORDER — ASCORBIC ACID 60 MG
500 TABLET,CHEWABLE ORAL
Refills: 0 | Status: DISCONTINUED | OUTPATIENT
Start: 2020-02-22 | End: 2020-03-06

## 2020-02-21 RX ORDER — VENLAFAXINE HCL 75 MG
1 CAPSULE, EXT RELEASE 24 HR ORAL
Qty: 0 | Refills: 0 | DISCHARGE

## 2020-02-21 RX ORDER — ACETAMINOPHEN 500 MG
650 TABLET ORAL EVERY 6 HOURS
Refills: 0 | Status: DISCONTINUED | OUTPATIENT
Start: 2020-02-22 | End: 2020-03-06

## 2020-02-21 RX ORDER — SODIUM CHLORIDE 9 MG/ML
1000 INJECTION, SOLUTION INTRAVENOUS
Refills: 0 | Status: DISCONTINUED | OUTPATIENT
Start: 2020-02-22 | End: 2020-03-05

## 2020-02-21 RX ORDER — ATORVASTATIN CALCIUM 80 MG/1
10 TABLET, FILM COATED ORAL AT BEDTIME
Refills: 0 | Status: DISCONTINUED | OUTPATIENT
Start: 2020-02-21 | End: 2020-02-21

## 2020-02-21 RX ORDER — KETOROLAC TROMETHAMINE 30 MG/ML
30 SYRINGE (ML) INJECTION ONCE
Refills: 0 | Status: DISCONTINUED | OUTPATIENT
Start: 2020-02-21 | End: 2020-02-21

## 2020-02-21 RX ORDER — CLONAZEPAM 1 MG
1 TABLET ORAL
Qty: 0 | Refills: 0 | DISCHARGE

## 2020-02-21 RX ORDER — SENNA PLUS 8.6 MG/1
2 TABLET ORAL AT BEDTIME
Refills: 0 | Status: DISCONTINUED | OUTPATIENT
Start: 2020-02-22 | End: 2020-03-06

## 2020-02-21 RX ORDER — ASPIRIN/CALCIUM CARB/MAGNESIUM 324 MG
81 TABLET ORAL DAILY
Refills: 0 | Status: DISCONTINUED | OUTPATIENT
Start: 2020-02-22 | End: 2020-02-22

## 2020-02-21 RX ORDER — ACETAMINOPHEN 500 MG
1000 TABLET ORAL ONCE
Refills: 0 | Status: COMPLETED | OUTPATIENT
Start: 2020-02-21 | End: 2020-02-21

## 2020-02-21 RX ORDER — INFLUENZA VIRUS VACCINE 15; 15; 15; 15 UG/.5ML; UG/.5ML; UG/.5ML; UG/.5ML
0.5 SUSPENSION INTRAMUSCULAR ONCE
Refills: 0 | Status: COMPLETED | OUTPATIENT
Start: 2020-02-21 | End: 2020-02-21

## 2020-02-21 RX ORDER — HYDROMORPHONE HYDROCHLORIDE 2 MG/ML
0.5 INJECTION INTRAMUSCULAR; INTRAVENOUS; SUBCUTANEOUS EVERY 6 HOURS
Refills: 0 | Status: DISCONTINUED | OUTPATIENT
Start: 2020-02-22 | End: 2020-02-28

## 2020-02-21 RX ORDER — CLONAZEPAM 1 MG
0.5 TABLET ORAL DAILY
Refills: 0 | Status: DISCONTINUED | OUTPATIENT
Start: 2020-02-21 | End: 2020-02-21

## 2020-02-21 RX ORDER — TRAZODONE HCL 50 MG
50 TABLET ORAL AT BEDTIME
Refills: 0 | Status: DISCONTINUED | OUTPATIENT
Start: 2020-02-22 | End: 2020-03-06

## 2020-02-21 RX ORDER — ONDANSETRON 8 MG/1
4 TABLET, FILM COATED ORAL ONCE
Refills: 0 | Status: DISCONTINUED | OUTPATIENT
Start: 2020-02-21 | End: 2020-02-22

## 2020-02-21 RX ORDER — SODIUM CHLORIDE 9 MG/ML
1000 INJECTION INTRAMUSCULAR; INTRAVENOUS; SUBCUTANEOUS ONCE
Refills: 0 | Status: COMPLETED | OUTPATIENT
Start: 2020-02-21 | End: 2020-02-21

## 2020-02-21 RX ORDER — KETOROLAC TROMETHAMINE 30 MG/ML
30 SYRINGE (ML) INJECTION EVERY 6 HOURS
Refills: 0 | Status: DISCONTINUED | OUTPATIENT
Start: 2020-02-21 | End: 2020-02-21

## 2020-02-21 RX ORDER — GABAPENTIN 400 MG/1
600 CAPSULE ORAL AT BEDTIME
Refills: 0 | Status: DISCONTINUED | OUTPATIENT
Start: 2020-02-21 | End: 2020-02-21

## 2020-02-21 RX ORDER — QUETIAPINE FUMARATE 200 MG/1
25 TABLET, FILM COATED ORAL
Refills: 0 | Status: DISCONTINUED | OUTPATIENT
Start: 2020-02-21 | End: 2020-02-21

## 2020-02-21 RX ORDER — HYDROMORPHONE HYDROCHLORIDE 2 MG/ML
0.5 INJECTION INTRAMUSCULAR; INTRAVENOUS; SUBCUTANEOUS ONCE
Refills: 0 | Status: DISCONTINUED | OUTPATIENT
Start: 2020-02-21 | End: 2020-02-21

## 2020-02-21 RX ADMIN — OXYCODONE HYDROCHLORIDE 10 MILLIGRAM(S): 5 TABLET ORAL at 02:12

## 2020-02-21 RX ADMIN — MORPHINE SULFATE 4 MILLIGRAM(S): 50 CAPSULE, EXTENDED RELEASE ORAL at 16:07

## 2020-02-21 RX ADMIN — PIPERACILLIN AND TAZOBACTAM 200 GRAM(S): 4; .5 INJECTION, POWDER, LYOPHILIZED, FOR SOLUTION INTRAVENOUS at 04:01

## 2020-02-21 RX ADMIN — MORPHINE SULFATE 4 MILLIGRAM(S): 50 CAPSULE, EXTENDED RELEASE ORAL at 20:22

## 2020-02-21 RX ADMIN — HYDROMORPHONE HYDROCHLORIDE 0.5 MILLIGRAM(S): 2 INJECTION INTRAMUSCULAR; INTRAVENOUS; SUBCUTANEOUS at 09:16

## 2020-02-21 RX ADMIN — Medication 1000 MILLIGRAM(S): at 00:18

## 2020-02-21 RX ADMIN — HYDROMORPHONE HYDROCHLORIDE 0.5 MILLIGRAM(S): 2 INJECTION INTRAMUSCULAR; INTRAVENOUS; SUBCUTANEOUS at 23:28

## 2020-02-21 RX ADMIN — Medication 30 MILLIGRAM(S): at 04:01

## 2020-02-21 RX ADMIN — MORPHINE SULFATE 4 MILLIGRAM(S): 50 CAPSULE, EXTENDED RELEASE ORAL at 06:36

## 2020-02-21 RX ADMIN — HYDROMORPHONE HYDROCHLORIDE 0.5 MILLIGRAM(S): 2 INJECTION INTRAMUSCULAR; INTRAVENOUS; SUBCUTANEOUS at 23:53

## 2020-02-21 RX ADMIN — HYDROMORPHONE HYDROCHLORIDE 0.5 MILLIGRAM(S): 2 INJECTION INTRAMUSCULAR; INTRAVENOUS; SUBCUTANEOUS at 23:12

## 2020-02-21 RX ADMIN — SODIUM CHLORIDE 1000 MILLILITER(S): 9 INJECTION INTRAMUSCULAR; INTRAVENOUS; SUBCUTANEOUS at 04:02

## 2020-02-21 RX ADMIN — HYDROMORPHONE HYDROCHLORIDE 0.5 MILLIGRAM(S): 2 INJECTION INTRAMUSCULAR; INTRAVENOUS; SUBCUTANEOUS at 23:14

## 2020-02-21 RX ADMIN — OXYCODONE HYDROCHLORIDE 10 MILLIGRAM(S): 5 TABLET ORAL at 03:02

## 2020-02-21 RX ADMIN — Medication 400 MILLIGRAM(S): at 22:57

## 2020-02-21 RX ADMIN — MORPHINE SULFATE 4 MILLIGRAM(S): 50 CAPSULE, EXTENDED RELEASE ORAL at 16:45

## 2020-02-21 RX ADMIN — Medication 1000 MILLIGRAM(S): at 23:14

## 2020-02-21 RX ADMIN — BUPRENORPHINE AND NALOXONE 1 TABLET(S): 2; .5 TABLET SUBLINGUAL at 18:27

## 2020-02-21 RX ADMIN — Medication 30 MILLIGRAM(S): at 14:15

## 2020-02-21 RX ADMIN — Medication 30 MILLIGRAM(S): at 06:31

## 2020-02-21 RX ADMIN — BUPRENORPHINE AND NALOXONE 1 TABLET(S): 2; .5 TABLET SUBLINGUAL at 13:59

## 2020-02-21 RX ADMIN — SODIUM CHLORIDE 75 MILLILITER(S): 9 INJECTION, SOLUTION INTRAVENOUS at 23:30

## 2020-02-21 RX ADMIN — HYDROMORPHONE HYDROCHLORIDE 0.5 MILLIGRAM(S): 2 INJECTION INTRAMUSCULAR; INTRAVENOUS; SUBCUTANEOUS at 09:01

## 2020-02-21 RX ADMIN — Medication 30 MILLIGRAM(S): at 13:59

## 2020-02-21 RX ADMIN — HYDROMORPHONE HYDROCHLORIDE 0.5 MILLIGRAM(S): 2 INJECTION INTRAMUSCULAR; INTRAVENOUS; SUBCUTANEOUS at 23:31

## 2020-02-21 RX ADMIN — MORPHINE SULFATE 4 MILLIGRAM(S): 50 CAPSULE, EXTENDED RELEASE ORAL at 19:52

## 2020-02-21 RX ADMIN — HYDROMORPHONE HYDROCHLORIDE 0.5 MILLIGRAM(S): 2 INJECTION INTRAMUSCULAR; INTRAVENOUS; SUBCUTANEOUS at 23:02

## 2020-02-21 RX ADMIN — SODIUM CHLORIDE 1000 MILLILITER(S): 9 INJECTION INTRAMUSCULAR; INTRAVENOUS; SUBCUTANEOUS at 01:38

## 2020-02-21 RX ADMIN — OXYCODONE HYDROCHLORIDE 10 MILLIGRAM(S): 5 TABLET ORAL at 23:27

## 2020-02-21 RX ADMIN — Medication 1000 MILLIGRAM(S): at 00:17

## 2020-02-21 NOTE — PROGRESS NOTE ADULT - SUBJECTIVE AND OBJECTIVE BOX
CTA Neck Reviewed.  Right  carotid intimal flap healed completely.  From a vascular surgery standpoint patient  has no contraindication for surgery. Discussed with  Orthopedic team.  may resume aspirin after surgery     Plan d/w  vascular surgery attending Dr. Marie

## 2020-02-21 NOTE — CHART NOTE - NSCHARTNOTEFT_GEN_A_CORE
Patient was evaluated at bedside. Pt's compartments are soft and compressible. No pain with passive stretch. At present suspicion of compartment syndrome is low.    Ortho
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
## 2020-02-21 NOTE — CONSULT NOTE ADULT - SUBJECTIVE AND OBJECTIVE BOX
Vascular surgery was called to evaluate this 29  Y/F for right carotid artery pathology. Pt has a history of right carotid dissection back in august 2019 with residual left sided weakness. Pt admitted s/p mechanical  fall injury   at home yesterday. She is  unsure if she blacked out and fell. Pt sustained right distal  tibia/fibula  fracture during the fall.  On my encounter patient appears drowsy, poor historian with fiance at bedside who provided most of the history.       Admitting HPI:   29F, pmh of Depression, PTSD, Generalized anxiety d/o, heroin abuse now on suboxone, HTN, Stroke with left sided weakness, RIght carotid A dissection/stenosis on asa/statin, paroxysmal atrial tachycardia, who presented to the ER with right leg pain inability to ambulate after a fall. She states she felt dizzy and fell onto her right leg. Denies sob/chest pain/n/v/d/abd pain. Felt fine prior to falling. no loc.   IN ED had imaging which showed right distal tib/fib fracture. Scheduled for surgery later today. C/o severe pain at fracture site.    ROS: all 10 systems reviewed and is as above otherwise negative.   PMH: as above  PSH: denies  F/H: denies significant medical conditions in immediate family members.  Social: smokes 1ppd , drinks ETOH once in a while, heroin abuse in past     Vital Signs Last 24 Hrs  T(C): 36.6 (21 Feb 2020 06:55), Max: 36.6 (21 Feb 2020 06:55)  T(F): 97.8 (21 Feb 2020 06:55), Max: 97.8 (21 Feb 2020 06:55)  HR: 84 (21 Feb 2020 06:55) (81 - 92)  BP: 118/68 (21 Feb 2020 06:55) (84/43 - 123/77)  BP(mean): --  RR: 17 (21 Feb 2020 06:55) (16 - 18)  SpO2: 94% (21 Feb 2020 06:55) (94% - 100%)    Exam  Appears Drowsy   room air, non labored breathing  S1,S2 present   Right carotid pulse  Right LE splint is in place    02-21    139  |  108  |  6<L>  ----------------------------<  107<H>  3.9   |  26  |  0.83    < from: CT Lower Extremity No Cont, Right (02.21.20 @ 01:04) >  NTERPRETATION:  Clinical indication: Distal tibial and fibular fracture..    Technique: CT axial images of the right lower extremity were obtained without intravenous contrast. Coronal and sagittal reformatted images were also obtained. 3-D volume rendering images were obtained from a separate workstation  .    Comparison: Earlier radiographs of the same date.     Findings:    Bones: There is a comminuted fracture in the tibial plafond extending to the distal shaft. There is fragment displacement and impaction with articular involvement. There is a displaced fracture of the distal fibula without involving the articular surface. There is no fracture in the proximal tibia or fibula.    Soft tissue: Trace right knee effusion. Extensive subcutaneous edema/hematoma anteromedially around the tibial fracture site and laterally around the fibular fracture site. There is no disruption of overlying skin. Small ankle joint effusion is seen.      Impression:    Comminuted intra-articular fracture of the distal tibia extending from the tibial plafond to the distal shaft..    Displaced spiral fracture of the distal fibula without articular involvement.    No fracture in the proximal tibia or fibula.    Small knee and ankle effusion with subcutaneous edema/hematoma around the fracture sites.                MARIO CHAND   This document has been electronically signed. Feb 21 2020  1:25AM        < end of copied text >    Ca    8.0<L>      21 Feb 2020 07:36                          11.8   9.60  )-----------( 353      ( 21 Feb 2020 07:36 )             37.6

## 2020-02-21 NOTE — BEHAVIORAL HEALTH ASSESSMENT NOTE - THOUGHT CONTENT
Patient with elevated BNP 4000+, Dimer 2000+, unable to r/o PE with CTA given dec. GFR, will do B/L Perivasc US and give 1 dose of Lovenox in ED. Patient to be admitted for shortness of breath in the setting of likely cardiac etiology/dCHF. Supplemental O2 provided as patient noted to be desaturating to 82% on RA while sleeping.
Unremarkable

## 2020-02-21 NOTE — CONSULT NOTE ADULT - ASSESSMENT
29 Y/F with history of right carotid dissection back in august 2019 with residual left sided weakness. Pt admitted s/p mechanical fall with right distal tibial and fibula  fracture, pending surgery today.     Plan   Pt Will need a CTA to evaluate carotid artery status  will continue to follow   Medical management per primary team    Plan d/w Dr. Marie

## 2020-02-21 NOTE — H&P ADULT - ASSESSMENT
29f, pmh as above a/w:    1. Fall/Right tib fib fracture:  -admit to medicine.   -pain control  -for OR later today  -request vascular consult for Right carotid Dissection prior to OR.   -physical therapy post op  -incentive spirometry  -bowel regimen.     2. H/o right carotid A dissection/stenosis, CVA/mild lue weakness:  -on asa/statin  -vascular eval requested as above    3. HTN:  -on clonidine.    4. Heroin abuse in past:  -continue suboxone.    5. Depression/anxiety/PTSD:  -continue effexor, klonipin, lamictal, trazodone, gabapentin, seroquel  -consult psychiatry to assist with management.     6. DVT px  start post op    7. Adv directives: full code. 29f, pmh as above a/w:    1. Fall/Right tib fib fracture:  -admit to medicine.   -pain control  -for OR later today  -request vascular consult for Right carotid Dissection prior to OR.   -physical therapy post op  -incentive spirometry  -bowel regimen.     2.Dizziness:  -could be related to multiple sedative meds  -check orthostatics when able.    2. H/o right carotid A dissection/stenosis, CVA/mild lue weakness:  -on asa/statin  -vascular eval requested as above    3. HTN:  -on clonidine.    4. Heroin abuse in past:  -continue suboxone.    5. Depression/anxiety/PTSD:  -continue effexor, klonipin, lamictal, trazodone, gabapentin, seroquel  -consult psychiatry to assist with management.     6. DVT px  start post op    7. Adv directives: full code.

## 2020-02-21 NOTE — CONSULT NOTE ADULT - SUBJECTIVE AND OBJECTIVE BOX
29y Female community ambulator presents c/o Right lower extremity pain s/p fall at home.  Pt states she took her seroquel and then felt lightheaded and fell backwards, catching her foot on the cabinet. Pt is a community ambulatory at baseline. Pt is unable to bear weight on extremity. Pt denies numbness tingling paresthesias in affected limb. Pt denies headstrike or LOC and denies any other orthopedic injuries at this time.  Note: pt is on Suboxone at baseline from previous narcotic abuse history    PAST MEDICAL & SURGICAL HISTORY:  Transaminitis  Aneurysm of right internal carotid artery  Cerebrovascular accident (CVA)  Substance abuse  Anxiety  Depression  PTSD (post-traumatic stress disorder)    MEDICATIONS  (STANDING):  lactated ringers. 1000 milliLiter(s) IV Continuous <Continuous>    Allergies    Geodon (Other)  Seroquel (Unknown)  Zyprexa (Other)                          12.9   11.83 )-----------( 377      ( 20 Feb 2020 22:51 )             40.2     20 Feb 2020 22:51    138    |  104    |  7      ----------------------------<  100    4.6     |  30     |  1.03     Ca    9.1        20 Feb 2020 22:51      PT/INR - ( 20 Feb 2020 22:51 )   PT: 11.9 sec;   INR: 1.07 ratio         PTT - ( 20 Feb 2020 22:51 )  PTT:30.5 sec  Vital Signs Last 24 Hrs  T(C): 36.4 (02-20-20 @ 21:09), Max: 36.4 (02-20-20 @ 21:09)  T(F): 97.5 (02-20-20 @ 21:09), Max: 97.5 (02-20-20 @ 21:09)  HR: 92 (02-20-20 @ 21:09) (92 - 92)  BP: 84/43 (02-20-20 @ 21:09) (84/43 - 84/43)  RR: 18 (02-20-20 @ 21:09) (18 - 18)  SpO2: 100% (02-20-20 @ 21:09) (100% - 100%)    Imaging: XR was personally reviewed which demonstrates Right tibia fibula fracture with posterior angulation, malrotation    Physical Exam    Gen: NAD, AAOx2, lethargic but answering questions and following commands  Right LE: Skin intact, visible deformity of ankle/LE, +swelling at fracture site along distal tibia, +TTP over fracture site, no bony TTP at Knee/Foot/Toes, neg logroll, +EHL/FHL/TA/GS, SILT L3-S1, +DP/PT Pulses, compartments soft.  Knee exam: non tender to palpation along joint line, no gross edema or ecchymosis skin intact.      Secondary Survey: Full ROM of unaffected extremities, SILT globally, compartments soft, no bony TTP over bony prominences, no calf TTP, able to SLR with contralateral leg, no TTP along axial spine.     Procedure:  Reduction performed. Pt placed in long leg splint at 30degrees of knee flexion. NVI post splint    A/P: 29y Female with RIght distal tibia and fibula fracture  - Admit to medical team  - plan for possible OR tomorrow for ORIF vs Exfix  -NPO, IV fluids while NPO, FU preop labs  - hold chemical dvt ppx, SCD's   - will need documented clearance for OR  - FU CT RLE for preop planning   -pain control  -keep splint clean dry intact  -rest ice elevate affected leg  -NWB on affected leg  -Post reduction XR reveal adequate reduction  -discussed signs symptoms of compartment syndrome  -discussed possible need for surgical fixation with patient and boyfriend at bedside, answered all questions  - Will advise in AM regarding operative plan

## 2020-02-21 NOTE — BEHAVIORAL HEALTH ASSESSMENT NOTE - NS ED BHA MSE SPEECH RATE
Problem: Gas Exchange - Impaired:  Goal: Levels of oxygenation will improve  Levels of oxygenation will improve   Outcome: Ongoing  Pt cont on vapotherm, O2 remains in lower to mid 90's. Pt rounded on hourly. Call light in reach. Normal

## 2020-02-21 NOTE — CHART NOTE - NSCHARTNOTEFT_GEN_A_CORE
HOSPITALIST ADDENDUM:  VASCULAR CONSULT APPRECIATED  NO MEDICAL CONTRAINDICATIONS FOR OR AT THIS TIME.

## 2020-02-21 NOTE — H&P ADULT - NSHPLABSRESULTS_GEN_ALL_CORE
LABS:                        11.8   9.60  )-----------( 353      ( 2020 07:36 )             37.6     02-21    139  |  108  |  6<L>  ----------------------------<  107<H>  3.9   |  26  |  0.83    Ca    8.0<L>      2020 07:36      PT/INR - ( 2020 07:36 )   PT: 12.6 sec;   INR: 1.13 ratio         PTT - ( 2020 07:36 )  PTT:30.8 sec  Urinalysis Basic - ( 2020 04:13 )    Color: Yellow / Appearance: Clear / S.005 / pH: x  Gluc: x / Ketone: Negative  / Bili: Negative / Urobili: Negative mg/dL   Blood: x / Protein: Negative mg/dL / Nitrite: Negative   Leuk Esterase: Trace / RBC: 0-2 /HPF / WBC 6-10   Sq Epi: x / Non Sq Epi: Few / Bacteria: Moderate      EKG: sinus, no acute ischemic changes

## 2020-02-21 NOTE — H&P ADULT - HISTORY OF PRESENT ILLNESS
c/c: fall/ right leg pain    HPI: 29F, pmh of Depression, PTSD, Generalized anxiety d/o, heroin abuse now on suboxone, HTN, Stroke with left sided weakness, RIght carotid A dissection/stenosis on asa/statin, paroxysmal atrial tachycardia, who presented to the ER with right leg pain inability to ambulate after a fall. She states she felt dizzy and fell onto her right leg. Denies sob/chest pain/n/v/d/abd pain. Felt fine prior to falling. no loc.   IN ED had imaging which showed right distal tib/fib fracture. Scheduled for surgery later today. C/o severe pain at fracture site.    ROS: all 10 systems reviewed and is as above otherwise negative.   PMH: as above  PSH: denies  F/H: denies significant medical conditions in immediate family members.  Social: smokes 1ppd , drinks ETOH once in a while, heroin abuse in past

## 2020-02-21 NOTE — ED ADULT NURSE REASSESSMENT NOTE - NS ED NURSE REASSESS COMMENT FT1
Pt remains NPO since midnight.  Pt resting comfortably. Pt on stretcher, NAD noted.  Pt updated on/aware of POC. Pt needs met.  Safety maintained.

## 2020-02-21 NOTE — H&P ADULT - NSHPPHYSICALEXAM_GEN_ALL_CORE
Vital Signs Last 24 Hrs  T(C): 36.6 (21 Feb 2020 06:55), Max: 36.6 (21 Feb 2020 06:55)  T(F): 97.8 (21 Feb 2020 06:55), Max: 97.8 (21 Feb 2020 06:55)  HR: 84 (21 Feb 2020 06:55) (81 - 92)  BP: 118/68 (21 Feb 2020 06:55) (84/43 - 123/77)  RR: 17 (21 Feb 2020 06:55) (16 - 18)  SpO2: 94% (21 Feb 2020 06:55) (94% - 100%)    PHYSICAL EXAM:    GENERAL: Comfortable, no acute distress   HEAD:  Normocephalic, atraumatic  EYES: EOMI, PERRLA  HEENT: Moist mucous membranes  NECK: Supple, No JVD  NERVOUS SYSTEM:  Alert & Oriented X3, mild left facial droop, mild LUE drift otherwise non focal.  CHEST/LUNG: Clear to auscultation bilaterally  HEART: Regular rate and rhythm  ABDOMEN: Soft, Nontender, Nondistended, Bowel sounds present  GENITOURINARY: Voiding, no palpable bladder  EXTREMITIES:   No clubbing, cyanosis, or edema  MUSCULOSKELETAL- RLE in splint.  SKIN-no rash

## 2020-02-21 NOTE — CONSULT NOTE ADULT - ATTENDING COMMENTS
All images reviewed overnight.  Agree with closed reduction and long leg splint.  planned discussed with oncall resident.
29 year old female with previous right hemispheric stroke secondary to right carotid dissection  Now admitted with lower extremity fracture  No new neurological findings  Will obtain CTA neck
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
## 2020-02-22 ENCOUNTER — TRANSCRIPTION ENCOUNTER (OUTPATIENT)
Age: 30
End: 2020-02-22

## 2020-02-22 LAB
ANION GAP SERPL CALC-SCNC: 4 MMOL/L — LOW (ref 5–17)
ANION GAP SERPL CALC-SCNC: 6 MMOL/L — SIGNIFICANT CHANGE UP (ref 5–17)
BUN SERPL-MCNC: 7 MG/DL — SIGNIFICANT CHANGE UP (ref 7–23)
BUN SERPL-MCNC: 7 MG/DL — SIGNIFICANT CHANGE UP (ref 7–23)
CALCIUM SERPL-MCNC: 8.4 MG/DL — LOW (ref 8.5–10.1)
CALCIUM SERPL-MCNC: 8.8 MG/DL — SIGNIFICANT CHANGE UP (ref 8.5–10.1)
CHLORIDE SERPL-SCNC: 107 MMOL/L — SIGNIFICANT CHANGE UP (ref 96–108)
CHLORIDE SERPL-SCNC: 110 MMOL/L — HIGH (ref 96–108)
CO2 SERPL-SCNC: 26 MMOL/L — SIGNIFICANT CHANGE UP (ref 22–31)
CO2 SERPL-SCNC: 27 MMOL/L — SIGNIFICANT CHANGE UP (ref 22–31)
CREAT SERPL-MCNC: 0.84 MG/DL — SIGNIFICANT CHANGE UP (ref 0.5–1.3)
CREAT SERPL-MCNC: 0.92 MG/DL — SIGNIFICANT CHANGE UP (ref 0.5–1.3)
GLUCOSE SERPL-MCNC: 123 MG/DL — HIGH (ref 70–99)
GLUCOSE SERPL-MCNC: 94 MG/DL — SIGNIFICANT CHANGE UP (ref 70–99)
HCT VFR BLD CALC: 40.1 % — SIGNIFICANT CHANGE UP (ref 34.5–45)
HGB BLD-MCNC: 12.6 G/DL — SIGNIFICANT CHANGE UP (ref 11.5–15.5)
MCHC RBC-ENTMCNC: 27.3 PG — SIGNIFICANT CHANGE UP (ref 27–34)
MCHC RBC-ENTMCNC: 31.4 GM/DL — LOW (ref 32–36)
MCV RBC AUTO: 86.8 FL — SIGNIFICANT CHANGE UP (ref 80–100)
PLATELET # BLD AUTO: 401 K/UL — HIGH (ref 150–400)
POTASSIUM SERPL-MCNC: 4.2 MMOL/L — SIGNIFICANT CHANGE UP (ref 3.5–5.3)
POTASSIUM SERPL-MCNC: 4.4 MMOL/L — SIGNIFICANT CHANGE UP (ref 3.5–5.3)
POTASSIUM SERPL-SCNC: 4.2 MMOL/L — SIGNIFICANT CHANGE UP (ref 3.5–5.3)
POTASSIUM SERPL-SCNC: 4.4 MMOL/L — SIGNIFICANT CHANGE UP (ref 3.5–5.3)
RBC # BLD: 4.62 M/UL — SIGNIFICANT CHANGE UP (ref 3.8–5.2)
RBC # FLD: 13.9 % — SIGNIFICANT CHANGE UP (ref 10.3–14.5)
SODIUM SERPL-SCNC: 140 MMOL/L — SIGNIFICANT CHANGE UP (ref 135–145)
SODIUM SERPL-SCNC: 140 MMOL/L — SIGNIFICANT CHANGE UP (ref 135–145)
WBC # BLD: 8.93 K/UL — SIGNIFICANT CHANGE UP (ref 3.8–10.5)
WBC # FLD AUTO: 8.93 K/UL — SIGNIFICANT CHANGE UP (ref 3.8–10.5)

## 2020-02-22 PROCEDURE — 76376 3D RENDER W/INTRP POSTPROCES: CPT | Mod: 26

## 2020-02-22 PROCEDURE — 73700 CT LOWER EXTREMITY W/O DYE: CPT | Mod: 26,RT

## 2020-02-22 PROCEDURE — 99223 1ST HOSP IP/OBS HIGH 75: CPT

## 2020-02-22 PROCEDURE — 99232 SBSQ HOSP IP/OBS MODERATE 35: CPT

## 2020-02-22 PROCEDURE — 99233 SBSQ HOSP IP/OBS HIGH 50: CPT

## 2020-02-22 RX ORDER — CEFAZOLIN SODIUM 1 G
2000 VIAL (EA) INJECTION EVERY 8 HOURS
Refills: 0 | Status: COMPLETED | OUTPATIENT
Start: 2020-02-22 | End: 2020-02-22

## 2020-02-22 RX ORDER — ASPIRIN/CALCIUM CARB/MAGNESIUM 324 MG
81 TABLET ORAL DAILY
Refills: 0 | Status: DISCONTINUED | OUTPATIENT
Start: 2020-02-22 | End: 2020-03-06

## 2020-02-22 RX ORDER — ACETAMINOPHEN 500 MG
1000 TABLET ORAL ONCE
Refills: 0 | Status: COMPLETED | OUTPATIENT
Start: 2020-02-22 | End: 2020-02-22

## 2020-02-22 RX ORDER — ASPIRIN/CALCIUM CARB/MAGNESIUM 324 MG
325 TABLET ORAL
Refills: 0 | Status: DISCONTINUED | OUTPATIENT
Start: 2020-02-22 | End: 2020-02-22

## 2020-02-22 RX ORDER — ENOXAPARIN SODIUM 100 MG/ML
40 INJECTION SUBCUTANEOUS DAILY
Refills: 0 | Status: COMPLETED | OUTPATIENT
Start: 2020-02-22 | End: 2020-02-25

## 2020-02-22 RX ADMIN — QUETIAPINE FUMARATE 25 MILLIGRAM(S): 200 TABLET, FILM COATED ORAL at 05:27

## 2020-02-22 RX ADMIN — Medication 500 MILLIGRAM(S): at 05:28

## 2020-02-22 RX ADMIN — Medication 500 MILLIGRAM(S): at 17:49

## 2020-02-22 RX ADMIN — QUETIAPINE FUMARATE 25 MILLIGRAM(S): 200 TABLET, FILM COATED ORAL at 17:50

## 2020-02-22 RX ADMIN — OXYCODONE HYDROCHLORIDE 10 MILLIGRAM(S): 5 TABLET ORAL at 00:08

## 2020-02-22 RX ADMIN — Medication 325 MILLIGRAM(S): at 16:38

## 2020-02-22 RX ADMIN — Medication 375 MILLIGRAM(S): at 14:18

## 2020-02-22 RX ADMIN — HYDROMORPHONE HYDROCHLORIDE 0.5 MILLIGRAM(S): 2 INJECTION INTRAMUSCULAR; INTRAVENOUS; SUBCUTANEOUS at 13:49

## 2020-02-22 RX ADMIN — GABAPENTIN 300 MILLIGRAM(S): 400 CAPSULE ORAL at 05:28

## 2020-02-22 RX ADMIN — HYDROMORPHONE HYDROCHLORIDE 0.5 MILLIGRAM(S): 2 INJECTION INTRAMUSCULAR; INTRAVENOUS; SUBCUTANEOUS at 05:38

## 2020-02-22 RX ADMIN — ENOXAPARIN SODIUM 40 MILLIGRAM(S): 100 INJECTION SUBCUTANEOUS at 05:25

## 2020-02-22 RX ADMIN — Medication 1000 MILLIGRAM(S): at 16:53

## 2020-02-22 RX ADMIN — OXYCODONE HYDROCHLORIDE 10 MILLIGRAM(S): 5 TABLET ORAL at 01:25

## 2020-02-22 RX ADMIN — HYDROMORPHONE HYDROCHLORIDE 0.5 MILLIGRAM(S): 2 INJECTION INTRAMUSCULAR; INTRAVENOUS; SUBCUTANEOUS at 00:06

## 2020-02-22 RX ADMIN — Medication 1 TABLET(S): at 13:49

## 2020-02-22 RX ADMIN — Medication 1 MILLIGRAM(S): at 13:49

## 2020-02-22 RX ADMIN — OXYCODONE HYDROCHLORIDE 10 MILLIGRAM(S): 5 TABLET ORAL at 15:59

## 2020-02-22 RX ADMIN — ATORVASTATIN CALCIUM 10 MILLIGRAM(S): 80 TABLET, FILM COATED ORAL at 22:13

## 2020-02-22 RX ADMIN — GABAPENTIN 300 MILLIGRAM(S): 400 CAPSULE ORAL at 17:49

## 2020-02-22 RX ADMIN — BUPRENORPHINE AND NALOXONE 1 TABLET(S): 2; .5 TABLET SUBLINGUAL at 17:49

## 2020-02-22 RX ADMIN — Medication 400 MILLIGRAM(S): at 16:38

## 2020-02-22 RX ADMIN — HYDROMORPHONE HYDROCHLORIDE 0.5 MILLIGRAM(S): 2 INJECTION INTRAMUSCULAR; INTRAVENOUS; SUBCUTANEOUS at 14:04

## 2020-02-22 RX ADMIN — GABAPENTIN 600 MILLIGRAM(S): 400 CAPSULE ORAL at 22:13

## 2020-02-22 RX ADMIN — Medication 0.1 MILLIGRAM(S): at 22:13

## 2020-02-22 RX ADMIN — Medication 0.1 MILLIGRAM(S): at 05:27

## 2020-02-22 RX ADMIN — HYDROMORPHONE HYDROCHLORIDE 0.5 MILLIGRAM(S): 2 INJECTION INTRAMUSCULAR; INTRAVENOUS; SUBCUTANEOUS at 00:36

## 2020-02-22 RX ADMIN — BUPRENORPHINE AND NALOXONE 1 TABLET(S): 2; .5 TABLET SUBLINGUAL at 05:28

## 2020-02-22 RX ADMIN — HYDROMORPHONE HYDROCHLORIDE 0.5 MILLIGRAM(S): 2 INJECTION INTRAMUSCULAR; INTRAVENOUS; SUBCUTANEOUS at 19:41

## 2020-02-22 RX ADMIN — Medication 100 MILLIGRAM(S): at 05:28

## 2020-02-22 RX ADMIN — Medication 81 MILLIGRAM(S): at 16:38

## 2020-02-22 RX ADMIN — OXYCODONE HYDROCHLORIDE 10 MILLIGRAM(S): 5 TABLET ORAL at 01:55

## 2020-02-22 RX ADMIN — Medication 325 MILLIGRAM(S): at 13:49

## 2020-02-22 RX ADMIN — Medication 0.1 MILLIGRAM(S): at 14:18

## 2020-02-22 RX ADMIN — HYDROMORPHONE HYDROCHLORIDE 0.5 MILLIGRAM(S): 2 INJECTION INTRAMUSCULAR; INTRAVENOUS; SUBCUTANEOUS at 19:56

## 2020-02-22 RX ADMIN — HYDROMORPHONE HYDROCHLORIDE 0.5 MILLIGRAM(S): 2 INJECTION INTRAMUSCULAR; INTRAVENOUS; SUBCUTANEOUS at 05:23

## 2020-02-22 RX ADMIN — OXYCODONE HYDROCHLORIDE 10 MILLIGRAM(S): 5 TABLET ORAL at 15:29

## 2020-02-22 RX ADMIN — Medication 100 MILLIGRAM(S): at 13:48

## 2020-02-22 RX ADMIN — BUPRENORPHINE AND NALOXONE 1 TABLET(S): 2; .5 TABLET SUBLINGUAL at 13:49

## 2020-02-22 NOTE — DISCHARGE NOTE PROVIDER - NSDCFUADDINST_GEN_ALL_CORE_FT
ORIF DC Instructions:    1.	Analgesia  2.	Non-Weight Bearing  right lower Extremity, with assistive device/rolling walker  3.	Continue DVT/PE Prophylaxis. Per AC team, please see med rec.  4. Take Protonix if on Lovenox  5.	Out of Bed Daily, no sitting around.  6.	Follow up with Orthopedic Surgeon Dr. Tyson in 14 Days. Call Office For Appointment. Repeat x-rays in office.  7.	RN can Remove Staples/Sutures Post-Op Day 14 (**insert date) if they are accessible ie. not coverd by cast or splint.   8.	Elevate the extremity as much as possible  9.	Keep bandage/Splint Clean and dry. Do not get it wet. Do not walk or put any body weight on splint because it will break.

## 2020-02-22 NOTE — PROGRESS NOTE BEHAVIORAL HEALTH - NSBHFUPINTERVALHXFT_PSY_A_CORE
Patient presents lethargic, sedated, minimally engaged in interview, stating feeling "okay," denying anxiety, panic, depressed mood or anhedonia, hopelessness or suicidal ideation. Denies manic / psychotic symptoms. No delusions elicited. Reports being able to sleep last night. Denies other concerns. Reports tolerating recent medication changes.

## 2020-02-22 NOTE — PROGRESS NOTE ADULT - SUBJECTIVE AND OBJECTIVE BOX
c/c: fall/ right leg pain    HPI: 29F, pmh of Depression, PTSD, Generalized anxiety d/o, heroin abuse now on suboxone, HTN, Stroke with left sided weakness, RIght carotid A dissection/stenosis on asa/statin, paroxysmal atrial tachycardia, who presented to the ER with right leg pain inability to ambulate after a fall. She states she felt dizzy and fell onto her right leg. Denies sob/chest pain/n/v/d/abd pain. Felt fine prior to falling. no loc.   IN ED had imaging which showed right distal tib/fib fracture.   Underwent ex-fix .    : pt seen and examined this am. Felt ok. Had some pain at surgical site. Hadn't eaten yet. Denied sob/chest pain        Review of system- All 10 systems reviewed and is as per HPI otherwise negative.       VITALS  T(C): 36.6 (20 @ 05:17), Max: 36.8 (20 @ 17:32)  HR: 90 (20 @ 05:17) (76 - 90)  BP: 136/87 (20 @ 05:17) (115/70 - 154/103)  RR: 16 (20 @ 05:17) (14 - 18)  SpO2: 94% (20 @ 05:17) (94% - 100%)    PHYSICAL EXAM:    GENERAL: Comfortable, no acute distress  HEAD:  Atraumatic, Normocephalic  EYES: EOMI, PERRLA  HEENT: Moist mucous membranes  NECK: Supple, No JVD  NERVOUS SYSTEM:  Alert & Oriented X3, 5/5 power b/l UE, LLE  CHEST/LUNG: Clear to auscultation bilaterally  HEART: Regular rate and rhythm  ABDOMEN: Soft, Nontender, Nondistended; Bowel sounds present  GENITOURINARY- Voiding, no palpable bladder  EXTREMITIES:  No clubbing, cyanosis, or edema  MUSCULOSKELETAL-LLE in splint/ace-wrap  SKIN-no rash        LABS:                        12.6   8.93  )-----------( 401      ( 2020 06:25 )             40.1         140  |  107  |  7   ----------------------------<  123<H>  4.4   |  27  |  0.92    Ca    8.8      2020 06:25      PT/INR - ( 2020 07:36 )   PT: 12.6 sec;   INR: 1.13 ratio         PTT - ( 2020 07:36 )  PTT:30.8 sec  Urinalysis Basic - ( 2020 04:13 )    Color: Yellow / Appearance: Clear / S.005 / pH: x  Gluc: x / Ketone: Negative  / Bili: Negative / Urobili: Negative mg/dL   Blood: x / Protein: Negative mg/dL / Nitrite: Negative   Leuk Esterase: Trace / RBC: 0-2 /HPF / WBC 6-10   Sq Epi: x / Non Sq Epi: Few / Bacteria: Moderate            MEDS  acetaminophen   Tablet .. 650 milliGRAM(s) Oral every 6 hours PRN  ascorbic acid 500 milliGRAM(s) Oral two times a day  aspirin enteric coated 81 milliGRAM(s) Oral daily  atorvastatin 10 milliGRAM(s) Oral at bedtime  buprenorphine 8 mG/naloxone 2 mG SL  Tablet 1 Tablet(s) SubLingual <User Schedule>  calcium carbonate    500 mG (Tums) Chewable 3 Tablet(s) Chew every 6 hours PRN  ceFAZolin   IVPB 2000 milliGRAM(s) IV Intermittent every 8 hours  clonazePAM  Tablet 0.5 milliGRAM(s) Oral daily PRN  cloNIDine 0.1 milliGRAM(s) Oral three times a day  enoxaparin Injectable 40 milliGRAM(s) SubCutaneous every 24 hours  ferrous    sulfate 325 milliGRAM(s) Oral three times a day with meals  folic acid 1 milliGRAM(s) Oral daily  gabapentin 300 milliGRAM(s) Oral two times a day  gabapentin 600 milliGRAM(s) Oral at bedtime  HYDROmorphone  Injectable 0.5 milliGRAM(s) IV Push every 6 hours PRN  influenza   Vaccine 0.5 milliLiter(s) IntraMuscular once  lactated ringers. 1000 milliLiter(s) IV Continuous <Continuous>  multivitamin 1 Tablet(s) Oral daily  ondansetron Injectable 4 milliGRAM(s) IV Push every 6 hours PRN  oxyCODONE    IR 10 milliGRAM(s) Oral every 4 hours PRN  oxyCODONE    IR 5 milliGRAM(s) Oral every 4 hours PRN  QUEtiapine 25 milliGRAM(s) Oral two times a day  senna 2 Tablet(s) Oral at bedtime PRN  traZODone 50 milliGRAM(s) Oral at bedtime PRN  venlafaxine XR. 375 milliGRAM(s) Oral daily    ASSESSMENT AND PLAN:  29f, pmh as above a/w:    1. Fall/Right tib fib fracture:  -S/P ex fix POD#1  -pain control  -physical therapy  -incentive spirometry  -definitive surgical mx next week.    2.Dizziness:  -could be related to multiple sedative meds/high clonidine dosing.  -clonidine decreased per psychiatry  -check orthostatics when able.    3. H/o right carotid A dissection/stenosis, CVA/mild lue weakness:  -on asa/statin  -vascular eval appreciated  -repeat CTA with healed carotid dissection    3. HTN:  -on clonidine.    4. Heroin abuse in past:  -continue suboxone.    5. Depression/anxiety/PTSD:  -continue effexor, klonipin, lamictal, trazodone, gabapentin, seroquel  -psychiatry eval appreciated, meds being titrated down d/t sedations.    6. DVT px  -lovenox

## 2020-02-22 NOTE — PROGRESS NOTE BEHAVIORAL HEALTH - NSBHCHARTREVIEWINVESTIGATE_PSY_A_CORE FT
Ventricular Rate 79 BPM    Atrial Rate 80 BPM    P-R Interval 164 ms    QRS Duration 96 ms    Q-T Interval 368 ms    QTC Calculation(Bezet) 421 ms    P Axis 38 degrees    R Axis 18 degrees    T Axis 36 degrees    Diagnosis Line Normal sinus rhythm  Normal ECG  Confirmed by Shakir Anguiano ([id number]) on 2/21/2020 1:37:56 PM
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
## 2020-02-22 NOTE — PROGRESS NOTE BEHAVIORAL HEALTH - NSBHCHARTREVIEWVS_PSY_A_CORE FT
Vital Signs Last 24 Hrs  T(C): 36.6 (22 Feb 2020 05:17), Max: 36.8 (21 Feb 2020 17:32)  T(F): 97.8 (22 Feb 2020 05:17), Max: 98.2 (21 Feb 2020 17:32)  HR: 90 (22 Feb 2020 05:17) (76 - 98)  BP: 136/87 (22 Feb 2020 05:17) (115/70 - 154/103)  BP(mean): 105 (22 Feb 2020 00:30) (82 - 114)  RR: 16 (22 Feb 2020 05:17) (14 - 18)  SpO2: 94% (22 Feb 2020 05:17) (94% - 100%)

## 2020-02-22 NOTE — CONSULT NOTE ADULT - SUBJECTIVE AND OBJECTIVE BOX
HPI: This is a 30 y/o woman with pmhx of Depression, PTSD, Generalized anxiety d/o, heroin abuse now on subloxone, HTN, Stroke with left sided weakness, right carotid A dissection/stenosis on asa/statin, paroxysmal atrial tachycardia, who presented to the ER with right leg pain inability to ambulate after a fall. She states she felt dizzy and fell onto her right leg. Denies sob/chest pain/n/v/d/abd pain. Felt fine prior to falling. no loc.   IN ED had imaging which showed right distal tib/fib fracture. Scheduled for surgery later today. C/o severe pain at fracture site.    2020: Pt seen at bedside on 2N, she is AAOx3, delayed response. Informed her of VTE ppx with ASA. Inquired about prior use of oral AC and patient reports "I don't know."     Patient is a 29y old  Female who presents with a chief complaint of tib/fib fracture (2020 13:03)    Consulted by Dr. Urbano for VTE prophylaxis, risk stratification, and anticoagulation management.    PAST MEDICAL & SURGICAL HISTORY:  Cyst, breast  Transaminitis  Aneurysm of right internal carotid artery  Cerebrovascular accident (CVA)  Substance abuse  Anxiety  Depression  PTSD (post-traumatic stress disorder)  H/O breast biopsy    BMI: 30.4    CrCL: 126.86    CAPRINI SCORE  AGE RELATED RISK FACTORS                                                       MOBILITY RELATED FACTORS  [ ] Age 41-60 years                                            (1 Point)                  [ ] Bed rest                                                        (1 Point)  [ ] Age: 61-74 years                                           (2 Points)                [ ] Plaster cast                                                   (2 Points)  [ ] Age= 75 years                                              (3 Points)                 [ ] Bed bound for more than 72 hours                   (2 Points)    DISEASE RELATED RISK FACTORS                                               GENDER SPECIFIC FACTORS  [ ] Edema in the lower extremities                       (1 Point)           [ ] Pregnancy                                                            (1 Point)  [ ] Varicose veins                                               (1 Point)                  [ ] Post-partum < 6 weeks                                      (1 Point)             [X ] BMI > 25 Kg/m2                                            (1 Point)                  [ ] Hormonal therapy or oral contraception       (1 Point)                 [ ] Sepsis (in the previous month)                        (1 Point)             [ ] History of pregnancy complications                (1Point)  [ ] Pneumonia or serious lung disease                                             [ ] Unexplained or recurrent  (=3), premature                                 (In the previous month)                               (1 Point)                birth with toxemia or growth-restricted infant (1 Point)  [ ] Abnormal pulmonary function test            (1 Point)                                   SURGERY RELATED RISK FACTORS  [ ] Acute myocardial infarction                       (1 Point)                  [ ]  Section                                         (1 Point)  [ ] Congestive heart failure (in the previous month) (1 Point)   [ ] Minor surgery   lasting <45 minutes       (1 Point)   [ ] Inflammatory bowel disease                             (1 Point)          [ ] Arthroscopic surgery                                  (2 Points)  [ ] Central venous access                                    (2 Points)            [ ] General surgery lasting >45 minutes      (2 Points)       [ ] Stroke (in the previous month)                  (5 Points)            [ ] Elective major lower extremity arthroplasty (5 Points)                                   [  ] Malignancy (present or past include skin melanoma                                          but exclude  basal skin cell)    (2 points)                                      TRAUMA RELATED RISK FACTORS                HEMATOLOGY RELATED FACTORS                                  [X ] Fracture of the hip, pelvis, or leg                       (5 Points)  [ ] Prior episodes of VTE                                     (3 Points)          [ ] Acute spinal cord injury (in the previous month)  (5 Points)  [ ] Positive family history for VTE                         (3 Points)       [ ] Paralysis (less than 1 month)                          (5 Points)  [ ] Prothrombin 83918 A                                      (3 Points)         [ ] Multiple Trauma (within 1month)                 (5Points)                                                                                                                                                                [ ] Factor V Leiden                                          (3 Points)                                OTHER RISK FACTORS                          [ ] Lupus anticoagulants                                     (3 Points)                       [ ] BMI > 40                          (1 Point)                                                         [ ] Anticardiolipin antibodies                                (3 Points)                   [ ] Smoking                              (1Point)                                                [ ] High homocysteine in the blood                      (3 Points)                [  ] Diabetes requiring insulin (1point)                         [ ] Other congenital or acquired thrombophilia       (3 Points)          [  ] Chemotherapy                   (1 Point)  [ ] Heparin induced thrombocytopenia                  (3 Points)             [  ] Blood Transfusion                (1 point)                                                                                                         Total Score [  6  ]                                                                                                                   IMPROVE Bleeding Risk Score: 0    Falls Risk:   High ( X )  Mod (  )  Low (  )    EBL: 5 ml    Social: smokes 1ppd , drinks ETOH once in a while, heroin abuse in past (2020 09:25)    FAMILY HISTORY:  Medical history unknown: in father, unknown if living or   Family history of drug use: IN MOTHER, LIVING  Denies any personal or familial history of clotting or bleeding disorders.    Allergies  Geodon (Other)  Seroquel (Unknown)  Zyprexa (Other)    Intolerances    REVIEW OF SYSTEMS    (  )Fever	     (  )Constipation	(  )SOB			(  )Headache	(  )Dysuria  (  )Chills	     (  )Melena	(  )Dyspnea present on exertion    (  )Dizziness                    (  )Polyuria  (  )Nausea	     (  )Hematochezia	(  )Cough		                    (  )Syncope   	(  )Hematuria  (  )Vomiting    (  )Chest Pain	(  )Wheezing		(  )Weakness  (  )Diarrhea     (  )Palpitations	(  )Anorexia		(  )Myalgia    + pain in her right leg. All other review of systems negative: Yes    PHYSICAL EXAM:    Vital Signs Last 24 Hrs  T(C): 36.4 (2020 13:45), Max: 36.8 (2020 17:32)  T(F): 97.6 (2020 13:45), Max: 98.2 (2020 17:32)  HR: 104 (2020 13:45) (76 - 104)  BP: 142/84 (2020 13:45) (115/70 - 154/103)  BP(mean): 105 (2020 00:30) (82 - 114)  RR: 16 (2020 13:45) (14 - 18)  SpO2: 95% (2020 13:45) (94% - 100%)    Constitutional: Appears fatigue    Neurological: Drowsy but A& O x 3    Skin: Warm    Respiratory and Thorax: normal effort; Breath sounds: normal; No rales/wheezing/rhonchi  	  Cardiovascular: S1, S2, regular, NMBR	    Gastrointestinal: BS + x 4Q, nontender	    Genitourinary:  Bladder nondistended, nontender    Musculoskeletal:   General Right:   no muscle/joint tenderness,   normal tone, no joint swelling,   ROM: limited	    General Left:   no muscle/joint tenderness,   normal tone, no joint swelling,   ROM: full    RLE: with external fixator in place, wiggles toes, sensation intact    Lower extrems:   Right: no calf tenderness              negative tammi's sign               + pedal pulses    Left:   no calf tenderness              negative tammi's sign               + pedal pulses    MEDICATIONS  (STANDING):  ascorbic acid 500 milliGRAM(s) Oral two times a day  aspirin 325 milliGRAM(s) Oral two times a day  atorvastatin 10 milliGRAM(s) Oral at bedtime  buprenorphine 8 mG/naloxone 2 mG SL  Tablet 1 Tablet(s) SubLingual <User Schedule>  cloNIDine 0.1 milliGRAM(s) Oral three times a day  ferrous    sulfate 325 milliGRAM(s) Oral three times a day with meals  folic acid 1 milliGRAM(s) Oral daily  gabapentin 300 milliGRAM(s) Oral two times a day  gabapentin 600 milliGRAM(s) Oral at bedtime  influenza   Vaccine 0.5 milliLiter(s) IntraMuscular once  lactated ringers. 1000 milliLiter(s) (75 mL/Hr) IV Continuous <Continuous>  multivitamin 1 Tablet(s) Oral daily  QUEtiapine 25 milliGRAM(s) Oral two times a day  venlafaxine XR. 375 milliGRAM(s) Oral daily    LABS:                        12.6   8.93  )-----------( 401      ( 2020 06:25 )             40.1         140  |  107  |  7   ----------------------------<  123<H>  4.4   |  27  |  0.92    Ca    8.8      2020 06:25      PT/INR - ( 2020 07:36 )   PT: 12.6 sec;   INR: 1.13 ratio    PTT - ( 2020 07:36 )  PTT:30.8 sec  Urinalysis Basic - ( 2020 04:13 )  Color: Yellow / Appearance: Clear / S.005 / pH: x  Gluc: x / Ketone: Negative  / Bili: Negative / Urobili: Negative mg/dL   Blood: x / Protein: Negative mg/dL / Nitrite: Negative   Leuk Esterase: Trace / RBC: 0-2 /HPF / WBC 6-10   Sq Epi: x / Non Sq Epi: Few / Bacteria: Moderate    RADIOLOGY, EKG & ADDITIONAL TESTS: Reviewed.     EXAM:  CT ANKLE ONLY RT                        EXAM:  CT 3D RECONSTRUCT AYANA [facility]RA                        PROCEDURE DATE:  2020    IMPRESSION:  1.  Status post external fixation.  2.  Comminuted mildly displaced fractures of the lateral tibial plafond with intra-articular extension as described above.  3.  Nondisplaced coronally oriented intra-articular fracture at the posterior tibial malleolus.  4.  Mildly displaced distal fibular fracture.  5.  Suspected widening of the tibiotalar joint space  6.  Soft tissue swelling.    DVT Prophylaxis:  LMWH                   (  )  Heparin SQ           (  )  Coumadin             (  )  Xarelto                  (  )  Eliquis                   (  )  Venodynes           ( X )  Ambulation          (X )  UFH                       (  )  Contraindicated  (  )  ASA                       (  X ) HPI: This is a 28 y/o woman with pmhx of Depression, PTSD, Generalized anxiety d/o, heroin abuse now on subloxone, HTN, Stroke with left sided weakness, right carotid A dissection/stenosis on asa/statin, paroxysmal atrial tachycardia, who presented to the ER with right leg pain inability to ambulate after a fall. She states she felt dizzy and fell onto her right leg. Denies sob/chest pain/n/v/d/abd pain. Felt fine prior to falling. no loc.   IN ED had imaging which showed right distal tib/fib fracture. Scheduled for surgery later today. C/o severe pain at fracture site.    2020: Pt seen at bedside on 2N, she is AAOx3, delayed response. Informed her of VTE ppx with ASA. Inquired about prior use of oral AC and patient reports "I don't know."     Patient is a 29y old  Female who presents with a chief complaint of tib/fib fracture (2020 13:03)    Consulted by Dr. Urbano for VTE prophylaxis, risk stratification, and anticoagulation management.    PAST MEDICAL & SURGICAL HISTORY:  Cyst, breast  Transaminitis  Aneurysm of right internal carotid artery  Cerebrovascular accident (CVA)  Substance abuse  Anxiety  Depression  PTSD (post-traumatic stress disorder)  H/O breast biopsy    BMI: 30.4    CrCL: 126.86    CAPRINI SCORE  AGE RELATED RISK FACTORS                                                       MOBILITY RELATED FACTORS  [ ] Age 41-60 years                                            (1 Point)                  [ ] Bed rest                                                        (1 Point)  [ ] Age: 61-74 years                                           (2 Points)                [ ] Plaster cast                                                   (2 Points)  [ ] Age= 75 years                                              (3 Points)                 [ ] Bed bound for more than 72 hours                   (2 Points)    DISEASE RELATED RISK FACTORS                                               GENDER SPECIFIC FACTORS  [ ] Edema in the lower extremities                       (1 Point)           [ ] Pregnancy                                                            (1 Point)  [ ] Varicose veins                                               (1 Point)                  [ ] Post-partum < 6 weeks                                      (1 Point)             [X ] BMI > 25 Kg/m2                                            (1 Point)                  [ ] Hormonal therapy or oral contraception       (1 Point)                 [ ] Sepsis (in the previous month)                        (1 Point)             [ ] History of pregnancy complications                (1Point)  [ ] Pneumonia or serious lung disease                                             [ ] Unexplained or recurrent  (=3), premature                                 (In the previous month)                               (1 Point)                birth with toxemia or growth-restricted infant (1 Point)  [ ] Abnormal pulmonary function test            (1 Point)                                   SURGERY RELATED RISK FACTORS  [ ] Acute myocardial infarction                       (1 Point)                  [ ]  Section                                         (1 Point)  [ ] Congestive heart failure (in the previous month) (1 Point)   [ ] Minor surgery   lasting <45 minutes       (1 Point)   [ ] Inflammatory bowel disease                             (1 Point)          [ ] Arthroscopic surgery                                  (2 Points)  [ ] Central venous access                                    (2 Points)            [ ] General surgery lasting >45 minutes      (2 Points)       [ ] Stroke (in the previous month)                  (5 Points)            [ ] Elective major lower extremity arthroplasty (5 Points)                                   [  ] Malignancy (present or past include skin melanoma                                          but exclude  basal skin cell)    (2 points)                                      TRAUMA RELATED RISK FACTORS                HEMATOLOGY RELATED FACTORS                                  [X ] Fracture of the hip, pelvis, or leg                       (5 Points)  [ ] Prior episodes of VTE                                     (3 Points)          [ ] Acute spinal cord injury (in the previous month)  (5 Points)  [ ] Positive family history for VTE                         (3 Points)       [ ] Paralysis (less than 1 month)                          (5 Points)  [ ] Prothrombin 02579 A                                      (3 Points)         [ ] Multiple Trauma (within 1month)                 (5Points)                                                                                                                                                                [ ] Factor V Leiden                                          (3 Points)                                OTHER RISK FACTORS                          [ ] Lupus anticoagulants                                     (3 Points)                       [ ] BMI > 40                          (1 Point)                                                         [ ] Anticardiolipin antibodies                                (3 Points)                   [ ] Smoking                              (1Point)                                                [ ] High homocysteine in the blood                      (3 Points)                [  ] Diabetes requiring insulin (1point)                         [ ] Other congenital or acquired thrombophilia       (3 Points)          [  ] Chemotherapy                   (1 Point)  [ ] Heparin induced thrombocytopenia                  (3 Points)             [  ] Blood Transfusion                (1 point)                                                                                                         Total Score [  6  ]                                                                                                                   IMPROVE Bleeding Risk Score: 0    Falls Risk:   High ( X )  Mod (  )  Low (  )    EBL: 5 ml    PROCEDURE START:   PROCEDURE END:     Social: smokes 1ppd , drinks ETOH once in a while, heroin abuse in past (2020 09:25)    FAMILY HISTORY:  Medical history unknown: in father, unknown if living or   Family history of drug use: IN MOTHER, LIVING  Denies any personal or familial history of clotting or bleeding disorders.    Allergies  Geodon (Other)  Seroquel (Unknown)  Zyprexa (Other)    Intolerances    REVIEW OF SYSTEMS    (  )Fever	     (  )Constipation	(  )SOB			(  )Headache	(  )Dysuria  (  )Chills	     (  )Melena	(  )Dyspnea present on exertion    (  )Dizziness                    (  )Polyuria  (  )Nausea	     (  )Hematochezia	(  )Cough		                    (  )Syncope   	(  )Hematuria  (  )Vomiting    (  )Chest Pain	(  )Wheezing		(  )Weakness  (  )Diarrhea     (  )Palpitations	(  )Anorexia		(  )Myalgia    + pain in her right leg. All other review of systems negative: Yes    PHYSICAL EXAM:    Vital Signs Last 24 Hrs  T(C): 36.4 (2020 13:45), Max: 36.8 (2020 17:32)  T(F): 97.6 (2020 13:45), Max: 98.2 (2020 17:32)  HR: 104 (2020 13:45) (76 - 104)  BP: 142/84 (2020 13:45) (115/70 - 154/103)  BP(mean): 105 (2020 00:30) (82 - 114)  RR: 16 (2020 13:45) (14 - 18)  SpO2: 95% (2020 13:45) (94% - 100%)    Constitutional: Appears fatigue    Neurological: Drowsy but A& O x 3    Skin: Warm    Respiratory and Thorax: normal effort; Breath sounds: normal; No rales/wheezing/rhonchi  	  Cardiovascular: S1, S2, regular, NMBR	    Gastrointestinal: BS + x 4Q, nontender	    Genitourinary:  Bladder nondistended, nontender    Musculoskeletal:   General Right:   no muscle/joint tenderness,   normal tone, no joint swelling,   ROM: limited	    General Left:   no muscle/joint tenderness,   normal tone, no joint swelling,   ROM: full    RLE: with external fixator in place, wiggles toes, sensation intact    Lower extrems:   Right: no calf tenderness              negative tammi's sign               + pedal pulses    Left:   no calf tenderness              negative tammi's sign               + pedal pulses    MEDICATIONS  (STANDING):  ascorbic acid 500 milliGRAM(s) Oral two times a day  aspirin 325 milliGRAM(s) Oral two times a day  atorvastatin 10 milliGRAM(s) Oral at bedtime  buprenorphine 8 mG/naloxone 2 mG SL  Tablet 1 Tablet(s) SubLingual <User Schedule>  cloNIDine 0.1 milliGRAM(s) Oral three times a day  ferrous    sulfate 325 milliGRAM(s) Oral three times a day with meals  folic acid 1 milliGRAM(s) Oral daily  gabapentin 300 milliGRAM(s) Oral two times a day  gabapentin 600 milliGRAM(s) Oral at bedtime  influenza   Vaccine 0.5 milliLiter(s) IntraMuscular once  lactated ringers. 1000 milliLiter(s) (75 mL/Hr) IV Continuous <Continuous>  multivitamin 1 Tablet(s) Oral daily  QUEtiapine 25 milliGRAM(s) Oral two times a day  venlafaxine XR. 375 milliGRAM(s) Oral daily    LABS:                        12.6   8.93  )-----------( 401      ( 2020 06:25 )             40.1         140  |  107  |  7   ----------------------------<  123<H>  4.4   |  27  |  0.92    Ca    8.8      2020 06:25      PT/INR - ( 2020 07:36 )   PT: 12.6 sec;   INR: 1.13 ratio    PTT - ( 2020 07:36 )  PTT:30.8 sec  Urinalysis Basic - ( 2020 04:13 )  Color: Yellow / Appearance: Clear / S.005 / pH: x  Gluc: x / Ketone: Negative  / Bili: Negative / Urobili: Negative mg/dL   Blood: x / Protein: Negative mg/dL / Nitrite: Negative   Leuk Esterase: Trace / RBC: 0-2 /HPF / WBC 6-10   Sq Epi: x / Non Sq Epi: Few / Bacteria: Moderate    RADIOLOGY, EKG & ADDITIONAL TESTS: Reviewed.     EXAM:  CT ANKLE ONLY RT                        EXAM:  CT 3D RECONSTRUCT  [facility]RA                        PROCEDURE DATE:  2020    IMPRESSION:  1.  Status post external fixation.  2.  Comminuted mildly displaced fractures of the lateral tibial plafond with intra-articular extension as described above.  3.  Nondisplaced coronally oriented intra-articular fracture at the posterior tibial malleolus.  4.  Mildly displaced distal fibular fracture.  5.  Suspected widening of the tibiotalar joint space  6.  Soft tissue swelling.    DVT Prophylaxis:  LMWH                   (  )  Heparin SQ           (  )  Coumadin             (  )  Xarelto                  (  )  Eliquis                   (  )  Venodynes           ( X )  Ambulation          (X )  UFH                       (  )  Contraindicated  (  )  ASA                       (  X )

## 2020-02-22 NOTE — PHYSICAL THERAPY INITIAL EVALUATION ADULT - PERTINENT HX OF CURRENT PROBLEM, REHAB EVAL
s/p fall/Pilon Fracture of R TIbia s/p fall/Pilon Fracture of R TIbia. Pt with hx of right carotid artery dissection/stenosis: mild CVA, with mild left UE weakness.

## 2020-02-22 NOTE — DISCHARGE NOTE PROVIDER - NSDCMRMEDTOKEN_GEN_ALL_CORE_FT
Aspir 81: 1 tab(s) orally once a day  atorvastatin 10 mg oral tablet: 1 tab(s) orally once a day (at bedtime)  buprenorphine-naloxone 8 mg-2 mg sublingual tablet: 1 tab(s) sublingual 3 times a day  clonazePAM 0.25 mg oral tablet, disintegratin tab(s) orally once a day at 2pm  clonazePAM 1 mg oral tablet: 1 tab(s) orally once a day, As Needed for anxiety  cloNIDine 0.1 mg oral tablet: 2 tab(s) orally once a day @2pm  cloNIDine 0.1 mg oral tablet: 3 tab(s) orally once a day (at bedtime)  gabapentin 300 mg oral capsule: 1 cap(s) orally 2 times a day  gabapentin 600 mg oral tablet: 1 tab(s) orally once a day (at bedtime)  lamoTRIgine 150 mg oral tablet: 1 tab(s) orally every 12 hours  QUEtiapine 25 mg oral tablet: 1 tab(s) orally 2 times a day  traZODone 50 mg oral tablet: 1 tab(s) orally once a day (at bedtime), As Needed for insomnia  venlafaxine 150 mg oral capsule, extended release: cap(s) orally once a day  venlafaxine 75 mg oral tablet: 1 tab(s) orally once a day  ***combined with the 300mg to equal [id number]*** acetaminophen 325 mg oral tablet: 2 tab(s) orally every 6 hours, As needed, Temp greater or equal to 38C (100.4F)  acetaminophen 325 mg oral tablet: 2 tab(s) orally every 6 hours, As needed, Mild Pain (1 - 3)  ascorbic acid 500 mg oral tablet: 1 tab(s) orally 2 times a day  aspirin 325 mg oral tablet: 1 tab(s) orally 2 times a day for dvt ppx until weight bearing   atorvastatin 10 mg oral tablet: 1 tab(s) orally once a day (at bedtime)  buprenorphine-naloxone 8 mg-2 mg sublingual tablet: 1 tab(s) sublingual 3 times a day  cefuroxime 500 mg oral tablet: 1 tab(s) orally every 12 hours until 3/8  clonazePAM 0.5 mg oral tablet: 1 tab(s) orally every 12 hours, As needed, Anxiety  cloNIDine 0.1 mg oral tablet: 1 tab(s) orally 2 times a day  dilTIAZem 30 mg oral tablet: 1 tab(s) orally 2 times a day  FeroSul 325 mg (65 mg elemental iron) oral tablet: 1 tab(s) orally once a day  folic acid 1 mg oral tablet: 1 tab(s) orally once a day  gabapentin 300 mg oral capsule: 1 cap(s) orally 2 times a day  gabapentin 300 mg oral capsule: 2 cap(s) orally once a day (at bedtime)  lamoTRIgine 25 mg oral tablet: 1 tab(s) orally 2 times a day  metoprolol tartrate 75 mg oral tablet: 1 tab(s) orally 2 times a day  Multiple Vitamins oral tablet: 1 tab(s) orally once a day  oxyCODONE 5 mg oral tablet: 1 tab(s) orally every 6 hours, As needed, Moderate to severe  QUEtiapine 50 mg oral tablet: 1 tab(s) orally 2 times a day  senna oral tablet: 2 tab(s) orally once a day (at bedtime), As needed, Constipation  traZODone 50 mg oral tablet: 1 tab(s) orally once a day (at bedtime), As needed, insomnia  venlafaxine 75 mg oral capsule, extended release: 3 cap(s) orally once a day acetaminophen 325 mg oral tablet: 2 tab(s) orally every 6 hours, As needed, Mild Pain (1 - 3)  ascorbic acid 500 mg oral tablet: 1 tab(s) orally 2 times a day  aspirin 325 mg oral tablet: 1 tab(s) orally 2 times a day for dvt ppx until weight bearing   atorvastatin 10 mg oral tablet: 1 tab(s) orally once a day (at bedtime)  buprenorphine-naloxone 8 mg-2 mg sublingual tablet: 1 tab(s) sublingual 3 times a day  cefuroxime 500 mg oral tablet: 1 tab(s) orally every 12 hours until 3/8  clonazePAM 0.5 mg oral tablet: 1 tab(s) orally every 12 hours, As needed, Anxiety  cloNIDine 0.1 mg oral tablet: 1 tab(s) orally 2 times a day  dilTIAZem 30 mg oral tablet: 1 tab(s) orally 2 times a day  FeroSul 325 mg (65 mg elemental iron) oral tablet: 1 tab(s) orally once a day  folic acid 1 mg oral tablet: 1 tab(s) orally once a day  gabapentin 300 mg oral capsule: 1 cap(s) orally 2 times a day  gabapentin 300 mg oral capsule: 2 cap(s) orally once a day (at bedtime)  lamoTRIgine 25 mg oral tablet: 1 tab(s) orally 2 times a day  metoprolol tartrate 75 mg oral tablet: 1 tab(s) orally 2 times a day  Multiple Vitamins oral tablet: 1 tab(s) orally once a day  oxyCODONE 5 mg oral tablet: 1 tab(s) orally every 6 hours, As needed, Moderate to severe  QUEtiapine 50 mg oral tablet: 1 tab(s) orally 2 times a day  senna oral tablet: 2 tab(s) orally once a day (at bedtime), As needed, Constipation  traZODone 50 mg oral tablet: 1 tab(s) orally once a day (at bedtime), As needed, insomnia  venlafaxine 75 mg oral capsule, extended release: 3 cap(s) orally once a day

## 2020-02-22 NOTE — DISCHARGE NOTE PROVIDER - PROVIDER TOKENS
PROVIDER:[TOKEN:[[id number]],FOLLOWUP:[2 weeks]] PROVIDER:[TOKEN:[[id number]],FOLLOWUP:[2 weeks]],PROVIDER:[TOKEN:[[id number]],FOLLOWUP:[2 weeks]],PROVIDER:[TOKEN:[[id number]],FOLLOWUP:[1 week]],PROVIDER:[TOKEN:[[id number]],FOLLOWUP:[1 week]]

## 2020-02-22 NOTE — DISCHARGE NOTE PROVIDER - CARE PROVIDER_API CALL
Antwon Tyson (DO)  Orthopaedic Surgery  [address] [place] [address]  Phone: [phone]  Fax: [phone]  Follow Up Time: 2 weeks Antwon Tyson (DO)  Orthopaedic Surgery  [address] [place] [address]  Phone: [phone]  Fax: [phone]  Follow Up Time: 2 weeks    Junior Marie)  Surgery  [address] [facility], [address] [place] [address]  Phone: [phone]  Fax: [phone]  Follow Up Time: 2 weeks    Radha Dean)  Cardiovascular Disease; Internal Medicine  [address] [place] [address]  Phone: [phone]  Fax: [phone]  Follow Up Time: 1 week    Ruben Pinto)  Internal Medicine  [address] [place] [address]  Phone: [phone]  Fax: [phone]  Follow Up Time: 1 week Antwon Tyson (DO)  Orthopaedic Surgery  [address] [place] [address]  Phone: [phone]  Fax: [phone]  Follow Up Time: 2 weeks    Junior Marie)  Surgery  [address] [facility], [address]nd [place] [address]  Phone: [phone]  Fax: [phone]  Follow Up Time: 2 weeks    Radha Dean)  Cardiovascular Disease; Internal Medicine  [address] [place] [address]  Phone: [phone]  Fax: [phone]  Follow Up Time: 1 week    Ruben Pinto)  Internal Medicine  [address] [place] [address]  Phone: [phone]  Fax: [phone]  Follow Up Time: 1 week

## 2020-02-22 NOTE — DISCHARGE NOTE PROVIDER - HOSPITAL COURSE
c/c: fall/ right leg pain        HPI: 29F, pmh of Depression, PTSD, Generalized anxiety d/o, heroin abuse now on suboxone, HTN, Stroke with left sided weakness, RIght carotid A dissection/stenosis on asa/statin, paroxysmal atrial tachycardia, who presented to the ER with right leg pain inability to ambulate after a fall. She states she felt dizzy and fell onto her right leg. Denies sob/chest pain/n/v/d/abd pain. Felt fine prior to falling. no loc.   In ED had imaging which showed right distal tib/fib fracture.   Underwent ex-fix 2/21. Followed by psychiatry and meds titrated down as it was felt her high doses contributed to her dizziness. Post op course notable for episodes of SVT requiring adenosine. Seen by EP, felt not to be a good candidate for ablation at this time given RLE splint. started on bb/ccb and episodes seem to be decreasing in duration.   Pt doing well, no fever, chills, n, v.  Complains of some dysuria for which she will be treated for 3 days for UTI.   Pt declined ablation intervention for SVT.  She is controlled on AV marjan agents, HD stable, afebrile.  Pt ready for [facility].        REVIEW OF SYSTEMS: All other review of systems is negative unless indicated above.        Vital Signs Last 24 Hrs    T(C): 36.8 (05 Mar 2020 11:12), Max: 36.8 (05 Mar 2020 11:12)    T(F): 98.2 (05 Mar 2020 11:12), Max: 98.2 (05 Mar 2020 11:12)    HR: 90 (05 Mar 2020 11:12) (72 - 90)    BP: 107/81 (05 Mar 2020 11:12) (99/53 - 126/68)    BP(mean): --    RR: 18 (05 Mar 2020 11:12) (17 - 18)    SpO2: 96% (05 Mar 2020 11:12) (95% - 97%)        PHYSICAL EXAM:    GENERAL: Comfortable, no acute distress    HEAD:  Atraumatic, Normocephalic    EYES: EOMI, PERRLA    HEENT: Moist mucous membranes    NECK: Supple, No JVD    NERVOUS SYSTEM:  nonfocal    CHEST/LUNG: Clear to auscultation bilaterally    HEART: Regular rate and rhythm    ABDOMEN: Soft, Nontender, Nondistended; Bowel sounds present    GENITOURINARY- Voiding, no palpable bladder    EXTREMITIES:  No clubbing, cyanosis, or edema    MUSCULOSKELETAL- RLE in splint/ace-wrap    SKIN-no rash        med/labs: Reviewed and interpreted         ASSESSMENT AND PLAN:    #Fall/Right tib/fib fracture:    -S/P ex fix     -S/p ORIF 2/26/20    -pain control    -physical therapy    -incentive spirometry        #SVT    -s/p adenosine    -continue bb/ccb as ordered    -Pt declines EP ablation         #HCAP likely d/t resistant gram negative organism:    -completed 7 days treatment with abx.        dysuria/UTI:    -3 day course of ceftin         #Dizziness:    -likely related to multiple sedative meds/high clonidine dosing +/-SVT        #H/o right carotid A dissection/stenosis, CVA/mild lue weakness:    -on asa/statin    -vascular eval appreciated    -repeat CTA with healed carotid dissection        #HTN:    -now on bb/ccb.        #Heroin abuse in past:    -continue suboxone.        #Depression/anxiety/PTSD:    -continue effexor, klonipin, trazodone, gabapentin, seroquel    -prn klonipin decreased from 1mg to 0.5mg.    -clonidine decreased from 0.2mg daily and 0.3mg HS to 0.1 mg bid.    -effexor decreased from 375 to 225mg     -seroquel increased from 25 bid to 50 bid for anxiety.     -continue trazodone, gabapentin at current doses    -upon review of meds pt hasn't gotten lamictal since 2/21    -d/w psychiatry, needs to be restarted @ lower dose and titrated up slowly .    -started lamictal @ 25mg bid, titrate up in 2 weeks.        DVT ppx: - Continue Lovenox 40 mg SQ daily while in the hospital/Rehab then switch toASA 325 mg PO BID at time of discharge if she is still NWB.        #Dispo: To [facility]        Attending Statement: 40 minutes spent on total encounter and discharge planning. c/c: fall/ right leg pain        HPI: 29F, pmh of Depression, PTSD, Generalized anxiety d/o, heroin abuse now on suboxone, HTN, Stroke with left sided weakness, RIght carotid A dissection/stenosis on asa/statin, paroxysmal atrial tachycardia, who presented to the ER with right leg pain inability to ambulate after a fall. She states she felt dizzy and fell onto her right leg. Denies sob/chest pain/n/v/d/abd pain. Felt fine prior to falling. no loc.   In ED had imaging which showed right distal tib/fib fracture.   Underwent ex-fix 2/21. Followed by psychiatry and meds titrated down as it was felt her high doses contributed to her dizziness.  Lamictal was not restarted post op and resumed later on in hospital course at low dose. THis should be titrated back up as tolerated.  Post op course notable for episodes of SVT requiring adenosine. Seen by EP, felt not to be a good candidate for ablation at this time given RLE splint. started on bb/ccb with improvement.   Pt doing well, no fever, chills, n, v.  Complains of some dysuria for which she will be treated for 3 days for UTI.   Pt declined ablation intervention for SVT.  She is controlled on AV marjan agents, HD stable, afebrile.  Pt ready for [facility]. She will need psychiatry follow up for further titration of meds.     for physical exam please see progress note from 3/6        LABS:Complete Blood Count in AM (03.02.20 @ 07:19)      WBC Count: 11.25 K/uL      RBC Count: 4.11 M/uL      Hemoglobin: 11.5 g/dL      Hematocrit: 35.4 %      Mean Cell Volume: 86.1 fl      Mean Cell Hemoglobin: 28.0 pg      Mean Cell Hemoglobin Conc: 32.5 gm/dL      Red Cell Distrib Width: 14.2 %      Platelet Count - Automated: 396 K/uL         Xray Ankle Post Reduction, Right (02.20.20 @ 23:47) >        HISTORY: Postreduction     Four views of the right ankle show improved alignment of the distal tibia and fibula with their respective shafts. Overlying cast material is present. The ankle mortise shows no gross malalignment.    IMPRESSION: Postreduction views.        FINAL DIAGNOSIS:        #Fall/Right tib/fib fracture:    -S/p ORIF 2/26/20        #SVT        #HCAP likely d/t resistant gram negative organism:        #UTI        #Dizziness:    -likely related to multiple sedative meds/high clonidine dosing +/-SVT        #H/o right carotid A dissection/stenosis, CVA/mild lue weakness:    -CTA WITH HEALED DISSECTION        #HTN    #Heroin abuse in pasT on suboxone.        #Depression/anxiety/PTSD-meds titrated, will need close f/u as outpt.        Attending Statement: 40 minutes spent on total encounter and discharge planning.

## 2020-02-22 NOTE — DISCHARGE NOTE PROVIDER - NSDCCPCAREPLAN_GEN_ALL_CORE_FT
PRINCIPAL DISCHARGE DIAGNOSIS  Diagnosis: Closed displaced pilon fracture of right tibia, initial encounter  Assessment and Plan of Treatment:       SECONDARY DISCHARGE DIAGNOSES  Diagnosis: Atelectasis  Assessment and Plan of Treatment:     Diagnosis: Other closed fracture of distal end of right fibula, initial encounter  Assessment and Plan of Treatment:     Diagnosis: Polysubstance abuse  Assessment and Plan of Treatment: PRINCIPAL DISCHARGE DIAGNOSIS  Diagnosis: Closed displaced pilon fracture of right tibia, initial encounter  Assessment and Plan of Treatment: post ORIF of right pilon fracture.  Physical therapy, supportive care, pain management.  DVT ppx until weight bearing.      SECONDARY DISCHARGE DIAGNOSES  Diagnosis: H/O carotid stenosis  Assessment and Plan of Treatment: Follow up with vascular surgeon 2 weeks.    Diagnosis: Post traumatic stress disorder (PTSD)  Assessment and Plan of Treatment: anxiety, depression - continue psych meds as prescribed in your discharg med rec.    Diagnosis: SVT (supraventricular tachycardia)  Assessment and Plan of Treatment: Declines ablation.  Continue metoprolol and cardizem - stable.  Follow up outpatient with cardiologist.    Diagnosis: Opioid dependence in remission  Assessment and Plan of Treatment: Continue suboxone PRINCIPAL DISCHARGE DIAGNOSIS  Diagnosis: Closed displaced pilon fracture of right tibia, initial encounter  Assessment and Plan of Treatment: post ORIF of right pilon fracture.  Physical therapy, supportive care, pain management.  DVT ppx until weight bearing.      SECONDARY DISCHARGE DIAGNOSES  Diagnosis: Post traumatic stress disorder (PTSD)  Assessment and Plan of Treatment: anxiety, depression - continue psych meds as prescribed in your discharg med rec.  follow up with psychiatry at rehab and have meds titrated further if needed.  lamictal should be increased in 2 weeks.    Diagnosis: SVT (supraventricular tachycardia)  Assessment and Plan of Treatment: Declines ablation.  Continue metoprolol and cardizem - stable.  Follow up outpatient with cardiologist.    Diagnosis: Opioid dependence in remission  Assessment and Plan of Treatment: Continue suboxone

## 2020-02-22 NOTE — CONSULT NOTE ADULT - ASSESSMENT
A 30 y/o woman with pmhx of depression, PTSD, Generalized anxiety d/o, heroin abuse, stroke with left sided weakness, right carotid A dissection/stenosis on asa/statin, paroxysmal atrial tachycardia, who presented to the ER with right leg pain inability to ambulate after a fall. She is found to have pilon fx of right tibia. S/p delta external fixator, per Ortho plans to re-take her to OR in few days. Pt with VTE risk factors due to immobility and NWB of RLE.     PLAN:  - c/w home med ASA 81 mg daily  - c/w Lovenox 40 mg SQ daily for now and can transition to  mg PO BID at time of discharge  - Monitor CBC/BMP  - maintain LLE venodynes    Thank you for consult, will continue to follow

## 2020-02-22 NOTE — PROGRESS NOTE ADULT - SUBJECTIVE AND OBJECTIVE BOX
patient seen at bedside today  s/p right tibia fracture repair  no acute event overnight  had CTA neck yesterday     Vital Signs Last 24 Hrs  T(C): 36.6 (22 Feb 2020 05:17), Max: 36.8 (21 Feb 2020 17:32)  T(F): 97.8 (22 Feb 2020 05:17), Max: 98.2 (21 Feb 2020 17:32)  HR: 90 (22 Feb 2020 05:17) (76 - 98)  BP: 136/87 (22 Feb 2020 05:17) (115/70 - 154/103)  BP(mean): 105 (22 Feb 2020 00:30) (82 - 114)  RR: 16 (22 Feb 2020 05:17) (14 - 18)  SpO2: 94% (22 Feb 2020 05:17) (94% - 100%)    Exam  Awake, in NAD  room air, non  labored breathing   right LE dressing is in place    02-22    140  |  107  |  7   ----------------------------<  123<H>  4.4   |  27  |  0.92    Ca    8.8      22 Feb 2020 06:25                            12.6   8.93  )-----------( 401      ( 22 Feb 2020 06:25 )             40.1

## 2020-02-22 NOTE — DISCHARGE NOTE PROVIDER - CARE PROVIDERS DIRECT ADDRESSES
[email or web address],[email or web address]

## 2020-02-22 NOTE — PROGRESS NOTE ADULT - SUBJECTIVE AND OBJECTIVE BOX
Patient seen and examined at bedside. Reports no acute complaints at this time. Pain is well controlled. No acute events overnight.    PHYSICAL EXAM:  Vital Signs Last 24 Hrs  T(C): 36.6 (22 Feb 2020 05:17), Max: 36.8 (21 Feb 2020 17:32)  T(F): 97.8 (22 Feb 2020 05:17), Max: 98.2 (21 Feb 2020 17:32)  HR: 90 (22 Feb 2020 05:17) (76 - 98)  BP: 136/87 (22 Feb 2020 05:17) (115/70 - 154/103)  BP(mean): 105 (22 Feb 2020 00:30) (82 - 114)  RR: 16 (22 Feb 2020 05:17) (14 - 18)  SpO2: 94% (22 Feb 2020 05:17) (94% - 100%)      Gen: NAD, AAOx3    Right Lower Extremity:  Ex Fix Intact  Dressing clean dry intact  +EHL/FHL  SILT L3-S1  +DP/PT Pulses  Compartments soft  No calf TTP B/L

## 2020-02-22 NOTE — PROGRESS NOTE ADULT - ASSESSMENT
A/P: 29F s/p R Ankle Ex Fix POD 1  Analgesia  DVT ppx  NWB  Plan for ORIF next week  PT/OT  Encourage incentive spirometry  Ice and elevate as tolerated  Will discuss with attending and advise if plan changes

## 2020-02-22 NOTE — PROGRESS NOTE ADULT - ASSESSMENT
29 Y/F with history of right carotid dissection back in august 2019 with residual left sided weakness. Pt admitted s/p mechanical fall with right distal tibial and fibula  fracture,  s/p right tibial fracture repair. Repeat CTA shows complete resolution of the right carotid intima flap    Plan   follow up with vascular surgery as outpatient  2 weeks upon discharge   resume aspirin  vascular surgery will sign off   Medical management per primary team    Plan d/w Dr. Marie

## 2020-02-23 ENCOUNTER — TRANSCRIPTION ENCOUNTER (OUTPATIENT)
Age: 30
End: 2020-02-23

## 2020-02-23 LAB
ANION GAP SERPL CALC-SCNC: 5 MMOL/L — SIGNIFICANT CHANGE UP (ref 5–17)
BUN SERPL-MCNC: 9 MG/DL — SIGNIFICANT CHANGE UP (ref 7–23)
CALCIUM SERPL-MCNC: 8.9 MG/DL — SIGNIFICANT CHANGE UP (ref 8.5–10.1)
CHLORIDE SERPL-SCNC: 104 MMOL/L — SIGNIFICANT CHANGE UP (ref 96–108)
CO2 SERPL-SCNC: 29 MMOL/L — SIGNIFICANT CHANGE UP (ref 22–31)
CREAT SERPL-MCNC: 0.73 MG/DL — SIGNIFICANT CHANGE UP (ref 0.5–1.3)
GLUCOSE SERPL-MCNC: 91 MG/DL — SIGNIFICANT CHANGE UP (ref 70–99)
HCT VFR BLD CALC: 37.6 % — SIGNIFICANT CHANGE UP (ref 34.5–45)
HGB BLD-MCNC: 11.8 G/DL — SIGNIFICANT CHANGE UP (ref 11.5–15.5)
MCHC RBC-ENTMCNC: 27.3 PG — SIGNIFICANT CHANGE UP (ref 27–34)
MCHC RBC-ENTMCNC: 31.4 GM/DL — LOW (ref 32–36)
MCV RBC AUTO: 86.8 FL — SIGNIFICANT CHANGE UP (ref 80–100)
PLATELET # BLD AUTO: 406 K/UL — HIGH (ref 150–400)
POTASSIUM SERPL-MCNC: 4 MMOL/L — SIGNIFICANT CHANGE UP (ref 3.5–5.3)
POTASSIUM SERPL-SCNC: 4 MMOL/L — SIGNIFICANT CHANGE UP (ref 3.5–5.3)
RBC # BLD: 4.33 M/UL — SIGNIFICANT CHANGE UP (ref 3.8–5.2)
RBC # FLD: 14.2 % — SIGNIFICANT CHANGE UP (ref 10.3–14.5)
SODIUM SERPL-SCNC: 138 MMOL/L — SIGNIFICANT CHANGE UP (ref 135–145)
WBC # BLD: 12.43 K/UL — HIGH (ref 3.8–10.5)
WBC # FLD AUTO: 12.43 K/UL — HIGH (ref 3.8–10.5)

## 2020-02-23 PROCEDURE — 99232 SBSQ HOSP IP/OBS MODERATE 35: CPT

## 2020-02-23 PROCEDURE — 99233 SBSQ HOSP IP/OBS HIGH 50: CPT

## 2020-02-23 RX ADMIN — OXYCODONE HYDROCHLORIDE 10 MILLIGRAM(S): 5 TABLET ORAL at 17:06

## 2020-02-23 RX ADMIN — Medication 325 MILLIGRAM(S): at 12:11

## 2020-02-23 RX ADMIN — HYDROMORPHONE HYDROCHLORIDE 0.5 MILLIGRAM(S): 2 INJECTION INTRAMUSCULAR; INTRAVENOUS; SUBCUTANEOUS at 02:28

## 2020-02-23 RX ADMIN — ENOXAPARIN SODIUM 40 MILLIGRAM(S): 100 INJECTION SUBCUTANEOUS at 12:10

## 2020-02-23 RX ADMIN — OXYCODONE HYDROCHLORIDE 10 MILLIGRAM(S): 5 TABLET ORAL at 17:36

## 2020-02-23 RX ADMIN — Medication 0.1 MILLIGRAM(S): at 05:56

## 2020-02-23 RX ADMIN — OXYCODONE HYDROCHLORIDE 10 MILLIGRAM(S): 5 TABLET ORAL at 12:11

## 2020-02-23 RX ADMIN — Medication 1 MILLIGRAM(S): at 12:11

## 2020-02-23 RX ADMIN — QUETIAPINE FUMARATE 25 MILLIGRAM(S): 200 TABLET, FILM COATED ORAL at 17:08

## 2020-02-23 RX ADMIN — GABAPENTIN 300 MILLIGRAM(S): 400 CAPSULE ORAL at 17:05

## 2020-02-23 RX ADMIN — HYDROMORPHONE HYDROCHLORIDE 0.5 MILLIGRAM(S): 2 INJECTION INTRAMUSCULAR; INTRAVENOUS; SUBCUTANEOUS at 21:08

## 2020-02-23 RX ADMIN — HYDROMORPHONE HYDROCHLORIDE 0.5 MILLIGRAM(S): 2 INJECTION INTRAMUSCULAR; INTRAVENOUS; SUBCUTANEOUS at 02:13

## 2020-02-23 RX ADMIN — GABAPENTIN 600 MILLIGRAM(S): 400 CAPSULE ORAL at 21:09

## 2020-02-23 RX ADMIN — QUETIAPINE FUMARATE 25 MILLIGRAM(S): 200 TABLET, FILM COATED ORAL at 05:57

## 2020-02-23 RX ADMIN — BUPRENORPHINE AND NALOXONE 1 TABLET(S): 2; .5 TABLET SUBLINGUAL at 13:08

## 2020-02-23 RX ADMIN — OXYCODONE HYDROCHLORIDE 10 MILLIGRAM(S): 5 TABLET ORAL at 05:57

## 2020-02-23 RX ADMIN — Medication 375 MILLIGRAM(S): at 12:13

## 2020-02-23 RX ADMIN — OXYCODONE HYDROCHLORIDE 10 MILLIGRAM(S): 5 TABLET ORAL at 12:41

## 2020-02-23 RX ADMIN — Medication 81 MILLIGRAM(S): at 12:11

## 2020-02-23 RX ADMIN — Medication 500 MILLIGRAM(S): at 17:05

## 2020-02-23 RX ADMIN — HYDROMORPHONE HYDROCHLORIDE 0.5 MILLIGRAM(S): 2 INJECTION INTRAMUSCULAR; INTRAVENOUS; SUBCUTANEOUS at 13:06

## 2020-02-23 RX ADMIN — GABAPENTIN 300 MILLIGRAM(S): 400 CAPSULE ORAL at 05:57

## 2020-02-23 RX ADMIN — HYDROMORPHONE HYDROCHLORIDE 0.5 MILLIGRAM(S): 2 INJECTION INTRAMUSCULAR; INTRAVENOUS; SUBCUTANEOUS at 13:20

## 2020-02-23 RX ADMIN — Medication 1 TABLET(S): at 12:11

## 2020-02-23 RX ADMIN — Medication 500 MILLIGRAM(S): at 05:57

## 2020-02-23 RX ADMIN — Medication 0.1 MILLIGRAM(S): at 17:08

## 2020-02-23 RX ADMIN — BUPRENORPHINE AND NALOXONE 1 TABLET(S): 2; .5 TABLET SUBLINGUAL at 17:05

## 2020-02-23 RX ADMIN — OXYCODONE HYDROCHLORIDE 10 MILLIGRAM(S): 5 TABLET ORAL at 06:27

## 2020-02-23 RX ADMIN — ATORVASTATIN CALCIUM 10 MILLIGRAM(S): 80 TABLET, FILM COATED ORAL at 21:10

## 2020-02-23 RX ADMIN — BUPRENORPHINE AND NALOXONE 1 TABLET(S): 2; .5 TABLET SUBLINGUAL at 05:57

## 2020-02-23 RX ADMIN — Medication 325 MILLIGRAM(S): at 17:05

## 2020-02-23 NOTE — PROGRESS NOTE ADULT - SUBJECTIVE AND OBJECTIVE BOX
Pt S/E at bedside, no acute events overnight, pain controlled    Vital Signs Last 24 Hrs  T(C): 36.7 (22 Feb 2020 22:09), Max: 36.7 (22 Feb 2020 17:00)  T(F): 98.1 (22 Feb 2020 22:09), Max: 98.1 (22 Feb 2020 17:00)  HR: 91 (22 Feb 2020 22:09) (91 - 104)  BP: 126/79 (22 Feb 2020 22:09) (121/75 - 150/83)  BP(mean): --  RR: 16 (22 Feb 2020 22:09) (16 - 16)  SpO2: 94% (22 Feb 2020 22:09) (92% - 95%)    Gen: NAD    Right Lower Extremity:  Ex-fix intact, dressing dry  +plantar/dorsiflexion toes  SILT distally  warm, well perfused, brisk CR  Compartments soft  No calf TTP B/L

## 2020-02-23 NOTE — PROGRESS NOTE BEHAVIORAL HEALTH - NSBHCHARTREVIEWVS_PSY_A_CORE FT
Vital Signs Last 24 Hrs  T(C): 36.8 (23 Feb 2020 11:00), Max: 36.8 (23 Feb 2020 11:00)  T(F): 98.2 (23 Feb 2020 11:00), Max: 98.2 (23 Feb 2020 11:00)  HR: 79 (23 Feb 2020 11:00) (79 - 104)  BP: 125/73 (23 Feb 2020 11:00) (121/75 - 150/83)  BP(mean): 85 (23 Feb 2020 11:00) (85 - 85)  RR: 16 (23 Feb 2020 11:00) (16 - 16)  SpO2: 96% (23 Feb 2020 11:00) (92% - 96%)

## 2020-02-23 NOTE — PROGRESS NOTE ADULT - SUBJECTIVE AND OBJECTIVE BOX
c/c: fall/ right leg pain    HPI: 29F, pmh of Depression, PTSD, Generalized anxiety d/o, heroin abuse now on suboxone, HTN, Stroke with left sided weakness, RIght carotid A dissection/stenosis on asa/statin, paroxysmal atrial tachycardia, who presented to the ER with right leg pain inability to ambulate after a fall. She states she felt dizzy and fell onto her right leg. Denies sob/chest pain/n/v/d/abd pain. Felt fine prior to falling. no loc.   IN ED had imaging which showed right distal tib/fib fracture.   Underwent ex-fix 2/21.    2/23: pt seen and examined this am. Sleeping but arousable. no acute overnight events. meds being titrated down per psychiatry.      Review of system- All 10 systems reviewed and is as per HPI otherwise negative.     Vital Signs Last 24 Hrs  T(C): 36.8 (23 Feb 2020 11:00), Max: 36.8 (23 Feb 2020 11:00)  T(F): 98.2 (23 Feb 2020 11:00), Max: 98.2 (23 Feb 2020 11:00)  HR: 79 (23 Feb 2020 11:00) (79 - 104)  BP: 125/73 (23 Feb 2020 11:00) (121/75 - 150/83)  BP(mean): 85 (23 Feb 2020 11:00) (85 - 85)  RR: 16 (23 Feb 2020 11:00) (16 - 16)  SpO2: 96% (23 Feb 2020 11:00) (92% - 96%)    PHYSICAL EXAM:    GENERAL: Comfortable, no acute distress  HEAD:  Atraumatic, Normocephalic  EYES: EOMI, PERRLA  HEENT: Moist mucous membranes  NECK: Supple, No JVD  NERVOUS SYSTEM:  Alert & Oriented X3, 5/5 power b/l UE, LLE  CHEST/LUNG: Clear to auscultation bilaterally  HEART: Regular rate and rhythm  ABDOMEN: Soft, Nontender, Nondistended; Bowel sounds present  GENITOURINARY- Voiding, no palpable bladder  EXTREMITIES:  No clubbing, cyanosis, or edema  MUSCULOSKELETAL-LLE in splint/ace-wrap  SKIN-no rash      LABS:                        11.8   12.43 )-----------( 406      ( 23 Feb 2020 06:37 )             37.6     02-23    138  |  104  |  9   ----------------------------<  91  4.0   |  29  |  0.73    Ca    8.9      23 Feb 2020 06:37      MEDICATIONS  (STANDING):  ascorbic acid 500 milliGRAM(s) Oral two times a day  aspirin  chewable 81 milliGRAM(s) Oral daily  atorvastatin 10 milliGRAM(s) Oral at bedtime  buprenorphine 8 mG/naloxone 2 mG SL  Tablet 1 Tablet(s) SubLingual <User Schedule>  cloNIDine 0.1 milliGRAM(s) Oral two times a day  enoxaparin Injectable 40 milliGRAM(s) SubCutaneous daily  ferrous    sulfate 325 milliGRAM(s) Oral three times a day with meals  folic acid 1 milliGRAM(s) Oral daily  gabapentin 300 milliGRAM(s) Oral two times a day  gabapentin 600 milliGRAM(s) Oral at bedtime  influenza   Vaccine 0.5 milliLiter(s) IntraMuscular once  lactated ringers. 1000 milliLiter(s) (75 mL/Hr) IV Continuous <Continuous>  multivitamin 1 Tablet(s) Oral daily  QUEtiapine 25 milliGRAM(s) Oral two times a day  venlafaxine XR. 375 milliGRAM(s) Oral daily    MEDICATIONS  (PRN):  acetaminophen   Tablet .. 650 milliGRAM(s) Oral every 6 hours PRN Temp greater or equal to 38C (100.4F)  calcium carbonate    500 mG (Tums) Chewable 3 Tablet(s) Chew every 6 hours PRN Dyspepsia  HYDROmorphone  Injectable 0.5 milliGRAM(s) IV Push every 6 hours PRN Severe Pain (7 - 10)  ondansetron Injectable 4 milliGRAM(s) IV Push every 6 hours PRN Nausea and/or Vomiting  oxyCODONE    IR 10 milliGRAM(s) Oral every 4 hours PRN Moderate Pain (4 - 6)  oxyCODONE    IR 5 milliGRAM(s) Oral every 4 hours PRN Mild Pain (1 - 3)  senna 2 Tablet(s) Oral at bedtime PRN Constipation  traZODone 50 milliGRAM(s) Oral at bedtime PRN insomnia    ASSESSMENT AND PLAN:  29f, pmh as above a/w:    1. Fall/Right tib fib fracture:  -S/P ex fix POD#2  -pain control  -physical therapy  -incentive spirometry  -definitive surgical mx next week.    2.Dizziness:  -could be related to multiple sedative meds/high clonidine dosing.  -clonidine decreased per psychiatry    3. H/o right carotid A dissection/stenosis, CVA/mild lue weakness:  -on asa/statin  -vascular eval appreciated  -repeat CTA with healed carotid dissection    3. HTN:  -on clonidine.    4. Heroin abuse in past:  -continue suboxone.    5. Depression/anxiety/PTSD:  -continue effexor, klonipin, lamictal, trazodone, gabapentin, seroquel  -psychiatry eval appreciated, meds being titrated down d/t sedation.    6. DVT px  -lovenox    7. Dispo:  -definitive mx of ankle fx this week.

## 2020-02-23 NOTE — PROGRESS NOTE ADULT - SUBJECTIVE AND OBJECTIVE BOX
HPI: This is a 30 y/o woman with pmhx of Depression, PTSD, Generalized anxiety d/o, heroin abuse now on subloxone, HTN, Stroke with left sided weakness, right carotid A dissection/stenosis on asa/statin, paroxysmal atrial tachycardia, who presented to the ER with right leg pain inability to ambulate after a fall. She states she felt dizzy and fell onto her right leg. Denies sob/chest pain/n/v/d/abd pain. Felt fine prior to falling. no loc.   IN ED had imaging which showed right distal tib/fib fracture. Scheduled for surgery later today. C/o severe pain at fracture site.    2020: Pt seen at bedside on 2N, she is AAOx3, delayed response. Informed her of VTE ppx with ASA. Inquired about prior use of oral AC and patient reports "I don't know."   2020: Pt seen at bedside on 2N, lethargic and not following commands; poor engagement with conversation    Patient is a 29y old  Female who presents with a chief complaint of tib/fib fracture (2020 13:03)    Consulted by Dr. Urbano for VTE prophylaxis, risk stratification, and anticoagulation management.    PAST MEDICAL & SURGICAL HISTORY:  Cyst, breast  Transaminitis  Aneurysm of right internal carotid artery  Cerebrovascular accident (CVA)  Substance abuse  Anxiety  Depression  PTSD (post-traumatic stress disorder)  H/O breast biopsy    BMI: 30.4    CrCL: 126.86    CAPRINI SCORE  AGE RELATED RISK FACTORS                                                       MOBILITY RELATED FACTORS  [ ] Age 41-60 years                                            (1 Point)                  [ ] Bed rest                                                        (1 Point)  [ ] Age: 61-74 years                                           (2 Points)                [ ] Plaster cast                                                   (2 Points)  [ ] Age= 75 years                                              (3 Points)                 [ ] Bed bound for more than 72 hours                   (2 Points)    DISEASE RELATED RISK FACTORS                                               GENDER SPECIFIC FACTORS  [ ] Edema in the lower extremities                       (1 Point)           [ ] Pregnancy                                                            (1 Point)  [ ] Varicose veins                                               (1 Point)                  [ ] Post-partum < 6 weeks                                      (1 Point)             [X ] BMI > 25 Kg/m2                                            (1 Point)                  [ ] Hormonal therapy or oral contraception       (1 Point)                 [ ] Sepsis (in the previous month)                        (1 Point)             [ ] History of pregnancy complications                (1Point)  [ ] Pneumonia or serious lung disease                                             [ ] Unexplained or recurrent  (=3), premature                                 (In the previous month)                               (1 Point)                birth with toxemia or growth-restricted infant (1 Point)  [ ] Abnormal pulmonary function test            (1 Point)                                   SURGERY RELATED RISK FACTORS  [ ] Acute myocardial infarction                       (1 Point)                  [ ]  Section                                         (1 Point)  [ ] Congestive heart failure (in the previous month) (1 Point)   [ ] Minor surgery   lasting <45 minutes       (1 Point)   [ ] Inflammatory bowel disease                             (1 Point)          [ ] Arthroscopic surgery                                  (2 Points)  [ ] Central venous access                                    (2 Points)            [ ] General surgery lasting >45 minutes      (2 Points)       [ ] Stroke (in the previous month)                  (5 Points)            [ ] Elective major lower extremity arthroplasty (5 Points)                                   [  ] Malignancy (present or past include skin melanoma                                          but exclude  basal skin cell)    (2 points)                                      TRAUMA RELATED RISK FACTORS                HEMATOLOGY RELATED FACTORS                                  [X ] Fracture of the hip, pelvis, or leg                       (5 Points)  [ ] Prior episodes of VTE                                     (3 Points)          [ ] Acute spinal cord injury (in the previous month)  (5 Points)  [ ] Positive family history for VTE                         (3 Points)       [ ] Paralysis (less than 1 month)                          (5 Points)  [ ] Prothrombin 24346 A                                      (3 Points)         [ ] Multiple Trauma (within 1month)                 (5Points)                                                                                                                                                                [ ] Factor V Leiden                                          (3 Points)                                OTHER RISK FACTORS                          [ ] Lupus anticoagulants                                     (3 Points)                       [ ] BMI > 40                          (1 Point)                                                         [ ] Anticardiolipin antibodies                                (3 Points)                   [ ] Smoking                              (1Point)                                                [ ] High homocysteine in the blood                      (3 Points)                [  ] Diabetes requiring insulin (1point)                         [ ] Other congenital or acquired thrombophilia       (3 Points)          [  ] Chemotherapy                   (1 Point)  [ ] Heparin induced thrombocytopenia                  (3 Points)             [  ] Blood Transfusion                (1 point)                                                                                                         Total Score [  6  ]                                                                                                                   IMPROVE Bleeding Risk Score: 0    Falls Risk:   High ( X )  Mod (  )  Low (  )    EBL: 5 ml    PROCEDURE START:   PROCEDURE END:     Social: smokes 1ppd , drinks ETOH once in a while, heroin abuse in past (2020 09:25)    FAMILY HISTORY:  Medical history unknown: in father, unknown if living or   Family history of drug use: IN MOTHER, LIVING  Denies any personal or familial history of clotting or bleeding disorders.    Allergies  Geodon (Other)  Seroquel (Unknown)  Zyprexa (Other)    Intolerances    REVIEW OF SYSTEMS    (  )Fever	     (  )Constipation	(  )SOB			(  )Headache	(  )Dysuria  (  )Chills	     (  )Melena	(  )Dyspnea present on exertion    (  )Dizziness                    (  )Polyuria  (  )Nausea	     (  )Hematochezia	(  )Cough		                    (  )Syncope   	(  )Hematuria  (  )Vomiting    (  )Chest Pain	(  )Wheezing		(  )Weakness  (  )Diarrhea     (  )Palpitations	(  )Anorexia		(  )Myalgia    + pain in her right leg. All other review of systems negative: Yes    PHYSICAL EXAM:    Vital Signs Last 24 Hrs  T(C): 36.7 (20 @ 22:09), Max: 36.7 (20 @ 17:00)  T(F): 98.1 (20 @ 22:09), Max: 98.1 (20 @ 17:00)  HR: 91 (20 @ 22:09) (91 - 104)  BP: 126/79 (20 @ 22:09) (121/75 - 150/83)  BP(mean): --  RR: 16 (20 @ 22:09) (16 - 16)  SpO2: 94% (20 @ 22:09) (92% - 95%)    Constitutional: Appears fatigue    Neurological: Lethargic     Skin: Warm    Respiratory and Thorax: normal effort; Breath sounds: normal; No rales/wheezing/rhonchi  	  Cardiovascular: S1, S2, regular, NMBR	    Gastrointestinal: BS + x 4Q, nontender	    Genitourinary:  Bladder nondistended, nontender    Musculoskeletal:   General Right:   no muscle/joint tenderness,   normal tone, no joint swelling,   ROM: limited	    General Left:   no muscle/joint tenderness,   normal tone, no joint swelling,   ROM: full    RLE: with external fixator in place, wiggles toes, sensation intact    Lower extrems:   Right: no calf tenderness              negative tammi's sign               + pedal pulses    Left:   no calf tenderness              negative tammi's sign               + pedal pulses    MEDICATIONS  (STANDING):  ascorbic acid 500 milliGRAM(s) Oral two times a day  aspirin  chewable 81 milliGRAM(s) Oral daily  atorvastatin 10 milliGRAM(s) Oral at bedtime  buprenorphine 8 mG/naloxone 2 mG SL  Tablet 1 Tablet(s) SubLingual <User Schedule>  cloNIDine 0.1 milliGRAM(s) Oral three times a day  enoxaparin Injectable 40 milliGRAM(s) SubCutaneous daily  ferrous    sulfate 325 milliGRAM(s) Oral three times a day with meals  folic acid 1 milliGRAM(s) Oral daily  gabapentin 300 milliGRAM(s) Oral two times a day  gabapentin 600 milliGRAM(s) Oral at bedtime  influenza   Vaccine 0.5 milliLiter(s) IntraMuscular once  lactated ringers. 1000 milliLiter(s) (75 mL/Hr) IV Continuous <Continuous>  multivitamin 1 Tablet(s) Oral daily  QUEtiapine 25 milliGRAM(s) Oral two times a day  venlafaxine XR. 375 milliGRAM(s) Oral daily    LABS:                      11.8   12.43 )-----------( 406      ( 2020 06:37 )             37.6           138  |  104  |  9   ----------------------------<  91  4.0   |  29  |  0.73    Ca    8.9      2020 06:37                       12.6   8.93  )-----------( 401      ( 2020 06:25 )             40.1     02    140  |  107  |  7   ----------------------------<  123<H>  4.4   |  27  |  0.92    Ca    8.8      2020 06:25      PT/INR - ( 2020 07:36 )   PT: 12.6 sec;   INR: 1.13 ratio    PTT - ( 2020 07:36 )  PTT:30.8 sec  Urinalysis Basic - ( 2020 04:13 )  Color: Yellow / Appearance: Clear / S.005 / pH: x  Gluc: x / Ketone: Negative  / Bili: Negative / Urobili: Negative mg/dL   Blood: x / Protein: Negative mg/dL / Nitrite: Negative   Leuk Esterase: Trace / RBC: 0-2 /HPF / WBC 6-10   Sq Epi: x / Non Sq Epi: Few / Bacteria: Moderate    RADIOLOGY, EKG & ADDITIONAL TESTS: Reviewed.     EXAM:  CT ANKLE ONLY RT                        EXAM:  CT 3D RECONSTRUCT WO MITRA                        PROCEDURE DATE:  2020    IMPRESSION:  1.  Status post external fixation.  2.  Comminuted mildly displaced fractures of the lateral tibial plafond with intra-articular extension as described above.  3.  Nondisplaced coronally oriented intra-articular fracture at the posterior tibial malleolus.  4.  Mildly displaced distal fibular fracture.  5.  Suspected widening of the tibiotalar joint space  6.  Soft tissue swelling.    DVT Prophylaxis:  LMWH                   (  )  Heparin SQ           (  )  Coumadin             (  )  Xarelto                  (  )  Eliquis                   (  )  Venodynes           ( X )  Ambulation          (X )  UFH                       (  )  Contraindicated  (  )  ASA                       (  X )

## 2020-02-23 NOTE — PROGRESS NOTE ADULT - ASSESSMENT
A/P: 29F s/p RLE ex-fix for pilon fracture  Plan for OR with Dr. Tyson for definitive fixation likely tuesday or wednesday this week  Medical optimization for OR  Analgesia  DVT ppx  NWB RLE in exfix  Ice/elevation  PT  Will discuss with Dr. Tyson and advise if plan changes

## 2020-02-23 NOTE — PROGRESS NOTE ADULT - ASSESSMENT
A 30 y/o woman with pmhx of depression, PTSD, heroin abuse, stroke with left sided weakness, right carotid A dissection/stenosis on asa/statin, paroxysmal atrial tachycardia, who presented to the ER with right leg pain inability to ambulate after a fall. She is found to have pilon fx of right tibia. S/p delta external fixator, per Ortho plans to re-take her to OR next Tues vs. Wed. Pt with VTE risk factors due to immobility and NWB of RLE.     PLAN:  - c/w home med ASA 81 mg daily  - c/w Lovenox 40 mg SQ daily for now and can transition to  mg PO BID at time of discharge  - Monitor CBC/BMP  - maintain LLE venodynes    Will continue to follow
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
## 2020-02-23 NOTE — PROGRESS NOTE BEHAVIORAL HEALTH - NSBHFUPINTERVALHXFT_PSY_A_CORE
Patient presents less lethargic, and less sedated, minimally engaged in interview, stating feeling "okay besides pain" denying anxiety, panic, depressed mood or anhedonia, hopelessness or suicidal ideation. Denies manic / psychotic symptoms. No delusions elicited. Reports being able to sleep last night. Denies other concerns. Reports tolerating recent medication changes.

## 2020-02-24 DIAGNOSIS — Z71.89 OTHER SPECIFIED COUNSELING: ICD-10-CM

## 2020-02-24 LAB
ANION GAP SERPL CALC-SCNC: 6 MMOL/L — SIGNIFICANT CHANGE UP (ref 5–17)
BUN SERPL-MCNC: 15 MG/DL — SIGNIFICANT CHANGE UP (ref 7–23)
CALCIUM SERPL-MCNC: 9.2 MG/DL — SIGNIFICANT CHANGE UP (ref 8.5–10.1)
CHLORIDE SERPL-SCNC: 104 MMOL/L — SIGNIFICANT CHANGE UP (ref 96–108)
CO2 SERPL-SCNC: 28 MMOL/L — SIGNIFICANT CHANGE UP (ref 22–31)
CREAT SERPL-MCNC: 0.89 MG/DL — SIGNIFICANT CHANGE UP (ref 0.5–1.3)
GLUCOSE SERPL-MCNC: 98 MG/DL — SIGNIFICANT CHANGE UP (ref 70–99)
HCT VFR BLD CALC: 41.4 % — SIGNIFICANT CHANGE UP (ref 34.5–45)
HGB BLD-MCNC: 13 G/DL — SIGNIFICANT CHANGE UP (ref 11.5–15.5)
MCHC RBC-ENTMCNC: 27.3 PG — SIGNIFICANT CHANGE UP (ref 27–34)
MCHC RBC-ENTMCNC: 31.4 GM/DL — LOW (ref 32–36)
MCV RBC AUTO: 87 FL — SIGNIFICANT CHANGE UP (ref 80–100)
PLATELET # BLD AUTO: 417 K/UL — HIGH (ref 150–400)
POTASSIUM SERPL-MCNC: 3.9 MMOL/L — SIGNIFICANT CHANGE UP (ref 3.5–5.3)
POTASSIUM SERPL-SCNC: 3.9 MMOL/L — SIGNIFICANT CHANGE UP (ref 3.5–5.3)
RBC # BLD: 4.76 M/UL — SIGNIFICANT CHANGE UP (ref 3.8–5.2)
RBC # FLD: 13.9 % — SIGNIFICANT CHANGE UP (ref 10.3–14.5)
SODIUM SERPL-SCNC: 138 MMOL/L — SIGNIFICANT CHANGE UP (ref 135–145)
WBC # BLD: 12.74 K/UL — HIGH (ref 3.8–10.5)
WBC # FLD AUTO: 12.74 K/UL — HIGH (ref 3.8–10.5)

## 2020-02-24 PROCEDURE — 99232 SBSQ HOSP IP/OBS MODERATE 35: CPT

## 2020-02-24 RX ORDER — BUPRENORPHINE AND NALOXONE 2; .5 MG/1; MG/1
1 TABLET SUBLINGUAL ONCE
Refills: 0 | Status: DISCONTINUED | OUTPATIENT
Start: 2020-02-24 | End: 2020-02-25

## 2020-02-24 RX ORDER — ACETAMINOPHEN 500 MG
1000 TABLET ORAL ONCE
Refills: 0 | Status: COMPLETED | OUTPATIENT
Start: 2020-02-24 | End: 2020-02-24

## 2020-02-24 RX ADMIN — OXYCODONE HYDROCHLORIDE 10 MILLIGRAM(S): 5 TABLET ORAL at 15:38

## 2020-02-24 RX ADMIN — QUETIAPINE FUMARATE 25 MILLIGRAM(S): 200 TABLET, FILM COATED ORAL at 09:47

## 2020-02-24 RX ADMIN — Medication 1 MILLIGRAM(S): at 11:36

## 2020-02-24 RX ADMIN — Medication 0.1 MILLIGRAM(S): at 09:48

## 2020-02-24 RX ADMIN — OXYCODONE HYDROCHLORIDE 10 MILLIGRAM(S): 5 TABLET ORAL at 16:30

## 2020-02-24 RX ADMIN — BUPRENORPHINE AND NALOXONE 1 TABLET(S): 2; .5 TABLET SUBLINGUAL at 06:32

## 2020-02-24 RX ADMIN — OXYCODONE HYDROCHLORIDE 10 MILLIGRAM(S): 5 TABLET ORAL at 21:39

## 2020-02-24 RX ADMIN — Medication 0.1 MILLIGRAM(S): at 21:39

## 2020-02-24 RX ADMIN — Medication 400 MILLIGRAM(S): at 23:17

## 2020-02-24 RX ADMIN — Medication 500 MILLIGRAM(S): at 06:32

## 2020-02-24 RX ADMIN — Medication 375 MILLIGRAM(S): at 11:36

## 2020-02-24 RX ADMIN — Medication 50 MILLIGRAM(S): at 23:28

## 2020-02-24 RX ADMIN — GABAPENTIN 300 MILLIGRAM(S): 400 CAPSULE ORAL at 17:44

## 2020-02-24 RX ADMIN — HYDROMORPHONE HYDROCHLORIDE 0.5 MILLIGRAM(S): 2 INJECTION INTRAMUSCULAR; INTRAVENOUS; SUBCUTANEOUS at 18:40

## 2020-02-24 RX ADMIN — Medication 1 TABLET(S): at 11:36

## 2020-02-24 RX ADMIN — Medication 325 MILLIGRAM(S): at 11:36

## 2020-02-24 RX ADMIN — GABAPENTIN 300 MILLIGRAM(S): 400 CAPSULE ORAL at 06:32

## 2020-02-24 RX ADMIN — OXYCODONE HYDROCHLORIDE 10 MILLIGRAM(S): 5 TABLET ORAL at 22:09

## 2020-02-24 RX ADMIN — Medication 1000 MILLIGRAM(S): at 23:32

## 2020-02-24 RX ADMIN — Medication 500 MILLIGRAM(S): at 17:44

## 2020-02-24 RX ADMIN — GABAPENTIN 600 MILLIGRAM(S): 400 CAPSULE ORAL at 21:39

## 2020-02-24 RX ADMIN — ATORVASTATIN CALCIUM 10 MILLIGRAM(S): 80 TABLET, FILM COATED ORAL at 21:39

## 2020-02-24 RX ADMIN — ENOXAPARIN SODIUM 40 MILLIGRAM(S): 100 INJECTION SUBCUTANEOUS at 11:37

## 2020-02-24 RX ADMIN — Medication 325 MILLIGRAM(S): at 17:44

## 2020-02-24 RX ADMIN — HYDROMORPHONE HYDROCHLORIDE 0.5 MILLIGRAM(S): 2 INJECTION INTRAMUSCULAR; INTRAVENOUS; SUBCUTANEOUS at 06:32

## 2020-02-24 RX ADMIN — Medication 81 MILLIGRAM(S): at 11:36

## 2020-02-24 RX ADMIN — Medication 325 MILLIGRAM(S): at 09:48
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
## 2020-02-24 NOTE — CHART NOTE - NSCHARTNOTEFT_GEN_A_CORE
Nurse called to notify subaxon order was not active. Per nurse patient have been in subaxon for many years and experience" withdrawal symptoms" . Chart reviewed and a dose of subaxon ordered. Please reevaluate the need to continue in subaxon.

## 2020-02-24 NOTE — PROGRESS NOTE ADULT - SUBJECTIVE AND OBJECTIVE BOX
Pt seen and examined at bedside. Reports no acute complaints at this time. Pain is well controlled. No acute events overnight.    Vital Signs Last 24 Hrs  T(C): 36.8 (24 Feb 2020 04:51), Max: 36.8 (23 Feb 2020 11:00)  T(F): 98.3 (24 Feb 2020 04:51), Max: 98.3 (24 Feb 2020 04:51)  HR: 94 (24 Feb 2020 04:51) (79 - 102)  BP: 122/77 (24 Feb 2020 04:51) (111/71 - 140/83)  BP(mean): 85 (23 Feb 2020 11:00) (85 - 85)  RR: 16 (24 Feb 2020 04:51) (16 - 16)  SpO2: 91% (24 Feb 2020 04:51) (91% - 96%)    Gen: NAD    Right Lower Extremity:  Ex-fix intact, dressing dry  +plantar/dorsiflexion toes  SILT distally  warm, well perfused, brisk CR  Compartments soft  No calf TTP B/L

## 2020-02-24 NOTE — PROGRESS NOTE BEHAVIORAL HEALTH - NSBHFUPINTERVALHXFT_PSY_A_CORE
Patient remains lethargic however less sedated, minimally engaged in interview, stating feeling "okay" denying anxiety, panic, depressed mood or anhedonia, hopelessness or suicidal ideation. Denies manic / psychotic symptoms. No delusions elicited. Reports being able to sleep last night. Denies other concerns. Reports tolerating recent medication changes.

## 2020-02-24 NOTE — PROGRESS NOTE ADULT - SUBJECTIVE AND OBJECTIVE BOX
c/c: fall/ right leg pain    HPI: 29F, pmh of Depression, PTSD, Generalized anxiety d/o, heroin abuse now on suboxone, HTN, Stroke with left sided weakness, RIght carotid A dissection/stenosis on asa/statin, paroxysmal atrial tachycardia, who presented to the ER with right leg pain inability to ambulate after a fall. She states she felt dizzy and fell onto her right leg. Denies sob/chest pain/n/v/d/abd pain. Felt fine prior to falling. no loc.   IN ED had imaging which showed right distal tib/fib fracture.   Underwent ex-fix 2/21.    2/23: pt seen and examined this am. Sleeping but arousable. no acute overnight events. meds being titrated down per psychiatry.  2/24 responsive    Review of system- All 10 systems reviewed and is as per HPI otherwise negative.     Vital Signs Last 24 Hrs  T(C): 36.9 (24 Feb 2020 11:00), Max: 36.9 (24 Feb 2020 11:00)  T(F): 98.5 (24 Feb 2020 11:00), Max: 98.5 (24 Feb 2020 11:00)  HR: 92 (24 Feb 2020 11:00) (84 - 94)  BP: 113/79 (24 Feb 2020 11:00) (111/71 - 140/83)  BP(mean): --  RR: 16 (24 Feb 2020 11:00) (16 - 16)  SpO2: 94% (24 Feb 2020 11:00) (91% - 95%)    PHYSICAL EXAM:    GENERAL: Comfortable, no acute distress  HEAD:  Atraumatic, Normocephalic  EYES: EOMI, PERRLA  HEENT: Moist mucous membranes  NECK: Supple, No JVD  NERVOUS SYSTEM:  Alert & Oriented X3, 5/5 power b/l UE, LLE  CHEST/LUNG: Clear to auscultation bilaterally  HEART: Regular rate and rhythm  ABDOMEN: Soft, Nontender, Nondistended; Bowel sounds present  GENITOURINARY- Voiding, no palpable bladder  EXTREMITIES:  No clubbing, cyanosis, or edema  MUSCULOSKELETAL-LLE in splint/ace-wrap  SKIN-no rash    LABS:                                 13.0   12.74 )-----------( 417      ( 24 Feb 2020 07:26 )             41.4     24 Feb 2020 07:26    138    |  104    |  15     ----------------------------<  98     3.9     |  28     |  0.89     Ca    9.2        24 Feb 2020 07:26    MEDICATIONS  (STANDING):  ascorbic acid 500 milliGRAM(s) Oral two times a day  aspirin  chewable 81 milliGRAM(s) Oral daily  atorvastatin 10 milliGRAM(s) Oral at bedtime  buprenorphine 8 mG/naloxone 2 mG SL  Tablet 1 Tablet(s) SubLingual <User Schedule>  cloNIDine 0.1 milliGRAM(s) Oral two times a day  enoxaparin Injectable 40 milliGRAM(s) SubCutaneous daily  ferrous    sulfate 325 milliGRAM(s) Oral three times a day with meals  folic acid 1 milliGRAM(s) Oral daily  gabapentin 300 milliGRAM(s) Oral two times a day  gabapentin 600 milliGRAM(s) Oral at bedtime  influenza   Vaccine 0.5 milliLiter(s) IntraMuscular once  lactated ringers. 1000 milliLiter(s) (75 mL/Hr) IV Continuous <Continuous>  multivitamin 1 Tablet(s) Oral daily  QUEtiapine 25 milliGRAM(s) Oral two times a day  venlafaxine XR. 375 milliGRAM(s) Oral daily    MEDICATIONS  (PRN):  acetaminophen   Tablet .. 650 milliGRAM(s) Oral every 6 hours PRN Temp greater or equal to 38C (100.4F)  calcium carbonate    500 mG (Tums) Chewable 3 Tablet(s) Chew every 6 hours PRN Dyspepsia  HYDROmorphone  Injectable 0.5 milliGRAM(s) IV Push every 6 hours PRN Severe Pain (7 - 10)  ondansetron Injectable 4 milliGRAM(s) IV Push every 6 hours PRN Nausea and/or Vomiting  oxyCODONE    IR 10 milliGRAM(s) Oral every 4 hours PRN Moderate Pain (4 - 6)  oxyCODONE    IR 5 milliGRAM(s) Oral every 4 hours PRN Mild Pain (1 - 3)  senna 2 Tablet(s) Oral at bedtime PRN Constipation  traZODone 50 milliGRAM(s) Oral at bedtime PRN insomnia    ASSESSMENT AND PLAN:  29f, pmh as above a/w:    1. Fall/Right tib fib fracture:  -S/P ex fix   -pain control  -physical therapy  -incentive spirometry  -for ORIF on Wednesday    2.Dizziness:  -could be related to multiple sedative meds/high clonidine dosing.  -clonidine decreased per psychiatry    3. H/o right carotid A dissection/stenosis, CVA/mild lue weakness:  -on asa/statin  -vascular eval appreciated  -repeat CTA with healed carotid dissection    3. HTN:  -on clonidine.    4. Heroin abuse in past:  -continue suboxone.    5. Depression/anxiety/PTSD:  -continue effexor, klonipin, lamictal, trazodone, gabapentin, seroquel  -psychiatry eval appreciated, meds being titrated down d/t sedation.    6. DVT px  -lovenox    7. Dispo- pt is medically optimized for OR on Wednesday. D/w pt and ortho team

## 2020-02-24 NOTE — PROGRESS NOTE BEHAVIORAL HEALTH - NSBHCHARTREVIEWVS_PSY_A_CORE FT
Vital Signs Last 24 Hrs  T(C): 36.9 (24 Feb 2020 11:00), Max: 36.9 (24 Feb 2020 11:00)  T(F): 98.5 (24 Feb 2020 11:00), Max: 98.5 (24 Feb 2020 11:00)  HR: 92 (24 Feb 2020 11:00) (84 - 102)  BP: 113/79 (24 Feb 2020 11:00) (111/71 - 140/83)  BP(mean): --  RR: 16 (24 Feb 2020 11:00) (16 - 16)  SpO2: 94% (24 Feb 2020 11:00) (91% - 95%)  SpO2: 96% (23 Feb 2020 11:00) (92% - 96%)

## 2020-02-24 NOTE — PROGRESS NOTE ADULT - ASSESSMENT
A/P: 29F s/p RLE ex-fix for pilon fracture  Plan for OR with Dr. Tyson for definitive fixation Wednesday this week  Medical optimization for OR  Analgesia  DVT ppx  NWB RLE in exfix  Ice/elevation  PT  Will discuss with Dr. Tyson and advise if plan changes

## 2020-02-24 NOTE — PROGRESS NOTE ADULT - SUBJECTIVE AND OBJECTIVE BOX
[facility]  Operative Report  Date of Surgery: 02/21/2020  Patient: Lucía Freeman  Surgeon: Kenroy Urbano DO – Orthopedic Surgery  Assistant: Yony garza DO, and Josh Castano DO  Medical Record Number: [id number]  Account Number: [id number]  Dictation:   Pre op Diagnosis:  Right Tibia shaft fracture with Right distal tibia fracture  Right ankle fracture with fibula fracture  Post op Diagnosis: same  Procedure Summary: Closed reduction of the Distal tibia and ankle fracture,  Removal of long leg splint  Application of spanning external fixation from tibia to calcaneus  Flouroscope supervision  Anesthesia: General Endotracheal Intubation  Positioning: Supine on Flat table.  Mechanical Venodyne Compression Device  Complication None:  Estimated Blood Loss: minimal   Procedure Summary:    Preoperative Discussion: Risk and benefits of surgery were discussed extensively with the patient.  Patient and her mother and fiancee are involved in decision process.  Hospital course reviewed.  Need for staged surgery review.  role of orthopedic foot and ankle specialist performing definitive surgery reviewed.  Need for potentially extended hospital stay discussed.  Time to bone healing and walking may take multiple months.  some residual chronic pain expected in the above kind of fractures.    Risk and benefits discussed in detail and patient agreed to proceed.  All questions answered.      Procedure in detail: Informed consent was obtained. Patient received general anesthesia. Excessive cervical extension was avoided.  Patient received preoperative antibiotics per protocol.  Fluoroscopy was used for this case.  All bony prominences were well padded.      patient's splint is removed and extremity is prepped and draped.    Approximately 1 cm incision was made in the vertical fashion 3cm below the tibial tubercle.  Sharp dissection to periosteum.  then Michele external fixation pin is inserted under fluoroscope guidance in bicortical position.    next 2nd pin is inserted as well in bicortical fashion 3 cm below.      Next calcaneus pin is made from medial to lateral position.  Care is taken to reduce the tibial and fibula out to length and rotational alignment as well.  Fracture is reduced to anatomic length.  Then connecting rods are placed and final tighetned.    Final xrays are taken.    Sharp pin edge is cute and well padded pin site dressing is applied.    Next attention is paid to heel padding with a U-shaped heel guard connector.    compartments were soft post reduction and good pulses.    Patient was subsequently extubated. Patient was moving all 4 limbs.      Estimated Blood Loss 10 ml    Kenroy Urbano  [employer]  Kanwarpaul S Sundeep, DO    Electric signature  for submission to medical records.

## 2020-02-25 LAB — HCG UR QL: NEGATIVE — SIGNIFICANT CHANGE UP

## 2020-02-25 PROCEDURE — 99231 SBSQ HOSP IP/OBS SF/LOW 25: CPT

## 2020-02-25 PROCEDURE — 99232 SBSQ HOSP IP/OBS MODERATE 35: CPT

## 2020-02-25 RX ORDER — SODIUM CHLORIDE 9 MG/ML
1000 INJECTION, SOLUTION INTRAVENOUS
Refills: 0 | Status: DISCONTINUED | OUTPATIENT
Start: 2020-02-25 | End: 2020-03-05

## 2020-02-25 RX ORDER — BUPRENORPHINE AND NALOXONE 2; .5 MG/1; MG/1
1 TABLET SUBLINGUAL
Refills: 0 | Status: DISCONTINUED | OUTPATIENT
Start: 2020-02-25 | End: 2020-03-03

## 2020-02-25 RX ADMIN — ENOXAPARIN SODIUM 40 MILLIGRAM(S): 100 INJECTION SUBCUTANEOUS at 12:02

## 2020-02-25 RX ADMIN — BUPRENORPHINE AND NALOXONE 1 TABLET(S): 2; .5 TABLET SUBLINGUAL at 12:48

## 2020-02-25 RX ADMIN — BUPRENORPHINE AND NALOXONE 1 TABLET(S): 2; .5 TABLET SUBLINGUAL at 17:42

## 2020-02-25 RX ADMIN — GABAPENTIN 600 MILLIGRAM(S): 400 CAPSULE ORAL at 22:18

## 2020-02-25 RX ADMIN — Medication 0.1 MILLIGRAM(S): at 22:17

## 2020-02-25 RX ADMIN — GABAPENTIN 300 MILLIGRAM(S): 400 CAPSULE ORAL at 05:14

## 2020-02-25 RX ADMIN — Medication 81 MILLIGRAM(S): at 12:03

## 2020-02-25 RX ADMIN — HYDROMORPHONE HYDROCHLORIDE 0.5 MILLIGRAM(S): 2 INJECTION INTRAMUSCULAR; INTRAVENOUS; SUBCUTANEOUS at 20:19

## 2020-02-25 RX ADMIN — HYDROMORPHONE HYDROCHLORIDE 0.5 MILLIGRAM(S): 2 INJECTION INTRAMUSCULAR; INTRAVENOUS; SUBCUTANEOUS at 01:23

## 2020-02-25 RX ADMIN — GABAPENTIN 300 MILLIGRAM(S): 400 CAPSULE ORAL at 17:42

## 2020-02-25 RX ADMIN — OXYCODONE HYDROCHLORIDE 10 MILLIGRAM(S): 5 TABLET ORAL at 05:44

## 2020-02-25 RX ADMIN — HYDROMORPHONE HYDROCHLORIDE 0.5 MILLIGRAM(S): 2 INJECTION INTRAMUSCULAR; INTRAVENOUS; SUBCUTANEOUS at 07:39

## 2020-02-25 RX ADMIN — OXYCODONE HYDROCHLORIDE 10 MILLIGRAM(S): 5 TABLET ORAL at 11:20

## 2020-02-25 RX ADMIN — ONDANSETRON 4 MILLIGRAM(S): 8 TABLET, FILM COATED ORAL at 11:10

## 2020-02-25 RX ADMIN — OXYCODONE HYDROCHLORIDE 10 MILLIGRAM(S): 5 TABLET ORAL at 10:34

## 2020-02-25 RX ADMIN — OXYCODONE HYDROCHLORIDE 10 MILLIGRAM(S): 5 TABLET ORAL at 15:45

## 2020-02-25 RX ADMIN — HYDROMORPHONE HYDROCHLORIDE 0.5 MILLIGRAM(S): 2 INJECTION INTRAMUSCULAR; INTRAVENOUS; SUBCUTANEOUS at 13:48

## 2020-02-25 RX ADMIN — Medication 325 MILLIGRAM(S): at 17:42

## 2020-02-25 RX ADMIN — Medication 325 MILLIGRAM(S): at 12:03

## 2020-02-25 RX ADMIN — Medication 1 TABLET(S): at 12:02

## 2020-02-25 RX ADMIN — BUPRENORPHINE AND NALOXONE 1 TABLET(S): 2; .5 TABLET SUBLINGUAL at 05:20

## 2020-02-25 RX ADMIN — Medication 375 MILLIGRAM(S): at 12:04

## 2020-02-25 RX ADMIN — Medication 1 MILLIGRAM(S): at 12:03

## 2020-02-25 RX ADMIN — HYDROMORPHONE HYDROCHLORIDE 0.5 MILLIGRAM(S): 2 INJECTION INTRAMUSCULAR; INTRAVENOUS; SUBCUTANEOUS at 07:50

## 2020-02-25 RX ADMIN — Medication 500 MILLIGRAM(S): at 17:42

## 2020-02-25 RX ADMIN — Medication 50 MILLIGRAM(S): at 23:01

## 2020-02-25 RX ADMIN — HYDROMORPHONE HYDROCHLORIDE 0.5 MILLIGRAM(S): 2 INJECTION INTRAMUSCULAR; INTRAVENOUS; SUBCUTANEOUS at 19:49

## 2020-02-25 RX ADMIN — BUPRENORPHINE AND NALOXONE 1 TABLET(S): 2; .5 TABLET SUBLINGUAL at 13:35

## 2020-02-25 RX ADMIN — Medication 325 MILLIGRAM(S): at 07:42

## 2020-02-25 RX ADMIN — ATORVASTATIN CALCIUM 10 MILLIGRAM(S): 80 TABLET, FILM COATED ORAL at 22:17

## 2020-02-25 RX ADMIN — OXYCODONE HYDROCHLORIDE 10 MILLIGRAM(S): 5 TABLET ORAL at 05:14

## 2020-02-25 RX ADMIN — HYDROMORPHONE HYDROCHLORIDE 0.5 MILLIGRAM(S): 2 INJECTION INTRAMUSCULAR; INTRAVENOUS; SUBCUTANEOUS at 01:08

## 2020-02-25 RX ADMIN — OXYCODONE HYDROCHLORIDE 10 MILLIGRAM(S): 5 TABLET ORAL at 16:31

## 2020-02-25 RX ADMIN — Medication 500 MILLIGRAM(S): at 05:14

## 2020-02-25 RX ADMIN — HYDROMORPHONE HYDROCHLORIDE 0.5 MILLIGRAM(S): 2 INJECTION INTRAMUSCULAR; INTRAVENOUS; SUBCUTANEOUS at 13:35

## 2020-02-25 RX ADMIN — ONDANSETRON 4 MILLIGRAM(S): 8 TABLET, FILM COATED ORAL at 20:41
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
## 2020-02-25 NOTE — PROGRESS NOTE ADULT - SUBJECTIVE AND OBJECTIVE BOX
Patient seen and examined at bedside. No acute events over night. No acute complaints at this time. Patient continues to report pain in the right lower, albeit well controlled with medications. Denies chest pain, shortness of breath, nausea or vomiting.     PE:  Vital Signs Last 24 Hrs  T(C): 36.7 (02-24-20 @ 21:41), Max: 36.9 (02-24-20 @ 11:00)  T(F): 98.1 (02-24-20 @ 21:41), Max: 98.5 (02-24-20 @ 11:00)  HR: 87 (02-24-20 @ 21:41) (87 - 97)  BP: 121/87 (02-24-20 @ 21:41) (113/79 - 143/79)  BP(mean): --  RR: 18 (02-24-20 @ 21:41) (16 - 18)  SpO2: 92% (02-24-20 @ 21:41) (92% - 99%)    General: NAD, resting comfortably in bed  R LE:   Dressing in Exfix C/D/I  SCDs present contralaterally  Compartments soft and compressible  No calf tenderness bilaterally  able to grossly move toes  sensation intact to toes  cap refill < 2 seconds                          13.0   12.74 )-----------( 417      ( 24 Feb 2020 07:26 )             41.4     24 Feb 2020 07:26    138    |  104    |  15     ----------------------------<  98     3.9     |  28     |  0.89     Ca    9.2        24 Feb 2020 07:26          A/P:  29y f s/p exfix, to OR R ankle on Wednesday 2/27  -PT/OT - NWB RLE  -Pain Control  -DVT ppx w/lovenox  -Continue to document or clearance  -FU AM Labs  -Rest, ice, compress and elevate the extremity as we needed  -Incentive Spirometry  -Medical management appreciated

## 2020-02-25 NOTE — PROGRESS NOTE ADULT - ASSESSMENT
A 28 y/o woman with pmhx of depression, PTSD, heroin abuse, stroke with left sided weakness, right carotid A dissection/stenosis on asa/statin, paroxysmal atrial tachycardia, who presented to the ER with right leg pain inability to ambulate after a fall. She is found to have pilon fx of right tibia. S/p delta external fixator, per Ortho plans to re-take her to OR next Tues vs. Wed. Pt with VTE risk factors due to immobility and NWB of RLE.     PLAN:  - c/w home med ASA 81 mg daily  - pending surgery today resume  Lovenox 40 mg SQ daily post op and can transition to  mg PO BID at time of discharge  - Monitor CBC/BMP  - maintain LLE venodynes    Will continue to follow A 28 y/o woman with pmhx of depression, PTSD, heroin abuse, stroke with left sided weakness, right carotid A dissection/stenosis on asa/statin, paroxysmal atrial tachycardia, who presented to the ER with right leg pain inability to ambulate after a fall. She is found to have pilon fx of right tibia. S/p delta external fixator, per Ortho plans to re-take her to OR next Tues vs. Wed. Pt with VTE risk factors due to immobility and NWB of RLE.     PLAN:  - c/w home med ASA 81 mg daily  - pending surgery tomorrow d/c Lovenox 40 mg SQ daily  after today's dose and resume post op and can transition to  mg PO BID at time of discharge  - Monitor CBC/BMP  - maintain LLE venodynes    Will continue to follow

## 2020-02-25 NOTE — PROGRESS NOTE BEHAVIORAL HEALTH - NSBHCONSULTFOLLOWAFTERCARE_PSY_A_CORE FT
follow-up with out-patient psychiatrist follow-up with out-patient psychiatrist, Dr. Redd: continue psychiatric treatment as managed in

## 2020-02-25 NOTE — PROGRESS NOTE ADULT - SUBJECTIVE AND OBJECTIVE BOX
HPI: This is a 28 y/o woman with pmhx of Depression, PTSD, Generalized anxiety d/o, heroin abuse now on subloxone, HTN, Stroke with left sided weakness, right carotid A dissection/stenosis on asa/statin, paroxysmal atrial tachycardia, who presented to the ER with right leg pain inability to ambulate after a fall. She states she felt dizzy and fell onto her right leg. Denies sob/chest pain/n/v/d/abd pain. Felt fine prior to falling. no loc.   IN ED had imaging which showed right distal tib/fib fracture. Scheduled for surgery later today. C/o severe pain at fracture site.    2020: Pt seen at bedside on 2N, she is AAOx3, delayed response. Informed her of VTE ppx with ASA. Inquired about prior use of oral AC and patient reports "I don't know."   2020: Pt seen at bedside on 2N, lethargic and not following commands; poor engagement with conversation  2020: Pt seen at bedside on 2 N awake not following commands, pending surgery today.    Patient is a 29y old  Female who presents with a chief complaint of tib/fib fracture (2020 13:03)    Consulted by Dr. Urbano for VTE prophylaxis, risk stratification, and anticoagulation management.    PAST MEDICAL & SURGICAL HISTORY:  Cyst, breast  Transaminitis  Aneurysm of right internal carotid artery  Cerebrovascular accident (CVA)  Substance abuse  Anxiety  Depression  PTSD (post-traumatic stress disorder)  H/O breast biopsy    BMI: 30.4    CrCL: 126.86    CAPRINI SCORE  AGE RELATED RISK FACTORS                                                       MOBILITY RELATED FACTORS  [ ] Age 41-60 years                                            (1 Point)                  [ ] Bed rest                                                        (1 Point)  [ ] Age: 61-74 years                                           (2 Points)                [ ] Plaster cast                                                   (2 Points)  [ ] Age= 75 years                                              (3 Points)                 [ ] Bed bound for more than 72 hours                   (2 Points)    DISEASE RELATED RISK FACTORS                                               GENDER SPECIFIC FACTORS  [ ] Edema in the lower extremities                       (1 Point)           [ ] Pregnancy                                                            (1 Point)  [ ] Varicose veins                                               (1 Point)                  [ ] Post-partum < 6 weeks                                      (1 Point)             [X ] BMI > 25 Kg/m2                                            (1 Point)                  [ ] Hormonal therapy or oral contraception       (1 Point)                 [ ] Sepsis (in the previous month)                        (1 Point)             [ ] History of pregnancy complications                (1Point)  [ ] Pneumonia or serious lung disease                                             [ ] Unexplained or recurrent  (=3), premature                                 (In the previous month)                               (1 Point)                birth with toxemia or growth-restricted infant (1 Point)  [ ] Abnormal pulmonary function test            (1 Point)                                   SURGERY RELATED RISK FACTORS  [ ] Acute myocardial infarction                       (1 Point)                  [ ]  Section                                         (1 Point)  [ ] Congestive heart failure (in the previous month) (1 Point)   [ ] Minor surgery   lasting <45 minutes       (1 Point)   [ ] Inflammatory bowel disease                             (1 Point)          [ ] Arthroscopic surgery                                  (2 Points)  [ ] Central venous access                                    (2 Points)            [ ] General surgery lasting >45 minutes      (2 Points)       [ ] Stroke (in the previous month)                  (5 Points)            [ ] Elective major lower extremity arthroplasty (5 Points)                                   [  ] Malignancy (present or past include skin melanoma                                          but exclude  basal skin cell)    (2 points)                                      TRAUMA RELATED RISK FACTORS                HEMATOLOGY RELATED FACTORS                                  [X ] Fracture of the hip, pelvis, or leg                       (5 Points)  [ ] Prior episodes of VTE                                     (3 Points)          [ ] Acute spinal cord injury (in the previous month)  (5 Points)  [ ] Positive family history for VTE                         (3 Points)       [ ] Paralysis (less than 1 month)                          (5 Points)  [ ] Prothrombin 40072 A                                      (3 Points)         [ ] Multiple Trauma (within 1month)                 (5Points)                                                                                                                                                                [ ] Factor V Leiden                                          (3 Points)                                OTHER RISK FACTORS                          [ ] Lupus anticoagulants                                     (3 Points)                       [ ] BMI > 40                          (1 Point)                                                         [ ] Anticardiolipin antibodies                                (3 Points)                   [ ] Smoking                              (1Point)                                                [ ] High homocysteine in the blood                      (3 Points)                [  ] Diabetes requiring insulin (1point)                         [ ] Other congenital or acquired thrombophilia       (3 Points)          [  ] Chemotherapy                   (1 Point)  [ ] Heparin induced thrombocytopenia                  (3 Points)             [  ] Blood Transfusion                (1 point)                                                                                                         Total Score [  6  ]                                                                                                                   IMPROVE Bleeding Risk Score: 0    Falls Risk:   High ( X )  Mod (  )  Low (  )    EBL: 5 ml    PROCEDURE START:   PROCEDURE END:     Social: smokes 1ppd , drinks ETOH once in a while, heroin abuse in past (2020 09:25)    FAMILY HISTORY:  Medical history unknown: in father, unknown if living or   Family history of drug use: IN MOTHER, LIVING  Denies any personal or familial history of clotting or bleeding disorders.    Allergies  Geodon (Other)  Seroquel (Unknown)  Zyprexa (Other)    Intolerances    REVIEW OF SYSTEMS    (  )Fever	     (  )Constipation	(  )SOB			(  )Headache	(  )Dysuria  (  )Chills	     (  )Melena	(  )Dyspnea present on exertion    (  )Dizziness                    (  )Polyuria  (  )Nausea	     (  )Hematochezia	(  )Cough		                    (  )Syncope   	(  )Hematuria  (  )Vomiting    (  )Chest Pain	(  )Wheezing		(  )Weakness  (  )Diarrhea     (  )Palpitations	(  )Anorexia		(  )Myalgia    + pain in her right leg. All other review of systems negative: Yes    PHYSICAL EXAM:  Vital Signs Last 24 Hrs  T(C): 36.7 (2020 21:41), Max: 36.9 (2020 11:00)  T(F): 98.1 (2020 21:41), Max: 98.5 (2020 11:00)  HR: 87 (2020 21:41) (87 - 97)  BP: 121/87 (2020 21:41) (113/79 - 143/79)  BP(mean): --  RR: 18 (2020 21:41) (16 - 18)  SpO2: 92% (2020 21:41) (92% - 99%)    Constitutional: Appears fatigue    Neurological: Lethargic     Skin: Warm    Respiratory and Thorax: normal effort; Breath sounds: normal; No rales/wheezing/rhonchi  	  Cardiovascular: S1, S2, regular, NMBR	    Gastrointestinal: BS + x 4Q, nontender	    Genitourinary:  Bladder nondistended, nontender    Musculoskeletal:   General Right:   no muscle/joint tenderness,   normal tone, no joint swelling,   ROM: limited	    General Left:   no muscle/joint tenderness,   normal tone, no joint swelling,   ROM: full    RLE: with external fixator in place, wiggles toes, sensation intact    Lower extrems:   Right: no calf tenderness              negative tammi's sign               + pedal pulses    Left:   no calf tenderness              negative tammi's sign               + pedal pulses    MEDICATIONS  (STANDING):  ascorbic acid 500 milliGRAM(s) Oral two times a day  aspirin  chewable 81 milliGRAM(s) Oral daily  atorvastatin 10 milliGRAM(s) Oral at bedtime  buprenorphine 8 mG/naloxone 2 mG SL  Tablet 1 Tablet(s) SubLingual <User Schedule>  cloNIDine 0.1 milliGRAM(s) Oral three times a day  enoxaparin Injectable 40 milliGRAM(s) SubCutaneous daily  ferrous    sulfate 325 milliGRAM(s) Oral three times a day with meals  folic acid 1 milliGRAM(s) Oral daily  gabapentin 300 milliGRAM(s) Oral two times a day  gabapentin 600 milliGRAM(s) Oral at bedtime  influenza   Vaccine 0.5 milliLiter(s) IntraMuscular once  lactated ringers. 1000 milliLiter(s) (75 mL/Hr) IV Continuous <Continuous>  multivitamin 1 Tablet(s) Oral daily  QUEtiapine 25 milliGRAM(s) Oral two times a day  venlafaxine XR. 375 milliGRAM(s) Oral daily    LABS:                                12.6   11.02 )-----------( 413      ( 2020 07:12 )             38.7       02-    137  |  105  |  15  ----------------------------<  107<H>  3.8   |  27  |  0.77    Ca    9.0      2020 07:12                        11.8   12.43 )-----------( 406      ( 2020 06:37 )             37.6       02-23    138  |  104  |  9   ----------------------------<  91  4.0   |  29  |  0.73    Ca    8.9      2020 06:37                       12.6   8.93  )-----------( 401      ( 2020 06:25 )             40.1     02-22    140  |  107  |  7   ----------------------------<  123<H>  4.4   |  27  |  0.92    Ca    8.8      2020 06:25      PT/INR - ( 2020 07:36 )   PT: 12.6 sec;   INR: 1.13 ratio    PTT - ( 2020 07:36 )  PTT:30.8 sec  Urinalysis Basic - ( 2020 04:13 )  Color: Yellow / Appearance: Clear / S.005 / pH: x  Gluc: x / Ketone: Negative  / Bili: Negative / Urobili: Negative mg/dL   Blood: x / Protein: Negative mg/dL / Nitrite: Negative   Leuk Esterase: Trace / RBC: 0-2 /HPF / WBC 6-10   Sq Epi: x / Non Sq Epi: Few / Bacteria: Moderate    RADIOLOGY, EKG & ADDITIONAL TESTS: Reviewed.     EXAM:  CT ANKLE ONLY RT                        EXAM:  CT 3D RECONSTRUCT AYANA MANRIQUEZ                        PROCEDURE DATE:  2020    IMPRESSION:  1.  Status post external fixation.  2.  Comminuted mildly displaced fractures of the lateral tibial plafond with intra-articular extension as described above.  3.  Nondisplaced coronally oriented intra-articular fracture at the posterior tibial malleolus.  4.  Mildly displaced distal fibular fracture.  5.  Suspected widening of the tibiotalar joint space  6.  Soft tissue swelling.    DVT Prophylaxis:  LMWH                   (  )  Heparin SQ           (  )  Coumadin             (  )  Xarelto                  (  )  Eliquis                   (  )  Venodynes           ( X )  Ambulation          (X )  UFH                       (  )  Contraindicated  (  )  ASA                       (  X ) HPI: This is a 28 y/o woman with pmhx of Depression, PTSD, Generalized anxiety d/o, heroin abuse now on subloxone, HTN, Stroke with left sided weakness, right carotid A dissection/stenosis on asa/statin, paroxysmal atrial tachycardia, who presented to the ER with right leg pain inability to ambulate after a fall. She states she felt dizzy and fell onto her right leg. Denies sob/chest pain/n/v/d/abd pain. Felt fine prior to falling. no loc.   IN ED had imaging which showed right distal tib/fib fracture. Scheduled for surgery later today. C/o severe pain at fracture site.    2020: Pt seen at bedside on 2N, she is AAOx3, delayed response. Informed her of VTE ppx with ASA. Inquired about prior use of oral AC and patient reports "I don't know."   2020: Pt seen at bedside on 2N, lethargic and not following commands; poor engagement with conversation  2020: Pt seen at bedside on 2 N awake not following commands, pending surgery tomorrow    Patient is a 29y old  Female who presents with a chief complaint of tib/fib fracture (2020 13:03)    Consulted by Dr. Urbano for VTE prophylaxis, risk stratification, and anticoagulation management.    PAST MEDICAL & SURGICAL HISTORY:  Cyst, breast  Transaminitis  Aneurysm of right internal carotid artery  Cerebrovascular accident (CVA)  Substance abuse  Anxiety  Depression  PTSD (post-traumatic stress disorder)  H/O breast biopsy    BMI: 30.4    CrCL: 126.86    CAPRINI SCORE  AGE RELATED RISK FACTORS                                                       MOBILITY RELATED FACTORS  [ ] Age 41-60 years                                            (1 Point)                  [ ] Bed rest                                                        (1 Point)  [ ] Age: 61-74 years                                           (2 Points)                [ ] Plaster cast                                                   (2 Points)  [ ] Age= 75 years                                              (3 Points)                 [ ] Bed bound for more than 72 hours                   (2 Points)    DISEASE RELATED RISK FACTORS                                               GENDER SPECIFIC FACTORS  [ ] Edema in the lower extremities                       (1 Point)           [ ] Pregnancy                                                            (1 Point)  [ ] Varicose veins                                               (1 Point)                  [ ] Post-partum < 6 weeks                                      (1 Point)             [X ] BMI > 25 Kg/m2                                            (1 Point)                  [ ] Hormonal therapy or oral contraception       (1 Point)                 [ ] Sepsis (in the previous month)                        (1 Point)             [ ] History of pregnancy complications                (1Point)  [ ] Pneumonia or serious lung disease                                             [ ] Unexplained or recurrent  (=3), premature                                 (In the previous month)                               (1 Point)                birth with toxemia or growth-restricted infant (1 Point)  [ ] Abnormal pulmonary function test            (1 Point)                                   SURGERY RELATED RISK FACTORS  [ ] Acute myocardial infarction                       (1 Point)                  [ ]  Section                                         (1 Point)  [ ] Congestive heart failure (in the previous month) (1 Point)   [ ] Minor surgery   lasting <45 minutes       (1 Point)   [ ] Inflammatory bowel disease                             (1 Point)          [ ] Arthroscopic surgery                                  (2 Points)  [ ] Central venous access                                    (2 Points)            [ ] General surgery lasting >45 minutes      (2 Points)       [ ] Stroke (in the previous month)                  (5 Points)            [ ] Elective major lower extremity arthroplasty (5 Points)                                   [  ] Malignancy (present or past include skin melanoma                                          but exclude  basal skin cell)    (2 points)                                      TRAUMA RELATED RISK FACTORS                HEMATOLOGY RELATED FACTORS                                  [X ] Fracture of the hip, pelvis, or leg                       (5 Points)  [ ] Prior episodes of VTE                                     (3 Points)          [ ] Acute spinal cord injury (in the previous month)  (5 Points)  [ ] Positive family history for VTE                         (3 Points)       [ ] Paralysis (less than 1 month)                          (5 Points)  [ ] Prothrombin 81601 A                                      (3 Points)         [ ] Multiple Trauma (within 1month)                 (5Points)                                                                                                                                                                [ ] Factor V Leiden                                          (3 Points)                                OTHER RISK FACTORS                          [ ] Lupus anticoagulants                                     (3 Points)                       [ ] BMI > 40                          (1 Point)                                                         [ ] Anticardiolipin antibodies                                (3 Points)                   [ ] Smoking                              (1Point)                                                [ ] High homocysteine in the blood                      (3 Points)                [  ] Diabetes requiring insulin (1point)                         [ ] Other congenital or acquired thrombophilia       (3 Points)          [  ] Chemotherapy                   (1 Point)  [ ] Heparin induced thrombocytopenia                  (3 Points)             [  ] Blood Transfusion                (1 point)                                                                                                         Total Score [  6  ]                                                                                                                   IMPROVE Bleeding Risk Score: 0    Falls Risk:   High ( X )  Mod (  )  Low (  )    EBL: 5 ml    PROCEDURE START:   PROCEDURE END:     Social: smokes 1ppd , drinks ETOH once in a while, heroin abuse in past (2020 09:25)    FAMILY HISTORY:  Medical history unknown: in father, unknown if living or   Family history of drug use: IN MOTHER, LIVING  Denies any personal or familial history of clotting or bleeding disorders.    Allergies  Geodon (Other)  Seroquel (Unknown)  Zyprexa (Other)    Intolerances    REVIEW OF SYSTEMS    (  )Fever	     (  )Constipation	(  )SOB			(  )Headache	(  )Dysuria  (  )Chills	     (  )Melena	(  )Dyspnea present on exertion    (  )Dizziness                    (  )Polyuria  (  )Nausea	     (  )Hematochezia	(  )Cough		                    (  )Syncope   	(  )Hematuria  (  )Vomiting    (  )Chest Pain	(  )Wheezing		(  )Weakness  (  )Diarrhea     (  )Palpitations	(  )Anorexia		(  )Myalgia    + pain in her right leg. All other review of systems negative: Yes    PHYSICAL EXAM:  Vital Signs Last 24 Hrs  T(C): 36.7 (2020 21:41), Max: 36.9 (2020 11:00)  T(F): 98.1 (2020 21:41), Max: 98.5 (2020 11:00)  HR: 87 (2020 21:41) (87 - 97)  BP: 121/87 (2020 21:41) (113/79 - 143/79)  BP(mean): --  RR: 18 (2020 21:41) (16 - 18)  SpO2: 92% (2020 21:41) (92% - 99%)    Constitutional: Appears fatigue    Neurological: Lethargic     Skin: Warm    Respiratory and Thorax: normal effort; Breath sounds: normal; No rales/wheezing/rhonchi  	  Cardiovascular: S1, S2, regular, NMBR	    Gastrointestinal: BS + x 4Q, nontender	    Genitourinary:  Bladder nondistended, nontender    Musculoskeletal:   General Right:   no muscle/joint tenderness,   normal tone, no joint swelling,   ROM: limited	    General Left:   no muscle/joint tenderness,   normal tone, no joint swelling,   ROM: full    RLE: with external fixator in place, wiggles toes, sensation intact    Lower extrems:   Right: no calf tenderness              negative tammi's sign               + pedal pulses    Left:   no calf tenderness              negative tammi's sign               + pedal pulses    MEDICATIONS  (STANDING):  ascorbic acid 500 milliGRAM(s) Oral two times a day  aspirin  chewable 81 milliGRAM(s) Oral daily  atorvastatin 10 milliGRAM(s) Oral at bedtime  buprenorphine 8 mG/naloxone 2 mG SL  Tablet 1 Tablet(s) SubLingual <User Schedule>  cloNIDine 0.1 milliGRAM(s) Oral three times a day  enoxaparin Injectable 40 milliGRAM(s) SubCutaneous daily  ferrous    sulfate 325 milliGRAM(s) Oral three times a day with meals  folic acid 1 milliGRAM(s) Oral daily  gabapentin 300 milliGRAM(s) Oral two times a day  gabapentin 600 milliGRAM(s) Oral at bedtime  influenza   Vaccine 0.5 milliLiter(s) IntraMuscular once  lactated ringers. 1000 milliLiter(s) (75 mL/Hr) IV Continuous <Continuous>  multivitamin 1 Tablet(s) Oral daily  QUEtiapine 25 milliGRAM(s) Oral two times a day  venlafaxine XR. 375 milliGRAM(s) Oral daily    LABS:                                12.6   11.02 )-----------( 413      ( 2020 07:12 )             38.7       02-    137  |  105  |  15  ----------------------------<  107<H>  3.8   |  27  |  0.77    Ca    9.0      2020 07:12                        11.8   12.43 )-----------( 406      ( 2020 06:37 )             37.6       02-23    138  |  104  |  9   ----------------------------<  91  4.0   |  29  |  0.73    Ca    8.9      2020 06:37                       12.6   8.93  )-----------( 401      ( 2020 06:25 )             40.1     02-    140  |  107  |  7   ----------------------------<  123<H>  4.4   |  27  |  0.92    Ca    8.8      2020 06:25      PT/INR - ( 2020 07:36 )   PT: 12.6 sec;   INR: 1.13 ratio    PTT - ( 2020 07:36 )  PTT:30.8 sec  Urinalysis Basic - ( 2020 04:13 )  Color: Yellow / Appearance: Clear / S.005 / pH: x  Gluc: x / Ketone: Negative  / Bili: Negative / Urobili: Negative mg/dL   Blood: x / Protein: Negative mg/dL / Nitrite: Negative   Leuk Esterase: Trace / RBC: 0-2 /HPF / WBC 6-10   Sq Epi: x / Non Sq Epi: Few / Bacteria: Moderate    RADIOLOGY, EKG & ADDITIONAL TESTS: Reviewed.     EXAM:  CT ANKLE ONLY RT                        EXAM:  CT 3D RECONSTRUCT AYANA MANRIQUEZ                        PROCEDURE DATE:  2020    IMPRESSION:  1.  Status post external fixation.  2.  Comminuted mildly displaced fractures of the lateral tibial plafond with intra-articular extension as described above.  3.  Nondisplaced coronally oriented intra-articular fracture at the posterior tibial malleolus.  4.  Mildly displaced distal fibular fracture.  5.  Suspected widening of the tibiotalar joint space  6.  Soft tissue swelling.    DVT Prophylaxis:  LMWH                   (  )  Heparin SQ           (  )  Coumadin             (  )  Xarelto                  (  )  Eliquis                   (  )  Venodynes           ( X )  Ambulation          (X )  UFH                       (  )  Contraindicated  (  )  ASA                       (  X )

## 2020-02-25 NOTE — PROGRESS NOTE BEHAVIORAL HEALTH - NSBHCHARTREVIEWVS_PSY_A_CORE FT
Vital Signs Last 24 Hrs  T(C): 36.9 (24 Feb 2020 11:00), Max: 36.9 (24 Feb 2020 11:00)  T(F): 98.5 (24 Feb 2020 11:00), Max: 98.5 (24 Feb 2020 11:00)  HR: 92 (24 Feb 2020 11:00) (84 - 102)  BP: 113/79 (24 Feb 2020 11:00) (111/71 - 140/83)  BP(mean): --  RR: 16 (24 Feb 2020 11:00) (16 - 16)  SpO2: 94% (24 Feb 2020 11:00) (91% - 95%)  SpO2: 96% (23 Feb 2020 11:00) (92% - 96%) Vital Signs Last 24 Hrs  T(C): 36.6 (25 Feb 2020 19:48), Max: 36.7 (25 Feb 2020 11:21)  T(F): 97.8 (25 Feb 2020 19:48), Max: 98 (25 Feb 2020 11:21)  HR: 106 (25 Feb 2020 19:48) (98 - 114)  BP: 140/79 (25 Feb 2020 19:48) (133/86 - 142/84)  BP(mean): 95 (25 Feb 2020 11:21) (95 - 95)  RR: 16 (25 Feb 2020 19:48) (16 - 16)  SpO2: 92% (25 Feb 2020 19:48) (90% - 92%)

## 2020-02-25 NOTE — PROGRESS NOTE BEHAVIORAL HEALTH - NSBHFUPINTERVALHXFT_PSY_A_CORE
Patient remains lethargic however less sedated, minimally engaged in interview, stating feeling "okay" denying anxiety, panic, depressed mood or anhedonia, hopelessness or suicidal ideation. Denies manic / psychotic symptoms. No delusions elicited. Reports being able to sleep last night. Denies other concerns. Reports tolerating recent medication changes. Patient remains less lethargic today however remains unmotivated for psychiatric evaluation stating feeling "okay" denying anxiety, panic, depressed mood or anhedonia, hopelessness or suicidal ideation. Denies manic / psychotic symptoms. No delusions elicited. Reports being able to sleep last night. Denies other concerns. Reports tolerating recent medication changes.    AS per Dr. Luque, patient has been lethargic during entire hospitalization. Patient has surgery tomorrow and will be discharged Thursday.

## 2020-02-25 NOTE — PROGRESS NOTE ADULT - SUBJECTIVE AND OBJECTIVE BOX
c/c: fall/ right leg pain    HPI: 29F, pmh of Depression, PTSD, Generalized anxiety d/o, heroin abuse now on suboxone, HTN, Stroke with left sided weakness, RIght carotid A dissection/stenosis on asa/statin, paroxysmal atrial tachycardia, who presented to the ER with right leg pain inability to ambulate after a fall. She states she felt dizzy and fell onto her right leg. Denies sob/chest pain/n/v/d/abd pain. Felt fine prior to falling. no loc.   IN ED had imaging which showed right distal tib/fib fracture.   Underwent ex-fix 2/21.    2/23: pt seen and examined this am. Sleeping but arousable. no acute overnight events. meds being titrated down per psychiatry.  2/24 responsive  2/25 c/o pain, otherwise stable    Review of system- All 10 systems reviewed and is as per HPI otherwise negative.     Vital Signs Last 24 Hrs  T(C): 36.7 (25 Feb 2020 11:21), Max: 36.9 (24 Feb 2020 17:04)  T(F): 98 (25 Feb 2020 11:21), Max: 98.4 (24 Feb 2020 17:04)  HR: 98 (25 Feb 2020 13:30) (87 - 112)  BP: 138/86 (25 Feb 2020 13:30) (121/87 - 138/86)  BP(mean): 95 (25 Feb 2020 11:21) (95 - 95)  RR: 16 (25 Feb 2020 11:21) (16 - 18)  SpO2: 90% (25 Feb 2020 11:21) (90% - 95%)    PHYSICAL EXAM:  GENERAL: Comfortable, no acute distress  HEAD:  Atraumatic, Normocephalic  EYES: EOMI, PERRLA  HEENT: Moist mucous membranes  NECK: Supple, No JVD  NERVOUS SYSTEM:  Alert & Oriented X3, 5/5 power b/l UE, LLE  CHEST/LUNG: Clear to auscultation bilaterally  HEART: Regular rate and rhythm  ABDOMEN: Soft, Nontender, Nondistended; Bowel sounds present  GENITOURINARY- Voiding, no palpable bladder  EXTREMITIES:  No clubbing, cyanosis, or edema  MUSCULOSKELETAL-LLE in splint/ace-wrap  SKIN-no rash    LABS:                        12.6   11.02 )-----------( 413      ( 25 Feb 2020 07:12 )             38.7     25 Feb 2020 07:12    137    |  105    |  15     ----------------------------<  107    3.8     |  27     |  0.77     Ca    9.0        25 Feb 2020 07:12    MEDICATIONS  (STANDING):  ascorbic acid 500 milliGRAM(s) Oral two times a day  aspirin  chewable 81 milliGRAM(s) Oral daily  atorvastatin 10 milliGRAM(s) Oral at bedtime  buprenorphine 8 mG/naloxone 2 mG SL  Tablet 1 Tablet(s) SubLingual <User Schedule>  cloNIDine 0.1 milliGRAM(s) Oral two times a day  enoxaparin Injectable 40 milliGRAM(s) SubCutaneous daily  ferrous    sulfate 325 milliGRAM(s) Oral three times a day with meals  folic acid 1 milliGRAM(s) Oral daily  gabapentin 300 milliGRAM(s) Oral two times a day  gabapentin 600 milliGRAM(s) Oral at bedtime  influenza   Vaccine 0.5 milliLiter(s) IntraMuscular once  lactated ringers. 1000 milliLiter(s) (75 mL/Hr) IV Continuous <Continuous>  multivitamin 1 Tablet(s) Oral daily  QUEtiapine 25 milliGRAM(s) Oral two times a day  venlafaxine XR. 375 milliGRAM(s) Oral daily    MEDICATIONS  (PRN):  acetaminophen   Tablet .. 650 milliGRAM(s) Oral every 6 hours PRN Temp greater or equal to 38C (100.4F)  calcium carbonate    500 mG (Tums) Chewable 3 Tablet(s) Chew every 6 hours PRN Dyspepsia  HYDROmorphone  Injectable 0.5 milliGRAM(s) IV Push every 6 hours PRN Severe Pain (7 - 10)  ondansetron Injectable 4 milliGRAM(s) IV Push every 6 hours PRN Nausea and/or Vomiting  oxyCODONE    IR 10 milliGRAM(s) Oral every 4 hours PRN Moderate Pain (4 - 6)  oxyCODONE    IR 5 milliGRAM(s) Oral every 4 hours PRN Mild Pain (1 - 3)  senna 2 Tablet(s) Oral at bedtime PRN Constipation  traZODone 50 milliGRAM(s) Oral at bedtime PRN insomnia    ASSESSMENT AND PLAN:  1. Fall/Right tib fib fracture:  -S/P ex fix   -pain control  -physical therapy  -incentive spirometry  -for ORIF tomorrow    2.Dizziness:  -could be related to multiple sedative meds/high clonidine dosing.  -clonidine decreased per psychiatry    3. H/o right carotid A dissection/stenosis, CVA/mild lue weakness:  -on asa/statin  -vascular eval appreciated  -repeat CTA with healed carotid dissection    3. HTN:  -on clonidine.    4. Heroin abuse in past:  -continue suboxone.    5. Depression/anxiety/PTSD:  -continue effexor, klonipin, lamictal, trazodone, gabapentin, seroquel  -psychiatry eval appreciated, meds being titrated down d/t sedation.    6. DVT px  -lovenox  -hold p Mn for OR tomorrow    7. Dispo- pt is medically optimized for OR tomorrow.

## 2020-02-26 LAB
ANION GAP SERPL CALC-SCNC: 3 MMOL/L — LOW (ref 5–17)
ANION GAP SERPL CALC-SCNC: 5 MMOL/L — SIGNIFICANT CHANGE UP (ref 5–17)
APTT BLD: 30.1 SEC — SIGNIFICANT CHANGE UP (ref 27.5–36.3)
BUN SERPL-MCNC: 14 MG/DL — SIGNIFICANT CHANGE UP (ref 7–23)
BUN SERPL-MCNC: 14 MG/DL — SIGNIFICANT CHANGE UP (ref 7–23)
CALCIUM SERPL-MCNC: 8.8 MG/DL — SIGNIFICANT CHANGE UP (ref 8.5–10.1)
CALCIUM SERPL-MCNC: 9.1 MG/DL — SIGNIFICANT CHANGE UP (ref 8.5–10.1)
CHLORIDE SERPL-SCNC: 103 MMOL/L — SIGNIFICANT CHANGE UP (ref 96–108)
CHLORIDE SERPL-SCNC: 105 MMOL/L — SIGNIFICANT CHANGE UP (ref 96–108)
CO2 SERPL-SCNC: 28 MMOL/L — SIGNIFICANT CHANGE UP (ref 22–31)
CO2 SERPL-SCNC: 31 MMOL/L — SIGNIFICANT CHANGE UP (ref 22–31)
CREAT SERPL-MCNC: 0.65 MG/DL — SIGNIFICANT CHANGE UP (ref 0.5–1.3)
CREAT SERPL-MCNC: 0.76 MG/DL — SIGNIFICANT CHANGE UP (ref 0.5–1.3)
CULTURE RESULTS: SIGNIFICANT CHANGE UP
CULTURE RESULTS: SIGNIFICANT CHANGE UP
GLUCOSE SERPL-MCNC: 107 MG/DL — HIGH (ref 70–99)
GLUCOSE SERPL-MCNC: 134 MG/DL — HIGH (ref 70–99)
HCT VFR BLD CALC: 37 % — SIGNIFICANT CHANGE UP (ref 34.5–45)
HCT VFR BLD CALC: 38.1 % — SIGNIFICANT CHANGE UP (ref 34.5–45)
HGB BLD-MCNC: 11.9 G/DL — SIGNIFICANT CHANGE UP (ref 11.5–15.5)
HGB BLD-MCNC: 12.6 G/DL — SIGNIFICANT CHANGE UP (ref 11.5–15.5)
INR BLD: 1.15 RATIO — SIGNIFICANT CHANGE UP (ref 0.88–1.16)
MCHC RBC-ENTMCNC: 27.7 PG — SIGNIFICANT CHANGE UP (ref 27–34)
MCHC RBC-ENTMCNC: 28 PG — SIGNIFICANT CHANGE UP (ref 27–34)
MCHC RBC-ENTMCNC: 32.2 GM/DL — SIGNIFICANT CHANGE UP (ref 32–36)
MCHC RBC-ENTMCNC: 33.1 GM/DL — SIGNIFICANT CHANGE UP (ref 32–36)
MCV RBC AUTO: 84.7 FL — SIGNIFICANT CHANGE UP (ref 80–100)
MCV RBC AUTO: 86.2 FL — SIGNIFICANT CHANGE UP (ref 80–100)
PLATELET # BLD AUTO: 392 K/UL — SIGNIFICANT CHANGE UP (ref 150–400)
PLATELET # BLD AUTO: 414 K/UL — HIGH (ref 150–400)
POTASSIUM SERPL-MCNC: 3.9 MMOL/L — SIGNIFICANT CHANGE UP (ref 3.5–5.3)
POTASSIUM SERPL-MCNC: 4.1 MMOL/L — SIGNIFICANT CHANGE UP (ref 3.5–5.3)
POTASSIUM SERPL-SCNC: 3.9 MMOL/L — SIGNIFICANT CHANGE UP (ref 3.5–5.3)
POTASSIUM SERPL-SCNC: 4.1 MMOL/L — SIGNIFICANT CHANGE UP (ref 3.5–5.3)
PROTHROM AB SERPL-ACNC: 12.8 SEC — SIGNIFICANT CHANGE UP (ref 10–12.9)
RBC # BLD: 4.29 M/UL — SIGNIFICANT CHANGE UP (ref 3.8–5.2)
RBC # BLD: 4.5 M/UL — SIGNIFICANT CHANGE UP (ref 3.8–5.2)
RBC # FLD: 13.8 % — SIGNIFICANT CHANGE UP (ref 10.3–14.5)
RBC # FLD: 14.1 % — SIGNIFICANT CHANGE UP (ref 10.3–14.5)
SODIUM SERPL-SCNC: 136 MMOL/L — SIGNIFICANT CHANGE UP (ref 135–145)
SODIUM SERPL-SCNC: 139 MMOL/L — SIGNIFICANT CHANGE UP (ref 135–145)
SPECIMEN SOURCE: SIGNIFICANT CHANGE UP
SPECIMEN SOURCE: SIGNIFICANT CHANGE UP
WBC # BLD: 12.75 K/UL — HIGH (ref 3.8–10.5)
WBC # BLD: 18.73 K/UL — HIGH (ref 3.8–10.5)
WBC # FLD AUTO: 12.75 K/UL — HIGH (ref 3.8–10.5)
WBC # FLD AUTO: 18.73 K/UL — HIGH (ref 3.8–10.5)

## 2020-02-26 PROCEDURE — 71275 CT ANGIOGRAPHY CHEST: CPT | Mod: 26

## 2020-02-26 PROCEDURE — 27828 TREAT LOWER LEG FRACTURE: CPT | Mod: RT

## 2020-02-26 PROCEDURE — 20694 RMVL EXT FIXJ SYS UNDER ANES: CPT | Mod: RT

## 2020-02-26 PROCEDURE — 99233 SBSQ HOSP IP/OBS HIGH 50: CPT

## 2020-02-26 PROCEDURE — 99231 SBSQ HOSP IP/OBS SF/LOW 25: CPT

## 2020-02-26 PROCEDURE — 71045 X-RAY EXAM CHEST 1 VIEW: CPT | Mod: 26

## 2020-02-26 PROCEDURE — 99221 1ST HOSP IP/OBS SF/LOW 40: CPT | Mod: 57

## 2020-02-26 RX ORDER — CEFEPIME 1 G/1
INJECTION, POWDER, FOR SOLUTION INTRAMUSCULAR; INTRAVENOUS
Refills: 0 | Status: DISCONTINUED | OUTPATIENT
Start: 2020-02-26 | End: 2020-02-26

## 2020-02-26 RX ORDER — LANOLIN ALCOHOL/MO/W.PET/CERES
3 CREAM (GRAM) TOPICAL AT BEDTIME
Refills: 0 | Status: DISCONTINUED | OUTPATIENT
Start: 2020-02-26 | End: 2020-03-06

## 2020-02-26 RX ORDER — CEFEPIME 1 G/1
2000 INJECTION, POWDER, FOR SOLUTION INTRAMUSCULAR; INTRAVENOUS EVERY 12 HOURS
Refills: 0 | Status: COMPLETED | OUTPATIENT
Start: 2020-02-26 | End: 2020-03-03

## 2020-02-26 RX ORDER — CLONAZEPAM 1 MG
0.5 TABLET ORAL DAILY
Refills: 0 | Status: DISCONTINUED | OUTPATIENT
Start: 2020-02-26 | End: 2020-03-04

## 2020-02-26 RX ORDER — CEFEPIME 1 G/1
1000 INJECTION, POWDER, FOR SOLUTION INTRAMUSCULAR; INTRAVENOUS EVERY 12 HOURS
Refills: 0 | Status: DISCONTINUED | OUTPATIENT
Start: 2020-02-26 | End: 2020-02-26

## 2020-02-26 RX ORDER — QUETIAPINE FUMARATE 200 MG/1
25 TABLET, FILM COATED ORAL
Refills: 0 | Status: DISCONTINUED | OUTPATIENT
Start: 2020-02-26 | End: 2020-03-04

## 2020-02-26 RX ORDER — HYDROMORPHONE HYDROCHLORIDE 2 MG/ML
0.5 INJECTION INTRAMUSCULAR; INTRAVENOUS; SUBCUTANEOUS
Refills: 0 | Status: DISCONTINUED | OUTPATIENT
Start: 2020-02-26 | End: 2020-02-26

## 2020-02-26 RX ORDER — CEFEPIME 1 G/1
1000 INJECTION, POWDER, FOR SOLUTION INTRAMUSCULAR; INTRAVENOUS ONCE
Refills: 0 | Status: DISCONTINUED | OUTPATIENT
Start: 2020-02-26 | End: 2020-02-26

## 2020-02-26 RX ORDER — ENOXAPARIN SODIUM 100 MG/ML
40 INJECTION SUBCUTANEOUS EVERY 24 HOURS
Refills: 0 | Status: DISCONTINUED | OUTPATIENT
Start: 2020-02-27 | End: 2020-03-06

## 2020-02-26 RX ORDER — ONDANSETRON 8 MG/1
4 TABLET, FILM COATED ORAL ONCE
Refills: 0 | Status: DISCONTINUED | OUTPATIENT
Start: 2020-02-26 | End: 2020-02-26

## 2020-02-26 RX ORDER — PROCHLORPERAZINE MALEATE 5 MG
10 TABLET ORAL ONCE
Refills: 0 | Status: DISCONTINUED | OUTPATIENT
Start: 2020-02-26 | End: 2020-03-06

## 2020-02-26 RX ORDER — CEFAZOLIN SODIUM 1 G
2000 VIAL (EA) INJECTION EVERY 8 HOURS
Refills: 0 | Status: COMPLETED | OUTPATIENT
Start: 2020-02-26 | End: 2020-02-27

## 2020-02-26 RX ORDER — VANCOMYCIN HCL 1 G
1000 VIAL (EA) INTRAVENOUS ONCE
Refills: 0 | Status: DISCONTINUED | OUTPATIENT
Start: 2020-02-26 | End: 2020-02-26

## 2020-02-26 RX ORDER — VANCOMYCIN HCL 1 G
1000 VIAL (EA) INTRAVENOUS ONCE
Refills: 0 | Status: COMPLETED | OUTPATIENT
Start: 2020-02-26 | End: 2020-02-26

## 2020-02-26 RX ORDER — SODIUM CHLORIDE 9 MG/ML
1000 INJECTION, SOLUTION INTRAVENOUS
Refills: 0 | Status: DISCONTINUED | OUTPATIENT
Start: 2020-02-26 | End: 2020-03-05

## 2020-02-26 RX ORDER — FENTANYL CITRATE 50 UG/ML
50 INJECTION INTRAVENOUS
Refills: 0 | Status: DISCONTINUED | OUTPATIENT
Start: 2020-02-26 | End: 2020-02-26

## 2020-02-26 RX ORDER — SODIUM CHLORIDE 9 MG/ML
1000 INJECTION, SOLUTION INTRAVENOUS
Refills: 0 | Status: DISCONTINUED | OUTPATIENT
Start: 2020-02-26 | End: 2020-02-26

## 2020-02-26 RX ORDER — CEFEPIME 1 G/1
1000 INJECTION, POWDER, FOR SOLUTION INTRAMUSCULAR; INTRAVENOUS ONCE
Refills: 0 | Status: COMPLETED | OUTPATIENT
Start: 2020-02-26 | End: 2020-02-26

## 2020-02-26 RX ADMIN — QUETIAPINE FUMARATE 25 MILLIGRAM(S): 200 TABLET, FILM COATED ORAL at 22:56

## 2020-02-26 RX ADMIN — HYDROMORPHONE HYDROCHLORIDE 0.5 MILLIGRAM(S): 2 INJECTION INTRAMUSCULAR; INTRAVENOUS; SUBCUTANEOUS at 14:45

## 2020-02-26 RX ADMIN — Medication 0.1 MILLIGRAM(S): at 11:37

## 2020-02-26 RX ADMIN — BUPRENORPHINE AND NALOXONE 1 TABLET(S): 2; .5 TABLET SUBLINGUAL at 22:56

## 2020-02-26 RX ADMIN — CEFEPIME 100 MILLIGRAM(S): 1 INJECTION, POWDER, FOR SOLUTION INTRAMUSCULAR; INTRAVENOUS at 22:57

## 2020-02-26 RX ADMIN — GABAPENTIN 300 MILLIGRAM(S): 400 CAPSULE ORAL at 05:44

## 2020-02-26 RX ADMIN — ONDANSETRON 4 MILLIGRAM(S): 8 TABLET, FILM COATED ORAL at 15:52

## 2020-02-26 RX ADMIN — Medication 100 MILLIGRAM(S): at 22:56

## 2020-02-26 RX ADMIN — BUPRENORPHINE AND NALOXONE 1 TABLET(S): 2; .5 TABLET SUBLINGUAL at 13:21

## 2020-02-26 RX ADMIN — Medication 250 MILLIGRAM(S): at 13:22

## 2020-02-26 RX ADMIN — SODIUM CHLORIDE 75 MILLILITER(S): 9 INJECTION, SOLUTION INTRAVENOUS at 00:30

## 2020-02-26 RX ADMIN — BUPRENORPHINE AND NALOXONE 1 TABLET(S): 2; .5 TABLET SUBLINGUAL at 05:44

## 2020-02-26 RX ADMIN — GABAPENTIN 600 MILLIGRAM(S): 400 CAPSULE ORAL at 22:56

## 2020-02-26 RX ADMIN — CEFEPIME 1000 MILLIGRAM(S): 1 INJECTION, POWDER, FOR SOLUTION INTRAMUSCULAR; INTRAVENOUS at 11:37

## 2020-02-26 RX ADMIN — Medication 0.1 MILLIGRAM(S): at 22:56

## 2020-02-26 RX ADMIN — ONDANSETRON 4 MILLIGRAM(S): 8 TABLET, FILM COATED ORAL at 03:53

## 2020-02-26 RX ADMIN — HYDROMORPHONE HYDROCHLORIDE 0.5 MILLIGRAM(S): 2 INJECTION INTRAMUSCULAR; INTRAVENOUS; SUBCUTANEOUS at 14:15

## 2020-02-26 RX ADMIN — QUETIAPINE FUMARATE 25 MILLIGRAM(S): 200 TABLET, FILM COATED ORAL at 11:37

## 2020-02-26 RX ADMIN — SODIUM CHLORIDE 75 MILLILITER(S): 9 INJECTION, SOLUTION INTRAVENOUS at 15:52

## 2020-02-26 RX ADMIN — Medication 375 MILLIGRAM(S): at 11:37

## 2020-02-26 NOTE — PROGRESS NOTE ADULT - SUBJECTIVE AND OBJECTIVE BOX
c/c: fall/ right leg pain    HPI: 29F, pmh of Depression, PTSD, Generalized anxiety d/o, heroin abuse now on suboxone, HTN, Stroke with left sided weakness, RIght carotid A dissection/stenosis on asa/statin, paroxysmal atrial tachycardia, who presented to the ER with right leg pain inability to ambulate after a fall. She states she felt dizzy and fell onto her right leg. Denies sob/chest pain/n/v/d/abd pain. Felt fine prior to falling. no loc.   IN ED had imaging which showed right distal tib/fib fracture.   Underwent ex-fix 2/21.    2/23: pt seen and examined this am. Sleeping but arousable. no acute overnight events. meds being titrated down per psychiatry.  2/24 responsive  2/25 c/o pain, otherwise stable  2/26 overnight events noted. Imaging studies reviewed. Patient with limited complaints    Review of system- All 10 systems reviewed and is as per HPI otherwise negative.     Vital Signs Last 24 Hrs  T(C): 36.9 (26 Feb 2020 11:01), Max: 36.9 (26 Feb 2020 11:01)  T(F): 98.4 (26 Feb 2020 11:01), Max: 98.4 (26 Feb 2020 11:01)  HR: 104 (26 Feb 2020 11:01) (99 - 114)  BP: 144/84 (26 Feb 2020 11:01) (131/82 - 145/80)  BP(mean): 97 (26 Feb 2020 11:01) (92 - 97)  RR: 17 (26 Feb 2020 11:01) (16 - 18)  SpO2: 96% (26 Feb 2020 11:01) (90% - 96%)    PHYSICAL EXAM:  GENERAL: Comfortable, no acute distress  HEAD:  Atraumatic, Normocephalic  EYES: EOMI, PERRLA  HEENT: Moist mucous membranes  NECK: Supple, No JVD  NERVOUS SYSTEM:  Alert & Oriented X3, 5/5 power b/l UE, LLE  CHEST/LUNG: Clear to auscultation bilaterally  HEART: Regular rate and rhythm  ABDOMEN: Soft, Nontender, Nondistended; Bowel sounds present  GENITOURINARY- Voiding, no palpable bladder  EXTREMITIES:  No clubbing, cyanosis, or edema  MUSCULOSKELETAL- LLE in splint/ace-wrap  SKIN-no rash    LABS:                        12.6   12.75 )-----------( 414      ( 26 Feb 2020 06:19 )             38.1     26 Feb 2020 06:19    139    |  105    |  14     ----------------------------<  107    3.9     |  31     |  0.65     Ca    9.1        26 Feb 2020 06:19    PT/INR - ( 26 Feb 2020 06:19 )   PT: 12.8 sec;   INR: 1.15 ratio       PTT - ( 26 Feb 2020 06:19 )  PTT:30.1 sec    MEDICATIONS  (STANDING):  ascorbic acid 500 milliGRAM(s) Oral two times a day  aspirin  chewable 81 milliGRAM(s) Oral daily  atorvastatin 10 milliGRAM(s) Oral at bedtime  buprenorphine 8 mG/naloxone 2 mG SL  Tablet 1 Tablet(s) SubLingual <User Schedule>  cloNIDine 0.1 milliGRAM(s) Oral two times a day  enoxaparin Injectable 40 milliGRAM(s) SubCutaneous daily  ferrous    sulfate 325 milliGRAM(s) Oral three times a day with meals  folic acid 1 milliGRAM(s) Oral daily  gabapentin 300 milliGRAM(s) Oral two times a day  gabapentin 600 milliGRAM(s) Oral at bedtime  influenza   Vaccine 0.5 milliLiter(s) IntraMuscular once  lactated ringers. 1000 milliLiter(s) (75 mL/Hr) IV Continuous <Continuous>  multivitamin 1 Tablet(s) Oral daily  QUEtiapine 25 milliGRAM(s) Oral two times a day  venlafaxine XR. 375 milliGRAM(s) Oral daily    MEDICATIONS  (PRN):  acetaminophen   Tablet .. 650 milliGRAM(s) Oral every 6 hours PRN Temp greater or equal to 38C (100.4F)  calcium carbonate    500 mG (Tums) Chewable 3 Tablet(s) Chew every 6 hours PRN Dyspepsia  HYDROmorphone  Injectable 0.5 milliGRAM(s) IV Push every 6 hours PRN Severe Pain (7 - 10)  ondansetron Injectable 4 milliGRAM(s) IV Push every 6 hours PRN Nausea and/or Vomiting  oxyCODONE    IR 10 milliGRAM(s) Oral every 4 hours PRN Moderate Pain (4 - 6)  oxyCODONE    IR 5 milliGRAM(s) Oral every 4 hours PRN Mild Pain (1 - 3)  senna 2 Tablet(s) Oral at bedtime PRN Constipation  traZODone 50 milliGRAM(s) Oral at bedtime PRN insomnia    ASSESSMENT AND PLAN:  #Fall/Right tib/fib fracture:  -S/P ex fix   -pain control  -physical therapy  -incentive spirometry  -for ORIF today  -pt is stable to go to OR    #Atelectasis vs pneumonia  Patient with mild leukocytosis, otherwise negative clinical presentation  Patient does not use incentive spirometer and refused to participate with PT given her higher then usual risk for pneumonia  Started on IV abx  ID eval aprpeciated    #Dizziness:  -could be related to multiple sedative meds/high clonidine dosing.  -clonidine decreased per psychiatry    #H/o right carotid A dissection/stenosis, CVA/mild lue weakness:  -on asa/statin  -vascular eval appreciated  -repeat CTA with healed carotid dissection    #HTN:  -on clonidine.    #Heroin abuse in past:  -continue suboxone.    #Depression/anxiety/PTSD:  -continue effexor, klonipin, lamictal, trazodone, gabapentin, seroquel  -psychiatry eval appreciated, meds being titrated down d/t sedation.    #DVT px- on hold for OR    7. Dispo- pt is medically stable for OR today. D/w pt, ID and ortho team

## 2020-02-26 NOTE — BRIEF OPERATIVE NOTE - OPERATION/FINDINGS
Comminuted right bimalleolar ankle fracture. For details, please check the operative notes.
Pilon Fracture of R TIbia  Delta External Fixator Frame

## 2020-02-26 NOTE — PROGRESS NOTE ADULT - SUBJECTIVE AND OBJECTIVE BOX
Patient seen and examined at bedside. Tachycardic and low SpO2 overnight. Pt states she had some difficulty breathing this AM and has not been using her incentive spirometer. No other acute events overnight. Patient continues to report pain in the right lower, albeit well controlled with medications.     PE:  Vital Signs Last 24 Hrs  T(C): 36.7 (26 Feb 2020 04:21), Max: 36.7 (25 Feb 2020 11:21)  T(F): 98 (26 Feb 2020 04:21), Max: 98.1 (25 Feb 2020 22:02)  HR: 99 (26 Feb 2020 04:21) (98 - 114)  BP: 131/82 (26 Feb 2020 04:21) (131/82 - 145/80)  BP(mean): 95 (25 Feb 2020 11:21) (95 - 95)  RR: 16 (26 Feb 2020 04:21) (16 - 16)  SpO2: 91% (26 Feb 2020 04:21) (90% - 92%)    General: NAD, resting comfortably in bed  R LE:   Dressing in Exfix C/D/I  SCDs present contralaterally  Compartments soft and compressible  No calf tenderness bilaterally  able to grossly move toes  sensation intact to toes  cap refill < 2 seconds                     A/P:  29y f s/p exfix, to OR R ankle today  -PT/OT - NWB RLE  -Pain Control  -NPO  -IVF while NPO  -Hold all chemical DVT ppx for OR Today  -FU AM Labs, FU CTPA  -Rest, ice, compress and elevate the extremity as we needed  -Incentive Spirometry  -Medical management appreciated  - Will discuss with attending and advise if plan changes

## 2020-02-26 NOTE — PROGRESS NOTE ADULT - ASSESSMENT
A 30 y/o woman with pmhx of depression, PTSD, heroin abuse, stroke with left sided weakness, right carotid A dissection/stenosis on asa/statin, paroxysmal atrial tachycardia, who presented to the ER with right leg pain inability to ambulate after a fall. She is found to have pilon fx of right tibia. S/p delta external fixator,  Pt with VTE risk factors due to immobility and NWB of RLE.     PLAN:  - c/w home med ASA 81 mg daily  - pending surgery today  Lovenox 40 mg SQ daily on Hold  after today resume post op and can transition to  mg PO BID at time of discharge  - Monitor CBC/BMP  - maintain LLE venodynes    Will continue to follow

## 2020-02-26 NOTE — BRIEF OPERATIVE NOTE - NSICDXBRIEFPOSTOP_GEN_ALL_CORE_FT
POST-OP DIAGNOSIS:  Ankle fracture 26-Feb-2020 21:02:26 Rt ankle fracture Hesham Ramsey
POST-OP DIAGNOSIS:  Pilon fracture of right tibia 21-Feb-2020 22:58:42  Yony Parker

## 2020-02-26 NOTE — BRIEF OPERATIVE NOTE - NSICDXBRIEFPROCEDURE_GEN_ALL_CORE_FT
PROCEDURES:  ORIF, fracture, ankle, bimalleolar 26-Feb-2020 21:01:02 Rt ankle fracture, ORIF Hakim, Basem  Removal, external fixation device, ankle 26-Feb-2020 20:59:39 Removal of external fixation device, Rt ankle Hakim, Basem
PROCEDURES:  Application, fixation device, external, large 21-Feb-2020 22:58:15  Yony Parker

## 2020-02-26 NOTE — BRIEF OPERATIVE NOTE - NSICDXBRIEFPREOP_GEN_ALL_CORE_FT
PRE-OP DIAGNOSIS:  Ankle fracture 26-Feb-2020 21:02:03 Rt ankle fracture Hesham Ramsey
PRE-OP DIAGNOSIS:  Pilon fracture of right tibia 21-Feb-2020 22:58:32  Yony Parker

## 2020-02-26 NOTE — CONSULT NOTE ADULT - SUBJECTIVE AND OBJECTIVE BOX
Patient is a 29y old  Female who presents with a chief complaint of tib/fib fracture (2020 09:42)    HPI:  29F, pmh of Depression, PTSD, Generalized anxiety d/o, heroin abuse now on suboxone, HTN, Stroke with left sided weakness, RIght carotid A dissection/stenosis on asa/statin, paroxysmal atrial tachycardia, who presented to the ER with right leg pain inability to ambulate after a fall. She states she felt dizzy and fell onto her right leg. Denies sob/chest pain/n/v/d/abd pain. Felt fine prior to falling. no loc. In ED had imaging which showed right distal tib/fib fracture.  C/o severe pain at fracture site. Here eval by ortho, s/p RLE external fixation, hospital course c/b hypoxia/cough/tachycardia o/n , wbc ct 12.7, CTA chest with no PE but noted with LLL consolidation with air bronchograms, RLL atelectasis, was given IV vancomycin/cefepime. Plan for OR  for ORIF.       PMH: as above  PSH: denies    Meds: per reconciliation sheet, noted below  MEDICATIONS  (STANDING):  ascorbic acid 500 milliGRAM(s) Oral two times a day  aspirin  chewable 81 milliGRAM(s) Oral daily  atorvastatin 10 milliGRAM(s) Oral at bedtime  buprenorphine 8 mG/naloxone 2 mG SL  Tablet 1 Tablet(s) SubLingual <User Schedule>  cefepime   IVPB 2000 milliGRAM(s) IV Intermittent every 12 hours  cloNIDine 0.1 milliGRAM(s) Oral two times a day  doxycycline hyclate Capsule 100 milliGRAM(s) Oral every 12 hours  ferrous    sulfate 325 milliGRAM(s) Oral three times a day with meals  folic acid 1 milliGRAM(s) Oral daily  gabapentin 300 milliGRAM(s) Oral two times a day  gabapentin 600 milliGRAM(s) Oral at bedtime  lactated ringers. 1000 milliLiter(s) (75 mL/Hr) IV Continuous <Continuous>  lactated ringers. 1000 milliLiter(s) (75 mL/Hr) IV Continuous <Continuous>  multivitamin 1 Tablet(s) Oral daily  QUEtiapine 25 milliGRAM(s) Oral two times a day  vancomycin  IVPB 1000 milliGRAM(s) IV Intermittent once  venlafaxine XR. 375 milliGRAM(s) Oral daily      Allergies    Geodon (Other)  Zyprexa (Other)    Intolerances      Social: smokes 1ppd , drinks ETOH once in a while, heroin abuse in past; no recent travel, no exposure to TB    FAMILY HISTORY:  Medical history unknown: in father, unknown if living or   Family history of drug use: IN MOTHER, LIVING     no history of premature cardiovascular disease in first degree relatives    ROS: no HA, no dizziness, no sore throat, no blurry vision, no CP, no palpitations,  no abdominal pain, no diarrhea, no N/V, no dysuria, no leg pain, no claudication, no rash, no joint aches, no rectal pain or bleeding, no night sweats  All other systems reviewed and are negative    Vital Signs Last 24 Hrs  T(C): 36.9 (2020 11:01), Max: 36.9 (2020 11:01)  T(F): 98.4 (2020 11:), Max: 98.4 (2020 11:)  HR: 104 (2020 11:) (98 - 114)  BP: 144/84 (2020 11:01) (131/82 - 145/80)  BP(mean): 97 (2020 11:) (92 - 97)  RR: 17 (2020 11:) (16 - 18)  SpO2: 96% (2020 11:01) (90% - 96%)  Daily     Daily     PE:    Constitutional: frail looking  HEENT: NC/AT, EOMI, PERRLA, conjunctivae clear; ears and nose atraumatic; pharynx benign  Neck: supple; thyroid not palpable  Back: no tenderness  Respiratory: decreased breath sounds, rhonchi  Cardiovascular: S1S2 regular, no murmurs  Abdomen: soft, not tender, not distended, positive BS; liver and spleen WNL  Genitourinary: no suprapubic tenderness  Lymphatic: no LN palpable  Musculoskeletal: no muscle tenderness, no joint swelling or tenderness  Extremities: no pedal edema, RLE in external fixation, dressing in place  Neurological/ Psychiatric:  moving all extremities  Skin: no rashes; no palpable lesions    Labs: all available labs reviewed                        12.6   12.75 )-----------( 414      ( 2020 06:19 )             38.1         139  |  105  |  14  ----------------------------<  107<H>  3.9   |  31  |  0.65    Ca    9.1      2020 06:19               Radiology: all available radiological tests reviewed  < from: CT Angio Chest PE Protocol w/ IV Cont (20 @ 09:07) >    EXAM:  CTA CHEST PE PROTOCOL (W)AW IC                            PROCEDURE DATE:  2020          INTERPRETATION:  CLINICAL INFORMATION: Tachycardia, acute hypoxia    COMPARISON: 2020    PROCEDURE:   CT Angiography of the Chest.  90 ml of Omnipaque 350 was injected intravenously. 10 ml were discarded.  Sagittal and coronal reformats were performed as well as 3D (MIP) reconstructions.      FINDINGS:    LUNGS AND AIRWAYS: Patent central airways.  Enhancing segmental consolidation in the left lower lobe with air bronchograms likely representing atelectasis however correlate clinically to exclude pneumonia. Subsegmental atelectasis in the right lower lobe.    PLEURA: No pleural effusion.    MEDIASTINUM AND JAIME: No lymphadenopathy.    VESSELS: No evidence of acute pulmonary embolism. No aortic aneurysm. No aortic dissection.    HEART: Heart size is normal. No pericardial effusion.    CHEST WALL AND LOWER NECK: Within normal limits.    VISUALIZED UPPER ABDOMEN: Hepatomegaly with hepatic steatosis.    BONES: Within normal limits.    IMPRESSION:     Enhancing segmental consolidation in the left lower lobe with air bronchograms likely representing atelectasis, clinically correlate for pneumonia. Subsegmental atelectasis in the right lower lobe.    No definite evidence of acute pulmonary embolism.      Advanced directives addressed: full resuscitation

## 2020-02-26 NOTE — PROGRESS NOTE ADULT - SUBJECTIVE AND OBJECTIVE BOX
HPI: This is a 30 y/o woman with pmhx of Depression, PTSD, Generalized anxiety d/o, heroin abuse now on subloxone, HTN, Stroke with left sided weakness, right carotid A dissection/stenosis on asa/statin, paroxysmal atrial tachycardia, who presented to the ER with right leg pain inability to ambulate after a fall. She states she felt dizzy and fell onto her right leg. Denies sob/chest pain/n/v/d/abd pain. Felt fine prior to falling. no loc.   IN ED had imaging which showed right distal tib/fib fracture. Scheduled for surgery later today. C/o severe pain at fracture site.    2020: Pt seen at bedside on 2N, she is AAOx3, delayed response. Informed her of VTE ppx with ASA. Inquired about prior use of oral AC and patient reports "I don't know."   2020: Pt seen at bedside on 2N, lethargic and not following commands; poor engagement with conversation  2020: Pt seen at bedside on 2 N awake not following commands, pending surgery tomorrow  2020: Pt seen at bedside NPO for OR today      Patient is a 29y old  Female who presents with a chief complaint of tib/fib fracture (2020 13:03)    Consulted by Dr. Urbano for VTE prophylaxis, risk stratification, and anticoagulation management.    PAST MEDICAL & SURGICAL HISTORY:  Cyst, breast  Transaminitis  Aneurysm of right internal carotid artery  Cerebrovascular accident (CVA)  Substance abuse  Anxiety  Depression  PTSD (post-traumatic stress disorder)  H/O breast biopsy    BMI: 30.4    CrCL: 126.86    CAPRINI SCORE  AGE RELATED RISK FACTORS                                                       MOBILITY RELATED FACTORS  [ ] Age 41-60 years                                            (1 Point)                  [ ] Bed rest                                                        (1 Point)  [ ] Age: 61-74 years                                           (2 Points)                [ ] Plaster cast                                                   (2 Points)  [ ] Age= 75 years                                              (3 Points)                 [ ] Bed bound for more than 72 hours                   (2 Points)    DISEASE RELATED RISK FACTORS                                               GENDER SPECIFIC FACTORS  [ ] Edema in the lower extremities                       (1 Point)           [ ] Pregnancy                                                            (1 Point)  [ ] Varicose veins                                               (1 Point)                  [ ] Post-partum < 6 weeks                                      (1 Point)             [X ] BMI > 25 Kg/m2                                            (1 Point)                  [ ] Hormonal therapy or oral contraception       (1 Point)                 [ ] Sepsis (in the previous month)                        (1 Point)             [ ] History of pregnancy complications                (1Point)  [ ] Pneumonia or serious lung disease                                             [ ] Unexplained or recurrent  (=3), premature                                 (In the previous month)                               (1 Point)                birth with toxemia or growth-restricted infant (1 Point)  [ ] Abnormal pulmonary function test            (1 Point)                                   SURGERY RELATED RISK FACTORS  [ ] Acute myocardial infarction                       (1 Point)                  [ ]  Section                                         (1 Point)  [ ] Congestive heart failure (in the previous month) (1 Point)   [ ] Minor surgery   lasting <45 minutes       (1 Point)   [ ] Inflammatory bowel disease                             (1 Point)          [ ] Arthroscopic surgery                                  (2 Points)  [ ] Central venous access                                    (2 Points)            [ ] General surgery lasting >45 minutes      (2 Points)       [ ] Stroke (in the previous month)                  (5 Points)            [ ] Elective major lower extremity arthroplasty (5 Points)                                   [  ] Malignancy (present or past include skin melanoma                                          but exclude  basal skin cell)    (2 points)                                      TRAUMA RELATED RISK FACTORS                HEMATOLOGY RELATED FACTORS                                  [X ] Fracture of the hip, pelvis, or leg                       (5 Points)  [ ] Prior episodes of VTE                                     (3 Points)          [ ] Acute spinal cord injury (in the previous month)  (5 Points)  [ ] Positive family history for VTE                         (3 Points)       [ ] Paralysis (less than 1 month)                          (5 Points)  [ ] Prothrombin 43203 A                                      (3 Points)         [ ] Multiple Trauma (within 1month)                 (5Points)                                                                                                                                                                [ ] Factor V Leiden                                          (3 Points)                                OTHER RISK FACTORS                          [ ] Lupus anticoagulants                                     (3 Points)                       [ ] BMI > 40                          (1 Point)                                                         [ ] Anticardiolipin antibodies                                (3 Points)                   [ ] Smoking                              (1Point)                                                [ ] High homocysteine in the blood                      (3 Points)                [  ] Diabetes requiring insulin (1point)                         [ ] Other congenital or acquired thrombophilia       (3 Points)          [  ] Chemotherapy                   (1 Point)  [ ] Heparin induced thrombocytopenia                  (3 Points)             [  ] Blood Transfusion                (1 point)                                                                                                         Total Score [  6  ]                                                                                                                   IMPROVE Bleeding Risk Score: 0    Falls Risk:   High ( X )  Mod (  )  Low (  )    EBL: 5 ml    PROCEDURE START:   PROCEDURE END:     Social: smokes 1ppd , drinks ETOH once in a while, heroin abuse in past (2020 09:25)    FAMILY HISTORY:  Medical history unknown: in father, unknown if living or   Family history of drug use: IN MOTHER, LIVING  Denies any personal or familial history of clotting or bleeding disorders.    Allergies  Geodon (Other)  Seroquel (Unknown)  Zyprexa (Other)    Intolerances    REVIEW OF SYSTEMS    (  )Fever	     (  )Constipation	(  )SOB			(  )Headache	(  )Dysuria  (  )Chills	     (  )Melena	(  )Dyspnea present on exertion    (  )Dizziness                    (  )Polyuria  (  )Nausea	     (  )Hematochezia	(  )Cough		                    (  )Syncope   	(  )Hematuria  (  )Vomiting    (  )Chest Pain	(  )Wheezing		(  )Weakness  (  )Diarrhea     (  )Palpitations	(  )Anorexia		(  )Myalgia    + pain in her right leg. All other review of systems negative: Yes    PHYSICAL EXAM:  Vital Signs Last 24 Hrs  T(C): 36.8 (2020 08:06), Max: 36.8 (2020 08:06)  T(F): 98.2 (2020 08:06), Max: 98.2 (2020 08:06)  HR: 101 (2020 08:06) (98 - 114)  BP: 138/77 (2020 08:06) (131/82 - 145/80)  BP(mean): 92 (2020 08:06) (92 - 95)  RR: 18 (2020 08:06) (16 - 18)  SpO2: 90% (2020 08:06) (90% - 92%)  Constitutional: Appears fatigue    Neurological: Lethargic     Skin: Warm    Respiratory and Thorax: normal effort; Breath sounds: normal; No rales/wheezing/rhonchi  	  Cardiovascular: S1, S2, regular, NMBR	    Gastrointestinal: BS + x 4Q, nontender	    Genitourinary:  Bladder nondistended, nontender    Musculoskeletal:   General Right:   no muscle/joint tenderness,   normal tone, no joint swelling,   ROM: limited	    General Left:   no muscle/joint tenderness,   normal tone, no joint swelling,   ROM: full    RLE: with external fixator in place, wiggles toes, sensation intact    Lower extrems:   Right: no calf tenderness              negative tammi's sign               + pedal pulses    Left:   no calf tenderness              negative tammi's sign               + pedal pulses  MEDICATIONS  (STANDING):  ascorbic acid 500 milliGRAM(s) Oral two times a day  aspirin  chewable 81 milliGRAM(s) Oral daily  atorvastatin 10 milliGRAM(s) Oral at bedtime  buprenorphine 8 mG/naloxone 2 mG SL  Tablet 1 Tablet(s) SubLingual <User Schedule>  cloNIDine 0.1 milliGRAM(s) Oral at bedtime  ferrous    sulfate 325 milliGRAM(s) Oral three times a day with meals  folic acid 1 milliGRAM(s) Oral daily  gabapentin 300 milliGRAM(s) Oral two times a day  gabapentin 600 milliGRAM(s) Oral at bedtime  lactated ringers. 1000 milliLiter(s) (75 mL/Hr) IV Continuous <Continuous>  lactated ringers. 1000 milliLiter(s) (75 mL/Hr) IV Continuous <Continuous>  multivitamin 1 Tablet(s) Oral daily  venlafaxine XR. 375 milliGRAM(s) Oral daily    LABS:                                  12.6   12.75 )-----------( 414      ( 2020 06:19 )             38.1           139  |  105  |  14  ----------------------------<  107<H>  3.9   |  31  |  0.65    Ca    9.1      2020 06:19        PT/INR - ( 2020 06:19 )   PT: 12.8 sec;   INR: 1.15 ratio         PTT - ( 2020 06:19 )  PTT:30.1 sec                    12.6   11.02 )-----------( 413      ( 2020 07:12 )             38.7       02    137  |  105  |  15  ----------------------------<  107<H>  3.8   |  27  |  0.77    Ca    9.0      2020 07:12                        11.8   12.43 )-----------( 406      ( 2020 06:37 )             37.6       02-    138  |  104  |  9   ----------------------------<  91  4.0   |  29  |  0.73    Ca    8.9      2020 06:37                       12.6   8.93  )-----------( 401      ( 2020 06:25 )             40.1     02-    140  |  107  |  7   ----------------------------<  123<H>  4.4   |  27  |  0.92    Ca    8.8      2020 06:25      PT/INR - ( 2020 07:36 )   PT: 12.6 sec;   INR: 1.13 ratio    PTT - ( 2020 07:36 )  PTT:30.8 sec  Urinalysis Basic - ( 2020 04:13 )  Color: Yellow / Appearance: Clear / S.005 / pH: x  Gluc: x / Ketone: Negative  / Bili: Negative / Urobili: Negative mg/dL   Blood: x / Protein: Negative mg/dL / Nitrite: Negative   Leuk Esterase: Trace / RBC: 0-2 /HPF / WBC 6-10   Sq Epi: x / Non Sq Epi: Few / Bacteria: Moderate    RADIOLOGY, EKG & ADDITIONAL TESTS: Reviewed.     EXAM:  CT ANKLE ONLY RT                        EXAM:  CT 3D RECONSTRUCT  MITRA                        PROCEDURE DATE:  2020    IMPRESSION:  1.  Status post external fixation.  2.  Comminuted mildly displaced fractures of the lateral tibial plafond with intra-articular extension as described above.  3.  Nondisplaced coronally oriented intra-articular fracture at the posterior tibial malleolus.  4.  Mildly displaced distal fibular fracture.  5.  Suspected widening of the tibiotalar joint space  6.  Soft tissue swelling.    DVT Prophylaxis:  LMWH                   (  )  Heparin SQ           (  )  Coumadin             (  )  Xarelto                  (  )  Eliquis                   (  )  Venodynes           ( X )  Ambulation          (X )  UFH                       (  )  Contraindicated  (  )  ASA                       (  X ) HPI: This is a 28 y/o woman with pmhx of Depression, PTSD, Generalized anxiety d/o, heroin abuse now on subloxone, HTN, Stroke with left sided weakness, right carotid A dissection/stenosis on asa/statin, paroxysmal atrial tachycardia, who presented to the ER with right leg pain inability to ambulate after a fall. She states she felt dizzy and fell onto her right leg. Denies sob/chest pain/n/v/d/abd pain. Felt fine prior to falling. no loc.   IN ED had imaging which showed right distal tib/fib fracture. Scheduled for surgery later today. C/o severe pain at fracture site.    2020: Pt seen at bedside on 2N, she is AAOx3, delayed response. Informed her of VTE ppx with ASA. Inquired about prior use of oral AC and patient reports "I don't know."   2020: Pt seen at bedside on 2N, lethargic and not following commands; poor engagement with conversation  2020: Pt seen at bedside on 2 N awake not following commands, pending surgery tomorrow  2020: Pt seen at bedside NPO for OR today, Pt  had an episode of SOB and tachycardia CT angio done negative for PE.      Patient is a 29y old  Female who presents with a chief complaint of tib/fib fracture (2020 13:03)    Consulted by Dr. Urbano for VTE prophylaxis, risk stratification, and anticoagulation management.    PAST MEDICAL & SURGICAL HISTORY:  Cyst, breast  Transaminitis  Aneurysm of right internal carotid artery  Cerebrovascular accident (CVA)  Substance abuse  Anxiety  Depression  PTSD (post-traumatic stress disorder)  H/O breast biopsy    BMI: 30.4    CrCL: 126.86    CAPRINI SCORE  AGE RELATED RISK FACTORS                                                       MOBILITY RELATED FACTORS  [ ] Age 41-60 years                                            (1 Point)                  [ ] Bed rest                                                        (1 Point)  [ ] Age: 61-74 years                                           (2 Points)                [ ] Plaster cast                                                   (2 Points)  [ ] Age= 75 years                                              (3 Points)                 [ ] Bed bound for more than 72 hours                   (2 Points)    DISEASE RELATED RISK FACTORS                                               GENDER SPECIFIC FACTORS  [ ] Edema in the lower extremities                       (1 Point)           [ ] Pregnancy                                                            (1 Point)  [ ] Varicose veins                                               (1 Point)                  [ ] Post-partum < 6 weeks                                      (1 Point)             [X ] BMI > 25 Kg/m2                                            (1 Point)                  [ ] Hormonal therapy or oral contraception       (1 Point)                 [ ] Sepsis (in the previous month)                        (1 Point)             [ ] History of pregnancy complications                (1Point)  [ ] Pneumonia or serious lung disease                                             [ ] Unexplained or recurrent  (=3), premature                                 (In the previous month)                               (1 Point)                birth with toxemia or growth-restricted infant (1 Point)  [ ] Abnormal pulmonary function test            (1 Point)                                   SURGERY RELATED RISK FACTORS  [ ] Acute myocardial infarction                       (1 Point)                  [ ]  Section                                         (1 Point)  [ ] Congestive heart failure (in the previous month) (1 Point)   [ ] Minor surgery   lasting <45 minutes       (1 Point)   [ ] Inflammatory bowel disease                             (1 Point)          [ ] Arthroscopic surgery                                  (2 Points)  [ ] Central venous access                                    (2 Points)            [ ] General surgery lasting >45 minutes      (2 Points)       [ ] Stroke (in the previous month)                  (5 Points)            [ ] Elective major lower extremity arthroplasty (5 Points)                                   [  ] Malignancy (present or past include skin melanoma                                          but exclude  basal skin cell)    (2 points)                                      TRAUMA RELATED RISK FACTORS                HEMATOLOGY RELATED FACTORS                                  [X ] Fracture of the hip, pelvis, or leg                       (5 Points)  [ ] Prior episodes of VTE                                     (3 Points)          [ ] Acute spinal cord injury (in the previous month)  (5 Points)  [ ] Positive family history for VTE                         (3 Points)       [ ] Paralysis (less than 1 month)                          (5 Points)  [ ] Prothrombin 49399 A                                      (3 Points)         [ ] Multiple Trauma (within 1month)                 (5Points)                                                                                                                                                                [ ] Factor V Leiden                                          (3 Points)                                OTHER RISK FACTORS                          [ ] Lupus anticoagulants                                     (3 Points)                       [ ] BMI > 40                          (1 Point)                                                         [ ] Anticardiolipin antibodies                                (3 Points)                   [ ] Smoking                              (1Point)                                                [ ] High homocysteine in the blood                      (3 Points)                [  ] Diabetes requiring insulin (1point)                         [ ] Other congenital or acquired thrombophilia       (3 Points)          [  ] Chemotherapy                   (1 Point)  [ ] Heparin induced thrombocytopenia                  (3 Points)             [  ] Blood Transfusion                (1 point)                                                                                                         Total Score [  6  ]                                                                                                                   IMPROVE Bleeding Risk Score: 0    Falls Risk:   High ( X )  Mod (  )  Low (  )    EBL: 5 ml    PROCEDURE START:   PROCEDURE END:     Social: smokes 1ppd , drinks ETOH once in a while, heroin abuse in past (2020 09:25)    FAMILY HISTORY:  Medical history unknown: in father, unknown if living or   Family history of drug use: IN MOTHER, LIVING  Denies any personal or familial history of clotting or bleeding disorders.    Allergies  Geodon (Other)  Seroquel (Unknown)  Zyprexa (Other)    Intolerances    REVIEW OF SYSTEMS    (  )Fever	     (  )Constipation	(  )SOB			(  )Headache	(  )Dysuria  (  )Chills	     (  )Melena	(  )Dyspnea present on exertion    (  )Dizziness                    (  )Polyuria  (  )Nausea	     (  )Hematochezia	(  )Cough		                    (  )Syncope   	(  )Hematuria  (  )Vomiting    (  )Chest Pain	(  )Wheezing		(  )Weakness  (  )Diarrhea     (  )Palpitations	(  )Anorexia		(  )Myalgia    + pain in her right leg. All other review of systems negative: Yes    PHYSICAL EXAM:  Vital Signs Last 24 Hrs  T(C): 36.8 (2020 08:06), Max: 36.8 (2020 08:06)  T(F): 98.2 (2020 08:06), Max: 98.2 (2020 08:06)  HR: 101 (2020 08:06) (98 - 114)  BP: 138/77 (2020 08:06) (131/82 - 145/80)  BP(mean): 92 (2020 08:06) (92 - 95)  RR: 18 (2020 08:06) (16 - 18)  SpO2: 90% (2020 08:06) (90% - 92%)  Constitutional: Appears fatigue    Neurological: Lethargic     Skin: Warm    Respiratory and Thorax: normal effort; Breath sounds: normal; No rales/wheezing/rhonchi  	  Cardiovascular: S1, S2, regular, NMBR	    Gastrointestinal: BS + x 4Q, nontender	    Genitourinary:  Bladder nondistended, nontender    Musculoskeletal:   General Right:   no muscle/joint tenderness,   normal tone, no joint swelling,   ROM: limited	    General Left:   no muscle/joint tenderness,   normal tone, no joint swelling,   ROM: full    RLE: with external fixator in place, wiggles toes, sensation intact    Lower extrems:   Right: no calf tenderness              negative tammi's sign               + pedal pulses    Left:   no calf tenderness              negative tammi's sign               + pedal pulses  MEDICATIONS  (STANDING):  ascorbic acid 500 milliGRAM(s) Oral two times a day  aspirin  chewable 81 milliGRAM(s) Oral daily  atorvastatin 10 milliGRAM(s) Oral at bedtime  buprenorphine 8 mG/naloxone 2 mG SL  Tablet 1 Tablet(s) SubLingual <User Schedule>  cloNIDine 0.1 milliGRAM(s) Oral at bedtime  ferrous    sulfate 325 milliGRAM(s) Oral three times a day with meals  folic acid 1 milliGRAM(s) Oral daily  gabapentin 300 milliGRAM(s) Oral two times a day  gabapentin 600 milliGRAM(s) Oral at bedtime  lactated ringers. 1000 milliLiter(s) (75 mL/Hr) IV Continuous <Continuous>  lactated ringers. 1000 milliLiter(s) (75 mL/Hr) IV Continuous <Continuous>  multivitamin 1 Tablet(s) Oral daily  venlafaxine XR. 375 milliGRAM(s) Oral daily    LABS:                                  12.6   12.75 )-----------( 414      ( 2020 06:19 )             38.1           139  |  105  |  14  ----------------------------<  107<H>  3.9   |  31  |  0.65    Ca    9.1      2020 06:19        PT/INR - ( 2020 06:19 )   PT: 12.8 sec;   INR: 1.15 ratio         PTT - ( 2020 06:19 )  PTT:30.1 sec                    12.6   11.02 )-----------( 413      ( 2020 07:12 )             38.7           137  |  105  |  15  ----------------------------<  107<H>  3.8   |  27  |  0.77    Ca    9.0      2020 07:12                        11.8   12.43 )-----------( 406      ( 2020 06:37 )             37.6       02    138  |  104  |  9   ----------------------------<  91  4.0   |  29  |  0.73    Ca    8.9      2020 06:37                       12.6   8.93  )-----------( 401      ( 2020 06:25 )             40.1         140  |  107  |  7   ----------------------------<  123<H>  4.4   |  27  |  0.92    Ca    8.8      2020 06:25      PT/INR - ( 2020 07:36 )   PT: 12.6 sec;   INR: 1.13 ratio    PTT - ( 2020 07:36 )  PTT:30.8 sec  Urinalysis Basic - ( 2020 04:13 )  Color: Yellow / Appearance: Clear / S.005 / pH: x  Gluc: x / Ketone: Negative  / Bili: Negative / Urobili: Negative mg/dL   Blood: x / Protein: Negative mg/dL / Nitrite: Negative   Leuk Esterase: Trace / RBC: 0-2 /HPF / WBC 6-10   Sq Epi: x / Non Sq Epi: Few / Bacteria: Moderate    RADIOLOGY, EKG & ADDITIONAL TESTS: Reviewed.     EXAM:  CT ANKLE ONLY RT                        EXAM:  CT 3D RECONSTRUCT WO MITRA                        PROCEDURE DATE:  2020    IMPRESSION:  1.  Status post external fixation.  2.  Comminuted mildly displaced fractures of the lateral tibial plafond with intra-articular extension as described above.  3.  Nondisplaced coronally oriented intra-articular fracture at the posterior tibial malleolus.  4.  Mildly displaced distal fibular fracture.  5.  Suspected widening of the tibiotalar joint space  6.  Soft tissue swelling.    < from: CT Angio Chest PE Protocol w/ IV Cont (20 @ 09:07) >  IMPRESSION:     Enhancing segmental consolidation in the left lower lobe with air bronchograms likely representing atelectasis, clinically correlate for pneumonia. Subsegmental atelectasis in the right lower lobe.    No definite evidence of acute pulmonary embolism.    < end of copied text >      DVT Prophylaxis:  LMWH                   (  )  Heparin SQ           (  )  Coumadin             (  )  Xarelto                  (  )  Eliquis                   (  )  Venodynes           ( X )  Ambulation          (X )  UFH                       (  )  Contraindicated  (  )  ASA                       (  X )

## 2020-02-26 NOTE — PROGRESS NOTE ADULT - SUBJECTIVE AND OBJECTIVE BOX
I am assuming care for this patient from Dr. Urbano.  The patient is s/p ex-fix placement to the right leg for a distal tibia and fibula fracture.      29y Female community ambulator presents c/o Right lower extremity pain s/p fall at home.  Pt states she took her seroquel and then felt lightheaded and fell backwards, catching her foot on the cabinet. Pt is a community ambulatory at baseline. Pt is unable to bear weight on extremity. Pt denies numbness tingling paresthesias in affected limb. Pt denies headstrike or LOC and denies any other orthopedic injuries at this time.  Note: pt is on Suboxone at baseline from previous narcotic abuse history    PAST MEDICAL & SURGICAL HISTORY:  Transaminitis  Aneurysm of right internal carotid artery  Cerebrovascular accident (CVA)  Substance abuse  Anxiety  Depression  PTSD (post-traumatic stress disorder)    MEDICATIONS  (STANDING):  lactated ringers. 1000 milliLiter(s) IV Continuous <Continuous>    Allergies    Geodon (Other)  Seroquel (Unknown)  Zyprexa (Other)                          12.9   11.83 )-----------( 377      ( 20 Feb 2020 22:51 )             40.2     20 Feb 2020 22:51    138    |  104    |  7      ----------------------------<  100    4.6     |  30     |  1.03     Ca    9.1        20 Feb 2020 22:51      PT/INR - ( 20 Feb 2020 22:51 )   PT: 11.9 sec;   INR: 1.07 ratio         PTT - ( 20 Feb 2020 22:51 )  PTT:30.5 sec  Vital Signs Last 24 Hrs  T(C): 36.4 (02-20-20 @ 21:09), Max: 36.4 (02-20-20 @ 21:09)  T(F): 97.5 (02-20-20 @ 21:09), Max: 97.5 (02-20-20 @ 21:09)  HR: 92 (02-20-20 @ 21:09) (92 - 92)  BP: 84/43 (02-20-20 @ 21:09) (84/43 - 84/43)  RR: 18 (02-20-20 @ 21:09) (18 - 18)  SpO2: 100% (02-20-20 @ 21:09) (100% - 100%)    Imaging: XR was personally reviewed which demonstrates Right tibia fibula fracture with posterior angulation, malrotation    Physical Exam    Gen: NAD, AAOx2, lethargic but answering questions and following commands  Right LE: Skin intact, visible deformity of ankle/LE, +swelling at fracture site along distal tibia, +TTP over fracture site, no bony TTP at Knee/Foot/Toes, neg logroll, +EHL/FHL/TA/GS, SILT L3-S1, +DP/PT Pulses, compartments soft.  Knee exam: non tender to palpation along joint line, no gross edema or ecchymosis skin intact.      Secondary Survey: Full ROM of unaffected extremities, SILT globally, compartments soft, no bony TTP over bony prominences, no calf TTP, able to SLR with contralateral leg, no TTP along axial spine.     Procedure:  Reduction performed. Pt placed in long leg splint at 30degrees of knee flexion. NVI post splint    A/P: 29y Female with RIght distal tibia and fibula fracture  - Admit to medical team  - ORIF   -NPO, IV fluids while NPO, FU preop labs  - hold chemical dvt ppx, SCD's   - will need documented clearance for OR - appreciated  -pain control - concern for postop pain control discussed with anesthesia.   -keep splint clean dry intact  -rest ice elevate affected leg  -NWB on affected leg  -Post reduction XR reveal adequate reduction  -discussed signs symptoms of compartment syndrome

## 2020-02-26 NOTE — CONSULT NOTE ADULT - ASSESSMENT
29F, pmh of Depression, PTSD, Generalized anxiety d/o, heroin abuse now on suboxone, HTN, Stroke with left sided weakness, RIght carotid A dissection/stenosis on asa/statin, paroxysmal atrial tachycardia, who presented to the ER with right leg pain inability to ambulate after a fall. She states she felt dizzy and fell onto her right leg. Denies sob/chest pain/n/v/d/abd pain. Felt fine prior to falling. no loc. In ED had imaging which showed right distal tib/fib fracture.  C/o severe pain at fracture site. Here eval by ortho, s/p RLE external fixation, hospital course c/b hypoxia/cough/tachycardia o/n 2/25, wbc ct 12.7, CTA chest with no PE but noted with LLL consolidation with air bronchograms, RLL atelectasis, was given IV vancomycin/cefepime. Plan for OR 2/26 for ORIF.    1. LLL pneumonia. RLL atelectasis. possible sepsis. R tib/fib fracture  - agree with IV vancomycin 1gm x 1  - agree with IV cefepime increase to [id number]  - add doxycycline 100mg BID atypical coverage  - monitor temps  - f/u cultures, check sputum cx  - monitor temps  - tolerating abx well so far; no side effects noted  - reason for abx use and side effects reviewed with patient  - incentrive spirometry  - f/u cbc  - supportive care  - fu cbc    2. other issues - care per  medicine

## 2020-02-27 LAB
ANION GAP SERPL CALC-SCNC: 6 MMOL/L — SIGNIFICANT CHANGE UP (ref 5–17)
BUN SERPL-MCNC: 12 MG/DL — SIGNIFICANT CHANGE UP (ref 7–23)
CALCIUM SERPL-MCNC: 9 MG/DL — SIGNIFICANT CHANGE UP (ref 8.5–10.1)
CHLORIDE SERPL-SCNC: 106 MMOL/L — SIGNIFICANT CHANGE UP (ref 96–108)
CO2 SERPL-SCNC: 28 MMOL/L — SIGNIFICANT CHANGE UP (ref 22–31)
CREAT SERPL-MCNC: 0.78 MG/DL — SIGNIFICANT CHANGE UP (ref 0.5–1.3)
GLUCOSE SERPL-MCNC: 109 MG/DL — HIGH (ref 70–99)
HCT VFR BLD CALC: 34.5 % — SIGNIFICANT CHANGE UP (ref 34.5–45)
HGB BLD-MCNC: 11.1 G/DL — LOW (ref 11.5–15.5)
MCHC RBC-ENTMCNC: 27.7 PG — SIGNIFICANT CHANGE UP (ref 27–34)
MCHC RBC-ENTMCNC: 32.2 GM/DL — SIGNIFICANT CHANGE UP (ref 32–36)
MCV RBC AUTO: 86 FL — SIGNIFICANT CHANGE UP (ref 80–100)
PLATELET # BLD AUTO: 389 K/UL — SIGNIFICANT CHANGE UP (ref 150–400)
POTASSIUM SERPL-MCNC: 4 MMOL/L — SIGNIFICANT CHANGE UP (ref 3.5–5.3)
POTASSIUM SERPL-SCNC: 4 MMOL/L — SIGNIFICANT CHANGE UP (ref 3.5–5.3)
RBC # BLD: 4.01 M/UL — SIGNIFICANT CHANGE UP (ref 3.8–5.2)
RBC # FLD: 14.4 % — SIGNIFICANT CHANGE UP (ref 10.3–14.5)
SODIUM SERPL-SCNC: 140 MMOL/L — SIGNIFICANT CHANGE UP (ref 135–145)
WBC # BLD: 16.27 K/UL — HIGH (ref 3.8–10.5)
WBC # FLD AUTO: 16.27 K/UL — HIGH (ref 3.8–10.5)

## 2020-02-27 PROCEDURE — 99231 SBSQ HOSP IP/OBS SF/LOW 25: CPT

## 2020-02-27 PROCEDURE — 99232 SBSQ HOSP IP/OBS MODERATE 35: CPT

## 2020-02-27 RX ORDER — ACETAMINOPHEN 500 MG
1000 TABLET ORAL ONCE
Refills: 0 | Status: COMPLETED | OUTPATIENT
Start: 2020-02-27 | End: 2020-02-27

## 2020-02-27 RX ADMIN — CEFEPIME 100 MILLIGRAM(S): 1 INJECTION, POWDER, FOR SOLUTION INTRAMUSCULAR; INTRAVENOUS at 10:19

## 2020-02-27 RX ADMIN — BUPRENORPHINE AND NALOXONE 1 TABLET(S): 2; .5 TABLET SUBLINGUAL at 05:49

## 2020-02-27 RX ADMIN — HYDROMORPHONE HYDROCHLORIDE 0.5 MILLIGRAM(S): 2 INJECTION INTRAMUSCULAR; INTRAVENOUS; SUBCUTANEOUS at 18:13

## 2020-02-27 RX ADMIN — Medication 500 MILLIGRAM(S): at 05:48

## 2020-02-27 RX ADMIN — QUETIAPINE FUMARATE 25 MILLIGRAM(S): 200 TABLET, FILM COATED ORAL at 05:48

## 2020-02-27 RX ADMIN — Medication 325 MILLIGRAM(S): at 13:38

## 2020-02-27 RX ADMIN — OXYCODONE HYDROCHLORIDE 10 MILLIGRAM(S): 5 TABLET ORAL at 02:09

## 2020-02-27 RX ADMIN — OXYCODONE HYDROCHLORIDE 10 MILLIGRAM(S): 5 TABLET ORAL at 16:43

## 2020-02-27 RX ADMIN — BUPRENORPHINE AND NALOXONE 1 TABLET(S): 2; .5 TABLET SUBLINGUAL at 18:47

## 2020-02-27 RX ADMIN — Medication 50 MILLIGRAM(S): at 23:54

## 2020-02-27 RX ADMIN — Medication 375 MILLIGRAM(S): at 14:10

## 2020-02-27 RX ADMIN — Medication 1 TABLET(S): at 13:37

## 2020-02-27 RX ADMIN — OXYCODONE HYDROCHLORIDE 10 MILLIGRAM(S): 5 TABLET ORAL at 17:40

## 2020-02-27 RX ADMIN — ONDANSETRON 4 MILLIGRAM(S): 8 TABLET, FILM COATED ORAL at 19:47

## 2020-02-27 RX ADMIN — QUETIAPINE FUMARATE 25 MILLIGRAM(S): 200 TABLET, FILM COATED ORAL at 18:01

## 2020-02-27 RX ADMIN — Medication 100 MILLIGRAM(S): at 05:48

## 2020-02-27 RX ADMIN — GABAPENTIN 300 MILLIGRAM(S): 400 CAPSULE ORAL at 18:01

## 2020-02-27 RX ADMIN — BUPRENORPHINE AND NALOXONE 1 TABLET(S): 2; .5 TABLET SUBLINGUAL at 12:49

## 2020-02-27 RX ADMIN — Medication 400 MILLIGRAM(S): at 22:48

## 2020-02-27 RX ADMIN — Medication 100 MILLIGRAM(S): at 14:04

## 2020-02-27 RX ADMIN — OXYCODONE HYDROCHLORIDE 10 MILLIGRAM(S): 5 TABLET ORAL at 22:10

## 2020-02-27 RX ADMIN — Medication 1 MILLIGRAM(S): at 13:38

## 2020-02-27 RX ADMIN — CEFEPIME 100 MILLIGRAM(S): 1 INJECTION, POWDER, FOR SOLUTION INTRAMUSCULAR; INTRAVENOUS at 21:15

## 2020-02-27 RX ADMIN — HYDROMORPHONE HYDROCHLORIDE 0.5 MILLIGRAM(S): 2 INJECTION INTRAMUSCULAR; INTRAVENOUS; SUBCUTANEOUS at 17:58

## 2020-02-27 RX ADMIN — Medication 500 MILLIGRAM(S): at 18:01

## 2020-02-27 RX ADMIN — ATORVASTATIN CALCIUM 10 MILLIGRAM(S): 80 TABLET, FILM COATED ORAL at 21:15

## 2020-02-27 RX ADMIN — OXYCODONE HYDROCHLORIDE 10 MILLIGRAM(S): 5 TABLET ORAL at 01:39

## 2020-02-27 RX ADMIN — ENOXAPARIN SODIUM 40 MILLIGRAM(S): 100 INJECTION SUBCUTANEOUS at 05:48

## 2020-02-27 RX ADMIN — Medication 100 MILLIGRAM(S): at 18:01

## 2020-02-27 RX ADMIN — OXYCODONE HYDROCHLORIDE 10 MILLIGRAM(S): 5 TABLET ORAL at 21:16

## 2020-02-27 RX ADMIN — GABAPENTIN 300 MILLIGRAM(S): 400 CAPSULE ORAL at 05:49

## 2020-02-27 RX ADMIN — Medication 0.1 MILLIGRAM(S): at 05:48

## 2020-02-27 RX ADMIN — Medication 325 MILLIGRAM(S): at 18:01

## 2020-02-27 RX ADMIN — GABAPENTIN 600 MILLIGRAM(S): 400 CAPSULE ORAL at 21:19

## 2020-02-27 RX ADMIN — Medication 0.1 MILLIGRAM(S): at 18:01

## 2020-02-27 RX ADMIN — Medication 81 MILLIGRAM(S): at 12:49

## 2020-02-27 RX ADMIN — Medication 1000 MILLIGRAM(S): at 23:02

## 2020-02-27 NOTE — PHYSICAL THERAPY INITIAL EVALUATION ADULT - ADDITIONAL COMMENTS
The pt was a community ambulator prior to admission. The pt was a community ambulator prior to admission. Pt states she took her seroquel and then felt lightheaded and fell backwards, catching her foot on the cabinet. Pt is a community ambulatory at baseline. Pt is unable to bear weight on extremity. - Note: pt is on Suboxone at baseline from previous narcotic abuse history

## 2020-02-27 NOTE — PROGRESS NOTE ADULT - ASSESSMENT
A 28 y/o woman with pmhx of depression, PTSD, heroin abuse, stroke with left sided weakness, right carotid A dissection/stenosis on asa/statin, paroxysmal atrial tachycardia, who presented to the ER with right leg pain inability to ambulate after a fall. She is found to have pilon fx of right tibia. S/p Right ankle ORIF,  Pt with VTE risk factors due to immobility and NWB of RLE.     PLAN:  - c/w home med ASA 81 mg daily  - Continue Lovenox 40 mg SQ daily while in the hospital can transition to  mg PO BID at time of discharge  - Monitor CBC/BMP  - maintain LLE venodynes    Will continue to follow

## 2020-02-27 NOTE — PHYSICAL THERAPY INITIAL EVALUATION ADULT - DID THE PATIENT HAVE SURGERY?
Comminuted right bimalleolar ankle fracture. - s/p ex-fix placement to the right leg for a distal tibia and fibula fracture yes

## 2020-02-27 NOTE — PHYSICAL THERAPY INITIAL EVALUATION ADULT - GENERAL OBSERVATIONS, REHAB EVAL
The pt was received on 2N, supine, right distal LE splinted and elevated, the pt presents with continued flat affect and intermittent participation level, requiring frequent repeated commands and re-directing to the task at hand. The pt agreed to get OOB with PT.

## 2020-02-27 NOTE — PHYSICAL THERAPY INITIAL EVALUATION ADULT - LIVES WITH, PROFILE
Reportedly the pt lives in a group home as per previous PT evaluation note: the pt stated though that she lives at home with her Fiance' -- will need to clarify. the pt stated though that she lives at home with her Fiance', the pt reports having a few steps to negotiate at home.

## 2020-02-27 NOTE — PROGRESS NOTE ADULT - ASSESSMENT
29F, pmh of Depression, PTSD, Generalized anxiety d/o, heroin abuse now on suboxone, HTN, Stroke with left sided weakness, RIght carotid A dissection/stenosis on asa/statin, paroxysmal atrial tachycardia, who presented to the ER with right leg pain inability to ambulate after a fall. She states she felt dizzy and fell onto her right leg. Denies sob/chest pain/n/v/d/abd pain. Felt fine prior to falling. no loc. In ED had imaging which showed right distal tib/fib fracture.  C/o severe pain at fracture site. Here eval by ortho, s/p RLE external fixation, hospital course c/b hypoxia/cough/tachycardia o/n 2/25, wbc ct 12.7, CTA chest with no PE but noted with LLL consolidation with air bronchograms, RLL atelectasis, was given IV vancomycin/cefepime. Plan for OR 2/26 for ORIF.    1. LLL pneumonia. RLL atelectasis. possible sepsis. R tib/fib fracture s/p surgery  - s/p IV vancomycin 1gm x 1  - on IV cefepime [id number] #2  - on doxycycline 100mg BID atypical coverage #2  - continue with abx coverage   - dc with po ceftin/doxycycline 7 day course   - monitor temps  - cx no growth  - monitor temps  - tolerating abx well so far; no side effects noted  - reason for abx use and side effects reviewed with patient  - incentive spirometry  - f/u cbc  - supportive care    2. other issues - care per  medicine

## 2020-02-27 NOTE — PHYSICAL THERAPY INITIAL EVALUATION ADULT - IMPAIRMENTS CONTRIBUTING TO GAIT DEVIATIONS, PT EVAL
impaired coordination/decreased flexibility/impaired postural control impaired postural control/At present the pt was unsafe to tolerate/ coordinate crutch training/impaired coordination/decreased flexibility

## 2020-02-27 NOTE — PROGRESS NOTE ADULT - SUBJECTIVE AND OBJECTIVE BOX
Patient seen and examined at bedside. No acute events over night. No acute complaints at this time. Pain well controlled. Denies chest pain, shortness of breath, nausea or vomiting.     PE:  Vital Signs Last 24 Hrs  T(C): 36.8 (02-27-20 @ 03:08), Max: 36.9 (02-26-20 @ 11:01)  T(F): 98.3 (02-27-20 @ 03:08), Max: 98.5 (02-26-20 @ 16:07)  HR: 99 (02-27-20 @ 03:08) (81 - 104)  BP: 114/66 (02-27-20 @ 03:08) (114/66 - 183/79)  BP(mean): 97 (02-26-20 @ 11:01) (92 - 97)  RR: 18 (02-27-20 @ 03:08) (11 - 18)  SpO2: 94% (02-27-20 @ 03:08) (90% - 100%)    General: NAD, resting comfortably in bed  R LE:   Trilam splint in place C/D/I  SCD in place contralaterally  No calf tenderness contralterally  Able to grossly wiggle toes  Gross sesnsation to toes intact  Cap refill <2 seconds                          11.9   18.73 )-----------( 392      ( 26 Feb 2020 23:32 )             37.0     26 Feb 2020 23:32    136    |  103    |  14     ----------------------------<  134    4.1     |  28     |  0.76     Ca    8.8        26 Feb 2020 23:32      PT/INR - ( 26 Feb 2020 06:19 )   PT: 12.8 sec;   INR: 1.15 ratio         PTT - ( 26 Feb 2020 06:19 )  PTT:30.1 sec    A/P:  29y f s/p R Ankle ORIF  -PT/OT - NWB RLE  -Pain Control  -DVT ppx per AC, reconsult apppreciated  -Continue perioperative abx x 24 hours  -FU AM Labs  -Vanco/doxy/cefepime for possible PNA vs atelectasis on CTPA  -Rest, ice, compress and elevate the extremity as we needed  -Incentive Spirometry  -Medical management appreciated  -FU PT c/s for dispo  -dispo: likely home

## 2020-02-27 NOTE — PHYSICAL THERAPY INITIAL EVALUATION ADULT - DISCHARGE DISPOSITION, PT EVAL
rehabilitation facility/[facility] vs HOME PT at group home as tolerated. rehabilitation facility/[facility] vs HOME PT pending support system at home

## 2020-02-27 NOTE — PROGRESS NOTE ADULT - SUBJECTIVE AND OBJECTIVE BOX
HPI: This is a 30 y/o woman with pmhx of Depression, PTSD, Generalized anxiety d/o, heroin abuse now on subloxone, HTN, Stroke with left sided weakness, right carotid A dissection/stenosis on asa/statin, paroxysmal atrial tachycardia, who presented to the ER with right leg pain inability to ambulate after a fall. She states she felt dizzy and fell onto her right leg. Denies sob/chest pain/n/v/d/abd pain. Felt fine prior to falling. no loc.   IN ED had imaging which showed right distal tib/fib fracture. Now s/p Right ankle ORIF on 2020: Pt seen at bedside on 2N, she is AAOx3, delayed response. Informed her of VTE ppx with ASA. Inquired about prior use of oral AC and patient reports "I don't know."   2020: Pt seen at bedside on 2N, lethargic and not following commands; poor engagement with conversation  2020: Pt seen at bedside on 2 N awake not following commands, pending surgery tomorrow  2020: Pt seen at bedside NPO for OR today, Pt  had an episode of SOB and tachycardia CT angio done negative for PE.  2020: Pt seen at bedside, s/p Right ankle ORIF, denies any pain, no c/o SOB or chest pain will continue lovenox while in the hospital      Patient is a 29y old  Female who presents with a chief complaint of tib/fib fracture (2020 13:03)    Consulted by Dr. Urbano for VTE prophylaxis, risk stratification, and anticoagulation management.    PAST MEDICAL & SURGICAL HISTORY:  Cyst, breast  Transaminitis  Aneurysm of right internal carotid artery  Cerebrovascular accident (CVA)  Substance abuse  Anxiety  Depression  PTSD (post-traumatic stress disorder)  H/O breast biopsy    BMI: 30.4    CrCL: 126.86    CAPRINI SCORE  AGE RELATED RISK FACTORS                                                       MOBILITY RELATED FACTORS  [ ] Age 41-60 years                                            (1 Point)                  [ ] Bed rest                                                        (1 Point)  [ ] Age: 61-74 years                                           (2 Points)                [ ] Plaster cast                                                   (2 Points)  [ ] Age= 75 years                                              (3 Points)                 [ ] Bed bound for more than 72 hours                   (2 Points)    DISEASE RELATED RISK FACTORS                                               GENDER SPECIFIC FACTORS  [ ] Edema in the lower extremities                       (1 Point)           [ ] Pregnancy                                                            (1 Point)  [ ] Varicose veins                                               (1 Point)                  [ ] Post-partum < 6 weeks                                      (1 Point)             [X ] BMI > 25 Kg/m2                                            (1 Point)                  [ ] Hormonal therapy or oral contraception       (1 Point)                 [ ] Sepsis (in the previous month)                        (1 Point)             [ ] History of pregnancy complications                (1Point)  [ ] Pneumonia or serious lung disease                                             [ ] Unexplained or recurrent  (=3), premature                                 (In the previous month)                               (1 Point)                birth with toxemia or growth-restricted infant (1 Point)  [ ] Abnormal pulmonary function test            (1 Point)                                   SURGERY RELATED RISK FACTORS  [ ] Acute myocardial infarction                       (1 Point)                  [ ]  Section                                         (1 Point)  [ ] Congestive heart failure (in the previous month) (1 Point)   [ ] Minor surgery   lasting <45 minutes       (1 Point)   [ ] Inflammatory bowel disease                             (1 Point)          [ ] Arthroscopic surgery                                  (2 Points)  [ ] Central venous access                                    (2 Points)            [ ] General surgery lasting >45 minutes      (2 Points)       [ ] Stroke (in the previous month)                  (5 Points)            [ ] Elective major lower extremity arthroplasty (5 Points)                                   [  ] Malignancy (present or past include skin melanoma                                          but exclude  basal skin cell)    (2 points)                                      TRAUMA RELATED RISK FACTORS                HEMATOLOGY RELATED FACTORS                                  [X ] Fracture of the hip, pelvis, or leg                       (5 Points)  [ ] Prior episodes of VTE                                     (3 Points)          [ ] Acute spinal cord injury (in the previous month)  (5 Points)  [ ] Positive family history for VTE                         (3 Points)       [ ] Paralysis (less than 1 month)                          (5 Points)  [ ] Prothrombin 01975 A                                      (3 Points)         [ ] Multiple Trauma (within 1month)                 (5Points)                                                                                                                                                                [ ] Factor V Leiden                                          (3 Points)                                OTHER RISK FACTORS                          [ ] Lupus anticoagulants                                     (3 Points)                       [ ] BMI > 40                          (1 Point)                                                         [ ] Anticardiolipin antibodies                                (3 Points)                   [ ] Smoking                              (1Point)                                                [ ] High homocysteine in the blood                      (3 Points)                [  ] Diabetes requiring insulin (1point)                         [ ] Other congenital or acquired thrombophilia       (3 Points)          [  ] Chemotherapy                   (1 Point)  [ ] Heparin induced thrombocytopenia                  (3 Points)             [  ] Blood Transfusion                (1 point)                                                                                                         Total Score [  6  ]                                                                                                                   IMPROVE Bleeding Risk Score: 0    Falls Risk:   High ( X )  Mod (  )  Low (  )    EBL: 5 ml    PROCEDURE START:   PROCEDURE END:     Social: smokes 1ppd , drinks ETOH once in a while, heroin abuse in past (2020 09:25)    FAMILY HISTORY:  Medical history unknown: in father, unknown if living or   Family history of drug use: IN MOTHER, LIVING  Denies any personal or familial history of clotting or bleeding disorders.    Allergies  Geodon (Other)  Seroquel (Unknown)  Zyprexa (Other)    Intolerances    REVIEW OF SYSTEMS    (  )Fever	     (  )Constipation	(  )SOB			(  )Headache	(  )Dysuria  (  )Chills	     (  )Melena	(  )Dyspnea present on exertion    (  )Dizziness                    (  )Polyuria  (  )Nausea	     (  )Hematochezia	(  )Cough		                    (  )Syncope   	(  )Hematuria  (  )Vomiting    (  )Chest Pain	(  )Wheezing		(  )Weakness  (  )Diarrhea     (  )Palpitations	(  )Anorexia		(  )Myalgia    + pain in her right leg. All other review of systems negative: Yes    PHYSICAL EXAM:  Vital Signs Last 24 Hrs  T(C): 36.8 (2020 03:08), Max: 36.9 (2020 11:01)  T(F): 98.3 (2020 03:08), Max: 98.5 (2020 16:07)  HR: 99 (2020 03:08) (81 - 104)  BP: 114/66 (2020 03:08) (114/66 - 183/79)  BP(mean): 97 (2020 11:01) (97 - 97)  RR: 18 (2020 03:08) (11 - 18)  SpO2: 94% (2020 03:08) (94% - 100%)  Neurological: Lethargic     Skin: Warm    Respiratory and Thorax: normal effort; Breath sounds: normal; No rales/wheezing/rhonchi  	  Cardiovascular: S1, S2, regular, NMBR	    Gastrointestinal: BS + x 4Q, nontender	    Genitourinary:  Bladder nondistended, nontender    Musculoskeletal:   General Right:   no muscle/joint tenderness,   normal tone, no joint swelling,   ROM: limited	    General Left:   no muscle/joint tenderness,   normal tone, no joint swelling,   ROM: full    RLE: Dsg:C/D/I, capillary refill :normal    Lower extrems:   Right: no calf tenderness              negative tammi's sign                pedal pulses Non accessable    Left:   no calf tenderness              negative tammi's sign               + pedal pulses    MEDICATIONS  (STANDING):  ascorbic acid 500 milliGRAM(s) Oral two times a day  aspirin  chewable 81 milliGRAM(s) Oral daily  atorvastatin 10 milliGRAM(s) Oral at bedtime  buprenorphine 8 mG/naloxone 2 mG SL  Tablet 1 Tablet(s) SubLingual <User Schedule>  ceFAZolin   IVPB 2000 milliGRAM(s) IV Intermittent every 8 hours  cefepime   IVPB 2000 milliGRAM(s) IV Intermittent every 12 hours  cloNIDine 0.1 milliGRAM(s) Oral two times a day  doxycycline hyclate Capsule 100 milliGRAM(s) Oral every 12 hours  enoxaparin Injectable 40 milliGRAM(s) SubCutaneous every 24 hours  ferrous    sulfate 325 milliGRAM(s) Oral three times a day with meals  folic acid 1 milliGRAM(s) Oral daily  gabapentin 300 milliGRAM(s) Oral two times a day  gabapentin 600 milliGRAM(s) Oral at bedtime  lactated ringers. 1000 milliLiter(s) (75 mL/Hr) IV Continuous <Continuous>  lactated ringers. 1000 milliLiter(s) (75 mL/Hr) IV Continuous <Continuous>  lactated ringers. 1000 milliLiter(s) (75 mL/Hr) IV Continuous <Continuous>  multivitamin 1 Tablet(s) Oral daily  QUEtiapine 25 milliGRAM(s) Oral two times a day  venlafaxine XR. 375 milliGRAM(s) Oral daily      LABS:                              11.1   16.27 )-----------( 389      ( 2020 07:04 )             34.5       02-    140  |  106  |  12  ----------------------------<  109<H>  4.0   |  28  |  0.78    Ca    9.0      2020 07:04        PT/INR - ( 2020 06:19 )   PT: 12.8 sec;   INR: 1.15 ratio         PTT - ( 2020 06:19 )  PTT:30.1 sec                          12.6   12.75 )-----------( 414      ( 2020 06:19 )             38.1       -    139  |  105  |  14  ----------------------------<  107<H>  3.9   |  31  |  0.65    Ca    9.1      2020 06:19        PT/INR - ( 2020 06:19 )   PT: 12.8 sec;   INR: 1.15 ratio         PTT - ( 2020 06:19 )  PTT:30.1 sec                    12.6   11.02 )-----------( 413      ( 2020 07:12 )             38.7       02    137  |  105  |  15  ----------------------------<  107<H>  3.8   |  27  |  0.77    Ca    9.0      2020 07:12                        11.8   12.43 )-----------( 406      ( 2020 06:37 )             37.6       02    138  |  104  |  9   ----------------------------<  91  4.0   |  29  |  0.73    Ca    8.9      2020 06:37                       12.6   8.93  )-----------( 401      ( 2020 06:25 )             40.1     02-22    140  |  107  |  7   ----------------------------<  123<H>  4.4   |  27  |  0.92    Ca    8.8      2020 06:25      PT/INR - ( 2020 07:36 )   PT: 12.6 sec;   INR: 1.13 ratio    PTT - ( 2020 07:36 )  PTT:30.8 sec  Urinalysis Basic - ( 2020 04:13 )  Color: Yellow / Appearance: Clear / S.005 / pH: x  Gluc: x / Ketone: Negative  / Bili: Negative / Urobili: Negative mg/dL   Blood: x / Protein: Negative mg/dL / Nitrite: Negative   Leuk Esterase: Trace / RBC: 0-2 /HPF / WBC 6-10   Sq Epi: x / Non Sq Epi: Few / Bacteria: Moderate    RADIOLOGY, EKG & ADDITIONAL TESTS: Reviewed.     EXAM:  CT ANKLE ONLY RT                        EXAM:  CT 3D RECONSTRUCT AYANA MANRIQUEZ                        PROCEDURE DATE:  2020    IMPRESSION:  1.  Status post external fixation.  2.  Comminuted mildly displaced fractures of the lateral tibial plafond with intra-articular extension as described above.  3.  Nondisplaced coronally oriented intra-articular fracture at the posterior tibial malleolus.  4.  Mildly displaced distal fibular fracture.  5.  Suspected widening of the tibiotalar joint space  6.  Soft tissue swelling.    < from: CT Angio Chest PE Protocol w/ IV Cont (20 @ 09:07) >  IMPRESSION:     Enhancing segmental consolidation in the left lower lobe with air bronchograms likely representing atelectasis, clinically correlate for pneumonia. Subsegmental atelectasis in the right lower lobe.    No definite evidence of acute pulmonary embolism.    < end of copied text >      DVT Prophylaxis:  LMWH                   (  )  Heparin SQ           (  )  Coumadin             (  )  Xarelto                  (  )  Eliquis                   (  )  Venodynes           ( X )  Ambulation          (X )  UFH                       (  )  Contraindicated  (  )  ASA                       (  X )

## 2020-02-27 NOTE — PROGRESS NOTE ADULT - SUBJECTIVE AND OBJECTIVE BOX
c/c: fall/ right leg pain    HPI: 29F, pmh of Depression, PTSD, Generalized anxiety d/o, heroin abuse now on suboxone, HTN, Stroke with left sided weakness, RIght carotid A dissection/stenosis on asa/statin, paroxysmal atrial tachycardia, who presented to the ER with right leg pain inability to ambulate after a fall. She states she felt dizzy and fell onto her right leg. Denies sob/chest pain/n/v/d/abd pain. Felt fine prior to falling. no loc.   IN ED had imaging which showed right distal tib/fib fracture.   Underwent ex-fix 2/21.    2/23: pt seen and examined this am. Sleeping but arousable. no acute overnight events. meds being titrated down per psychiatry.  2/24 responsive  2/25 c/o pain, otherwise stable  2/26 overnight events noted. Imaging studies reviewed. Patient with limited complaints  2/27 up in a chair    Review of system- All 10 systems reviewed and is as per HPI otherwise negative.     Vital Signs Last 24 Hrs  T(C): 36.8 (27 Feb 2020 11:04), Max: 36.9 (26 Feb 2020 16:07)  T(F): 98.3 (27 Feb 2020 11:04), Max: 98.5 (26 Feb 2020 16:07)  HR: 108 (27 Feb 2020 11:04) (81 - 108)  BP: 131/77 (27 Feb 2020 11:04) (114/66 - 183/79)  BP(mean): 90 (27 Feb 2020 11:04) (90 - 90)  RR: 16 (27 Feb 2020 11:04) (11 - 18)  SpO2: 92% (27 Feb 2020 11:04) (92% - 100%)    PHYSICAL EXAM:  GENERAL: Comfortable, no acute distress  HEAD:  Atraumatic, Normocephalic  EYES: EOMI, PERRLA  HEENT: Moist mucous membranes  NECK: Supple, No JVD  NERVOUS SYSTEM:  Alert & Oriented X3, 5/5 power b/l UE, LLE  CHEST/LUNG: Clear to auscultation bilaterally  HEART: Regular rate and rhythm  ABDOMEN: Soft, Nontender, Nondistended; Bowel sounds present  GENITOURINARY- Voiding, no palpable bladder  EXTREMITIES:  No clubbing, cyanosis, or edema  MUSCULOSKELETAL- LLE in splint/ace-wrap  SKIN-no rash    LABS:                                 11.1   16.27 )-----------( 389      ( 27 Feb 2020 07:04 )             34.5     27 Feb 2020 07:04    140    |  106    |  12     ----------------------------<  109    4.0     |  28     |  0.78     Ca    9.0        27 Feb 2020 07:04    PT/INR - ( 26 Feb 2020 06:19 )   PT: 12.8 sec;   INR: 1.15 ratio      PTT - ( 26 Feb 2020 06:19 )  PTT:30.1 sec    MEDICATIONS  (STANDING):  ascorbic acid 500 milliGRAM(s) Oral two times a day  aspirin  chewable 81 milliGRAM(s) Oral daily  atorvastatin 10 milliGRAM(s) Oral at bedtime  buprenorphine 8 mG/naloxone 2 mG SL  Tablet 1 Tablet(s) SubLingual <User Schedule>  cloNIDine 0.1 milliGRAM(s) Oral two times a day  enoxaparin Injectable 40 milliGRAM(s) SubCutaneous daily  ferrous    sulfate 325 milliGRAM(s) Oral three times a day with meals  folic acid 1 milliGRAM(s) Oral daily  gabapentin 300 milliGRAM(s) Oral two times a day  gabapentin 600 milliGRAM(s) Oral at bedtime  influenza   Vaccine 0.5 milliLiter(s) IntraMuscular once  lactated ringers. 1000 milliLiter(s) (75 mL/Hr) IV Continuous <Continuous>  multivitamin 1 Tablet(s) Oral daily  QUEtiapine 25 milliGRAM(s) Oral two times a day  venlafaxine XR. 375 milliGRAM(s) Oral daily    MEDICATIONS  (PRN):  acetaminophen   Tablet .. 650 milliGRAM(s) Oral every 6 hours PRN Temp greater or equal to 38C (100.4F)  calcium carbonate    500 mG (Tums) Chewable 3 Tablet(s) Chew every 6 hours PRN Dyspepsia  HYDROmorphone  Injectable 0.5 milliGRAM(s) IV Push every 6 hours PRN Severe Pain (7 - 10)  ondansetron Injectable 4 milliGRAM(s) IV Push every 6 hours PRN Nausea and/or Vomiting  oxyCODONE    IR 10 milliGRAM(s) Oral every 4 hours PRN Moderate Pain (4 - 6)  oxyCODONE    IR 5 milliGRAM(s) Oral every 4 hours PRN Mild Pain (1 - 3)  senna 2 Tablet(s) Oral at bedtime PRN Constipation  traZODone 50 milliGRAM(s) Oral at bedtime PRN insomnia    ASSESSMENT AND PLAN:  #Fall/Right tib/fib fracture:  -S/P ex fix   -S/p ORIF 2/26/20  -pain control  -physical therapy  -incentive spirometry    #Atelectasis vs pneumonia  Patient with mild leukocytosis, otherwise negative clinical presentation  Patient does not use incentive spirometer and refused to participate with PT given her higher then usual risk for pneumonia  Started on IV abx  ID eval appreciated  Will switch to PO ceftin/Doxy upon discharge    #Dizziness:  -could be related to multiple sedative meds/high clonidine dosing.  -clonidine decreased per psychiatry    #H/o right carotid A dissection/stenosis, CVA/mild lue weakness:  -on asa/statin  -vascular eval appreciated  -repeat CTA with healed carotid dissection    #HTN:  -on clonidine.    #Heroin abuse in past:  -continue suboxone.    #Depression/anxiety/PTSD:  -continue effexor, klonipin, lamictal, trazodone, gabapentin, seroquel  -psychiatry eval appreciated, meds being titrated down d/t sedation.    #DVT px- on hold for OR    #Dispo- continue PT, likely [facility] on discharge. D/w pt, ID and ortho team

## 2020-02-27 NOTE — PHYSICAL THERAPY INITIAL EVALUATION ADULT - PERTINENT HX OF CURRENT PROBLEM, REHAB EVAL
29y Female community ambulator presents c/o Right lower extremity pain s/p fall at home.  Pt states she took her seroquel and then felt lightheaded and fell backwards, catching her foot on the cabinet. Pt is a community ambulatory at baseline. Pt is unable to bear weight on extremity. - Note: pt is on Suboxone at baseline from previous narcotic abuse history 29F, pmh of Depression, PTSD, Generalized anxiety d/o, heroin abuse now on suboxone, HTN, Stroke with left sided weakness, RIght carotid A dissection/stenosis on asa/statin, paroxysmal atrial tachycardia, who presented to the ER with right leg pain inability to ambulate after a fall. Comminuted right bimalleolar ankle fracture. - s/p ex-fix placement to the right leg for a distal tibia and fibula fracture - now for ORIF

## 2020-02-27 NOTE — PROGRESS NOTE ADULT - SUBJECTIVE AND OBJECTIVE BOX
Date of service: 02-27-20 @ 12:20    pt seen and examined  feels better less cough  s/p RLE ORIF 2/26   afebrile    ROS: no fever or chills; denies dizziness, no HA, no abdominal pain, no diarrhea or constipation; no dysuria, no urinary frequency, no legs pain, no rashes    MEDICATIONS  (STANDING):  ascorbic acid 500 milliGRAM(s) Oral two times a day  aspirin  chewable 81 milliGRAM(s) Oral daily  atorvastatin 10 milliGRAM(s) Oral at bedtime  buprenorphine 8 mG/naloxone 2 mG SL  Tablet 1 Tablet(s) SubLingual <User Schedule>  ceFAZolin   IVPB 2000 milliGRAM(s) IV Intermittent every 8 hours  cefepime   IVPB 2000 milliGRAM(s) IV Intermittent every 12 hours  cloNIDine 0.1 milliGRAM(s) Oral two times a day  doxycycline hyclate Capsule 100 milliGRAM(s) Oral every 12 hours  enoxaparin Injectable 40 milliGRAM(s) SubCutaneous every 24 hours  ferrous    sulfate 325 milliGRAM(s) Oral three times a day with meals  folic acid 1 milliGRAM(s) Oral daily  gabapentin 300 milliGRAM(s) Oral two times a day  gabapentin 600 milliGRAM(s) Oral at bedtime  lactated ringers. 1000 milliLiter(s) (75 mL/Hr) IV Continuous <Continuous>  lactated ringers. 1000 milliLiter(s) (75 mL/Hr) IV Continuous <Continuous>  lactated ringers. 1000 milliLiter(s) (75 mL/Hr) IV Continuous <Continuous>  multivitamin 1 Tablet(s) Oral daily  QUEtiapine 25 milliGRAM(s) Oral two times a day  venlafaxine XR. 375 milliGRAM(s) Oral daily      Vital Signs Last 24 Hrs  T(C): 36.8 (27 Feb 2020 11:04), Max: 36.9 (26 Feb 2020 16:07)  T(F): 98.3 (27 Feb 2020 11:04), Max: 98.5 (26 Feb 2020 16:07)  HR: 108 (27 Feb 2020 11:04) (81 - 108)  BP: 131/77 (27 Feb 2020 11:04) (114/66 - 183/79)  BP(mean): 90 (27 Feb 2020 11:04) (90 - 90)  RR: 16 (27 Feb 2020 11:04) (11 - 18)  SpO2: 92% (27 Feb 2020 11:04) (92% - 100%)      PE:  Constitutional: frail looking  HEENT: NC/AT, EOMI, PERRLA, conjunctivae clear; ears and nose atraumatic; pharynx benign  Neck: supple; thyroid not palpable  Back: no tenderness  Respiratory: decreased breath sounds, rhonchi  Cardiovascular: S1S2 regular, no murmurs  Abdomen: soft, not tender, not distended, positive BS; liver and spleen WNL  Genitourinary: no suprapubic tenderness  Lymphatic: no LN palpable  Musculoskeletal: no muscle tenderness, no joint swelling or tenderness  Extremities: no pedal edema, RLE dressing in place  Neurological/ Psychiatric: moving all extremities  Skin: no rashes; no palpable lesions    Labs: all available labs reviewed                        11.1 16.27 )-----------( 389      ( 27 Feb 2020 07:04 )             34.5     02-27    140  |  106  |  12  ----------------------------<  109<H>  4.0   |  28  |  0.78    Ca    9.0      27 Feb 2020 07:04        Cultures:     Culture - Blood (02.21.20 @ 03:02)    Specimen Source: .Blood None    Culture Results:   No growth at 5 days.    Culture - Blood (02.21.20 @ 02:57)    Specimen Source: .Blood None    Culture Results:   No growth at 5 days.          Radiology: all available radiological tests reviewed  < from: CT Angio Chest PE Protocol w/ IV Cont (02.26.20 @ 09:07) >    EXAM:  CTA CHEST PE PROTOCOL (W)AW IC                            PROCEDURE DATE:  02/26/2020          INTERPRETATION:  CLINICAL INFORMATION: Tachycardia, acute hypoxia    COMPARISON: 2/21/2020    PROCEDURE:   CT Angiography of the Chest.  90 ml of Omnipaque 350 was injected intravenously. 10 ml were discarded.  Sagittal and coronal reformats were performed as well as 3D (MIP) reconstructions.      FINDINGS:    LUNGS AND AIRWAYS: Patent central airways.  Enhancing segmental consolidation in the left lower lobe with air bronchograms likely representing atelectasis however correlate clinically to exclude pneumonia. Subsegmental atelectasis in the right lower lobe.    PLEURA: No pleural effusion.    MEDIASTINUM AND JAIME: No lymphadenopathy.    VESSELS: No evidence of acute pulmonary embolism. No aortic aneurysm. No aortic dissection.    HEART: Heart size is normal. No pericardial effusion.    CHEST WALL AND LOWER NECK: Within normal limits.    VISUALIZED UPPER ABDOMEN: Hepatomegaly with hepatic steatosis.    BONES: Within normal limits.    IMPRESSION:     Enhancing segmental consolidation in the left lower lobe with air bronchograms likely representing atelectasis, clinically correlate for pneumonia. Subsegmental atelectasis in the right lower lobe.    No definite evidence of acute pulmonary embolism.      Advanced directives addressed: full resuscitation

## 2020-02-27 NOTE — PHYSICAL THERAPY INITIAL EVALUATION ADULT - MODALITIES TREATMENT COMMENTS
The pt demonstrated good overall activity tolerance, responding well to therex review, transfer and ambulation tx. The pt was left sitting OOB and in a chair with chair alarm secured, Right LE elevated on pillows/ step stool. RN aware. CB, tray and phone in place. The pt was in NAD at end of tx.

## 2020-02-28 LAB
ANION GAP SERPL CALC-SCNC: 5 MMOL/L — SIGNIFICANT CHANGE UP (ref 5–17)
BUN SERPL-MCNC: 10 MG/DL — SIGNIFICANT CHANGE UP (ref 7–23)
CALCIUM SERPL-MCNC: 8.9 MG/DL — SIGNIFICANT CHANGE UP (ref 8.5–10.1)
CHLORIDE SERPL-SCNC: 105 MMOL/L — SIGNIFICANT CHANGE UP (ref 96–108)
CO2 SERPL-SCNC: 29 MMOL/L — SIGNIFICANT CHANGE UP (ref 22–31)
CREAT SERPL-MCNC: 0.69 MG/DL — SIGNIFICANT CHANGE UP (ref 0.5–1.3)
GLUCOSE SERPL-MCNC: 110 MG/DL — HIGH (ref 70–99)
HCT VFR BLD CALC: 37.8 % — SIGNIFICANT CHANGE UP (ref 34.5–45)
HGB BLD-MCNC: 12.6 G/DL — SIGNIFICANT CHANGE UP (ref 11.5–15.5)
MCHC RBC-ENTMCNC: 28.6 PG — SIGNIFICANT CHANGE UP (ref 27–34)
MCHC RBC-ENTMCNC: 33.3 GM/DL — SIGNIFICANT CHANGE UP (ref 32–36)
MCV RBC AUTO: 85.9 FL — SIGNIFICANT CHANGE UP (ref 80–100)
PLATELET # BLD AUTO: 398 K/UL — SIGNIFICANT CHANGE UP (ref 150–400)
POTASSIUM SERPL-MCNC: 3.7 MMOL/L — SIGNIFICANT CHANGE UP (ref 3.5–5.3)
POTASSIUM SERPL-SCNC: 3.7 MMOL/L — SIGNIFICANT CHANGE UP (ref 3.5–5.3)
RBC # BLD: 4.4 M/UL — SIGNIFICANT CHANGE UP (ref 3.8–5.2)
RBC # FLD: 14.3 % — SIGNIFICANT CHANGE UP (ref 10.3–14.5)
SODIUM SERPL-SCNC: 139 MMOL/L — SIGNIFICANT CHANGE UP (ref 135–145)
WBC # BLD: 13.6 K/UL — HIGH (ref 3.8–10.5)
WBC # FLD AUTO: 13.6 K/UL — HIGH (ref 3.8–10.5)

## 2020-02-28 PROCEDURE — 93010 ELECTROCARDIOGRAM REPORT: CPT

## 2020-02-28 PROCEDURE — 99223 1ST HOSP IP/OBS HIGH 75: CPT

## 2020-02-28 PROCEDURE — 99233 SBSQ HOSP IP/OBS HIGH 50: CPT

## 2020-02-28 RX ORDER — HYDROMORPHONE HYDROCHLORIDE 2 MG/ML
0.25 INJECTION INTRAMUSCULAR; INTRAVENOUS; SUBCUTANEOUS EVERY 4 HOURS
Refills: 0 | Status: DISCONTINUED | OUTPATIENT
Start: 2020-02-28 | End: 2020-02-28

## 2020-02-28 RX ORDER — METOPROLOL TARTRATE 50 MG
25 TABLET ORAL
Refills: 0 | Status: DISCONTINUED | OUTPATIENT
Start: 2020-02-28 | End: 2020-02-29

## 2020-02-28 RX ORDER — ACETAMINOPHEN 500 MG
650 TABLET ORAL EVERY 6 HOURS
Refills: 0 | Status: DISCONTINUED | OUTPATIENT
Start: 2020-02-28 | End: 2020-03-06

## 2020-02-28 RX ORDER — METOPROLOL TARTRATE 50 MG
2.5 TABLET ORAL ONCE
Refills: 0 | Status: COMPLETED | OUTPATIENT
Start: 2020-02-28 | End: 2020-02-28

## 2020-02-28 RX ORDER — OXYCODONE HYDROCHLORIDE 5 MG/1
5 TABLET ORAL EVERY 6 HOURS
Refills: 0 | Status: DISCONTINUED | OUTPATIENT
Start: 2020-02-28 | End: 2020-02-28

## 2020-02-28 RX ADMIN — Medication 500 MILLIGRAM(S): at 18:35

## 2020-02-28 RX ADMIN — QUETIAPINE FUMARATE 25 MILLIGRAM(S): 200 TABLET, FILM COATED ORAL at 06:04

## 2020-02-28 RX ADMIN — Medication 50 MILLIGRAM(S): at 21:35

## 2020-02-28 RX ADMIN — BUPRENORPHINE AND NALOXONE 1 TABLET(S): 2; .5 TABLET SUBLINGUAL at 06:05

## 2020-02-28 RX ADMIN — HYDROMORPHONE HYDROCHLORIDE 0.5 MILLIGRAM(S): 2 INJECTION INTRAMUSCULAR; INTRAVENOUS; SUBCUTANEOUS at 05:09

## 2020-02-28 RX ADMIN — Medication 0.1 MILLIGRAM(S): at 19:26

## 2020-02-28 RX ADMIN — Medication 100 MILLIGRAM(S): at 19:26

## 2020-02-28 RX ADMIN — SODIUM CHLORIDE 75 MILLILITER(S): 9 INJECTION, SOLUTION INTRAVENOUS at 08:37

## 2020-02-28 RX ADMIN — Medication 325 MILLIGRAM(S): at 08:33

## 2020-02-28 RX ADMIN — BUPRENORPHINE AND NALOXONE 1 TABLET(S): 2; .5 TABLET SUBLINGUAL at 18:35

## 2020-02-28 RX ADMIN — GABAPENTIN 300 MILLIGRAM(S): 400 CAPSULE ORAL at 18:35

## 2020-02-28 RX ADMIN — Medication 375 MILLIGRAM(S): at 14:04

## 2020-02-28 RX ADMIN — QUETIAPINE FUMARATE 25 MILLIGRAM(S): 200 TABLET, FILM COATED ORAL at 19:27

## 2020-02-28 RX ADMIN — Medication 1 MILLIGRAM(S): at 11:21

## 2020-02-28 RX ADMIN — CEFEPIME 100 MILLIGRAM(S): 1 INJECTION, POWDER, FOR SOLUTION INTRAMUSCULAR; INTRAVENOUS at 11:20

## 2020-02-28 RX ADMIN — GABAPENTIN 300 MILLIGRAM(S): 400 CAPSULE ORAL at 06:04

## 2020-02-28 RX ADMIN — Medication 325 MILLIGRAM(S): at 11:23

## 2020-02-28 RX ADMIN — ATORVASTATIN CALCIUM 10 MILLIGRAM(S): 80 TABLET, FILM COATED ORAL at 21:35

## 2020-02-28 RX ADMIN — Medication 325 MILLIGRAM(S): at 18:35

## 2020-02-28 RX ADMIN — HYDROMORPHONE HYDROCHLORIDE 0.5 MILLIGRAM(S): 2 INJECTION INTRAMUSCULAR; INTRAVENOUS; SUBCUTANEOUS at 05:23

## 2020-02-28 RX ADMIN — Medication 100 MILLIGRAM(S): at 06:04

## 2020-02-28 RX ADMIN — CEFEPIME 100 MILLIGRAM(S): 1 INJECTION, POWDER, FOR SOLUTION INTRAMUSCULAR; INTRAVENOUS at 21:35

## 2020-02-28 RX ADMIN — Medication 25 MILLIGRAM(S): at 18:35

## 2020-02-28 RX ADMIN — GABAPENTIN 600 MILLIGRAM(S): 400 CAPSULE ORAL at 21:35

## 2020-02-28 RX ADMIN — Medication 1 TABLET(S): at 11:21

## 2020-02-28 RX ADMIN — Medication 0.1 MILLIGRAM(S): at 06:04

## 2020-02-28 RX ADMIN — Medication 81 MILLIGRAM(S): at 11:21

## 2020-02-28 RX ADMIN — Medication 2.5 MILLIGRAM(S): at 08:33

## 2020-02-28 RX ADMIN — BUPRENORPHINE AND NALOXONE 1 TABLET(S): 2; .5 TABLET SUBLINGUAL at 14:04

## 2020-02-28 RX ADMIN — Medication 500 MILLIGRAM(S): at 06:04

## 2020-02-28 RX ADMIN — ENOXAPARIN SODIUM 40 MILLIGRAM(S): 100 INJECTION SUBCUTANEOUS at 06:03

## 2020-02-28 RX ADMIN — Medication 0.5 MILLIGRAM(S): at 18:34

## 2020-02-28 NOTE — PROVIDER CONTACT NOTE (OTHER) - SITUATION
Patient having panic attack. Significant other at bedside. Patient tachycardic 130-150's on cardiac monitor.

## 2020-02-28 NOTE — PROVIDER CONTACT NOTE (OTHER) - SITUATION
Spoke with Dr Ferro, he will see the patient. Spoke with Dr Ferro, he will see the patient. Update: Yarely will see the patient and is aware.

## 2020-02-28 NOTE — PROVIDER CONTACT NOTE (CHANGE IN STATUS NOTIFICATION) - SITUATION
Called doctor to notify her that pt had HR of 180. Pt stated no chest pain at this time. Doctor to bedside. EKG ordered. KATHY Andrade back to bedside stated patient in SVT and medication IV adenosine needed. Cardiac monitor placed and rapid response team called to administer adenosine.
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
## 2020-02-28 NOTE — PROGRESS NOTE BEHAVIORAL HEALTH - NSBHCHARTREVIEWVS_PSY_A_CORE FT
Vital Signs Last 24 Hrs  T(C): 37.1 (28 Feb 2020 10:22), Max: 37.1 (28 Feb 2020 05:45)  T(F): 98.8 (28 Feb 2020 10:22), Max: 98.8 (28 Feb 2020 10:22)  HR: 102 (28 Feb 2020 10:22) (90 - 180)  BP: 157/97 (28 Feb 2020 10:22) (151/82 - 157/102)  BP(mean): --  RR: 18 (28 Feb 2020 10:22) (18 - 20)  SpO2: 94% (28 Feb 2020 10:22) (91% - 99%)

## 2020-02-28 NOTE — PROGRESS NOTE ADULT - SUBJECTIVE AND OBJECTIVE BOX
Patient seen and examined at bedside. Tachycardic overnight and this AM. No acute complaints at this time. Pain well controlled. Denies chest pain, shortness of breath, nausea or vomiting.     PE:  Vital Signs Last 24 Hrs  T(C): 36.7 (27 Feb 2020 22:31), Max: 36.8 (27 Feb 2020 11:04)  T(F): 98.1 (27 Feb 2020 22:31), Max: 98.3 (27 Feb 2020 11:04)  HR: 97 (27 Feb 2020 22:31) (97 - 151)  BP: 154/91 (27 Feb 2020 22:31) (131/77 - 154/91)  BP(mean): 90 (27 Feb 2020 11:04) (90 - 90)  RR: 18 (27 Feb 2020 22:31) (16 - 18)  SpO2: 95% (27 Feb 2020 22:31) (92% - 98%)    General: NAD, resting comfortably in bed  R LE:   Trilam splint in place C/D/I  SCD in place contralaterally  No calf tenderness contralterally  Able to grossly wiggle toes  Gross sesnsation to toes intact  Cap refill <2 seconds               LABS:                        11.1   16.27 )-----------( 389      ( 27 Feb 2020 07:04 )             34.5     27 Feb 2020 07:04    140    |  106    |  12     ----------------------------<  109    4.0     |  28     |  0.78     Ca    9.0        27 Feb 2020 07:04      PT/INR - ( 26 Feb 2020 06:19 )   PT: 12.8 sec;   INR: 1.15 ratio         PTT - ( 26 Feb 2020 06:19 )  PTT:30.1 sec    A/P:  29y f s/p R Ankle ORIF  -PT/OT - NWB RLE  - STAT EKG, FU Med Re tachycardia  -Pain Control  -DVT ppx per AC  -FU AM Labs  -Vanco/doxy/cefepime for possible PNA vs atelectasis on CTPA  -Rest, ice, compress and elevate the extremity as we needed  -Incentive Spirometry  -Medical management appreciated  -FU PT c/s for dispo  -dispo: likely home

## 2020-02-28 NOTE — PROGRESS NOTE BEHAVIORAL HEALTH - NSBHFUPINTERVALHXFT_PSY_A_CORE
Patient in bed, acknowledges writer however refusing to answer questions at this time. Patient is not appearing anxious, or depressed or psychotic at this time.

## 2020-02-28 NOTE — PROVIDER CONTACT NOTE (OTHER) - ACTION/TREATMENT ORDERED:
MD Luque made aware and to see patient.
Dr. Luque made aware. Will monitor for 30 minutes and reassess. Night nurse will be made aware.

## 2020-02-28 NOTE — PROVIDER CONTACT NOTE (OTHER) - DATE AND TIME:
21-Feb-2020 09:33
21-Feb-2020 09:41
26-Feb-2020 10:58
28-Feb-2020 07:45
28-Feb-2020 09:02
28-Feb-2020 09:06
28-Feb-2020 18:41

## 2020-02-28 NOTE — CHART NOTE - NSCHARTNOTEFT_GEN_A_CORE
28 y/o F with a h/o depression, PTSD, generalized anxiety disorder, heroin abuse (now on suboxone), HTN, prior CVA with residual left sided weakness, right carotid artery dissection/stenosis, paroxysmal atrial tachycardia, admitted on 2/21 with right distal tib/fib fracture secondary to a fall after experiencing dizziness. S/p ORIF on 2/26. Currently undergoing treatment for lobar pneumonia.     Responded to Rapid Response as patient with prolonged period of SVT with +. BP stable. Patient only c/o 6/10 pain in her RLE.    Administered 6mg IV adenosine and then 12mg IV adenosine which broke SVT and patient now with sinus tachycardia.     Send troponin with morning lab work. Pain control.    Transfer to telemetry unit.          PAST MEDICAL & SURGICAL HISTORY:  Cyst, breast  Transaminitis  Aneurysm of right internal carotid artery  Cerebrovascular accident (CVA)  Substance abuse  Anxiety  Depression  PTSD (post-traumatic stress disorder)  H/O breast biopsy      Review of Systems:  CONSTITUTIONAL: No fever, chills, or fatigue  EYES: No eye pain, visual disturbances, or discharge  ENMT:  No difficulty hearing, tinnitus, vertigo; No sinus or throat pain  NECK: No pain or stiffness  RESPIRATORY: No cough, wheezing, chills or hemoptysis; No shortness of breath  CARDIOVASCULAR: No chest pain, palpitations, dizziness, or leg swelling  GASTROINTESTINAL: No abdominal or epigastric pain. No nausea, vomiting, or hematemesis; No diarrhea or constipation. No melena or hematochezia.  GENITOURINARY: No dysuria, frequency, hematuria, or incontinence  NEUROLOGICAL: No headaches, memory loss, loss of strength, numbness, or tremors  SKIN: No itching, burning, rashes, or lesions   MUSCULOSKELETAL: (+)RLE pain  PSYCHIATRIC: No depression, anxiety, mood swings, or difficulty sleeping      Medications:  cefepime   IVPB 2000 milliGRAM(s) IV Intermittent every 12 hours  doxycycline hyclate Capsule 100 milliGRAM(s) Oral every 12 hours  cloNIDine 0.1 milliGRAM(s) Oral two times a day  acetaminophen   Tablet .. 650 milliGRAM(s) Oral every 6 hours PRN  buprenorphine 8 mG/naloxone 2 mG SL  Tablet 1 Tablet(s) SubLingual <User Schedule>  clonazePAM  Tablet 0.5 milliGRAM(s) Oral daily PRN  gabapentin 300 milliGRAM(s) Oral two times a day  gabapentin 600 milliGRAM(s) Oral at bedtime  HYDROmorphone  Injectable 0.5 milliGRAM(s) IV Push every 6 hours PRN  melatonin 3 milliGRAM(s) Oral at bedtime PRN  ondansetron Injectable 4 milliGRAM(s) IV Push every 6 hours PRN  oxyCODONE    IR 10 milliGRAM(s) Oral every 4 hours PRN  oxyCODONE    IR 5 milliGRAM(s) Oral every 4 hours PRN  prochlorperazine   Injectable 10 milliGRAM(s) IV Push once PRN  QUEtiapine 25 milliGRAM(s) Oral two times a day  traZODone 50 milliGRAM(s) Oral at bedtime PRN  venlafaxine XR. 375 milliGRAM(s) Oral daily  aspirin  chewable 81 milliGRAM(s) Oral daily  enoxaparin Injectable 40 milliGRAM(s) SubCutaneous every 24 hours  calcium carbonate    500 mG (Tums) Chewable 3 Tablet(s) Chew every 6 hours PRN  senna 2 Tablet(s) Oral at bedtime PRN  atorvastatin 10 milliGRAM(s) Oral at bedtime  ascorbic acid 500 milliGRAM(s) Oral two times a day  ferrous    sulfate 325 milliGRAM(s) Oral three times a day with meals  folic acid 1 milliGRAM(s) Oral daily  lactated ringers. 1000 milliLiter(s) IV Continuous <Continuous>  lactated ringers. 1000 milliLiter(s) IV Continuous <Continuous>  lactated ringers. 1000 milliLiter(s) IV Continuous <Continuous>  multivitamin 1 Tablet(s) Oral daily        ICU Vital Signs Last 24 Hrs  T(C): 36.6 (28 Feb 2020 07:30), Max: 37.1 (28 Feb 2020 05:45)  T(F): 97.9 (28 Feb 2020 07:30), Max: 98.7 (28 Feb 2020 05:45)  HR: 100 (28 Feb 2020 08:33) (97 - 180)  BP: 157/102 (28 Feb 2020 08:33) (131/77 - 157/102)  BP(mean): 90 (27 Feb 2020 11:04) (90 - 90)  ABP: --  ABP(mean): --  RR: 20 (28 Feb 2020 07:30) (16 - 20)  SpO2: 99% (28 Feb 2020 07:30) (91% - 99%)          I&O's Detail        LABS:                        12.6   13.60 )-----------( 398      ( 28 Feb 2020 07:03 )             37.8     02-28    139  |  105  |  10  ----------------------------<  110<H>  3.7   |  29  |  0.69    Ca    8.9      28 Feb 2020 07:03  Mg     2.1     02-28        CARDIAC MARKERS ( 28 Feb 2020 07:03 )  <0.015 ng/mL / x     / x     / x     / x          CAPILLARY BLOOD GLUCOSE      POCT Blood Glucose.: 109 mg/dL (28 Feb 2020 06:41)        CULTURES:        Physical Examination:    General: No acute distress.  Alert, oriented, interactive, nonfocal, (+)flat affect    HEENT: Pupils equal, reactive to light.  Symmetric.    PULM: Clear to auscultation bilaterally, no significant sputum production    CVS: tachycardic, regular rhythm, no murmurs, rubs, or gallops    ABD: Soft, nondistended, nontender, normoactive bowel sounds, no masses    EXT: No edema, nontender, RLE wrapped in ace-bandage    SKIN: Warm and well perfused, no rashes noted.    NEURO: A&Ox3, strength 5/5 all extremities, cranial nerves grossly intact, no focal deficits      RADIOLOGY:     < from: CT Angio Chest PE Protocol w/ IV Cont (02.26.20 @ 09:07) >    FINDINGS:    LUNGS AND AIRWAYS: Patent central airways.  Enhancing segmental consolidation in the left lower lobe with air bronchograms likely representing atelectasis however correlate clinically to exclude pneumonia. Subsegmental atelectasis in the right lower lobe.    PLEURA: No pleural effusion.    MEDIASTINUM AND JAIME: No lymphadenopathy.    VESSELS: No evidence of acute pulmonary embolism. No aortic aneurysm. No aortic dissection.    HEART: Heart size is normal. No pericardial effusion.    CHEST WALL AND LOWER NECK: Within normal limits.    VISUALIZED UPPER ABDOMEN: Hepatomegaly with hepatic steatosis.    BONES: Within normal limits.    IMPRESSION:     Enhancing segmental consolidation in the left lower lobe with air bronchograms likely representing atelectasis, clinically correlate for pneumonia. Subsegmental atelectasis in the right lower lobe.    No definite evidence of acute pulmonary embolism.          CRITICAL CARE TIME SPENT: 39 mins  Time spent evaluating/treating patient with medical issues that pose a high risk for life threatening deterioration and/or end-organ damage, reviewing data/labs/imaging, discussing case with multidisciplinary team, discussing plan/goals of care with patient/family. Non-inclusive of procedure time.

## 2020-02-28 NOTE — PROGRESS NOTE ADULT - SUBJECTIVE AND OBJECTIVE BOX
c/c: fall/ right leg pain    HPI: 29F, pmh of Depression, PTSD, Generalized anxiety d/o, heroin abuse now on suboxone, HTN, Stroke with left sided weakness, RIght carotid A dissection/stenosis on asa/statin, paroxysmal atrial tachycardia, who presented to the ER with right leg pain inability to ambulate after a fall. She states she felt dizzy and fell onto her right leg. Denies sob/chest pain/n/v/d/abd pain. Felt fine prior to falling. no loc.   IN ED had imaging which showed right distal tib/fib fracture.   Underwent ex-fix 2/21.    2/23: pt seen and examined this am. Sleeping but arousable. no acute overnight events. meds being titrated down per psychiatry.  2/24 responsive  2/25 c/o pain, otherwise stable  2/26 overnight events noted. Imaging studies reviewed. Patient with limited complaints  2/27 up in a chair  2/28 s/p RR earlier today for SVT, broke with Adenosine. On tele now- sinus    Review of system- All 10 systems reviewed and is as per HPI otherwise negative.     Vital Signs Last 24 Hrs  T(C): 37.1 (28 Feb 2020 10:22), Max: 37.1 (28 Feb 2020 05:45)  T(F): 98.8 (28 Feb 2020 10:22), Max: 98.8 (28 Feb 2020 10:22)  HR: 102 (28 Feb 2020 10:22) (90 - 180)  BP: 157/97 (28 Feb 2020 10:22) (151/82 - 157/102)  BP(mean): --  RR: 18 (28 Feb 2020 10:22) (18 - 20)  SpO2: 94% (28 Feb 2020 10:22) (91% - 99%)    PHYSICAL EXAM:  GENERAL: Comfortable, no acute distress  HEAD:  Atraumatic, Normocephalic  EYES: EOMI, PERRLA  HEENT: Moist mucous membranes  NECK: Supple, No JVD  NERVOUS SYSTEM:  Awake  CHEST/LUNG: Clear to auscultation bilaterally  HEART: Regular rate and rhythm  ABDOMEN: Soft, Nontender, Nondistended; Bowel sounds present  GENITOURINARY- Voiding, no palpable bladder  EXTREMITIES:  No clubbing, cyanosis, or edema  MUSCULOSKELETAL- LLE in splint/ace-wrap  SKIN-no rash    LABS:                        12.6   13.60 )-----------( 398      ( 28 Feb 2020 07:03 )             37.8     28 Feb 2020 07:03    139    |  105    |  10     ----------------------------<  110    3.7     |  29     |  0.69     Ca    8.9        28 Feb 2020 07:03  Mg     2.1       28 Feb 2020 07:03    POCT Blood Glucose.: 109 mg/dL (28 Feb 2020 06:41)    CARDIAC MARKERS ( 28 Feb 2020 07:03 )  <0.015 ng/mL / x     / x     / x     / x        MEDICATIONS  (STANDING):  ascorbic acid 500 milliGRAM(s) Oral two times a day  aspirin  chewable 81 milliGRAM(s) Oral daily  atorvastatin 10 milliGRAM(s) Oral at bedtime  buprenorphine 8 mG/naloxone 2 mG SL  Tablet 1 Tablet(s) SubLingual <User Schedule>  cloNIDine 0.1 milliGRAM(s) Oral two times a day  enoxaparin Injectable 40 milliGRAM(s) SubCutaneous daily  ferrous    sulfate 325 milliGRAM(s) Oral three times a day with meals  folic acid 1 milliGRAM(s) Oral daily  gabapentin 300 milliGRAM(s) Oral two times a day  gabapentin 600 milliGRAM(s) Oral at bedtime  influenza   Vaccine 0.5 milliLiter(s) IntraMuscular once  lactated ringers. 1000 milliLiter(s) (75 mL/Hr) IV Continuous <Continuous>  multivitamin 1 Tablet(s) Oral daily  QUEtiapine 25 milliGRAM(s) Oral two times a day  venlafaxine XR. 375 milliGRAM(s) Oral daily    MEDICATIONS  (PRN):  acetaminophen   Tablet .. 650 milliGRAM(s) Oral every 6 hours PRN Temp greater or equal to 38C (100.4F)  calcium carbonate    500 mG (Tums) Chewable 3 Tablet(s) Chew every 6 hours PRN Dyspepsia  HYDROmorphone  Injectable 0.5 milliGRAM(s) IV Push every 6 hours PRN Severe Pain (7 - 10)  ondansetron Injectable 4 milliGRAM(s) IV Push every 6 hours PRN Nausea and/or Vomiting  oxyCODONE    IR 10 milliGRAM(s) Oral every 4 hours PRN Moderate Pain (4 - 6)  oxyCODONE    IR 5 milliGRAM(s) Oral every 4 hours PRN Mild Pain (1 - 3)  senna 2 Tablet(s) Oral at bedtime PRN Constipation  traZODone 50 milliGRAM(s) Oral at bedtime PRN insomnia    ASSESSMENT AND PLAN:  #Fall/Right tib/fib fracture:  -S/P ex fix   -S/p ORIF 2/26/20  -pain control  -physical therapy  -incentive spirometry    #S/p RR for SVT  Broke with Adenosine  Ep eval appreciated  Would start on Metoprolol 25mg BID and titrate up as needed  Cardio eval DR Pritchett appreciated    #Atelectasis vs pneumonia  Patient with mild leukocytosis, otherwise negative clinical presentation  Patient does not use incentive spirometer and refused to participate with PT given her higher then usual risk for pneumonia  Started on IV abx  ID eval appreciated  Will switch to PO ceftin/Doxy upon discharge    #Dizziness:  -could be related to multiple sedative meds/high clonidine dosing.  -clonidine decreased per psychiatry    #H/o right carotid A dissection/stenosis, CVA/mild lue weakness:  -on asa/statin  -vascular eval appreciated  -repeat CTA with healed carotid dissection    #HTN:  Start BB    #Heroin abuse in past:  -continue suboxone.    #Depression/anxiety/PTSD:  -continue effexor, klonipin, lamictal, trazodone, gabapentin, seroquel  -psychiatry eval appreciated, meds being titrated down d/t sedation.    #DVT px- Lovenox    #Dispo- keep on telemetry. D/w pt, EP team

## 2020-02-28 NOTE — PROVIDER CONTACT NOTE (OTHER) - RECOMMENDATIONS
Deep breathing encouraged, medications administered, pharmacy notified regarding missing medications.

## 2020-02-28 NOTE — PROGRESS NOTE BEHAVIORAL HEALTH - NSBHCHARTREVIEWLAB_PSY_A_CORE FT
12.6   8.93  )-----------( 401      ( 2020 06:25 )             40.1       -    140  |  107  |  7   ----------------------------<  123<H>  4.4   |  27  |  0.92    Ca    8.8      2020 06:25                Urinalysis Basic - ( 2020 04:13 )    Color: Yellow / Appearance: Clear / S.005 / pH: x  Gluc: x / Ketone: Negative  / Bili: Negative / Urobili: Negative mg/dL   Blood: x / Protein: Negative mg/dL / Nitrite: Negative   Leuk Esterase: Trace / RBC: 0-2 /HPF / WBC 6-10   Sq Epi: x / Non Sq Epi: Few / Bacteria: Moderate        PT/INR - ( 2020 07:36 )   PT: 12.6 sec;   INR: 1.13 ratio         PTT - ( 2020 07:36 )  PTT:30.8 sec    Lactate Trend   @ 03:02 Lactate:1.0
12.6   13.60 )-----------( 398      ( 28 Feb 2020 07:03 )             37.8       02-28    139  |  105  |  10  ----------------------------<  110<H>  3.7   |  29  |  0.69    Ca    8.9      28 Feb 2020 07:03  Mg     2.1     02-28                        Lactate Trend      CARDIAC MARKERS ( 28 Feb 2020 07:03 )  <0.015 ng/mL / x     / x     / x     / x            CAPILLARY BLOOD GLUCOSE      POCT Blood Glucose.: 109 mg/dL (28 Feb 2020 06:41)        Culture Results:   No growth at 5 days. (02-21 @ 03:02)  Culture Results:   No growth at 5 days. (02-21 @ 02:57)

## 2020-02-28 NOTE — CONSULT NOTE ADULT - SUBJECTIVE AND OBJECTIVE BOX
HPI:  28 y/o F with a h/o depression, PTSD, generalized anxiety disorder, heroin abuse (now on suboxone), HTN, prior CVA with residual left sided weakness, right carotid artery dissection/stenosis, paroxysmal atrial tachycardia, admitted on  with right distal tib/fib fracture secondary to a fall after experiencing dizziness. S/p ORIF on               PAST MEDICAL & SURGICAL HISTORY:  Cyst, breast  Transaminitis  Aneurysm of right internal carotid artery  Cerebrovascular accident (CVA)  Substance abuse  Anxiety  Depression  PTSD (post-traumatic stress disorder)  H/O breast biopsy      MEDICATIONS  (STANDING):  ascorbic acid 500 milliGRAM(s) Oral two times a day  aspirin  chewable 81 milliGRAM(s) Oral daily  atorvastatin 10 milliGRAM(s) Oral at bedtime  buprenorphine 8 mG/naloxone 2 mG SL  Tablet 1 Tablet(s) SubLingual <User Schedule>  cefepime   IVPB 2000 milliGRAM(s) IV Intermittent every 12 hours  cloNIDine 0.1 milliGRAM(s) Oral two times a day  doxycycline hyclate Capsule 100 milliGRAM(s) Oral every 12 hours  enoxaparin Injectable 40 milliGRAM(s) SubCutaneous every 24 hours  ferrous    sulfate 325 milliGRAM(s) Oral three times a day with meals  folic acid 1 milliGRAM(s) Oral daily  gabapentin 300 milliGRAM(s) Oral two times a day  gabapentin 600 milliGRAM(s) Oral at bedtime  lactated ringers. 1000 milliLiter(s) (75 mL/Hr) IV Continuous <Continuous>  lactated ringers. 1000 milliLiter(s) (75 mL/Hr) IV Continuous <Continuous>  lactated ringers. 1000 milliLiter(s) (75 mL/Hr) IV Continuous <Continuous>  multivitamin 1 Tablet(s) Oral daily  QUEtiapine 25 milliGRAM(s) Oral two times a day  venlafaxine XR. 375 milliGRAM(s) Oral daily    MEDICATIONS  (PRN):  acetaminophen   Tablet .. 650 milliGRAM(s) Oral every 6 hours PRN Temp greater or equal to 38C (100.4F)  calcium carbonate    500 mG (Tums) Chewable 3 Tablet(s) Chew every 6 hours PRN Dyspepsia  clonazePAM  Tablet 0.5 milliGRAM(s) Oral daily PRN Anxiety  HYDROmorphone  Injectable 0.5 milliGRAM(s) IV Push every 6 hours PRN Severe Pain (7 - 10)  melatonin 3 milliGRAM(s) Oral at bedtime PRN Sleep  ondansetron Injectable 4 milliGRAM(s) IV Push every 6 hours PRN Nausea and/or Vomiting  oxyCODONE    IR 10 milliGRAM(s) Oral every 4 hours PRN Moderate Pain (4 - 6)  oxyCODONE    IR 5 milliGRAM(s) Oral every 4 hours PRN Mild Pain (1 - 3)  prochlorperazine   Injectable 10 milliGRAM(s) IV Push once PRN Nausea  senna 2 Tablet(s) Oral at bedtime PRN Constipation  traZODone 50 milliGRAM(s) Oral at bedtime PRN insomnia      Allergies    Geodon (Other)  Zyprexa (Other)            SOCIAL HISTORY: Denies tobacco, etoh abuse or illicit drug use    FAMILY HISTORY:  Medical history unknown: in father, unknown if living or   Family history of drug use: IN MOTHER, LIVING      Vital Signs Last 24 Hrs  T(C): 36.6 (2020 07:30), Max: 37.1 (2020 05:45)  T(F): 97.9 (2020 07:30), Max: 98.7 (2020 05:45)  HR: 90 (2020 09:00) (90 - 180)  BP: 155/99 (2020 09:00) (131/77 - 157/102)  BP(mean): 90 (2020 11:04) (90 - 90)  RR: 20 (2020 07:30) (16 - 20)  SpO2: 99% (2020 07:30) (91% - 99%)    REVIEW OF SYSTEMS:    CONSTITUTIONAL:  As per HPI.  HEENT:  Eyes:  No diplopia or blurred vision. ENT:  No earache, sore throat or runny nose.  CARDIOVASCULAR:  No pressure, squeezing, strangling, tightness, heaviness or aching about the chest, neck, axilla or epigastrium.  RESPIRATORY:  No cough, shortness of breath, PND or orthopnea.  GASTROINTESTINAL:  No nausea, vomiting or diarrhea.  GENITOURINARY:  No dysuria, frequency or urgency.  MUSCULOSKELETAL:  As per HPI.  SKIN:  No change in skin, hair or nails.  NEUROLOGIC:  No paresthesias, fasciculations, seizures or weakness.  PSYCHIATRIC:  No disorder of thought or mood.  ENDOCRINE:  No heat or cold intolerance, polyuria or polydipsia.  HEMATOLOGICAL:  No easy bruising or bleedings:  .     PHYSICAL EXAMINATION:    GENERAL APPEARANCE:  Pt. is not currently dyspneic, in no distress. Pt. is alert, oriented, and pleasant.  HEENT:  Pupils are normal and react normally. No icterus. Mucous membranes well colored.  NECK:  Supple. No lymphadenopathy. Jugular venous pressure not elevated. Carotids equal.   HEART:   The cardiac impulse has a normal quality. There are no murmurs, rubs or gallops noted  CHEST:  Chest is clear to auscultation. Normal respiratory effort.  ABDOMEN:  Soft and nontender.   EXTREMITIES:  There is no edema.   SKIN:  No rash or significant lesions are noted.    I&O's Summary      LABS:                        12.6   13.60 )-----------( 398      ( 2020 07:03 )             37.8     02-    139  |  105  |  10  ----------------------------<  110<H>  3.7   |  29  |  0.69    Ca    8.9      2020 07:03  Mg     2.1               CARDIAC MARKERS ( 2020 07:03 )  <0.015 ng/mL / x     / x     / x     / x                EKG:    TELEMETRY:    CARDIAC TESTS:    RADIOLOGY & ADDITIONAL STUDIES:    ASSESSMENT & PLAN: HPI:  28 y/o F with a h/o depression, PTSD, generalized anxiety disorder, heroin abuse (now on suboxone), HTN, prior CVA with residual left sided weakness, right carotid artery dissection/stenosis, paroxysmal atrial tachycardia, admitted on  with right distal tib/fib fracture secondary to a fall after experiencing dizziness. S/p ORIF on .  Patient currently being treated for PNA.  Rapid response called on patient this morning for sustained SVT according to notes.  Telemetry and EKG are unavailable.  Patient was given Atropine 6mg and 12 mg and transferred to telemetry.  Upon arrival to telemetry patient was Sinus Tach with HR in the 100s.  Patient is very lethargic secondary to medications and answered minimal questions.  Reports that she experienced some chest pain, palpitations and headache with SVT episode but symptoms have resolved. On tele patient given Lopressor 2.5mg IV once for blood pressure control.  EP asked to consult for SVT.        PAST MEDICAL & SURGICAL HISTORY:  Cyst, breast  Transaminitis  Aneurysm of right internal carotid artery  Cerebrovascular accident (CVA)  Substance abuse  Anxiety  Depression  PTSD (post-traumatic stress disorder)  H/O breast biopsy      MEDICATIONS  (STANDING):  ascorbic acid 500 milliGRAM(s) Oral two times a day  aspirin  chewable 81 milliGRAM(s) Oral daily  atorvastatin 10 milliGRAM(s) Oral at bedtime  buprenorphine 8 mG/naloxone 2 mG SL  Tablet 1 Tablet(s) SubLingual <User Schedule>  cefepime   IVPB 2000 milliGRAM(s) IV Intermittent every 12 hours  cloNIDine 0.1 milliGRAM(s) Oral two times a day  doxycycline hyclate Capsule 100 milliGRAM(s) Oral every 12 hours  enoxaparin Injectable 40 milliGRAM(s) SubCutaneous every 24 hours  ferrous    sulfate 325 milliGRAM(s) Oral three times a day with meals  folic acid 1 milliGRAM(s) Oral daily  gabapentin 300 milliGRAM(s) Oral two times a day  gabapentin 600 milliGRAM(s) Oral at bedtime  lactated ringers. 1000 milliLiter(s) (75 mL/Hr) IV Continuous <Continuous>  lactated ringers. 1000 milliLiter(s) (75 mL/Hr) IV Continuous <Continuous>  lactated ringers. 1000 milliLiter(s) (75 mL/Hr) IV Continuous <Continuous>  multivitamin 1 Tablet(s) Oral daily  QUEtiapine 25 milliGRAM(s) Oral two times a day  venlafaxine XR. 375 milliGRAM(s) Oral daily    MEDICATIONS  (PRN):  acetaminophen   Tablet .. 650 milliGRAM(s) Oral every 6 hours PRN Temp greater or equal to 38C (100.4F)  calcium carbonate    500 mG (Tums) Chewable 3 Tablet(s) Chew every 6 hours PRN Dyspepsia  clonazePAM  Tablet 0.5 milliGRAM(s) Oral daily PRN Anxiety  HYDROmorphone  Injectable 0.5 milliGRAM(s) IV Push every 6 hours PRN Severe Pain (7 - 10)  melatonin 3 milliGRAM(s) Oral at bedtime PRN Sleep  ondansetron Injectable 4 milliGRAM(s) IV Push every 6 hours PRN Nausea and/or Vomiting  oxyCODONE    IR 10 milliGRAM(s) Oral every 4 hours PRN Moderate Pain (4 - 6)  oxyCODONE    IR 5 milliGRAM(s) Oral every 4 hours PRN Mild Pain (1 - 3)  prochlorperazine   Injectable 10 milliGRAM(s) IV Push once PRN Nausea  senna 2 Tablet(s) Oral at bedtime PRN Constipation  traZODone 50 milliGRAM(s) Oral at bedtime PRN insomnia      Allergies    Geodon (Other)  Zyprexa (Other)            SOCIAL HISTORY: Smokes 1ppd, denies ETOH, former heroin abuse now on Suboxone    FAMILY HISTORY:  Medical history unknown: in father, unknown if living or   Family history of drug use: IN MOTHER, LIVING      Vital Signs Last 24 Hrs  T(C): 36.6 (2020 07:30), Max: 37.1 (2020 05:45)  T(F): 97.9 (2020 07:30), Max: 98.7 (2020 05:45)  HR: 90 (2020 09:00) (90 - 180)  BP: 155/99 (2020 09:00) (131/77 - 157/102)  BP(mean): 90 (2020 11:04) (90 - 90)  RR: 20 (2020 07:30) (16 - 20)  SpO2: 99% (2020 07:30) (91% - 99%)    REVIEW OF SYSTEMS: Unable to obtain due to lethargy        PHYSICAL EXAMINATION:  GENERAL: NAD, A&Ox2 (person and place)  HEAD:  Atraumatic, Normocephalic  EYES: EOMI, PERRLA  HEENT: Moist mucous membranes  NECK: Supple, No JVD  CHEST/LUNG: Clear to auscultation bilaterally  HEART: Regular rate and rhythm, tachycardic  ABDOMEN: Soft, Nontender, Nondistended; Bowel sounds present  EXTREMITIES:  No clubbing, cyanosis, or edema  MUSCULOSKELETAL- LLE in splint/ace-wrap  SKIN-no rash      I&O's Summary      LABS:                        12.6   13.60 )-----------( 398      ( 2020 07:03 )             37.8     -    139  |  105  |  10  ----------------------------<  110<H>  3.7   |  29  |  0.69    Ca    8.9      2020 07:03  Mg     2.1               CARDIAC MARKERS ( 2020 07:03 )  <0.015 ng/mL / x     / x     / x     / x                EKG:      TELEMETRY: SR with 70-100s    CARDIAC TESTS:  < from: Transthoracic Echocardiogram (19 @ 21:32) >   Impression     Summary     The left ventricle is normal in size, wall thickness, wall motion and   contractility.   Estimated left ventricular ejection fraction is 55 -60%.   Normal appearing left atrium.   Normalappearing right atrium.   Normal appearing right ventricle structure and function.   Normal aortic valve structure and function.   Normal appearing mitral valve structure and function.   Mild (1+) mitral regurgitation is present.   Normal appearing tricuspid valve structure and function.   No evidence of pericardial effusion.          RADIOLOGY & ADDITIONAL STUDIES:  < from: CT Angio Chest PE Protocol w/ IV Cont (20 @ 09:07) >  IMPRESSION:     Enhancing segmental consolidation in the left lower lobe with air bronchograms likely representing atelectasis, clinically correlate for pneumonia. Subsegmental atelectasis in the right lower lobe.    No definite evidence of acute pulmonary embolism.                ASSESSMENT & PLAN: HPI:  30 y/o F with a h/o depression, PTSD, generalized anxiety disorder, heroin abuse (now on suboxone), HTN, prior CVA with residual left sided weakness, right carotid artery dissection/stenosis, paroxysmal atrial tachycardia, admitted on  with right distal tib/fib fracture secondary to a fall after experiencing dizziness. S/p ORIF on .  Patient currently being treated for PNA.  Rapid response called on patient this morning for sustained SVT according to notes.  Telemetry and EKG are unavailable.  Patient was given Atropine 6mg and 12 mg and transferred to telemetry.  Upon arrival to telemetry patient was Sinus Tach with HR in the 100s.  Patient is very lethargic secondary to medications and answered minimal questions.  Reports that she experienced some chest pain, palpitations and headache with SVT episode but symptoms have resolved. On tele patient given Lopressor 2.5mg IV once for blood pressure control.  EP asked to consult for SVT.        PAST MEDICAL & SURGICAL HISTORY:  Cyst, breast  Transaminitis  Aneurysm of right internal carotid artery  Cerebrovascular accident (CVA)  Substance abuse  Anxiety  Depression  PTSD (post-traumatic stress disorder)  H/O breast biopsy      MEDICATIONS  (STANDING):  ascorbic acid 500 milliGRAM(s) Oral two times a day  aspirin  chewable 81 milliGRAM(s) Oral daily  atorvastatin 10 milliGRAM(s) Oral at bedtime  buprenorphine 8 mG/naloxone 2 mG SL  Tablet 1 Tablet(s) SubLingual <User Schedule>  cefepime   IVPB 2000 milliGRAM(s) IV Intermittent every 12 hours  cloNIDine 0.1 milliGRAM(s) Oral two times a day  doxycycline hyclate Capsule 100 milliGRAM(s) Oral every 12 hours  enoxaparin Injectable 40 milliGRAM(s) SubCutaneous every 24 hours  ferrous    sulfate 325 milliGRAM(s) Oral three times a day with meals  folic acid 1 milliGRAM(s) Oral daily  gabapentin 300 milliGRAM(s) Oral two times a day  gabapentin 600 milliGRAM(s) Oral at bedtime  lactated ringers. 1000 milliLiter(s) (75 mL/Hr) IV Continuous <Continuous>  lactated ringers. 1000 milliLiter(s) (75 mL/Hr) IV Continuous <Continuous>  lactated ringers. 1000 milliLiter(s) (75 mL/Hr) IV Continuous <Continuous>  multivitamin 1 Tablet(s) Oral daily  QUEtiapine 25 milliGRAM(s) Oral two times a day  venlafaxine XR. 375 milliGRAM(s) Oral daily    MEDICATIONS  (PRN):  acetaminophen   Tablet .. 650 milliGRAM(s) Oral every 6 hours PRN Temp greater or equal to 38C (100.4F)  calcium carbonate    500 mG (Tums) Chewable 3 Tablet(s) Chew every 6 hours PRN Dyspepsia  clonazePAM  Tablet 0.5 milliGRAM(s) Oral daily PRN Anxiety  HYDROmorphone  Injectable 0.5 milliGRAM(s) IV Push every 6 hours PRN Severe Pain (7 - 10)  melatonin 3 milliGRAM(s) Oral at bedtime PRN Sleep  ondansetron Injectable 4 milliGRAM(s) IV Push every 6 hours PRN Nausea and/or Vomiting  oxyCODONE    IR 10 milliGRAM(s) Oral every 4 hours PRN Moderate Pain (4 - 6)  oxyCODONE    IR 5 milliGRAM(s) Oral every 4 hours PRN Mild Pain (1 - 3)  prochlorperazine   Injectable 10 milliGRAM(s) IV Push once PRN Nausea  senna 2 Tablet(s) Oral at bedtime PRN Constipation  traZODone 50 milliGRAM(s) Oral at bedtime PRN insomnia      Allergies    Geodon (Other)  Zyprexa (Other)            SOCIAL HISTORY: Smokes 1ppd, denies ETOH, former heroin abuse now on Suboxone    FAMILY HISTORY:  Medical history unknown: in father, unknown if living or   Family history of drug use: IN MOTHER, LIVING      Vital Signs Last 24 Hrs  T(C): 36.6 (2020 07:30), Max: 37.1 (2020 05:45)  T(F): 97.9 (2020 07:30), Max: 98.7 (2020 05:45)  HR: 90 (2020 09:00) (90 - 180)  BP: 155/99 (2020 09:00) (131/77 - 157/102)  BP(mean): 90 (2020 11:04) (90 - 90)  RR: 20 (2020 07:30) (16 - 20)  SpO2: 99% (2020 07:30) (91% - 99%)    REVIEW OF SYSTEMS: Unable to obtain due to lethargy        PHYSICAL EXAMINATION:  GENERAL: NAD, A&Ox2 (person and place)  HEAD:  Atraumatic, Normocephalic  EYES: EOMI, PERRLA  HEENT: Moist mucous membranes  NECK: Supple, No JVD  CHEST/LUNG: Clear to auscultation bilaterally  HEART: Regular rate and rhythm, tachycardic  ABDOMEN: Soft, Nontender, Nondistended; Bowel sounds present  EXTREMITIES:  No clubbing, cyanosis, or edema  MUSCULOSKELETAL- LLE in splint/ace-wrap  SKIN-no rash      I&O's Summary      LABS:                        12.6   13.60 )-----------( 398      ( 2020 07:03 )             37.8         139  |  105  |  10  ----------------------------<  110<H>  3.7   |  29  |  0.69    Ca    8.9      2020 07:03  Mg     2.1               CARDIAC MARKERS ( 2020 07:03 )  <0.015 ng/mL / x     / x     / x     / x                EKG:  NSR@89BPM  with occasional PVC  SD: 134ms  QRS: 92ms  QT/QTc:352/428      TELEMETRY: SR with 70-100s    CARDIAC TESTS:  < from: Transthoracic Echocardiogram (19 @ 21:32) >   Impression     Summary     The left ventricle is normal in size, wall thickness, wall motion and   contractility.   Estimated left ventricular ejection fraction is 55 -60%.   Normal appearing left atrium.   Normalappearing right atrium.   Normal appearing right ventricle structure and function.   Normal aortic valve structure and function.   Normal appearing mitral valve structure and function.   Mild (1+) mitral regurgitation is present.   Normal appearing tricuspid valve structure and function.   No evidence of pericardial effusion.          RADIOLOGY & ADDITIONAL STUDIES:  < from: CT Angio Chest PE Protocol w/ IV Cont (20 @ 09:07) >  IMPRESSION:     Enhancing segmental consolidation in the left lower lobe with air bronchograms likely representing atelectasis, clinically correlate for pneumonia. Subsegmental atelectasis in the right lower lobe.    No definite evidence of acute pulmonary embolism.

## 2020-02-28 NOTE — CHART NOTE - NSCHARTNOTEFT_GEN_A_CORE
Nurse called reporting family member want to speak with a provider regarding pain meds     30 y/o F with PMH of Depression, PTSD, Generalized anxiety d/o, heroin abuse now on suboxone, HTN, Stroke with left sided weakness, Right carotid A dissection/stenosis on asa/statin, paroxysmal atrial tachycardia and admitted due to a right ankle fracture after a fall is evaluated bedside after family member requested  to speak with a provider. Pt is s/p R ankle ORIF and receiving pain control with hydromorphone and oxycodone. Mother and  stated since she came to the hospital have been unable to speak, paranoid and with psychosis. They attribute this to pain medications like hydromorphone and oxycodone. They requested to reduce dose of pain medications. At evaluation patient found Alert to person with anhedonia and in no distress. Denies any pain at this moment.       Vital Signs Last 24 Hrs  T(C): 37.2 (28 Feb 2020 21:13), Max: 37.2 (28 Feb 2020 21:13)  T(F): 99 (28 Feb 2020 21:13), Max: 99 (28 Feb 2020 21:13)  HR: 105 (28 Feb 2020 21:13) (90 - 180)  BP: 145/85 (28 Feb 2020 21:13) (145/85 - 157/102)  BP(mean): --  RR: 19 (28 Feb 2020 21:13) (18 - 26)  SpO2: 92% (28 Feb 2020 21:13) (91% - 99%)    General: Alert and oriented only to person, anhedonia   Lungs: CTA  Heart: RRR, tachy       Assessment and plan:   - Discontinue hydromorphone 0.5 mg IV, oxycodone 5 mg PO and oxycodone 10 mg   - Start Tylenol 650 mg PO q6hrs PRN mild pain  - Start Oxycodone 5 mg POq  4 hrs PRN moderate pain   - Start Hydromorphone 0.25 mg IV every 6 hrs severe pain

## 2020-02-28 NOTE — CONSULT NOTE ADULT - SUBJECTIVE AND OBJECTIVE BOX
Patient is a 29y old  Female who presents with a chief complaint of tib/fib fracture.       HPI:  c/c: fall/ right leg pain    HPI: 29F, pmh of Depression, PTSD, Generalized anxiety d/o, heroin abuse now on suboxone, HTN, Stroke with left sided weakness, RIght carotid A dissection/stenosis on asa/statin, paroxysmal atrial tachycardia, who presented to the ER with right leg pain inability to ambulate after a fall.     This am pt was noted to be tachycardic.  /min per documentation and report. There is no tele strip or EKG in chart.  Per report she was given adenosine.  She denies any symptoms but she is not verbally telling the answers but only nodding , staring       PMH: as above  PSH: denies  F/H: denies significant medical conditions in immediate family members.  Social: smokes 1ppd , drinks ETOH once in a while, heroin abuse in past       PAST MEDICAL & SURGICAL HISTORY:  Cyst, breast  Transaminitis  Aneurysm of right internal carotid artery  Cerebrovascular accident (CVA)  Substance abuse  Anxiety  Depression  PTSD (post-traumatic stress disorder)  H/O breast biopsy      MEDICATIONS  (STANDING):  ascorbic acid 500 milliGRAM(s) Oral two times a day  aspirin  chewable 81 milliGRAM(s) Oral daily  atorvastatin 10 milliGRAM(s) Oral at bedtime  buprenorphine 8 mG/naloxone 2 mG SL  Tablet 1 Tablet(s) SubLingual <User Schedule>  cefepime   IVPB 2000 milliGRAM(s) IV Intermittent every 12 hours  cloNIDine 0.1 milliGRAM(s) Oral two times a day  doxycycline hyclate Capsule 100 milliGRAM(s) Oral every 12 hours  enoxaparin Injectable 40 milliGRAM(s) SubCutaneous every 24 hours  ferrous    sulfate 325 milliGRAM(s) Oral three times a day with meals  folic acid 1 milliGRAM(s) Oral daily  gabapentin 300 milliGRAM(s) Oral two times a day  gabapentin 600 milliGRAM(s) Oral at bedtime  lactated ringers. 1000 milliLiter(s) (75 mL/Hr) IV Continuous <Continuous>  lactated ringers. 1000 milliLiter(s) (75 mL/Hr) IV Continuous <Continuous>  lactated ringers. 1000 milliLiter(s) (75 mL/Hr) IV Continuous <Continuous>  multivitamin 1 Tablet(s) Oral daily  QUEtiapine 25 milliGRAM(s) Oral two times a day  venlafaxine XR. 375 milliGRAM(s) Oral daily    MEDICATIONS  (PRN):  acetaminophen   Tablet .. 650 milliGRAM(s) Oral every 6 hours PRN Temp greater or equal to 38C (100.4F)  calcium carbonate    500 mG (Tums) Chewable 3 Tablet(s) Chew every 6 hours PRN Dyspepsia  clonazePAM  Tablet 0.5 milliGRAM(s) Oral daily PRN Anxiety  HYDROmorphone  Injectable 0.5 milliGRAM(s) IV Push every 6 hours PRN Severe Pain (7 - 10)  melatonin 3 milliGRAM(s) Oral at bedtime PRN Sleep  ondansetron Injectable 4 milliGRAM(s) IV Push every 6 hours PRN Nausea and/or Vomiting  oxyCODONE    IR 10 milliGRAM(s) Oral every 4 hours PRN Moderate Pain (4 - 6)  oxyCODONE    IR 5 milliGRAM(s) Oral every 4 hours PRN Mild Pain (1 - 3)  prochlorperazine   Injectable 10 milliGRAM(s) IV Push once PRN Nausea  senna 2 Tablet(s) Oral at bedtime PRN Constipation  traZODone 50 milliGRAM(s) Oral at bedtime PRN insomnia      FAMILY HISTORY:  Medical history unknown: in father, unknown if living or   Family history of drug use: IN MOTHER, LIVING      SOCIAL HISTORY:    REVIEW OF SYSTEMS:  CONSTITUTIONAL:    No fatigue, malaise, lethargy.  No fever or chills.  RESPIRATORY:  No cough.  No wheeze.  No hemoptysis.  No shortness of breath.  CARDIOVASCULAR:  No chest pains.  No palpitations. No shortness of breath, No orthopnea or PND.  GASTROINTESTINAL:  No abdominal pain.  No nausea or vomiting.    GENITOURINARY:    No hematuria.    MUSCULOSKELETAL:  No musculoskeletal pain.  No joint swelling.  No arthritis.  NEUROLOGICAL:  No tingling or numbness or weakness.  PSYCHIATRIC:  No confusion          Vital Signs Last 24 Hrs  T(C): 37.1 (2020 10:22), Max: 37.1 (2020 05:45)  T(F): 98.8 (2020 10:22), Max: 98.8 (2020 10:22)  HR: 102 (2020 10:22) (90 - 180)  BP: 157/97 (2020 10:22) (151/82 - 157/102)  BP(mean): --  RR: 18 (2020 10:22) (18 - 20)  SpO2: 94% (2020 10:22) (91% - 99%)    PHYSICAL EXAM-    Constitutional: The patient appears to be normal, well developed, well nourished and alert and oriented to time, place and person. The patient does not appear acutely ill.     Head: Head is normocephalic and atraumatic.      Neck: No jugular venous distention. No audible carotid bruits. There are strong carotid pulses bilaterally. No JVD.     Cardiovascular: Regular rate and rhythm without S3, S4. No murmurs or rubs are appreciated.      Respiratory: Breath sounds are normal. No rales. No wheezing.    Abdomen: Soft, nontender, nondistended with positive bowel sounds.      Extremity: R leg in cast     Neurologic: The patient is alert and oriented.      Skin: No rash, no obvious lesions noted.      Psychiatric: The patient appears to be emotionally stable.      INTERPRETATION OF TELEMETRY: SR , PACs .    ECG: Sinus rythm , normal axis, t wave inversion in V2-3, isolated PVCs.     I&O's Detail      LABS:                        12.6   13.60 )-----------( 398      ( 2020 07:03 )             37.8         139  |  105  |  10  ----------------------------<  110<H>  3.7   |  29  |  0.69    Ca    8.9      2020 07:03  Mg     2.1           CARDIAC MARKERS ( 2020 07:03 )  <0.015 ng/mL / x     / x     / x     / x              I&O's Summary    BNP  RADIOLOGY & ADDITIONAL STUDIES:  < from: Transthoracic Echocardiogram (19 @ 21:32) >     Summary     The left ventricle is normal in size, wall thickness, wall motion and   contractility.   Estimated left ventricular ejection fraction is 55 -60%.   Normal appearing left atrium.   Normalappearing right atrium.   Normal appearing right ventricle structure and function.   Normal aortic valve structure and function.   Normal appearing mitral valve structure and function.   Mild (1+) mitral regurgitation is present.   Normal appearing tricuspid valve structure and function.   No evidence of pericardial effusion.     Signature     ----------------------------------------------------------------   Electronically signed by Fer WINKLERInterpreting physician)   on 2019 02:14 PM   ----------------------------------------------------------------    < end of copied text >

## 2020-02-28 NOTE — CONSULT NOTE ADULT - ASSESSMENT
SVT- no tele strips or EKG in chart from the event.  I reviewed  EKG from today.  QTc normal  COntinue montioring on tele.  EP team consulted and discussed case with them.      HTN- BP high.  She just came to the floor from other floor.  Monitor BP for now and if consistently high then would consider uptitration of the BP meds.    CVA- continue home meds.    Other medical issues- Management per primary team.   Thank you for allowing me to participate in the care of this patient. Please feel free to contact me with any questions.

## 2020-02-28 NOTE — CONSULT NOTE ADULT - ASSESSMENT
28 y/o F with a h/o depression, PTSD, generalized anxiety disorder, heroin abuse (now on suboxone), HTN, prior CVA with residual left sided weakness, right carotid artery dissection/stenosis, paroxysmal atrial tachycardia, admitted on 2/21 with right distal tib/fib fracture secondary to a fall after experiencing dizziness. S/p ORIF on 2/26.  Patient currently being treated for PNA.  Rapid response called on patient this morning for sustained SVT according to notes.  Telemetry and EKG are unavailable.  Patient was given Atropine 6mg and 12 mg and transferred to telemetry.  Upon arrival to telemetry patient was Sinus Tach with HR in the 100s.  Patient is very lethargic secondary to medications and answered minimal questions.  Reports that she experienced some chest pain, palpitations and headache with SVT episode but symptoms have resolved. On tele patient given Lopressor 2.5mg IV once for blood pressure control.  EP asked to consult for SVT.    A/P SVT/HTN:  Continue tele monitoring  EKG Now  Keep Electrolytes WNL  Can start Metoprolol 25mg BID for BP and rate control.  Plan discussed with Dr. Sharma and Dr. Luque. 30 y/o F with a h/o depression, PTSD, generalized anxiety disorder, heroin abuse (now on suboxone), HTN, prior CVA with residual left sided weakness, right carotid artery dissection/stenosis, paroxysmal atrial tachycardia, admitted on 2/21 with right distal tib/fib fracture secondary to a fall after experiencing dizziness. S/p ORIF on 2/26.  Patient currently being treated for PNA.  Rapid response called on patient this morning for sustained SVT according to notes.  Telemetry and EKG are unavailable.  Patient was given Atropine 6mg and 12 mg and transferred to telemetry.  Upon arrival to telemetry patient was Sinus Tach with HR in the 100s.  Patient is very lethargic secondary to medications and answered minimal questions.  Reports that she experienced some chest pain, palpitations and headache with SVT episode but symptoms have resolved. On tele patient given Lopressor 2.5mg IV once for blood pressure control.  EP asked to consult for SVT.    A/P SVT/HTN:  Continue tele monitoring  EKG Now  Keep Electrolytes WNL  Can start Metoprolol 25mg BID for BP and rate control.  Dispo per medicine  Plan discussed with Dr. Sharma and Dr. Luque, patient and family 28 y/o F with a h/o depression, PTSD, generalized anxiety disorder, heroin abuse (now on suboxone), HTN, prior CVA with residual left sided weakness, right carotid artery dissection/stenosis, paroxysmal atrial tachycardia, admitted on 2/21 with right distal tib/fib fracture secondary to a fall after experiencing dizziness. S/p ORIF on 2/26.  Patient currently being treated for PNA.  Rapid response called on patient this morning for sustained SVT according to notes.  Telemetry and EKG are unavailable.  Patient was given Atropine 6mg and 12 mg and transferred to telemetry.  Upon arrival to telemetry patient was Sinus Tach with HR in the 100s.  Patient is very lethargic secondary to medications and answered minimal questions.  Reports that she experienced some chest pain, palpitations and headache with SVT episode but symptoms have resolved. On tele patient given Lopressor 2.5mg IV once for blood pressure control.  EP asked to consult for SVT.    A/P SVT/HTN:  Continue tele monitoring  Keep Electrolytes WNL  Start Metoprolol 25mg BID for BP and rate control.  Discussed with patient and family at bedside outpatient EP follow up for further palpitations.  Dispo per medicine  Plan discussed with Dr. Sharma, Dr. Luque, patient and family 28 y/o F with a h/o depression, PTSD, generalized anxiety disorder, heroin abuse (now on suboxone), HTN, prior CVA with residual left sided weakness, right carotid artery dissection/stenosis, paroxysmal atrial tachycardia, admitted on 2/21 with right distal tib/fib fracture secondary to a fall after experiencing dizziness. S/p ORIF on 2/26.  Patient currently being treated for PNA.  Rapid response called on patient this morning for sustained SVT according to notes.  Telemetry and EKG are unavailable.  Patient was given Atropine 6mg and 12 mg and transferred to telemetry.  Upon arrival to telemetry patient was Sinus Tach with HR in the 100s.  Patient is very lethargic secondary to medications and answered minimal questions.  Reports that she experienced some chest pain, palpitations and headache with SVT episode but symptoms have resolved. On tele patient given Lopressor 2.5mg IV once for blood pressure control.  EP asked to consult for SVT.    A/P SVT/HTN:  Continue tele monitoring  Keep Electrolytes WNL  Start Metoprolol 25mg BID for BP and rate control.  Discussed with patient and family at bedside outpatient EP follow up  if patient has palpitations.  Dispo per medicine  Plan discussed with Dr. Sharma, Dr. Luque, patient and family

## 2020-02-29 LAB
ANION GAP SERPL CALC-SCNC: 6 MMOL/L — SIGNIFICANT CHANGE UP (ref 5–17)
BUN SERPL-MCNC: 12 MG/DL — SIGNIFICANT CHANGE UP (ref 7–23)
CALCIUM SERPL-MCNC: 9 MG/DL — SIGNIFICANT CHANGE UP (ref 8.5–10.1)
CHLORIDE SERPL-SCNC: 105 MMOL/L — SIGNIFICANT CHANGE UP (ref 96–108)
CO2 SERPL-SCNC: 28 MMOL/L — SIGNIFICANT CHANGE UP (ref 22–31)
CREAT SERPL-MCNC: 0.68 MG/DL — SIGNIFICANT CHANGE UP (ref 0.5–1.3)
GLUCOSE SERPL-MCNC: 98 MG/DL — SIGNIFICANT CHANGE UP (ref 70–99)
HCT VFR BLD CALC: 36.4 % — SIGNIFICANT CHANGE UP (ref 34.5–45)
HGB BLD-MCNC: 11.7 G/DL — SIGNIFICANT CHANGE UP (ref 11.5–15.5)
MCHC RBC-ENTMCNC: 27.9 PG — SIGNIFICANT CHANGE UP (ref 27–34)
MCHC RBC-ENTMCNC: 32.1 GM/DL — SIGNIFICANT CHANGE UP (ref 32–36)
MCV RBC AUTO: 86.7 FL — SIGNIFICANT CHANGE UP (ref 80–100)
PLATELET # BLD AUTO: 383 K/UL — SIGNIFICANT CHANGE UP (ref 150–400)
POTASSIUM SERPL-MCNC: 3.7 MMOL/L — SIGNIFICANT CHANGE UP (ref 3.5–5.3)
POTASSIUM SERPL-SCNC: 3.7 MMOL/L — SIGNIFICANT CHANGE UP (ref 3.5–5.3)
RBC # BLD: 4.2 M/UL — SIGNIFICANT CHANGE UP (ref 3.8–5.2)
RBC # FLD: 14.4 % — SIGNIFICANT CHANGE UP (ref 10.3–14.5)
SODIUM SERPL-SCNC: 139 MMOL/L — SIGNIFICANT CHANGE UP (ref 135–145)
WBC # BLD: 12.71 K/UL — HIGH (ref 3.8–10.5)
WBC # FLD AUTO: 12.71 K/UL — HIGH (ref 3.8–10.5)

## 2020-02-29 PROCEDURE — 99222 1ST HOSP IP/OBS MODERATE 55: CPT

## 2020-02-29 PROCEDURE — 99233 SBSQ HOSP IP/OBS HIGH 50: CPT

## 2020-02-29 PROCEDURE — 99231 SBSQ HOSP IP/OBS SF/LOW 25: CPT

## 2020-02-29 RX ORDER — METOPROLOL TARTRATE 50 MG
25 TABLET ORAL ONCE
Refills: 0 | Status: COMPLETED | OUTPATIENT
Start: 2020-02-29 | End: 2020-02-29

## 2020-02-29 RX ORDER — METOPROLOL TARTRATE 50 MG
50 TABLET ORAL
Refills: 0 | Status: DISCONTINUED | OUTPATIENT
Start: 2020-02-29 | End: 2020-03-01

## 2020-02-29 RX ADMIN — Medication 100 MILLIGRAM(S): at 18:19

## 2020-02-29 RX ADMIN — BUPRENORPHINE AND NALOXONE 1 TABLET(S): 2; .5 TABLET SUBLINGUAL at 17:43

## 2020-02-29 RX ADMIN — Medication 25 MILLIGRAM(S): at 05:42

## 2020-02-29 RX ADMIN — GABAPENTIN 300 MILLIGRAM(S): 400 CAPSULE ORAL at 17:44

## 2020-02-29 RX ADMIN — Medication 0.5 MILLIGRAM(S): at 22:11

## 2020-02-29 RX ADMIN — Medication 1 TABLET(S): at 12:29

## 2020-02-29 RX ADMIN — CEFEPIME 100 MILLIGRAM(S): 1 INJECTION, POWDER, FOR SOLUTION INTRAMUSCULAR; INTRAVENOUS at 11:14

## 2020-02-29 RX ADMIN — Medication 25 MILLIGRAM(S): at 08:59

## 2020-02-29 RX ADMIN — Medication 0.1 MILLIGRAM(S): at 05:43

## 2020-02-29 RX ADMIN — Medication 650 MILLIGRAM(S): at 22:50

## 2020-02-29 RX ADMIN — Medication 375 MILLIGRAM(S): at 12:30

## 2020-02-29 RX ADMIN — QUETIAPINE FUMARATE 25 MILLIGRAM(S): 200 TABLET, FILM COATED ORAL at 17:44

## 2020-02-29 RX ADMIN — SODIUM CHLORIDE 75 MILLILITER(S): 9 INJECTION, SOLUTION INTRAVENOUS at 00:24

## 2020-02-29 RX ADMIN — Medication 650 MILLIGRAM(S): at 22:04

## 2020-02-29 RX ADMIN — BUPRENORPHINE AND NALOXONE 1 TABLET(S): 2; .5 TABLET SUBLINGUAL at 05:43

## 2020-02-29 RX ADMIN — ATORVASTATIN CALCIUM 10 MILLIGRAM(S): 80 TABLET, FILM COATED ORAL at 22:03

## 2020-02-29 RX ADMIN — BUPRENORPHINE AND NALOXONE 1 TABLET(S): 2; .5 TABLET SUBLINGUAL at 12:29

## 2020-02-29 RX ADMIN — CEFEPIME 100 MILLIGRAM(S): 1 INJECTION, POWDER, FOR SOLUTION INTRAMUSCULAR; INTRAVENOUS at 22:03

## 2020-02-29 RX ADMIN — Medication 500 MILLIGRAM(S): at 17:44

## 2020-02-29 RX ADMIN — Medication 325 MILLIGRAM(S): at 09:00

## 2020-02-29 RX ADMIN — QUETIAPINE FUMARATE 25 MILLIGRAM(S): 200 TABLET, FILM COATED ORAL at 05:43

## 2020-02-29 RX ADMIN — Medication 50 MILLIGRAM(S): at 17:44

## 2020-02-29 RX ADMIN — Medication 81 MILLIGRAM(S): at 12:29

## 2020-02-29 RX ADMIN — GABAPENTIN 600 MILLIGRAM(S): 400 CAPSULE ORAL at 22:02

## 2020-02-29 RX ADMIN — Medication 325 MILLIGRAM(S): at 12:33

## 2020-02-29 RX ADMIN — Medication 100 MILLIGRAM(S): at 05:43

## 2020-02-29 RX ADMIN — Medication 1 MILLIGRAM(S): at 12:29

## 2020-02-29 RX ADMIN — ENOXAPARIN SODIUM 40 MILLIGRAM(S): 100 INJECTION SUBCUTANEOUS at 05:43

## 2020-02-29 RX ADMIN — Medication 0.5 MILLIGRAM(S): at 15:25

## 2020-02-29 RX ADMIN — GABAPENTIN 300 MILLIGRAM(S): 400 CAPSULE ORAL at 05:42

## 2020-02-29 RX ADMIN — Medication 0.1 MILLIGRAM(S): at 17:44

## 2020-02-29 RX ADMIN — Medication 500 MILLIGRAM(S): at 05:43

## 2020-02-29 NOTE — CONSULT NOTE ADULT - ASSESSMENT
She has had recurrent SVT despite beta blocker therapy  SVT terminated with adenosine. It was hemodyn stable  Echo from 2019 showed normal LV  Agree that ablation would be a good option but given her confusion and post op state with leg cast not a good candidate currently.   She is not able to give consent and her fiance does not want her to have procedure. Concern about her mental status.  Would increase beta blocker and consider Calcium channel blocker if recurrent episodes and reevaluate for ablation at later date.

## 2020-02-29 NOTE — PROGRESS NOTE ADULT - ASSESSMENT
A 30 y/o woman with pmhx of depression, PTSD, heroin abuse, stroke with left sided weakness, right carotid A dissection/stenosis on asa/statin, paroxysmal atrial tachycardia, who presented to the ER with right leg pain inability to ambulate after a fall. She is found to have pilon fx of right tibia. S/p Right ankle ORIF,  Pt with VTE risk factors due to immobility and NWB of RLE.     PLAN:  - c/w home med ASA 81 mg daily  - Continue Lovenox 40 mg SQ daily while in the hospital can transition to  mg PO BID at time of discharge if d/c home  - Monitor CBC/BMP  - maintain LLE venodynes    Will continue to follow

## 2020-02-29 NOTE — PROGRESS NOTE ADULT - SUBJECTIVE AND OBJECTIVE BOX
c/c: fall/ right leg pain    HPI: 29F, pmh of Depression, PTSD, Generalized anxiety d/o, heroin abuse now on suboxone, HTN, Stroke with left sided weakness, RIght carotid A dissection/stenosis on asa/statin, paroxysmal atrial tachycardia, who presented to the ER with right leg pain inability to ambulate after a fall. She states she felt dizzy and fell onto her right leg. Denies sob/chest pain/n/v/d/abd pain. Felt fine prior to falling. no loc.   IN ED had imaging which showed right distal tib/fib fracture.   Underwent ex-fix 2/21.    2/23: pt seen and examined this am. Sleeping but arousable. no acute overnight events. meds being titrated down per psychiatry.  2/24 responsive  2/25 c/o pain, otherwise stable  2/26 overnight events noted. Imaging studies reviewed. Patient with limited complaints  2/27 up in a chair  2/28 s/p RR earlier today for SVT, broke with Adenosine. On tele now- sinus  2/29 patient had another RR earlier today for SVT- broke with Adenosine. EP f/u pending    Review of system- All 10 systems reviewed and is as per HPI otherwise negative.     Vital Signs Last 24 Hrs  T(C): 36.7 (29 Feb 2020 11:48), Max: 37.2 (28 Feb 2020 21:13)  T(F): 98.1 (29 Feb 2020 11:48), Max: 99 (28 Feb 2020 21:13)  HR: 87 (29 Feb 2020 11:48) (87 - 139)  BP: 133/70 (29 Feb 2020 11:48) (126/78 - 148/89)  BP(mean): --  RR: 18 (29 Feb 2020 11:48) (18 - 26)  SpO2: 95% (29 Feb 2020 11:48) (92% - 95%)    PHYSICAL EXAM:  GENERAL: Comfortable, no acute distress  HEAD:  Atraumatic, Normocephalic  EYES: EOMI, PERRLA  HEENT: Moist mucous membranes  NECK: Supple, No JVD  NERVOUS SYSTEM:  Awake  CHEST/LUNG: Clear to auscultation bilaterally  HEART: Regular rate and rhythm  ABDOMEN: Soft, Nontender, Nondistended; Bowel sounds present  GENITOURINARY- Voiding, no palpable bladder  EXTREMITIES:  No clubbing, cyanosis, or edema  MUSCULOSKELETAL- LLE in splint/ace-wrap  SKIN-no rash    LABS:                                 11.7   12.71 )-----------( 383      ( 29 Feb 2020 08:55 )             36.4     29 Feb 2020 08:55    139    |  105    |  12     ----------------------------<  98     3.7     |  28     |  0.68     Ca    9.0        29 Feb 2020 08:55  Mg     2.1       28 Feb 2020 07:03    CARDIAC MARKERS ( 28 Feb 2020 07:03 )  <0.015 ng/mL / x     / x     / x     / x        MEDICATIONS  (STANDING):  ascorbic acid 500 milliGRAM(s) Oral two times a day  aspirin  chewable 81 milliGRAM(s) Oral daily  atorvastatin 10 milliGRAM(s) Oral at bedtime  buprenorphine 8 mG/naloxone 2 mG SL  Tablet 1 Tablet(s) SubLingual <User Schedule>  cefepime   IVPB 2000 milliGRAM(s) IV Intermittent every 12 hours  cloNIDine 0.1 milliGRAM(s) Oral two times a day  doxycycline hyclate Capsule 100 milliGRAM(s) Oral every 12 hours  enoxaparin Injectable 40 milliGRAM(s) SubCutaneous every 24 hours  ferrous    sulfate 325 milliGRAM(s) Oral three times a day with meals  folic acid 1 milliGRAM(s) Oral daily  gabapentin 300 milliGRAM(s) Oral two times a day  gabapentin 600 milliGRAM(s) Oral at bedtime  lactated ringers. 1000 milliLiter(s) (75 mL/Hr) IV Continuous <Continuous>  lactated ringers. 1000 milliLiter(s) (75 mL/Hr) IV Continuous <Continuous>  lactated ringers. 1000 milliLiter(s) (75 mL/Hr) IV Continuous <Continuous>  metoprolol tartrate 50 milliGRAM(s) Oral two times a day  multivitamin 1 Tablet(s) Oral daily  QUEtiapine 25 milliGRAM(s) Oral two times a day  venlafaxine XR. 375 milliGRAM(s) Oral daily    MEDICATIONS  (PRN):  acetaminophen   Tablet .. 650 milliGRAM(s) Oral every 6 hours PRN Mild Pain (1 - 3)  acetaminophen   Tablet .. 650 milliGRAM(s) Oral every 6 hours PRN Temp greater or equal to 38C (100.4F)  calcium carbonate    500 mG (Tums) Chewable 3 Tablet(s) Chew every 6 hours PRN Dyspepsia  clonazePAM  Tablet 0.5 milliGRAM(s) Oral daily PRN Anxiety  HYDROmorphone  Injectable 0.25 milliGRAM(s) IV Push every 4 hours PRN Severe Pain (7 - 10)  melatonin 3 milliGRAM(s) Oral at bedtime PRN Sleep  ondansetron Injectable 4 milliGRAM(s) IV Push every 6 hours PRN Nausea and/or Vomiting  oxyCODONE    IR 5 milliGRAM(s) Oral every 6 hours PRN Moderate Pain (4 - 6)  prochlorperazine   Injectable 10 milliGRAM(s) IV Push once PRN Nausea  senna 2 Tablet(s) Oral at bedtime PRN Constipation  traZODone 50 milliGRAM(s) Oral at bedtime PRN insomnia    ASSESSMENT AND PLAN:  #Fall/Right tib/fib fracture:  -S/P ex fix   -S/p ORIF 2/26/20  -pain control  -physical therapy  -incentive spirometry    #S/p RR for SVT twice  Broke with Adenosine  Cardio eval appreciated  BB optimized  Need EP f/u for ?ablation    #Atelectasis vs pneumonia  Patient with mild leukocytosis, otherwise negative clinical presentation  Patient does not use incentive spirometer and refused to participate with PT given her higher then usual risk for pneumonia  Started on IV abx  ID eval appreciated  Will switch to PO ceftin/Doxy upon discharge    #Dizziness:  -could be related to multiple sedative meds/high clonidine dosing.  -clonidine decreased per psychiatry    #H/o right carotid A dissection/stenosis, CVA/mild lue weakness:  -on asa/statin  -vascular eval appreciated  -repeat CTA with healed carotid dissection    #HTN:  Start BB    #Heroin abuse in past:  -continue suboxone.    #Depression/anxiety/PTSD:  -continue effexor, klonipin, lamictal, trazodone, gabapentin, seroquel  -psychiatry eval appreciated, meds being titrated down d/t sedation.    #DVT px- Lovenox    #Dispo- keep on telemetry. Ep f/u for possible ablation. D/w pt, intensivist, DR Campuzano

## 2020-02-29 NOTE — PROGRESS NOTE ADULT - ASSESSMENT
-patient now with 2 bouts of high rate SVT-requiring adenosine and rapid response twice (fri and sat)-responing to adenosine.  Has had QT prolongation obviating use of amiodarone short term.  Currently on toprol 25 BID.  BP is elevated and resting HR in the 80s  1-currently received 25 toprol in the am.  Will be increasing to 50 BID-will give 25 extra this morning.    2-Reconsult EPS Dr. Goldman- need to continue watching-patient is 29 and should be considered for ablation

## 2020-02-29 NOTE — CHART NOTE - NSCHARTNOTEFT_GEN_A_CORE
[facility]apis Response    Patient went back into an SVT to 180    Adenosine 6 broke it    Cardio seeing    D/W Dr. Luque

## 2020-02-29 NOTE — PROGRESS NOTE ADULT - SUBJECTIVE AND OBJECTIVE BOX
HPI: This is a 28 y/o woman with pmhx of Depression, PTSD, Generalized anxiety d/o, heroin abuse now on subloxone, HTN, Stroke with left sided weakness, right carotid A dissection/stenosis on asa/statin, paroxysmal atrial tachycardia, who presented to the ER with right leg pain inability to ambulate after a fall. She states she felt dizzy and fell onto her right leg. Denies sob/chest pain/n/v/d/abd pain. Felt fine prior to falling. no loc.   IN ED had imaging which showed right distal tib/fib fracture. Now s/p Right ankle ORIF on 2020: Pt seen at bedside on 2N, she is AAOx3, delayed response. Informed her of VTE ppx with ASA. Inquired about prior use of oral AC and patient reports "I don't know."   2020: Pt seen at bedside on 2N, lethargic and not following commands; poor engagement with conversation  2020: Pt seen at bedside on 2 N awake not following commands, pending surgery tomorrow  2020: Pt seen at bedside NPO for OR today, Pt  had an episode of SOB and tachycardia CT angio done negative for PE.  2020: Pt seen at bedside, s/p Right ankle ORIF, denies any pain, no c/o SOB or chest pain will continue lovenox while in the hospital  ; rapid response this am due to episode of SVT treated with Adenosine      Patient is a 29y old  Female who presents with a chief complaint of tib/fib fracture (2020 13:03)    Consulted by Dr. Urbano for VTE prophylaxis, risk stratification, and anticoagulation management.    PAST MEDICAL & SURGICAL HISTORY:  Cyst, breast  Transaminitis  Aneurysm of right internal carotid artery  Cerebrovascular accident (CVA)  Substance abuse  Anxiety  Depression  PTSD (post-traumatic stress disorder)  H/O breast biopsy    BMI: 30.4    CrCL: 126.86    CAPRINI SCORE  AGE RELATED RISK FACTORS                                                       MOBILITY RELATED FACTORS  [ ] Age 41-60 years                                            (1 Point)                  [ ] Bed rest                                                        (1 Point)  [ ] Age: 61-74 years                                           (2 Points)                [ ] Plaster cast                                                   (2 Points)  [ ] Age= 75 years                                              (3 Points)                 [ ] Bed bound for more than 72 hours                   (2 Points)    DISEASE RELATED RISK FACTORS                                               GENDER SPECIFIC FACTORS  [ ] Edema in the lower extremities                       (1 Point)           [ ] Pregnancy                                                            (1 Point)  [ ] Varicose veins                                               (1 Point)                  [ ] Post-partum < 6 weeks                                      (1 Point)             [X ] BMI > 25 Kg/m2                                            (1 Point)                  [ ] Hormonal therapy or oral contraception       (1 Point)                 [ ] Sepsis (in the previous month)                        (1 Point)             [ ] History of pregnancy complications                (1Point)  [ ] Pneumonia or serious lung disease                                             [ ] Unexplained or recurrent  (=3), premature                                 (In the previous month)                               (1 Point)                birth with toxemia or growth-restricted infant (1 Point)  [ ] Abnormal pulmonary function test            (1 Point)                                   SURGERY RELATED RISK FACTORS  [ ] Acute myocardial infarction                       (1 Point)                  [ ]  Section                                         (1 Point)  [ ] Congestive heart failure (in the previous month) (1 Point)   [ ] Minor surgery   lasting <45 minutes       (1 Point)   [ ] Inflammatory bowel disease                             (1 Point)          [ ] Arthroscopic surgery                                  (2 Points)  [ ] Central venous access                                    (2 Points)            [ ] General surgery lasting >45 minutes      (2 Points)       [ ] Stroke (in the previous month)                  (5 Points)            [ ] Elective major lower extremity arthroplasty (5 Points)                                   [  ] Malignancy (present or past include skin melanoma                                          but exclude  basal skin cell)    (2 points)                                      TRAUMA RELATED RISK FACTORS                HEMATOLOGY RELATED FACTORS                                  [X ] Fracture of the hip, pelvis, or leg                       (5 Points)  [ ] Prior episodes of VTE                                     (3 Points)          [ ] Acute spinal cord injury (in the previous month)  (5 Points)  [ ] Positive family history for VTE                         (3 Points)       [ ] Paralysis (less than 1 month)                          (5 Points)  [ ] Prothrombin 01401 A                                      (3 Points)         [ ] Multiple Trauma (within 1month)                 (5Points)                                                                                                                                                                [ ] Factor V Leiden                                          (3 Points)                                OTHER RISK FACTORS                          [ ] Lupus anticoagulants                                     (3 Points)                       [ ] BMI > 40                          (1 Point)                                                         [ ] Anticardiolipin antibodies                                (3 Points)                   [ ] Smoking                              (1Point)                                                [ ] High homocysteine in the blood                      (3 Points)                [  ] Diabetes requiring insulin (1point)                         [ ] Other congenital or acquired thrombophilia       (3 Points)          [  ] Chemotherapy                   (1 Point)  [ ] Heparin induced thrombocytopenia                  (3 Points)             [  ] Blood Transfusion                (1 point)                                                                                                         Total Score [  6  ]                                                                                                                   IMPROVE Bleeding Risk Score: 0    Falls Risk:   High ( X )  Mod (  )  Low (  )    EBL: 5 ml    PROCEDURE START:   PROCEDURE END:     Allergies  Geodon (Other)  Seroquel (Unknown)  Zyprexa (Other)    Intolerances    REVIEW OF SYSTEMS    (  )Fever	     (  )Constipation	(  )SOB			(  )Headache	(  )Dysuria  (  )Chills	     (  )Melena	(  )Dyspnea present on exertion    (  )Dizziness                    (  )Polyuria  (  )Nausea	     (  )Hematochezia	(  )Cough		                    (  )Syncope   	(  )Hematuria  (  )Vomiting    (  )Chest Pain	(  )Wheezing		(  )Weakness  (  )Diarrhea     (  )Palpitations	(  )Anorexia		(  )Myalgia    + pain in her right leg. All other review of systems negative: Yes    Vital Signs Last 24 Hrs  T(C): 36.7 (20 @ 11:48), Max: 37.2 (20 @ 21:13)  T(F): 98.1 (20 @ 11:48), Max: 99 (20 @ 21:13)  HR: 87 (20 @ 11:48) (87 - 139)  BP: 133/70 (20 @ 11:48) (126/78 - 148/89)  BP(mean): --  RR: 18 (20 @ 11:48) (18 - 26)  SpO2: 95% (20 @ 11:48) (92% - 95%)        Neurological: Lethargic     Skin: Warm    Respiratory and Thorax: normal effort; Breath sounds: normal; No rales/wheezing/rhonchi  	  Cardiovascular: S1, S2, regular, NMBR SVT this am, now NSR on tele monitor    Gastrointestinal: BS + x 4Q, nontender	    Genitourinary:  Bladder nondistended, nontender    Musculoskeletal:   General Right:   no muscle/joint tenderness,   normal tone, no joint swelling,   ROM: limited	    General Left:   no muscle/joint tenderness,   normal tone, no joint swelling,   ROM: full    RLE: Dsg:C/D/I, capillary refill :normal    Lower extrems:   Right: no calf tenderness              negative tammi's sign                pedal pulses Non accessable    Left:   no calf tenderness              negative tammi's sign               + pedal pulses    LABS:                                11.7   12.71 )-----------( 383      ( 2020 08:55 )             36.4           139  |  105  |  12  ----------------------------<  98  3.7   |  28  |  0.68    Ca    9.0      2020 08:55  Mg     2.1     -                            11.1   16.27 )-----------( 389      ( 2020 07:04 )             34.5       02    140  |  106  |  12  ----------------------------<  109<H>  4.0   |  28  |  0.78    Ca    9.0      2020 07:04        PT/INR - ( 2020 06:19 )   PT: 12.8 sec;   INR: 1.15 ratio         PTT - ( 2020 06:19 )  PTT:30.1 sec                          12.6   12.75 )-----------( 414      ( 2020 06:19 )             38.1           139  |  105  |  14  ----------------------------<  107<H>  3.9   |  31  |  0.65    Ca    9.1      2020 06:19        PT/INR - ( 2020 06:19 )   PT: 12.8 sec;   INR: 1.15 ratio         PTT - ( 2020 06:19 )  PTT:30.1 sec                    12.6   11.02 )-----------( 413      ( 2020 07:12 )             38.7           137  |  105  |  15  ----------------------------<  107<H>  3.8   |  27  |  0.77    Ca    9.0      2020 07:12                        11.8   12.43 )-----------( 406      ( 2020 06:37 )             37.6       02    138  |  104  |  9   ----------------------------<  91  4.0   |  29  |  0.73    Ca    8.9      2020 06:37                       12.6   8.93  )-----------( 401      ( 2020 06:25 )             40.1         140  |  107  |  7   ----------------------------<  123<H>  4.4   |  27  |  0.92    Ca    8.8      2020 06:25      PT/INR - ( 2020 07:36 )   PT: 12.6 sec;   INR: 1.13 ratio    PTT - ( 2020 07:36 )  PTT:30.8 sec  Urinalysis Basic - ( 2020 04:13 )  Color: Yellow / Appearance: Clear / S.005 / pH: x  Gluc: x / Ketone: Negative  / Bili: Negative / Urobili: Negative mg/dL   Blood: x / Protein: Negative mg/dL / Nitrite: Negative   Leuk Esterase: Trace / RBC: 0-2 /HPF / WBC 6-10   Sq Epi: x / Non Sq Epi: Few / Bacteria: Moderate    RADIOLOGY, EKG & ADDITIONAL TESTS: Reviewed.     EXAM:  CT ANKLE ONLY RT                        EXAM:  CT 3D RECONSTRUCT WO MITRA                        PROCEDURE DATE:  2020    IMPRESSION:  1.  Status post external fixation.  2.  Comminuted mildly displaced fractures of the lateral tibial plafond with intra-articular extension as described above.  3.  Nondisplaced coronally oriented intra-articular fracture at the posterior tibial malleolus.  4.  Mildly displaced distal fibular fracture.  5.  Suspected widening of the tibiotalar joint space  6.  Soft tissue swelling.    < from: CT Angio Chest PE Protocol w/ IV Cont (20 @ 09:07) >  IMPRESSION:     Enhancing segmental consolidation in the left lower lobe with air bronchograms likely representing atelectasis, clinically correlate for pneumonia. Subsegmental atelectasis in the right lower lobe.    No definite evidence of acute pulmonary embolism.    < end of copied text >        MEDICATIONS  (STANDING):  ascorbic acid 500 milliGRAM(s) Oral two times a day  aspirin  chewable 81 milliGRAM(s) Oral daily  atorvastatin 10 milliGRAM(s) Oral at bedtime  buprenorphine 8 mG/naloxone 2 mG SL  Tablet 1 Tablet(s) SubLingual <User Schedule>  cefepime   IVPB 2000 milliGRAM(s) IV Intermittent every 12 hours  cloNIDine 0.1 milliGRAM(s) Oral two times a day  doxycycline hyclate Capsule 100 milliGRAM(s) Oral every 12 hours  enoxaparin Injectable 40 milliGRAM(s) SubCutaneous every 24 hours  ferrous    sulfate 325 milliGRAM(s) Oral three times a day with meals  folic acid 1 milliGRAM(s) Oral daily  gabapentin 300 milliGRAM(s) Oral two times a day  gabapentin 600 milliGRAM(s) Oral at bedtime  lactated ringers. 1000 milliLiter(s) IV Continuous <Continuous>  lactated ringers. 1000 milliLiter(s) IV Continuous <Continuous>  lactated ringers. 1000 milliLiter(s) IV Continuous <Continuous>  metoprolol tartrate 50 milliGRAM(s) Oral two times a day  multivitamin 1 Tablet(s) Oral daily  QUEtiapine 25 milliGRAM(s) Oral two times a day  venlafaxine XR. 375 milliGRAM(s) Oral daily              DVT Prophylaxis:  LMWH                   ( x )  Heparin SQ           (  )  Coumadin             (  )  Xarelto                  (  )  Eliquis                   (  )  Venodynes           ( X )  Ambulation          (X )  UFH                       (  )  Contraindicated  (  )  ASA                       (  X )

## 2020-02-29 NOTE — PROGRESS NOTE ADULT - SUBJECTIVE AND OBJECTIVE BOX
Patient seen and examined at bedside. No acute events over night. Rapid response yesterday for SVT. No acute complaints at this time. Pain well controlled. Denies chest pain, shortness of breath, nausea or vomiting.     PE:  Vital Signs Last 24 Hrs  T(C): 36.6 (02-29-20 @ 06:12), Max: 37.2 (02-28-20 @ 21:13)  T(F): 97.8 (02-29-20 @ 06:12), Max: 99 (02-28-20 @ 21:13)  HR: 87 (02-29-20 @ 06:12) (87 - 139)  BP: 126/78 (02-29-20 @ 06:12) (126/78 - 157/102)  BP(mean): --  RR: 19 (02-28-20 @ 21:13) (18 - 26)  SpO2: 93% (02-29-20 @ 06:12) (92% - 99%)    General: NAD, resting comfortably in bed  R LE:   Dressing C/D/I  SCDs present bilaterally  Compartments soft and compressible  No calf tenderness bilaterally  +TA/EHL/FHL/GSC  SILT L3-S1  2+ DP/PT                          12.6   13.60 )-----------( 398      ( 28 Feb 2020 07:03 )             37.8     28 Feb 2020 07:03    139    |  105    |  10     ----------------------------<  110    3.7     |  29     |  0.69     Ca    8.9        28 Feb 2020 07:03  Mg     2.1       28 Feb 2020 07:03          A/P:  29y f s/p R milka ponce B POD 3  -PT/OT - NWB RLE  -Pain Control  -DVT ppx - w/a81 and lovenox  -Vital sign monitoring  -FU AM Labs  -Rest, ice, compress and elevate the extremity as we needed  -Incentive Spirometry  -Medical management appreciated  -Dispo: SANJAY?

## 2020-02-29 NOTE — PROGRESS NOTE ADULT - SUBJECTIVE AND OBJECTIVE BOX
CHIEF COMPLAINT: Patient is a 29y old  Female who presents with a chief complaint of tib/fib fracture (29 Feb 2020 07:12)      HPI:  c/c: fall/ right leg pain    HPI: 29F, pmh of Depression, PTSD, Generalized anxiety d/o, heroin abuse now on suboxone, HTN, Stroke with left sided weakness, RIght carotid A dissection/stenosis on asa/statin, paroxysmal atrial tachycardia, who presented to the ER with right leg pain inability to ambulate after a fall. She states she felt dizzy and fell onto her right leg. Denies sob/chest pain/n/v/d/abd pain. Felt fine prior to falling. no loc.   IN ED had imaging which showed right distal tib/fib fracture. Scheduled for surgery later today. C/o severe pain at fracture site.    ROS: all 10 systems reviewed and is as above otherwise negative.   PMH: as above  PSH: denies  F/H: denies significant medical conditions in immediate family members.  Social: smokes 1ppd , drinks ETOH once in a while, heroin abuse in past (21 Feb 2020 09:25)      PMHx: PAST MEDICAL & SURGICAL HISTORY:  Cyst, breast  Transaminitis  Aneurysm of right internal carotid artery  Cerebrovascular accident (CVA)  Substance abuse  Anxiety  Depression  PTSD (post-traumatic stress disorder)  H/O breast biopsy      Allergies: Allergies    Geodon (Other)  Zyprexa (Other)    Intolerances          REVIEW OF SYSTEMS:    CONSTITUTIONAL: No weakness, fevers or chills  EYES/ENT: No visual changes;  No vertigo or throat pain   NECK: No pain or stiffness  RESPIRATORY: No cough, wheezing, hemoptysis; No shortness of breath  CARDIOVASCULAR: palpitations  GASTROINTESTINAL: No abdominal or epigastric pain. No nausea, vomiting, or hematemesis; No diarrhea or constipation. No melena or hematochezia.    Vital Signs Last 24 Hrs  T(C): 36.6 (29 Feb 2020 06:12), Max: 37.2 (28 Feb 2020 21:13)  T(F): 97.8 (29 Feb 2020 06:12), Max: 99 (28 Feb 2020 21:13)  HR: 87 (29 Feb 2020 06:12) (87 - 139)  BP: 126/78 (29 Feb 2020 06:12) (126/78 - 157/97)  BP(mean): --  RR: 19 (28 Feb 2020 21:13) (18 - 26)  SpO2: 93% (29 Feb 2020 06:12) (92% - 94%)    I&O's Summary          PHYSICAL EXAM:   HEENT: PERR, EOMI, Normal Hearing, MMM  Neck: Soft and supple, No LAD, No JVD  Respiratory: Breath sounds are clear bilaterally, No wheezing, rales or rhonchi  Cardiovascular: S1 and S2, regular rate and rhythm,  Murmurs, gallops or rubs  Gastrointestinal: Bowel Sounds present, soft, nontender, nondistended, no guarding, no rebound  Extremities: No peripheral edema      MEDICATIONS:  MEDICATIONS  (STANDING):  ascorbic acid 500 milliGRAM(s) Oral two times a day  aspirin  chewable 81 milliGRAM(s) Oral daily  atorvastatin 10 milliGRAM(s) Oral at bedtime  buprenorphine 8 mG/naloxone 2 mG SL  Tablet 1 Tablet(s) SubLingual <User Schedule>  cefepime   IVPB 2000 milliGRAM(s) IV Intermittent every 12 hours  cloNIDine 0.1 milliGRAM(s) Oral two times a day  doxycycline hyclate Capsule 100 milliGRAM(s) Oral every 12 hours  enoxaparin Injectable 40 milliGRAM(s) SubCutaneous every 24 hours  ferrous    sulfate 325 milliGRAM(s) Oral three times a day with meals  folic acid 1 milliGRAM(s) Oral daily  gabapentin 300 milliGRAM(s) Oral two times a day  gabapentin 600 milliGRAM(s) Oral at bedtime  lactated ringers. 1000 milliLiter(s) (75 mL/Hr) IV Continuous <Continuous>  lactated ringers. 1000 milliLiter(s) (75 mL/Hr) IV Continuous <Continuous>  lactated ringers. 1000 milliLiter(s) (75 mL/Hr) IV Continuous <Continuous>  metoprolol tartrate 25 milliGRAM(s) Oral two times a day  multivitamin 1 Tablet(s) Oral daily  QUEtiapine 25 milliGRAM(s) Oral two times a day  venlafaxine XR. 375 milliGRAM(s) Oral daily      LABS: All Labs Reviewed:                        12.6   13.60 )-----------( 398      ( 28 Feb 2020 07:03 )             37.8     02-28    139  |  105  |  10  ----------------------------<  110<H>  3.7   |  29  |  0.69    Ca    8.9      28 Feb 2020 07:03  Mg     2.1     02-28        CARDIAC MARKERS ( 28 Feb 2020 07:03 )  <0.015 ng/mL / x     / x     / x     / x          Blood Culture:       BNP     RADIOLOGY:    EKG:      Telemetry:  SVT-180bpm,     ECHO:  < from: Transthoracic Echocardiogram (08.02.19 @ 21:32) >  The left ventricle is normal in size, wall thickness, wall motion and   contractility.   Estimated left ventricular ejection fraction is 55 -60%.   Normal appearing left atrium.   Normalappearing right atrium.   Normal appearing right ventricle structure and function.   Normal aortic valve structure and function.   Normal appearing mitral valve structure and function.   Mild (1+) mitral regurgitation is present.   Normal appearing tricuspid valve structure and function.   No evidence of pericardial effusion.    < end of copied text >

## 2020-02-29 NOTE — CONSULT NOTE ADULT - REASON FOR ADMISSION
tib/fib fracture

## 2020-02-29 NOTE — CONSULT NOTE ADULT - SUBJECTIVE AND OBJECTIVE BOX
HPI: Asked to evaluate patient for catheter ablation because of recurrent SVT despite beta blocker therapy. Patient is a  29 woman who recently fell and sustained a tib-fib fracture. She underwent surgical repair and is in a long leg cast. Patient has h/o Depression, PTSD, anxiety , heroin abuse now on suboxone, HTN, Stroke with left sided weakness, RIght carotid A dissection/stenosis on asa/statin.  The patient  had presented to the ER with right leg pain inability to ambulate after a fall. She stated she felt dizzy and fell onto her right leg. Felt fine prior to falling. no loc.   She had SVT to 160 bpm today - terminated with adenosine - appear to be a mid RP tachycardia.   Patient is awake but confused. Her fiance was at bedside and answered questions. She was not able to answer question even though was awake  Social: smokes 1ppd , drinks ETOH once in a while, heroin abuse in past (2020 09:25)      PAST MEDICAL & SURGICAL HISTORY:  Cyst, breast  Transaminitis  Aneurysm of right internal carotid artery  Cerebrovascular accident (CVA)  Substance abuse  Anxiety  Depression  PTSD (post-traumatic stress disorder)  H/O breast biopsy      PREVIOUS DIAGNOSTIC TESTING:      ECHO  FINDINGS: Summary     The left ventricle is normal in size, wall thickness, wall motion and   contractility.   Estimated left ventricular ejection fraction is 55 -60%.   Normal appearing left atrium.   Normalappearing right atrium.   Normal appearing right ventricle structure and function.   Normal aortic valve structure and function.   Normal appearing mitral valve structure and function.   Mild (1+) mitral regurgitation is present.   Normal appearing tricuspid valve structure and function.   No evidence of pericardial effusion.     Signature     ----------------------------------------------------------------   Electronically signed by Fer Gomez MD(Interpreting physician)   on 2019 02:14 PM      STRESS  FINDINGS:    CATHETERIZATION  FINDINGS:      Allergies    Geodon (Other)  Zyprexa (Other)    Intolerances        MEDICATIONS  (STANDING):  ascorbic acid 500 milliGRAM(s) Oral two times a day  aspirin  chewable 81 milliGRAM(s) Oral daily  atorvastatin 10 milliGRAM(s) Oral at bedtime  buprenorphine 8 mG/naloxone 2 mG SL  Tablet 1 Tablet(s) SubLingual <User Schedule>  cefepime   IVPB 2000 milliGRAM(s) IV Intermittent every 12 hours  cloNIDine 0.1 milliGRAM(s) Oral two times a day  doxycycline hyclate Capsule 100 milliGRAM(s) Oral every 12 hours  enoxaparin Injectable 40 milliGRAM(s) SubCutaneous every 24 hours  ferrous    sulfate 325 milliGRAM(s) Oral three times a day with meals  folic acid 1 milliGRAM(s) Oral daily  gabapentin 300 milliGRAM(s) Oral two times a day  gabapentin 600 milliGRAM(s) Oral at bedtime  lactated ringers. 1000 milliLiter(s) (75 mL/Hr) IV Continuous <Continuous>  lactated ringers. 1000 milliLiter(s) (75 mL/Hr) IV Continuous <Continuous>  lactated ringers. 1000 milliLiter(s) (75 mL/Hr) IV Continuous <Continuous>  metoprolol tartrate 50 milliGRAM(s) Oral two times a day  multivitamin 1 Tablet(s) Oral daily  QUEtiapine 25 milliGRAM(s) Oral two times a day  venlafaxine XR. 375 milliGRAM(s) Oral daily    MEDICATIONS  (PRN):  acetaminophen   Tablet .. 650 milliGRAM(s) Oral every 6 hours PRN Mild Pain (1 - 3)  acetaminophen   Tablet .. 650 milliGRAM(s) Oral every 6 hours PRN Temp greater or equal to 38C (100.4F)  calcium carbonate    500 mG (Tums) Chewable 3 Tablet(s) Chew every 6 hours PRN Dyspepsia  clonazePAM  Tablet 0.5 milliGRAM(s) Oral daily PRN Anxiety  HYDROmorphone  Injectable 0.25 milliGRAM(s) IV Push every 4 hours PRN Severe Pain (7 - 10)  melatonin 3 milliGRAM(s) Oral at bedtime PRN Sleep  ondansetron Injectable 4 milliGRAM(s) IV Push every 6 hours PRN Nausea and/or Vomiting  oxyCODONE    IR 5 milliGRAM(s) Oral every 6 hours PRN Moderate Pain (4 - 6)  prochlorperazine   Injectable 10 milliGRAM(s) IV Push once PRN Nausea  senna 2 Tablet(s) Oral at bedtime PRN Constipation  traZODone 50 milliGRAM(s) Oral at bedtime PRN insomnia      FAMILY HISTORY:  Medical history unknown: in father, unknown if living or   Family history of drug use: IN MOTHER, LIVING      SOCIAL HISTORY:    CIGARETTES:  none    ALCOHOL:  none    REVIEW OF SYSTEMS: not obtainable.   CHEME/LYMPH: No easy bruising, or bleeding gums  ALLERY AND IMMUNOLOGIC: No hives or eczema	    Vital Signs Last 24 Hrs  T(C): 36.7 (2020 11:48), Max: 37.2 (2020 21:13)  T(F): 98.1 (2020 11:48), Max: 99 (2020 21:13)  HR: 84 (2020 17:49) (84 - 105)  BP: 106/79 (2020 17:49) (106/79 - 145/85)  BP(mean): --  RR: 18 (2020 17:49) (18 - 19)  SpO2: 95% (2020 11:48) (92% - 95%)    Daily     Daily     I&O's Detail      PHYSICAL EXAM:  Appearance: Normal  HEENT:   sclera non-icteric	  Cardiovascular: Normal S1 S2, No JVD, No cardiac murmurs  Respiratory: Lungs clear to auscultation	  Psychiatry: confused.   Gastrointestinal:  Soft, Non-tender,   Skin: No rashes, No ecchymoses, No cyanosis  Extremities:No clubbing, cyanosis or edema. Right leg cast.         INTERPRETATION OF TELEMETRY: SR    ECG: SR, no preexcitation    LABS:                        11.7   12.71 )-----------( 383      ( 2020 08:55 )             36.4         139  |  105  |  12  ----------------------------<  98  3.7   |  28  |  0.68    Ca    9.0      2020 08:55  Mg     2.1     02-28                I&O's Summary    BNP  RADIOLOGY & ADDITIONAL STUDIES:

## 2020-02-29 NOTE — CONSULT NOTE ADULT - PROVIDER SPECIALTY LIST ADULT
Orthopedics
Electrophysiology
Anticoag Management
Cardiology
Electrophysiology
Infectious Disease
Vascular Surgery

## 2020-03-01 LAB
ANION GAP SERPL CALC-SCNC: 5 MMOL/L — SIGNIFICANT CHANGE UP (ref 5–17)
BUN SERPL-MCNC: 18 MG/DL — SIGNIFICANT CHANGE UP (ref 7–23)
CALCIUM SERPL-MCNC: 8.8 MG/DL — SIGNIFICANT CHANGE UP (ref 8.5–10.1)
CHLORIDE SERPL-SCNC: 107 MMOL/L — SIGNIFICANT CHANGE UP (ref 96–108)
CO2 SERPL-SCNC: 27 MMOL/L — SIGNIFICANT CHANGE UP (ref 22–31)
CREAT SERPL-MCNC: 0.68 MG/DL — SIGNIFICANT CHANGE UP (ref 0.5–1.3)
GLUCOSE SERPL-MCNC: 135 MG/DL — HIGH (ref 70–99)
HCT VFR BLD CALC: 34.1 % — LOW (ref 34.5–45)
HGB BLD-MCNC: 11.2 G/DL — LOW (ref 11.5–15.5)
MCHC RBC-ENTMCNC: 28.1 PG — SIGNIFICANT CHANGE UP (ref 27–34)
MCHC RBC-ENTMCNC: 32.8 GM/DL — SIGNIFICANT CHANGE UP (ref 32–36)
MCV RBC AUTO: 85.5 FL — SIGNIFICANT CHANGE UP (ref 80–100)
PLATELET # BLD AUTO: 379 K/UL — SIGNIFICANT CHANGE UP (ref 150–400)
POTASSIUM SERPL-MCNC: 3.7 MMOL/L — SIGNIFICANT CHANGE UP (ref 3.5–5.3)
POTASSIUM SERPL-SCNC: 3.7 MMOL/L — SIGNIFICANT CHANGE UP (ref 3.5–5.3)
RBC # BLD: 3.99 M/UL — SIGNIFICANT CHANGE UP (ref 3.8–5.2)
RBC # FLD: 14.2 % — SIGNIFICANT CHANGE UP (ref 10.3–14.5)
SODIUM SERPL-SCNC: 139 MMOL/L — SIGNIFICANT CHANGE UP (ref 135–145)
WBC # BLD: 10.91 K/UL — HIGH (ref 3.8–10.5)
WBC # FLD AUTO: 10.91 K/UL — HIGH (ref 3.8–10.5)

## 2020-03-01 PROCEDURE — 99231 SBSQ HOSP IP/OBS SF/LOW 25: CPT

## 2020-03-01 PROCEDURE — 99233 SBSQ HOSP IP/OBS HIGH 50: CPT

## 2020-03-01 RX ORDER — CLONAZEPAM 1 MG
0.25 TABLET ORAL ONCE
Refills: 0 | Status: DISCONTINUED | OUTPATIENT
Start: 2020-03-01 | End: 2020-03-01

## 2020-03-01 RX ORDER — METOPROLOL TARTRATE 50 MG
75 TABLET ORAL
Refills: 0 | Status: DISCONTINUED | OUTPATIENT
Start: 2020-03-01 | End: 2020-03-06

## 2020-03-01 RX ORDER — METOPROLOL TARTRATE 50 MG
25 TABLET ORAL ONCE
Refills: 0 | Status: COMPLETED | OUTPATIENT
Start: 2020-03-01 | End: 2020-03-01

## 2020-03-01 RX ADMIN — CEFEPIME 100 MILLIGRAM(S): 1 INJECTION, POWDER, FOR SOLUTION INTRAMUSCULAR; INTRAVENOUS at 11:51

## 2020-03-01 RX ADMIN — Medication 0.5 MILLIGRAM(S): at 16:05

## 2020-03-01 RX ADMIN — CEFEPIME 100 MILLIGRAM(S): 1 INJECTION, POWDER, FOR SOLUTION INTRAMUSCULAR; INTRAVENOUS at 21:06

## 2020-03-01 RX ADMIN — Medication 25 MILLIGRAM(S): at 11:49

## 2020-03-01 RX ADMIN — QUETIAPINE FUMARATE 25 MILLIGRAM(S): 200 TABLET, FILM COATED ORAL at 18:01

## 2020-03-01 RX ADMIN — GABAPENTIN 600 MILLIGRAM(S): 400 CAPSULE ORAL at 21:06

## 2020-03-01 RX ADMIN — Medication 75 MILLIGRAM(S): at 18:05

## 2020-03-01 RX ADMIN — Medication 100 MILLIGRAM(S): at 06:54

## 2020-03-01 RX ADMIN — Medication 0.1 MILLIGRAM(S): at 18:01

## 2020-03-01 RX ADMIN — Medication 325 MILLIGRAM(S): at 18:05

## 2020-03-01 RX ADMIN — Medication 100 MILLIGRAM(S): at 17:58

## 2020-03-01 RX ADMIN — ENOXAPARIN SODIUM 40 MILLIGRAM(S): 100 INJECTION SUBCUTANEOUS at 06:54

## 2020-03-01 RX ADMIN — Medication 500 MILLIGRAM(S): at 17:58

## 2020-03-01 RX ADMIN — Medication 0.1 MILLIGRAM(S): at 06:54

## 2020-03-01 RX ADMIN — Medication 325 MILLIGRAM(S): at 11:49

## 2020-03-01 RX ADMIN — ATORVASTATIN CALCIUM 10 MILLIGRAM(S): 80 TABLET, FILM COATED ORAL at 21:06

## 2020-03-01 RX ADMIN — Medication 81 MILLIGRAM(S): at 11:49

## 2020-03-01 RX ADMIN — Medication 500 MILLIGRAM(S): at 06:54

## 2020-03-01 RX ADMIN — Medication 0.25 MILLIGRAM(S): at 21:05

## 2020-03-01 RX ADMIN — GABAPENTIN 300 MILLIGRAM(S): 400 CAPSULE ORAL at 06:53

## 2020-03-01 RX ADMIN — BUPRENORPHINE AND NALOXONE 1 TABLET(S): 2; .5 TABLET SUBLINGUAL at 18:05

## 2020-03-01 RX ADMIN — Medication 375 MILLIGRAM(S): at 13:12

## 2020-03-01 RX ADMIN — QUETIAPINE FUMARATE 25 MILLIGRAM(S): 200 TABLET, FILM COATED ORAL at 06:53

## 2020-03-01 RX ADMIN — BUPRENORPHINE AND NALOXONE 1 TABLET(S): 2; .5 TABLET SUBLINGUAL at 13:17

## 2020-03-01 RX ADMIN — Medication 50 MILLIGRAM(S): at 06:53

## 2020-03-01 RX ADMIN — BUPRENORPHINE AND NALOXONE 1 TABLET(S): 2; .5 TABLET SUBLINGUAL at 06:53

## 2020-03-01 RX ADMIN — Medication 1 MILLIGRAM(S): at 11:49

## 2020-03-01 RX ADMIN — Medication 1 TABLET(S): at 11:49

## 2020-03-01 RX ADMIN — GABAPENTIN 300 MILLIGRAM(S): 400 CAPSULE ORAL at 17:58

## 2020-03-01 NOTE — PROGRESS NOTE ADULT - SUBJECTIVE AND OBJECTIVE BOX
CHIEF COMPLAINT: Patient is a 29y old  Female who presents with a chief complaint of tib/fib fracture (29 Feb 2020 07:12)      HPI:  c/c: fall/ right leg pain    HPI: 29F, pmh of Depression, PTSD, Generalized anxiety d/o, heroin abuse now on suboxone, HTN, Stroke with left sided weakness, RIght carotid A dissection/stenosis on asa/statin, paroxysmal atrial tachycardia, who presented to the ER with right leg pain inability to ambulate after a fall. She states she felt dizzy and fell onto her right leg. Denies sob/chest pain/n/v/d/abd pain. Felt fine prior to falling. no loc.   IN ED had imaging which showed right distal tib/fib fracture. Scheduled for surgery later today. C/o severe pain at fracture site.    3/1 Episode of SVT yesterday, treated with Adenosis and increasing dose of betablockers  tele negative overnight    ROS: all 10 systems reviewed and is as above otherwise negative.   PMH: as above  PSH: denies  F/H: denies significant medical conditions in immediate family members.  Social: smokes 1ppd , drinks ETOH once in a while, heroin abuse in past (21 Feb 2020 09:25)      PMHx: PAST MEDICAL & SURGICAL HISTORY:  Cyst, breast  Transaminitis  Aneurysm of right internal carotid artery  Cerebrovascular accident (CVA)  Substance abuse  Anxiety  Depression  PTSD (post-traumatic stress disorder)  H/O breast biopsy      Allergies: Allergies    Geodon (Other)  Zyprexa (Other)    Intolerances          REVIEW OF SYSTEMS:    CONSTITUTIONAL: No weakness, fevers or chills  EYES/ENT: No visual changes;  No vertigo or throat pain   NECK: No pain or stiffness  RESPIRATORY: No cough, wheezing, hemoptysis; No shortness of breath  CARDIOVASCULAR: palpitations  GASTROINTESTINAL: No abdominal or epigastric pain. No nausea, vomiting, or hematemesis; No diarrhea or constipation. No melena or hematochezia.    Vital Signs Last 24 Hrs  T(C): 36.6 (29 Feb 2020 06:12), Max: 37.2 (28 Feb 2020 21:13)  T(F): 97.8 (29 Feb 2020 06:12), Max: 99 (28 Feb 2020 21:13)  HR: 87 (29 Feb 2020 06:12) (87 - 139)  BP: 126/78 (29 Feb 2020 06:12) (126/78 - 157/97)  BP(mean): --  RR: 19 (28 Feb 2020 21:13) (18 - 26)  SpO2: 93% (29 Feb 2020 06:12) (92% - 94%)    I&O's Summary          PHYSICAL EXAM:   HEENT: PERR, EOMI, Normal Hearing, MMM  Neck: Soft and supple, No LAD, No JVD  Respiratory: Breath sounds are clear bilaterally, No wheezing, rales or rhonchi  Cardiovascular: S1 and S2, regular rate and rhythm,  Murmurs, gallops or rubs  Gastrointestinal: Bowel Sounds present, soft, nontender, nondistended, no guarding, no rebound  Extremities: No peripheral edema      MEDICATIONS:  MEDICATIONS  (STANDING):  ascorbic acid 500 milliGRAM(s) Oral two times a day  aspirin  chewable 81 milliGRAM(s) Oral daily  atorvastatin 10 milliGRAM(s) Oral at bedtime  buprenorphine 8 mG/naloxone 2 mG SL  Tablet 1 Tablet(s) SubLingual <User Schedule>  cefepime   IVPB 2000 milliGRAM(s) IV Intermittent every 12 hours  cloNIDine 0.1 milliGRAM(s) Oral two times a day  doxycycline hyclate Capsule 100 milliGRAM(s) Oral every 12 hours  enoxaparin Injectable 40 milliGRAM(s) SubCutaneous every 24 hours  ferrous    sulfate 325 milliGRAM(s) Oral three times a day with meals  folic acid 1 milliGRAM(s) Oral daily  gabapentin 300 milliGRAM(s) Oral two times a day  gabapentin 600 milliGRAM(s) Oral at bedtime  lactated ringers. 1000 milliLiter(s) (75 mL/Hr) IV Continuous <Continuous>  lactated ringers. 1000 milliLiter(s) (75 mL/Hr) IV Continuous <Continuous>  lactated ringers. 1000 milliLiter(s) (75 mL/Hr) IV Continuous <Continuous>  metoprolol tartrate 25 milliGRAM(s) Oral two times a day  multivitamin 1 Tablet(s) Oral daily  QUEtiapine 25 milliGRAM(s) Oral two times a day  venlafaxine XR. 375 milliGRAM(s) Oral daily      LABS: All Labs Reviewed:                        12.6   13.60 )-----------( 398      ( 28 Feb 2020 07:03 )             37.8     02-28    139  |  105  |  10  ----------------------------<  110<H>  3.7   |  29  |  0.69    Ca    8.9      28 Feb 2020 07:03  Mg     2.1     02-28        CARDIAC MARKERS ( 28 Feb 2020 07:03 )  <0.015 ng/mL / x     / x     / x     / x          Blood Culture:       BNP     RADIOLOGY:    EKG:      Telemetry:  SVT-180bpm,     ECHO:  < from: Transthoracic Echocardiogram (08.02.19 @ 21:32) >  The left ventricle is normal in size, wall thickness, wall motion and   contractility.   Estimated left ventricular ejection fraction is 55 -60%.   Normal appearing left atrium.   Normalappearing right atrium.   Normal appearing right ventricle structure and function.   Normal aortic valve structure and function.   Normal appearing mitral valve structure and function.   Mild (1+) mitral regurgitation is present.   Normal appearing tricuspid valve structure and function.   No evidence of pericardial effusion.    < end of copied text >

## 2020-03-01 NOTE — PROGRESS NOTE ADULT - SUBJECTIVE AND OBJECTIVE BOX
c/c: fall/ right leg pain    HPI: 29F, pmh of Depression, PTSD, Generalized anxiety d/o, heroin abuse now on suboxone, HTN, Stroke with left sided weakness, RIght carotid A dissection/stenosis on asa/statin, paroxysmal atrial tachycardia, who presented to the ER with right leg pain inability to ambulate after a fall. She states she felt dizzy and fell onto her right leg. Denies sob/chest pain/n/v/d/abd pain. Felt fine prior to falling. no loc.   IN ED had imaging which showed right distal tib/fib fracture.   Underwent ex-fix 2/21.    2/23: pt seen and examined this am. Sleeping but arousable. no acute overnight events. meds being titrated down per psychiatry.  2/24 responsive  2/25 c/o pain, otherwise stable  2/26 overnight events noted. Imaging studies reviewed. Patient with limited complaints  2/27 up in a chair  2/28 s/p RR earlier today for SVT, broke with Adenosine. On tele now- sinus  2/29 patient had another RR earlier today for SVT- broke with Adenosine. EP f/u pending  3/1: pt seen and examined earlier today. Upon review of tele with [facility], it was noted she had svt earlier this am. Apparently EP was notified, no changes were recommended.     Review of system- All 10 systems reviewed and is as per HPI otherwise negative.     Vital Signs Last 24 Hrs  T(C): 36.5 (01 Mar 2020 10:45), Max: 37.4 (29 Feb 2020 22:05)  T(F): 97.7 (01 Mar 2020 10:45), Max: 99.4 (29 Feb 2020 22:05)  HR: 68 (01 Mar 2020 10:45) (68 - 92)  BP: 108/66 (01 Mar 2020 10:45) (106/79 - 113/68)  RR: 17 (01 Mar 2020 10:45) (16 - 18)  SpO2: 93% (01 Mar 2020 10:45) (93% - 95%)    PHYSICAL EXAM:  GENERAL: Comfortable, lethargic, no acute distress  HEAD:  Atraumatic, Normocephalic  EYES: EOMI, PERRLA  HEENT: Moist mucous membranes  NECK: Supple, No JVD  NERVOUS SYSTEM:  nonfocal  CHEST/LUNG: Clear to auscultation bilaterally  HEART: Regular rate and rhythm  ABDOMEN: Soft, Nontender, Nondistended; Bowel sounds present  GENITOURINARY- Voiding, no palpable bladder  EXTREMITIES:  No clubbing, cyanosis, or edema  MUSCULOSKELETAL- LLE in splint/ace-wrap  SKIN-no rash    LABS:                        11.2   10.91 )-----------( 379      ( 01 Mar 2020 07:28 )             34.1     03-01    139  |  107  |  18  ----------------------------<  135<H>  3.7   |  27  |  0.68    Ca    8.8      01 Mar 2020 07:28      MEDICATIONS  (STANDING):  ascorbic acid 500 milliGRAM(s) Oral two times a day  aspirin  chewable 81 milliGRAM(s) Oral daily  atorvastatin 10 milliGRAM(s) Oral at bedtime  buprenorphine 8 mG/naloxone 2 mG SL  Tablet 1 Tablet(s) SubLingual <User Schedule>  cefepime   IVPB 2000 milliGRAM(s) IV Intermittent every 12 hours  cloNIDine 0.1 milliGRAM(s) Oral two times a day  doxycycline hyclate Capsule 100 milliGRAM(s) Oral every 12 hours  enoxaparin Injectable 40 milliGRAM(s) SubCutaneous every 24 hours  ferrous    sulfate 325 milliGRAM(s) Oral three times a day with meals  folic acid 1 milliGRAM(s) Oral daily  gabapentin 300 milliGRAM(s) Oral two times a day  gabapentin 600 milliGRAM(s) Oral at bedtime  lactated ringers. 1000 milliLiter(s) (75 mL/Hr) IV Continuous <Continuous>  lactated ringers. 1000 milliLiter(s) (75 mL/Hr) IV Continuous <Continuous>  lactated ringers. 1000 milliLiter(s) (75 mL/Hr) IV Continuous <Continuous>  metoprolol tartrate 75 milliGRAM(s) Oral two times a day  multivitamin 1 Tablet(s) Oral daily  QUEtiapine 25 milliGRAM(s) Oral two times a day  venlafaxine XR. 375 milliGRAM(s) Oral daily    MEDICATIONS  (PRN):  acetaminophen   Tablet .. 650 milliGRAM(s) Oral every 6 hours PRN Mild Pain (1 - 3)  acetaminophen   Tablet .. 650 milliGRAM(s) Oral every 6 hours PRN Temp greater or equal to 38C (100.4F)  calcium carbonate    500 mG (Tums) Chewable 3 Tablet(s) Chew every 6 hours PRN Dyspepsia  clonazePAM  Tablet 0.5 milliGRAM(s) Oral daily PRN Anxiety  HYDROmorphone  Injectable 0.25 milliGRAM(s) IV Push every 4 hours PRN Severe Pain (7 - 10)  melatonin 3 milliGRAM(s) Oral at bedtime PRN Sleep  ondansetron Injectable 4 milliGRAM(s) IV Push every 6 hours PRN Nausea and/or Vomiting  oxyCODONE    IR 5 milliGRAM(s) Oral every 6 hours PRN Moderate Pain (4 - 6)  prochlorperazine   Injectable 10 milliGRAM(s) IV Push once PRN Nausea  senna 2 Tablet(s) Oral at bedtime PRN Constipation  traZODone 50 milliGRAM(s) Oral at bedtime PRN insomnia      ASSESSMENT AND PLAN:  #Fall/Right tib/fib fracture:  -S/P ex fix   -S/p ORIF 2/26/20  -pain control  -physical therapy  -incentive spirometry    #SVT;  -s/p adenosine  -recurrent SVT this am.   -seen by cardiology prior to svt  -EP called by RN this am  -will increase bb to 75mg bid    #HCAP likely d/t resistant gram negative organism:  -continue iv abx.  -change to po ceftin/doxy on dc.    #Dizziness:  -could be related to multiple sedative meds/high clonidine dosing.  -clonidine decreased per psychiatry    #H/o right carotid A dissection/stenosis, CVA/mild lue weakness:  -on asa/statin  -vascular eval appreciated  -repeat CTA with healed carotid dissection    #HTN:  Start BB    #Heroin abuse in past:  -continue suboxone.    #Depression/anxiety/PTSD:  -continue effexor, klonipin, lamictal, trazodone, gabapentin, seroquel  -psychiatry eval appreciated, meds being titrated down d/t sedation.    #DVT px- Lovenox    #Dispo-  -continue tele monitoring, titrate rate controlling meds as needed  -no plans for further ortho intervention.

## 2020-03-01 NOTE — PROGRESS NOTE ADULT - ASSESSMENT
Episodes of SVT - mid RP - ? AT  She does not want catheter ablation at this time.   Agree with increased BB and observe.   If recurrent episodes would consider CCB.

## 2020-03-01 NOTE — PROGRESS NOTE ADULT - SUBJECTIVE AND OBJECTIVE BOX
Patient seen and examined at bedside. Yesterday another rapid response for SVT, metoprolol increased. No acute events over night. No acute complaints at this time. Pain well controlled. Denies chest pain, shortness of breath, nausea or vomiting.     PE:  Vital Signs Last 24 Hrs  T(C): 36.4 (03-01-20 @ 05:21), Max: 37.4 (02-29-20 @ 22:05)  T(F): 97.6 (03-01-20 @ 05:21), Max: 99.4 (02-29-20 @ 22:05)  HR: 78 (03-01-20 @ 05:21) (78 - 92)  BP: 107/66 (03-01-20 @ 05:21) (106/79 - 133/70)  BP(mean): --  RR: 16 (03-01-20 @ 05:21) (16 - 18)  SpO2: 95% (03-01-20 @ 05:21) (94% - 95%)    General: NAD, resting comfortably in bed  R LE:   Dressing in trilam C/D/I  SCDs present contralaterally  Compartments soft and compressible  No calf tenderness bilaterally  +TA/EHL/FHL/GSC  SILT L3-S1  2+ DP/PT                          11.7   12.71 )-----------( 383      ( 29 Feb 2020 08:55 )             36.4     29 Feb 2020 08:55    139    |  105    |  12     ----------------------------<  98     3.7     |  28     |  0.68     Ca    9.0        29 Feb 2020 08:55  Mg     2.1       28 Feb 2020 07:03          A/P:  29y f s/pR ankle orif  -PT/OT -NWB RLE  -Pain Control  -DVT ppx w/ a81 and lovenox  -Continue abx per ID  -FU ID/Card/EP recs  -FU AM Labs  -Rest, ice, compress and elevate the extremity as we needed  -Incentive Spirometry  -Medical management appreciated

## 2020-03-01 NOTE — PROGRESS NOTE ADULT - SUBJECTIVE AND OBJECTIVE BOX
more awake and interactive. feeling better  Had 4 mins of SVT to 160 bpm. BP stable  Was on metoprol 50 mg bid - increased to 75 mg bid.        EKG:  TELE:    MEDICATIONS  (STANDING):  ascorbic acid 500 milliGRAM(s) Oral two times a day  aspirin  chewable 81 milliGRAM(s) Oral daily  atorvastatin 10 milliGRAM(s) Oral at bedtime  buprenorphine 8 mG/naloxone 2 mG SL  Tablet 1 Tablet(s) SubLingual <User Schedule>  cefepime   IVPB 2000 milliGRAM(s) IV Intermittent every 12 hours  clonazePAM Oral Disintegrating Tablet 0.25 milliGRAM(s) Oral once  cloNIDine 0.1 milliGRAM(s) Oral two times a day  doxycycline hyclate Capsule 100 milliGRAM(s) Oral every 12 hours  enoxaparin Injectable 40 milliGRAM(s) SubCutaneous every 24 hours  ferrous    sulfate 325 milliGRAM(s) Oral three times a day with meals  folic acid 1 milliGRAM(s) Oral daily  gabapentin 300 milliGRAM(s) Oral two times a day  gabapentin 600 milliGRAM(s) Oral at bedtime  lactated ringers. 1000 milliLiter(s) (75 mL/Hr) IV Continuous <Continuous>  lactated ringers. 1000 milliLiter(s) (75 mL/Hr) IV Continuous <Continuous>  lactated ringers. 1000 milliLiter(s) (75 mL/Hr) IV Continuous <Continuous>  metoprolol tartrate 75 milliGRAM(s) Oral two times a day  multivitamin 1 Tablet(s) Oral daily  QUEtiapine 25 milliGRAM(s) Oral two times a day  venlafaxine XR. 375 milliGRAM(s) Oral daily    MEDICATIONS  (PRN):  acetaminophen   Tablet .. 650 milliGRAM(s) Oral every 6 hours PRN Mild Pain (1 - 3)  acetaminophen   Tablet .. 650 milliGRAM(s) Oral every 6 hours PRN Temp greater or equal to 38C (100.4F)  calcium carbonate    500 mG (Tums) Chewable 3 Tablet(s) Chew every 6 hours PRN Dyspepsia  clonazePAM  Tablet 0.5 milliGRAM(s) Oral daily PRN Anxiety  HYDROmorphone  Injectable 0.25 milliGRAM(s) IV Push every 4 hours PRN Severe Pain (7 - 10)  melatonin 3 milliGRAM(s) Oral at bedtime PRN Sleep  ondansetron Injectable 4 milliGRAM(s) IV Push every 6 hours PRN Nausea and/or Vomiting  oxyCODONE    IR 5 milliGRAM(s) Oral every 6 hours PRN Moderate Pain (4 - 6)  prochlorperazine   Injectable 10 milliGRAM(s) IV Push once PRN Nausea  senna 2 Tablet(s) Oral at bedtime PRN Constipation  traZODone 50 milliGRAM(s) Oral at bedtime PRN insomnia      Allergies    Geodon (Other)  Zyprexa (Other)    Intolerances      PAST MEDICAL & SURGICAL HISTORY:  Cyst, breast  Transaminitis  Aneurysm of right internal carotid artery  Cerebrovascular accident (CVA)  Substance abuse  Anxiety  Depression  PTSD (post-traumatic stress disorder)  H/O breast biopsy      Vital Signs Last 24 Hrs  T(C): 36.5 (01 Mar 2020 10:45), Max: 37.4 (29 Feb 2020 22:05)  T(F): 97.7 (01 Mar 2020 10:45), Max: 99.4 (29 Feb 2020 22:05)  HR: 80 (01 Mar 2020 18:07) (68 - 92)  BP: 122/70 (01 Mar 2020 18:07) (107/66 - 122/70)  BP(mean): --  RR: 17 (01 Mar 2020 10:45) (16 - 18)  SpO2: 93% (01 Mar 2020 10:45) (93% - 95%)    Physical Exam:  Constitutional: NAD,   Cardiovascular: +S1S2 RRR  Pulmonary: CTA b/l, unlabored  Abd: soft NTND +BS  Extremities: left leg cast.  no pedal edema, +distal pulses b/l    LABS:                        11.2   10.91 )-----------( 379      ( 01 Mar 2020 07:28 )             34.1     03-01    139  |  107  |  18  ----------------------------<  135<H>  3.7   |  27  |  0.68    Ca    8.8      01 Mar 2020 07:28

## 2020-03-01 NOTE — PROGRESS NOTE ADULT - ASSESSMENT
Patient now with 2 bouts of high rate SVT-requiring adenosine and rapid response twice (fri and sat)-responing to adenosine.   No other episodes   Currently on toprol 50 BID.   EP consult in AM for possible ablation

## 2020-03-02 LAB
ANION GAP SERPL CALC-SCNC: 3 MMOL/L — LOW (ref 5–17)
BUN SERPL-MCNC: 14 MG/DL — SIGNIFICANT CHANGE UP (ref 7–23)
CALCIUM SERPL-MCNC: 8.8 MG/DL — SIGNIFICANT CHANGE UP (ref 8.5–10.1)
CHLORIDE SERPL-SCNC: 104 MMOL/L — SIGNIFICANT CHANGE UP (ref 96–108)
CO2 SERPL-SCNC: 30 MMOL/L — SIGNIFICANT CHANGE UP (ref 22–31)
CREAT SERPL-MCNC: 0.71 MG/DL — SIGNIFICANT CHANGE UP (ref 0.5–1.3)
GLUCOSE SERPL-MCNC: 100 MG/DL — HIGH (ref 70–99)
HCT VFR BLD CALC: 35.4 % — SIGNIFICANT CHANGE UP (ref 34.5–45)
HGB BLD-MCNC: 11.5 G/DL — SIGNIFICANT CHANGE UP (ref 11.5–15.5)
MCHC RBC-ENTMCNC: 28 PG — SIGNIFICANT CHANGE UP (ref 27–34)
MCHC RBC-ENTMCNC: 32.5 GM/DL — SIGNIFICANT CHANGE UP (ref 32–36)
MCV RBC AUTO: 86.1 FL — SIGNIFICANT CHANGE UP (ref 80–100)
PLATELET # BLD AUTO: 396 K/UL — SIGNIFICANT CHANGE UP (ref 150–400)
POTASSIUM SERPL-MCNC: 4 MMOL/L — SIGNIFICANT CHANGE UP (ref 3.5–5.3)
POTASSIUM SERPL-SCNC: 4 MMOL/L — SIGNIFICANT CHANGE UP (ref 3.5–5.3)
RBC # BLD: 4.11 M/UL — SIGNIFICANT CHANGE UP (ref 3.8–5.2)
RBC # FLD: 14.2 % — SIGNIFICANT CHANGE UP (ref 10.3–14.5)
SODIUM SERPL-SCNC: 137 MMOL/L — SIGNIFICANT CHANGE UP (ref 135–145)
WBC # BLD: 11.25 K/UL — HIGH (ref 3.8–10.5)
WBC # FLD AUTO: 11.25 K/UL — HIGH (ref 3.8–10.5)

## 2020-03-02 PROCEDURE — 99233 SBSQ HOSP IP/OBS HIGH 50: CPT

## 2020-03-02 PROCEDURE — 99231 SBSQ HOSP IP/OBS SF/LOW 25: CPT

## 2020-03-02 RX ORDER — DILTIAZEM HCL 120 MG
30 CAPSULE, EXT RELEASE 24 HR ORAL
Refills: 0 | Status: DISCONTINUED | OUTPATIENT
Start: 2020-03-02 | End: 2020-03-06

## 2020-03-02 RX ORDER — LAMOTRIGINE 25 MG/1
150 TABLET, ORALLY DISINTEGRATING ORAL EVERY 12 HOURS
Refills: 0 | Status: DISCONTINUED | OUTPATIENT
Start: 2020-03-02 | End: 2020-03-02

## 2020-03-02 RX ORDER — LAMOTRIGINE 25 MG/1
25 TABLET, ORALLY DISINTEGRATING ORAL
Refills: 0 | Status: DISCONTINUED | OUTPATIENT
Start: 2020-03-02 | End: 2020-03-06

## 2020-03-02 RX ADMIN — Medication 100 MILLIGRAM(S): at 17:33

## 2020-03-02 RX ADMIN — Medication 0.1 MILLIGRAM(S): at 17:33

## 2020-03-02 RX ADMIN — Medication 100 MILLIGRAM(S): at 06:18

## 2020-03-02 RX ADMIN — QUETIAPINE FUMARATE 25 MILLIGRAM(S): 200 TABLET, FILM COATED ORAL at 06:18

## 2020-03-02 RX ADMIN — CEFEPIME 100 MILLIGRAM(S): 1 INJECTION, POWDER, FOR SOLUTION INTRAMUSCULAR; INTRAVENOUS at 22:01

## 2020-03-02 RX ADMIN — Medication 75 MILLIGRAM(S): at 06:18

## 2020-03-02 RX ADMIN — GABAPENTIN 600 MILLIGRAM(S): 400 CAPSULE ORAL at 22:00

## 2020-03-02 RX ADMIN — Medication 325 MILLIGRAM(S): at 17:34

## 2020-03-02 RX ADMIN — Medication 500 MILLIGRAM(S): at 06:18

## 2020-03-02 RX ADMIN — Medication 1 MILLIGRAM(S): at 11:53

## 2020-03-02 RX ADMIN — Medication 75 MILLIGRAM(S): at 17:33

## 2020-03-02 RX ADMIN — ATORVASTATIN CALCIUM 10 MILLIGRAM(S): 80 TABLET, FILM COATED ORAL at 22:01

## 2020-03-02 RX ADMIN — Medication 325 MILLIGRAM(S): at 11:52

## 2020-03-02 RX ADMIN — BUPRENORPHINE AND NALOXONE 1 TABLET(S): 2; .5 TABLET SUBLINGUAL at 06:18

## 2020-03-02 RX ADMIN — Medication 325 MILLIGRAM(S): at 09:49

## 2020-03-02 RX ADMIN — CEFEPIME 100 MILLIGRAM(S): 1 INJECTION, POWDER, FOR SOLUTION INTRAMUSCULAR; INTRAVENOUS at 09:47

## 2020-03-02 RX ADMIN — Medication 0.1 MILLIGRAM(S): at 06:18

## 2020-03-02 RX ADMIN — Medication 1 TABLET(S): at 11:53

## 2020-03-02 RX ADMIN — BUPRENORPHINE AND NALOXONE 1 TABLET(S): 2; .5 TABLET SUBLINGUAL at 14:33

## 2020-03-02 RX ADMIN — Medication 30 MILLIGRAM(S): at 17:33

## 2020-03-02 RX ADMIN — BUPRENORPHINE AND NALOXONE 1 TABLET(S): 2; .5 TABLET SUBLINGUAL at 17:35

## 2020-03-02 RX ADMIN — GABAPENTIN 300 MILLIGRAM(S): 400 CAPSULE ORAL at 17:33

## 2020-03-02 RX ADMIN — GABAPENTIN 300 MILLIGRAM(S): 400 CAPSULE ORAL at 06:18

## 2020-03-02 RX ADMIN — Medication 500 MILLIGRAM(S): at 17:33

## 2020-03-02 RX ADMIN — LAMOTRIGINE 25 MILLIGRAM(S): 25 TABLET, ORALLY DISINTEGRATING ORAL at 14:56

## 2020-03-02 RX ADMIN — Medication 0.5 MILLIGRAM(S): at 22:01

## 2020-03-02 RX ADMIN — QUETIAPINE FUMARATE 25 MILLIGRAM(S): 200 TABLET, FILM COATED ORAL at 17:33

## 2020-03-02 RX ADMIN — ENOXAPARIN SODIUM 40 MILLIGRAM(S): 100 INJECTION SUBCUTANEOUS at 06:18

## 2020-03-02 RX ADMIN — Medication 375 MILLIGRAM(S): at 11:53

## 2020-03-02 RX ADMIN — LAMOTRIGINE 25 MILLIGRAM(S): 25 TABLET, ORALLY DISINTEGRATING ORAL at 17:33

## 2020-03-02 RX ADMIN — Medication 81 MILLIGRAM(S): at 11:53

## 2020-03-02 NOTE — PROGRESS NOTE ADULT - ASSESSMENT
30 y/o F with a h/o depression, PTSD, generalized anxiety disorder, heroin abuse (now on suboxone), HTN, prior CVA with residual left sided weakness, right carotid artery dissection/stenosis, paroxysmal atrial tachycardia, admitted on 2/21 with right distal tib/fib fracture secondary to a fall after experiencing dizziness. S/p ORIF on 2/26.  Patient currently being treated for PNA.  She has recurrent symptomatic SVT requiring adenosine, despite being on metoprolol 	  SVT:  discussed option of adding cardizem vs catheter ablation procedure as a more definitive therapy and is class I indication for this patient. Pt is reluctant to undergo SVT ablation at this time  continue metoprolol 75mg bid, will add cardizem and continue if SBP>120mmhg and HR >60 bpm   Continue tele monitoring  Keep Electrolytes WNL  Dispo per medicine  Plan discussed with NP, patient and family 28 y/o F with a h/o depression, PTSD, generalized anxiety disorder, heroin abuse (now on suboxone), HTN, prior CVA with residual left sided weakness, right carotid artery dissection/stenosis, paroxysmal atrial tachycardia, admitted on 2/21 with right distal tib/fib fracture secondary to a fall after experiencing dizziness. S/p ORIF on 2/26.  Patient currently being treated for PNA.  She has recurrent symptomatic SVT requiring adenosine, despite being on metoprolol 	  SVT:  discussed option of adding cardizem vs catheter ablation procedure as a more definitive therapy and is class I indication for this patient. Pt is reluctant to undergo SVT ablation at this time  continue metoprolol 75mg bid, will add cardizem and continue if SBP>100mmhg and HR >60 bpm   Continue tele monitoring  Keep Electrolytes WNL  Dispo per medicine  Plan discussed with NP, patient and family

## 2020-03-02 NOTE — PROGRESS NOTE ADULT - SUBJECTIVE AND OBJECTIVE BOX
c/c: fall/ right leg pain    HPI: 29F, pmh of Depression, PTSD, Generalized anxiety d/o, heroin abuse now on suboxone, HTN, Stroke with left sided weakness, RIght carotid A dissection/stenosis on asa/statin, paroxysmal atrial tachycardia, who presented to the ER with right leg pain inability to ambulate after a fall. She states she felt dizzy and fell onto her right leg. Denies sob/chest pain/n/v/d/abd pain. Felt fine prior to falling. no loc.   IN ED had imaging which showed right distal tib/fib fracture.   Underwent ex-fix 2/21. Followed by psychiatry and meds titrated down as it was felt her high doses contributed to her dizziness. Post op course notable for episodes of SVT requiring adenosine.     3/2: pt seen and examined this am. Sleeping but arousable. Denied pain/sob/dizziness. Hasn't really gotten out of bed since svt episodes.    Review of system- All 10 systems reviewed and is as per HPI otherwise negative.     Vital Signs Last 24 Hrs  T(C): 36.7 (02 Mar 2020 10:43), Max: 36.8 (01 Mar 2020 21:00)  T(F): 98 (02 Mar 2020 10:43), Max: 98.2 (01 Mar 2020 21:00)  HR: 76 (02 Mar 2020 10:43) (75 - 80)  BP: 115/62 (02 Mar 2020 10:43) (115/62 - 122/70)  RR: 18 (02 Mar 2020 10:43) (15 - 18)  SpO2: 94% (02 Mar 2020 10:43) (93% - 97%)    PHYSICAL EXAM:  GENERAL: Comfortable, lethargic, no acute distress  HEAD:  Atraumatic, Normocephalic  EYES: EOMI, PERRLA  HEENT: Moist mucous membranes  NECK: Supple, No JVD  NERVOUS SYSTEM:  nonfocal  CHEST/LUNG: Clear to auscultation bilaterally  HEART: Regular rate and rhythm  ABDOMEN: Soft, Nontender, Nondistended; Bowel sounds present  GENITOURINARY- Voiding, no palpable bladder  EXTREMITIES:  No clubbing, cyanosis, or edema  MUSCULOSKELETAL- RLE in splint/ace-wrap  SKIN-no rash    LABS:                        11.2   10.91 )-----------( 379      ( 01 Mar 2020 07:28 )             34.1     03-01    139  |  107  |  18  ----------------------------<  135<H>  3.7   |  27  |  0.68    Ca    8.8      01 Mar 2020 07:28      MEDICATIONS  (STANDING):  ascorbic acid 500 milliGRAM(s) Oral two times a day  aspirin  chewable 81 milliGRAM(s) Oral daily  atorvastatin 10 milliGRAM(s) Oral at bedtime  buprenorphine 8 mG/naloxone 2 mG SL  Tablet 1 Tablet(s) SubLingual <User Schedule>  cefepime   IVPB 2000 milliGRAM(s) IV Intermittent every 12 hours  cloNIDine 0.1 milliGRAM(s) Oral two times a day  doxycycline hyclate Capsule 100 milliGRAM(s) Oral every 12 hours  enoxaparin Injectable 40 milliGRAM(s) SubCutaneous every 24 hours  ferrous    sulfate 325 milliGRAM(s) Oral three times a day with meals  folic acid 1 milliGRAM(s) Oral daily  gabapentin 300 milliGRAM(s) Oral two times a day  gabapentin 600 milliGRAM(s) Oral at bedtime  lactated ringers. 1000 milliLiter(s) (75 mL/Hr) IV Continuous <Continuous>  lactated ringers. 1000 milliLiter(s) (75 mL/Hr) IV Continuous <Continuous>  lactated ringers. 1000 milliLiter(s) (75 mL/Hr) IV Continuous <Continuous>  metoprolol tartrate 75 milliGRAM(s) Oral two times a day  multivitamin 1 Tablet(s) Oral daily  QUEtiapine 25 milliGRAM(s) Oral two times a day  venlafaxine XR. 375 milliGRAM(s) Oral daily    MEDICATIONS  (PRN):  acetaminophen   Tablet .. 650 milliGRAM(s) Oral every 6 hours PRN Mild Pain (1 - 3)  acetaminophen   Tablet .. 650 milliGRAM(s) Oral every 6 hours PRN Temp greater or equal to 38C (100.4F)  calcium carbonate    500 mG (Tums) Chewable 3 Tablet(s) Chew every 6 hours PRN Dyspepsia  clonazePAM  Tablet 0.5 milliGRAM(s) Oral daily PRN Anxiety  HYDROmorphone  Injectable 0.25 milliGRAM(s) IV Push every 4 hours PRN Severe Pain (7 - 10)  melatonin 3 milliGRAM(s) Oral at bedtime PRN Sleep  ondansetron Injectable 4 milliGRAM(s) IV Push every 6 hours PRN Nausea and/or Vomiting  oxyCODONE    IR 5 milliGRAM(s) Oral every 6 hours PRN Moderate Pain (4 - 6)  prochlorperazine   Injectable 10 milliGRAM(s) IV Push once PRN Nausea  senna 2 Tablet(s) Oral at bedtime PRN Constipation  traZODone 50 milliGRAM(s) Oral at bedtime PRN insomnia      ASSESSMENT AND PLAN:  #Fall/Right tib/fib fracture:  -S/P ex fix   -S/p ORIF 2/26/20  -pain control  -physical therapy  -incentive spirometry    #SVT  -s/p adenosine  -bb increased to 75mg bid yesterday  -started on ccb today per EP.    #HCAP likely d/t resistant gram negative organism:  -on cefepime/doxy D#6  -can dc after tomorrow's dose.    #Dizziness:  -likely related to multiple sedative meds/high clonidine dosing +/-SVT    #H/o right carotid A dissection/stenosis, CVA/mild lue weakness:  -on asa/statin  -vascular eval appreciated  -repeat CTA with healed carotid dissection    #HTN:  -now on bb/ccb.    #Heroin abuse in past:  -continue suboxone.    #Depression/anxiety/PTSD:  -continue effexor, klonipin, trazodone, gabapentin, seroquel  -prn klonipin decreased from 1mg to 0.5mg.  -clonidine decreased from 0.2mg daily and 0.3mg HS to 0.1 mg bid.  -continue effexor, trazodone, gabapentin, seroquel at current doses.  -upon review of meds pt hasn't gotten lamictal since 2/21  -d/w psychiatry, needs to be restarted @ lower dose and titrated up slowly .  -will start lamictal @ 25mg bid, titrate up in 2 weeks.    #DVT px- Lovenox    #Dispo-  Dc planning once Episodes of SVT improved.

## 2020-03-02 NOTE — PROGRESS NOTE ADULT - SUBJECTIVE AND OBJECTIVE BOX
Subjective: Pt is asymptomatic sleeping most of the day, on multiple psych medications, alert awake and minimally verbal. Pt confirmed she felt dizzy and palpitations during SVT.    EKG: SR 70bpm nml QRS (2/28/20)  TELE:  SVT x2 on 3/1 and 2/29, shows abrupt  bpm, first episode sustained terminated with adenosine, 2nd episode 4min terminated spontaneously     MEDICATIONS  (STANDING):  ascorbic acid 500 milliGRAM(s) Oral two times a day  aspirin  chewable 81 milliGRAM(s) Oral daily  atorvastatin 10 milliGRAM(s) Oral at bedtime  buprenorphine 8 mG/naloxone 2 mG SL  Tablet 1 Tablet(s) SubLingual <User Schedule>  cefepime   IVPB 2000 milliGRAM(s) IV Intermittent every 12 hours  cloNIDine 0.1 milliGRAM(s) Oral two times a day  doxycycline hyclate Capsule 100 milliGRAM(s) Oral every 12 hours  enoxaparin Injectable 40 milliGRAM(s) SubCutaneous every 24 hours  ferrous    sulfate 325 milliGRAM(s) Oral three times a day with meals  folic acid 1 milliGRAM(s) Oral daily  gabapentin 300 milliGRAM(s) Oral two times a day  gabapentin 600 milliGRAM(s) Oral at bedtime  lactated ringers. 1000 milliLiter(s) (75 mL/Hr) IV Continuous <Continuous>  lactated ringers. 1000 milliLiter(s) (75 mL/Hr) IV Continuous <Continuous>  lactated ringers. 1000 milliLiter(s) (75 mL/Hr) IV Continuous <Continuous>  metoprolol tartrate 75 milliGRAM(s) Oral two times a day  multivitamin 1 Tablet(s) Oral daily  QUEtiapine 25 milliGRAM(s) Oral two times a day  venlafaxine XR. 375 milliGRAM(s) Oral daily    MEDICATIONS  (PRN):  acetaminophen   Tablet .. 650 milliGRAM(s) Oral every 6 hours PRN Mild Pain (1 - 3)  acetaminophen   Tablet .. 650 milliGRAM(s) Oral every 6 hours PRN Temp greater or equal to 38C (100.4F)  calcium carbonate    500 mG (Tums) Chewable 3 Tablet(s) Chew every 6 hours PRN Dyspepsia  clonazePAM  Tablet 0.5 milliGRAM(s) Oral daily PRN Anxiety  HYDROmorphone  Injectable 0.25 milliGRAM(s) IV Push every 4 hours PRN Severe Pain (7 - 10)  melatonin 3 milliGRAM(s) Oral at bedtime PRN Sleep  ondansetron Injectable 4 milliGRAM(s) IV Push every 6 hours PRN Nausea and/or Vomiting  oxyCODONE    IR 5 milliGRAM(s) Oral every 6 hours PRN Moderate Pain (4 - 6)  prochlorperazine   Injectable 10 milliGRAM(s) IV Push once PRN Nausea  senna 2 Tablet(s) Oral at bedtime PRN Constipation  traZODone 50 milliGRAM(s) Oral at bedtime PRN insomnia      Vital Signs Last 24 Hrs  T(C): 36.7 (02 Mar 2020 10:43), Max: 36.8 (01 Mar 2020 21:00)  T(F): 98 (02 Mar 2020 10:43), Max: 98.2 (01 Mar 2020 21:00)  HR: 76 (02 Mar 2020 10:43) (75 - 80)  BP: 115/62 (02 Mar 2020 10:43) (115/62 - 122/70)  BP(mean): --  RR: 18 (02 Mar 2020 10:43) (15 - 18)  SpO2: 94% (02 Mar 2020 10:43) (93% - 97%)    PHYSICAL EXAMINATION:  GENERAL: NAD, A&Ox2 (person and place)  HEAD:  Atraumatic, Normocephalic  EYES: EOMI, PERRLA  HEENT: Moist mucous membranes  NECK: Supple, No JVD  CHEST/LUNG: Clear to auscultation bilaterally  HEART: Regular rate and rhythm, tachycardic  ABDOMEN: Soft, Nontender, Nondistended; Bowel sounds present  EXTREMITIES:  No clubbing, cyanosis, or edema  MUSCULOSKELETAL- LLE in splint/ace-wrap  SKIN-no rash  LABS:                        11.5   11.25 )-----------( 396      ( 02 Mar 2020 07:19 )             35.4     03-02    137  |  104  |  14  ----------------------------<  100<H>  4.0   |  30  |  0.71    Ca    8.8      02 Mar 2020 07:19

## 2020-03-02 NOTE — PROGRESS NOTE ADULT - SUBJECTIVE AND OBJECTIVE BOX
HPI: This is a 28 y/o woman with pmhx of Depression, PTSD, Generalized anxiety d/o, heroin abuse now on subloxone, HTN, Stroke with left sided weakness, right carotid A dissection/stenosis on asa/statin, paroxysmal atrial tachycardia, who presented to the ER with right leg pain inability to ambulate after a fall. She states she felt dizzy and fell onto her right leg. Denies sob/chest pain/n/v/d/abd pain. Felt fine prior to falling. no loc.   IN ED had imaging which showed right distal tib/fib fracture. Now s/p Right ankle ORIF on 2020: Pt seen at bedside on 2N, she is AAOx3, delayed response. Informed her of VTE ppx with ASA. Inquired about prior use of oral AC and patient reports "I don't know."   2020: Pt seen at bedside on 2N, lethargic and not following commands; poor engagement with conversation  2020: Pt seen at bedside on 2 N awake not following commands, pending surgery tomorrow  2020: Pt seen at bedside NPO for OR today, Pt  had an episode of SOB and tachycardia CT angio done negative for PE.  2020: Pt seen at bedside, s/p Right ankle ORIF, denies any pain, no c/o SOB or chest pain will continue lovenox while in the hospital  ; rapid response this am due to episode of SVT treated with Adenosine  3/2/2020: Pt seen at bedside, resting in bed, no acute events       Patient is a 29y old  Female who presents with a chief complaint of tib/fib fracture (2020 13:03)    Consulted by Dr. Urbano for VTE prophylaxis, risk stratification, and anticoagulation management.    PAST MEDICAL & SURGICAL HISTORY:  Cyst, breast  Transaminitis  Aneurysm of right internal carotid artery  Cerebrovascular accident (CVA)  Substance abuse  Anxiety  Depression  PTSD (post-traumatic stress disorder)  H/O breast biopsy    BMI: 30.4    CrCL: 126.86    CAPRINI SCORE  AGE RELATED RISK FACTORS                                                       MOBILITY RELATED FACTORS  [ ] Age 41-60 years                                            (1 Point)                  [ ] Bed rest                                                        (1 Point)  [ ] Age: 61-74 years                                           (2 Points)                [ ] Plaster cast                                                   (2 Points)  [ ] Age= 75 years                                              (3 Points)                 [ ] Bed bound for more than 72 hours                   (2 Points)    DISEASE RELATED RISK FACTORS                                               GENDER SPECIFIC FACTORS  [ ] Edema in the lower extremities                       (1 Point)           [ ] Pregnancy                                                            (1 Point)  [ ] Varicose veins                                               (1 Point)                  [ ] Post-partum < 6 weeks                                      (1 Point)             [X ] BMI > 25 Kg/m2                                            (1 Point)                  [ ] Hormonal therapy or oral contraception       (1 Point)                 [ ] Sepsis (in the previous month)                        (1 Point)             [ ] History of pregnancy complications                (1Point)  [ ] Pneumonia or serious lung disease                                             [ ] Unexplained or recurrent  (=3), premature                                 (In the previous month)                               (1 Point)                birth with toxemia or growth-restricted infant (1 Point)  [ ] Abnormal pulmonary function test            (1 Point)                                   SURGERY RELATED RISK FACTORS  [ ] Acute myocardial infarction                       (1 Point)                  [ ]  Section                                         (1 Point)  [ ] Congestive heart failure (in the previous month) (1 Point)   [ ] Minor surgery   lasting <45 minutes       (1 Point)   [ ] Inflammatory bowel disease                             (1 Point)          [ ] Arthroscopic surgery                                  (2 Points)  [ ] Central venous access                                    (2 Points)            [ ] General surgery lasting >45 minutes      (2 Points)       [ ] Stroke (in the previous month)                  (5 Points)            [ ] Elective major lower extremity arthroplasty (5 Points)                                   [  ] Malignancy (present or past include skin melanoma                                          but exclude  basal skin cell)    (2 points)                                      TRAUMA RELATED RISK FACTORS                HEMATOLOGY RELATED FACTORS                                  [X ] Fracture of the hip, pelvis, or leg                       (5 Points)  [ ] Prior episodes of VTE                                     (3 Points)          [ ] Acute spinal cord injury (in the previous month)  (5 Points)  [ ] Positive family history for VTE                         (3 Points)       [ ] Paralysis (less than 1 month)                          (5 Points)  [ ] Prothrombin 48715 A                                      (3 Points)         [ ] Multiple Trauma (within 1month)                 (5Points)                                                                                                                                                                [ ] Factor V Leiden                                          (3 Points)                                OTHER RISK FACTORS                          [ ] Lupus anticoagulants                                     (3 Points)                       [ ] BMI > 40                          (1 Point)                                                         [ ] Anticardiolipin antibodies                                (3 Points)                   [ ] Smoking                              (1Point)                                                [ ] High homocysteine in the blood                      (3 Points)                [  ] Diabetes requiring insulin (1point)                         [ ] Other congenital or acquired thrombophilia       (3 Points)          [  ] Chemotherapy                   (1 Point)  [ ] Heparin induced thrombocytopenia                  (3 Points)             [  ] Blood Transfusion                (1 point)                                                                                                         Total Score [  6  ]                                                                                                                   IMPROVE Bleeding Risk Score: 0    Falls Risk:   High ( X )  Mod (  )  Low (  )    EBL: 5 ml    PROCEDURE START:   PROCEDURE END:     Allergies  Geodon (Other)  Seroquel (Unknown)  Zyprexa (Other)    Intolerances    REVIEW OF SYSTEMS    (  )Fever	     (  )Constipation	(  )SOB			(  )Headache	(  )Dysuria  (  )Chills	     (  )Melena	(  )Dyspnea present on exertion    (  )Dizziness                    (  )Polyuria  (  )Nausea	     (  )Hematochezia	(  )Cough		                    (  )Syncope   	(  )Hematuria  (  )Vomiting    (  )Chest Pain	(  )Wheezing		(  )Weakness  (  )Diarrhea     (  )Palpitations	(  )Anorexia		(  )Myalgia    + pain in her right leg. All other review of systems negative: Yes  Vital Signs Last 24 Hrs  T(C): 36.7 (02 Mar 2020 10:43), Max: 36.8 (01 Mar 2020 21:00)  T(F): 98 (02 Mar 2020 10:43), Max: 98.2 (01 Mar 2020 21:00)  HR: 76 (02 Mar 2020 10:43) (75 - 80)  BP: 115/62 (02 Mar 2020 10:43) (115/62 - 122/70)  BP(mean): --  RR: 18 (02 Mar 2020 10:43) (15 - 18)  SpO2: 94% (02 Mar 2020 10:43) (93% - 97%)      Neurological: Lethargic     Skin: Warm    Respiratory and Thorax: normal effort; Breath sounds: normal; No rales/wheezing/rhonchi  	  Cardiovascular: S1, S2, regular, NMBR SVT this am, now NSR on tele monitor    Gastrointestinal: BS + x 4Q, nontender	    Genitourinary:  Bladder nondistended, nontender    Musculoskeletal:   General Right:   no muscle/joint tenderness,   normal tone, no joint swelling,   ROM: limited	    General Left:   no muscle/joint tenderness,   normal tone, no joint swelling,   ROM: full    RLE: Dsg:C/D/I, capillary refill :normal    Lower extrems:   Right: no calf tenderness              negative tammi's sign                pedal pulses Non accessable    Left:   no calf tenderness              negative tammi's sign               + pedal pulses    LABS:                                       11.5   11.25 )-----------( 396      ( 02 Mar 2020 07:19 )             35.4       03-02    137  |  104  |  14  ----------------------------<  100<H>  4.0   |  30  |  0.71    Ca    8.8      02 Mar 2020 07:19                       11.7   12.71 )-----------( 383      ( 2020 08:55 )             36.4       02-    139  |  105  |  12  ----------------------------<  98  3.7   |  28  |  0.68    Ca    9.0      2020 08:55  Mg     2.1     -                            11.1   16.27 )-----------( 389      ( 2020 07:04 )             34.5       02-    140  |  106  |  12  ----------------------------<  109<H>  4.0   |  28  |  0.78    Ca    9.0      2020 07:04        PT/INR - ( 2020 06:19 )   PT: 12.8 sec;   INR: 1.15 ratio         PTT - ( 2020 06:19 )  PTT:30.1 sec                          12.6   12.75 )-----------( 414      ( 2020 06:19 )             38.1           139  |  105  |  14  ----------------------------<  107<H>  3.9   |  31  |  0.65    Ca    9.1      2020 06:19        PT/INR - ( 2020 06:19 )   PT: 12.8 sec;   INR: 1.15 ratio         PTT - ( :19 )  PTT:30.1 sec                    12.6   11.02 )-----------( 413      ( 2020 07:12 )             38.7       02    137  |  105  |  15  ----------------------------<  107<H>  3.8   |  27  |  0.77    Ca    9.0      2020 07:12                        11.8   12.43 )-----------( 406      ( 2020 06:37 )             37.6           138  |  104  |  9   ----------------------------<  91  4.0   |  29  |  0.73    Ca    8.9      2020 06:37                       12.6   8.93  )-----------( 401      ( 2020 06:25 )             40.1     02    140  |  107  |  7   ----------------------------<  123<H>  4.4   |  27  |  0.92    Ca    8.8      2020 06:25      PT/INR - ( 2020 07:36 )   PT: 12.6 sec;   INR: 1.13 ratio    PTT - ( 2020 07:36 )  PTT:30.8 sec  Urinalysis Basic - ( 2020 04:13 )  Color: Yellow / Appearance: Clear / S.005 / pH: x  Gluc: x / Ketone: Negative  / Bili: Negative / Urobili: Negative mg/dL   Blood: x / Protein: Negative mg/dL / Nitrite: Negative   Leuk Esterase: Trace / RBC: 0-2 /HPF / WBC 6-10   Sq Epi: x / Non Sq Epi: Few / Bacteria: Moderate    RADIOLOGY, EKG & ADDITIONAL TESTS: Reviewed.     EXAM:  CT ANKLE ONLY RT                        EXAM:  CT 3D RECONSTRUCT  MITRA                        PROCEDURE DATE:  2020    IMPRESSION:  1.  Status post external fixation.  2.  Comminuted mildly displaced fractures of the lateral tibial plafond with intra-articular extension as described above.  3.  Nondisplaced coronally oriented intra-articular fracture at the posterior tibial malleolus.  4.  Mildly displaced distal fibular fracture.  5.  Suspected widening of the tibiotalar joint space  6.  Soft tissue swelling.    < from: CT Angio Chest PE Protocol w/ IV Cont (20 @ 09:07) >  IMPRESSION:     Enhancing segmental consolidation in the left lower lobe with air bronchograms likely representing atelectasis, clinically correlate for pneumonia. Subsegmental atelectasis in the right lower lobe.    No definite evidence of acute pulmonary embolism.    < end of copied text >          MEDICATIONS  (STANDING):  ascorbic acid 500 milliGRAM(s) Oral two times a day  aspirin  chewable 81 milliGRAM(s) Oral daily  atorvastatin 10 milliGRAM(s) Oral at bedtime  buprenorphine 8 mG/naloxone 2 mG SL  Tablet 1 Tablet(s) SubLingual <User Schedule>  cefepime   IVPB 2000 milliGRAM(s) IV Intermittent every 12 hours  cloNIDine 0.1 milliGRAM(s) Oral two times a day  diltiazem    Tablet 30 milliGRAM(s) Oral two times a day  doxycycline hyclate Capsule 100 milliGRAM(s) Oral every 12 hours  enoxaparin Injectable 40 milliGRAM(s) SubCutaneous every 24 hours  ferrous    sulfate 325 milliGRAM(s) Oral three times a day with meals  folic acid 1 milliGRAM(s) Oral daily  gabapentin 300 milliGRAM(s) Oral two times a day  gabapentin 600 milliGRAM(s) Oral at bedtime  lactated ringers. 1000 milliLiter(s) (75 mL/Hr) IV Continuous <Continuous>  lactated ringers. 1000 milliLiter(s) (75 mL/Hr) IV Continuous <Continuous>  lactated ringers. 1000 milliLiter(s) (75 mL/Hr) IV Continuous <Continuous>  lamoTRIgine 25 milliGRAM(s) Oral two times a day  metoprolol tartrate 75 milliGRAM(s) Oral two times a day  multivitamin 1 Tablet(s) Oral daily  QUEtiapine 25 milliGRAM(s) Oral two times a day  venlafaxine XR. 375 milliGRAM(s) Oral daily                DVT Prophylaxis:  LMWH                   ( x )  Heparin SQ           (  )  Coumadin             (  )  Xarelto                  (  )  Eliquis                   (  )  Venodynes           ( X )  Ambulation          (X )  UFH                       (  )  Contraindicated  (  )  ASA                       (  X )

## 2020-03-02 NOTE — PROGRESS NOTE ADULT - SUBJECTIVE AND OBJECTIVE BOX
Patient is a 29y old  Female who presents with a chief complaint of tib/fib fracture.       HPI:  c/c: fall/ right leg pain    HPI: 29F, pmh of Depression, PTSD, Generalized anxiety d/o, heroin abuse now on suboxone, HTN, Stroke with left sided weakness, RIght carotid A dissection/stenosis on asa/statin, paroxysmal atrial tachycardia, who presented to the ER with right leg pain inability to ambulate after a fall.   -   This am pt was noted to be tachycardic.  /min per documentation and report. There is no tele strip or EKG in chart.  Per report she was given adenosine.  She denies any symptoms but she is not verbally telling the answers but only nodding , staring     3/2- pt seen and examined by me today. pt denies any palpitations or any symptoms.    PMH: as above  PSH: denies  F/H: denies significant medical conditions in immediate family members.  Social: smokes 1ppd , drinks ETOH once in a while, heroin abuse in past       PAST MEDICAL & SURGICAL HISTORY:  Cyst, breast  Transaminitis  Aneurysm of right internal carotid artery  Cerebrovascular accident (CVA)  Substance abuse  Anxiety  Depression  PTSD (post-traumatic stress disorder)  H/O breast biopsy      MEDICATIONS  (STANDING):  ascorbic acid 500 milliGRAM(s) Oral two times a day  aspirin  chewable 81 milliGRAM(s) Oral daily  atorvastatin 10 milliGRAM(s) Oral at bedtime  buprenorphine 8 mG/naloxone 2 mG SL  Tablet 1 Tablet(s) SubLingual <User Schedule>  cefepime   IVPB 2000 milliGRAM(s) IV Intermittent every 12 hours  cloNIDine 0.1 milliGRAM(s) Oral two times a day  doxycycline hyclate Capsule 100 milliGRAM(s) Oral every 12 hours  enoxaparin Injectable 40 milliGRAM(s) SubCutaneous every 24 hours  ferrous    sulfate 325 milliGRAM(s) Oral three times a day with meals  folic acid 1 milliGRAM(s) Oral daily  gabapentin 300 milliGRAM(s) Oral two times a day  gabapentin 600 milliGRAM(s) Oral at bedtime  lactated ringers. 1000 milliLiter(s) (75 mL/Hr) IV Continuous <Continuous>  lactated ringers. 1000 milliLiter(s) (75 mL/Hr) IV Continuous <Continuous>  lactated ringers. 1000 milliLiter(s) (75 mL/Hr) IV Continuous <Continuous>  multivitamin 1 Tablet(s) Oral daily  QUEtiapine 25 milliGRAM(s) Oral two times a day  venlafaxine XR. 375 milliGRAM(s) Oral daily    MEDICATIONS  (PRN):  acetaminophen   Tablet .. 650 milliGRAM(s) Oral every 6 hours PRN Temp greater or equal to 38C (100.4F)  calcium carbonate    500 mG (Tums) Chewable 3 Tablet(s) Chew every 6 hours PRN Dyspepsia  clonazePAM  Tablet 0.5 milliGRAM(s) Oral daily PRN Anxiety  HYDROmorphone  Injectable 0.5 milliGRAM(s) IV Push every 6 hours PRN Severe Pain (7 - 10)  melatonin 3 milliGRAM(s) Oral at bedtime PRN Sleep  ondansetron Injectable 4 milliGRAM(s) IV Push every 6 hours PRN Nausea and/or Vomiting  oxyCODONE    IR 10 milliGRAM(s) Oral every 4 hours PRN Moderate Pain (4 - 6)  oxyCODONE    IR 5 milliGRAM(s) Oral every 4 hours PRN Mild Pain (1 - 3)  prochlorperazine   Injectable 10 milliGRAM(s) IV Push once PRN Nausea  senna 2 Tablet(s) Oral at bedtime PRN Constipation  traZODone 50 milliGRAM(s) Oral at bedtime PRN insomnia      FAMILY HISTORY:  Medical history unknown: in father, unknown if living or   Family history of drug use: IN MOTHER, LIVING      SOCIAL HISTORY:    REVIEW OF SYSTEMS:  CONSTITUTIONAL:    No fatigue, malaise, lethargy.  No fever or chills.  RESPIRATORY:  No cough.  No wheeze.  No hemoptysis.  No shortness of breath.  CARDIOVASCULAR:  No chest pains.  No palpitations. No shortness of breath, No orthopnea or PND.  GASTROINTESTINAL:  No abdominal pain.  No nausea or vomiting.    GENITOURINARY:    No hematuria.    MUSCULOSKELETAL:  No musculoskeletal pain.  No joint swelling.  No arthritis.  NEUROLOGICAL:  No tingling or numbness or weakness.  PSYCHIATRIC:  No confusion          ICU Vital Signs Last 24 Hrs  T(C): 36.6 (02 Mar 2020 05:46), Max: 36.8 (01 Mar 2020 21:00)  T(F): 97.9 (02 Mar 2020 05:46), Max: 98.2 (01 Mar 2020 21:00)  HR: 75 (02 Mar 2020 05:46) (68 - 80)  BP: 119/60 (02 Mar 2020 05:46) (108/66 - 122/70)  BP(mean): --  ABP: --  ABP(mean): --  RR: 15 (02 Mar 2020 05:46) (15 - 18)  SpO2: 93% (02 Mar 2020 05:46) (93% - 97%)    PHYSICAL EXAM-    Constitutional: The patient appears to be normal, well developed, well nourished and alert and oriented to time, place and person. The patient does not appear acutely ill.     Head: Head is normocephalic and atraumatic.      Neck: No jugular venous distention. No audible carotid bruits. There are strong carotid pulses bilaterally. No JVD.     Cardiovascular: Regular rate and rhythm without S3, S4. No murmurs or rubs are appreciated.      Respiratory: Breath sounds are normal. No rales. No wheezing.    Abdomen: Soft, nontender, nondistended with positive bowel sounds.      Extremity: R leg in cast     Neurologic: The patient is alert and oriented.      Skin: No rash, no obvious lesions noted.      Psychiatric: The patient appears to be emotionally stable.      INTERPRETATION OF TELEMETRY: SR , PACs .    ECG: Sinus rythm , normal axis, t wave inversion in V2-3, isolated PVCs.     I&O's Detail      LABS:                                   11.5   11.25 )-----------( 396      ( 02 Mar 2020 07:19 )             35.4     03-01    139  |  107  |  18  ----------------------------<  135<H>  3.7   |  27  |  0.68    Ca    8.8      01 Mar 2020 07:28                             I&O's Summary    BNP  RADIOLOGY & ADDITIONAL STUDIES:  < from: Transthoracic Echocardiogram (19 @ 21:32) >     Summary     The left ventricle is normal in size, wall thickness, wall motion and   contractility.   Estimated left ventricular ejection fraction is 55 -60%.   Normal appearing left atrium.   Normalappearing right atrium.   Normal appearing right ventricle structure and function.   Normal aortic valve structure and function.   Normal appearing mitral valve structure and function.   Mild (1+) mitral regurgitation is present.   Normal appearing tricuspid valve structure and function.   No evidence of pericardial effusion.     Signature     ----------------------------------------------------------------   Electronically signed by Fer WINKLERInterpreting physician)   on 2019 02:14 PM   ----------------------------------------------------------------    < end of copied text >

## 2020-03-02 NOTE — PROGRESS NOTE ADULT - ASSESSMENT
29F s/p ORIF R pilon fx    Analgesia  DVT ppx  NWB RLE in splint  PT/OT  Incentive spirometry  Cont care per primary team  Appreciate cards/EP recs  DC planning

## 2020-03-02 NOTE — PROGRESS NOTE ADULT - ASSESSMENT
SVT- Pt had more episodes of SVT over the weekend.  She did not require any intervention and spontaneously converted to SInus rythm.  Long RP tachycardia.  Discussed with Dr Enrique this am.  he is going to review the SVT and give further recommendations.  EP team input over the weekend noted.   Will continue metoprlol for now.        HTN- BP optimal this am.   Pain control.     CVA- continue home meds.    Other medical issues- Management per primary team.   Thank you for allowing me to participate in the care of this patient. Please feel free to contact me with any questions.

## 2020-03-02 NOTE — PROGRESS NOTE ADULT - SUBJECTIVE AND OBJECTIVE BOX
Pt S/E at bedside, no acute events overnight, pain controlled. Resting comfortably with no complaints.     Vital Signs Last 24 Hrs  T(C): 36.6 (02 Mar 2020 05:46), Max: 36.8 (01 Mar 2020 21:00)  T(F): 97.9 (02 Mar 2020 05:46), Max: 98.2 (01 Mar 2020 21:00)  HR: 75 (02 Mar 2020 05:46) (68 - 80)  BP: 119/60 (02 Mar 2020 05:46) (108/66 - 122/70)  BP(mean): --  RR: 15 (02 Mar 2020 05:46) (15 - 18)  SpO2: 93% (02 Mar 2020 05:46) (93% - 97%)    Gen: NAD, AAOx3    Right Lower Extremity:  +trilam splint c/d/i  +EHL/FHL  SILT all 5 toes  Cap refill brisk  Compartments soft  No calf TTP B/L   No pain with passive stretch of toes

## 2020-03-03 LAB
ANION GAP SERPL CALC-SCNC: 4 MMOL/L — LOW (ref 5–17)
BUN SERPL-MCNC: 14 MG/DL — SIGNIFICANT CHANGE UP (ref 7–23)
CALCIUM SERPL-MCNC: 9.1 MG/DL — SIGNIFICANT CHANGE UP (ref 8.5–10.1)
CHLORIDE SERPL-SCNC: 109 MMOL/L — HIGH (ref 96–108)
CO2 SERPL-SCNC: 29 MMOL/L — SIGNIFICANT CHANGE UP (ref 22–31)
CREAT SERPL-MCNC: 0.72 MG/DL — SIGNIFICANT CHANGE UP (ref 0.5–1.3)
GLUCOSE SERPL-MCNC: 103 MG/DL — HIGH (ref 70–99)
MAGNESIUM SERPL-MCNC: 2.1 MG/DL — SIGNIFICANT CHANGE UP (ref 1.6–2.6)
PHOSPHATE SERPL-MCNC: 3.7 MG/DL — SIGNIFICANT CHANGE UP (ref 2.5–4.5)
POTASSIUM SERPL-MCNC: 3.9 MMOL/L — SIGNIFICANT CHANGE UP (ref 3.5–5.3)
POTASSIUM SERPL-SCNC: 3.9 MMOL/L — SIGNIFICANT CHANGE UP (ref 3.5–5.3)
SODIUM SERPL-SCNC: 142 MMOL/L — SIGNIFICANT CHANGE UP (ref 135–145)

## 2020-03-03 PROCEDURE — 99233 SBSQ HOSP IP/OBS HIGH 50: CPT

## 2020-03-03 RX ADMIN — BUPRENORPHINE AND NALOXONE 1 TABLET(S): 2; .5 TABLET SUBLINGUAL at 13:40

## 2020-03-03 RX ADMIN — ENOXAPARIN SODIUM 40 MILLIGRAM(S): 100 INJECTION SUBCUTANEOUS at 06:50

## 2020-03-03 RX ADMIN — Medication 100 MILLIGRAM(S): at 06:50

## 2020-03-03 RX ADMIN — Medication 1 MILLIGRAM(S): at 11:04

## 2020-03-03 RX ADMIN — LAMOTRIGINE 25 MILLIGRAM(S): 25 TABLET, ORALLY DISINTEGRATING ORAL at 17:32

## 2020-03-03 RX ADMIN — LAMOTRIGINE 25 MILLIGRAM(S): 25 TABLET, ORALLY DISINTEGRATING ORAL at 06:50

## 2020-03-03 RX ADMIN — Medication 75 MILLIGRAM(S): at 06:50

## 2020-03-03 RX ADMIN — QUETIAPINE FUMARATE 25 MILLIGRAM(S): 200 TABLET, FILM COATED ORAL at 06:50

## 2020-03-03 RX ADMIN — Medication 375 MILLIGRAM(S): at 11:05

## 2020-03-03 RX ADMIN — Medication 0.1 MILLIGRAM(S): at 17:32

## 2020-03-03 RX ADMIN — CEFEPIME 100 MILLIGRAM(S): 1 INJECTION, POWDER, FOR SOLUTION INTRAMUSCULAR; INTRAVENOUS at 21:56

## 2020-03-03 RX ADMIN — GABAPENTIN 300 MILLIGRAM(S): 400 CAPSULE ORAL at 06:50

## 2020-03-03 RX ADMIN — Medication 81 MILLIGRAM(S): at 11:04

## 2020-03-03 RX ADMIN — Medication 1 TABLET(S): at 11:05

## 2020-03-03 RX ADMIN — ATORVASTATIN CALCIUM 10 MILLIGRAM(S): 80 TABLET, FILM COATED ORAL at 21:56

## 2020-03-03 RX ADMIN — Medication 0.1 MILLIGRAM(S): at 06:50

## 2020-03-03 RX ADMIN — Medication 100 MILLIGRAM(S): at 17:27

## 2020-03-03 RX ADMIN — Medication 325 MILLIGRAM(S): at 11:15

## 2020-03-03 RX ADMIN — GABAPENTIN 300 MILLIGRAM(S): 400 CAPSULE ORAL at 17:27

## 2020-03-03 RX ADMIN — Medication 0.5 MILLIGRAM(S): at 17:38

## 2020-03-03 RX ADMIN — BUPRENORPHINE AND NALOXONE 1 TABLET(S): 2; .5 TABLET SUBLINGUAL at 19:55

## 2020-03-03 RX ADMIN — CEFEPIME 100 MILLIGRAM(S): 1 INJECTION, POWDER, FOR SOLUTION INTRAMUSCULAR; INTRAVENOUS at 11:03

## 2020-03-03 RX ADMIN — GABAPENTIN 600 MILLIGRAM(S): 400 CAPSULE ORAL at 21:56

## 2020-03-03 RX ADMIN — BUPRENORPHINE AND NALOXONE 1 TABLET(S): 2; .5 TABLET SUBLINGUAL at 06:50

## 2020-03-03 RX ADMIN — Medication 325 MILLIGRAM(S): at 17:27

## 2020-03-03 RX ADMIN — Medication 75 MILLIGRAM(S): at 17:32

## 2020-03-03 RX ADMIN — QUETIAPINE FUMARATE 25 MILLIGRAM(S): 200 TABLET, FILM COATED ORAL at 17:32

## 2020-03-03 RX ADMIN — Medication 500 MILLIGRAM(S): at 06:50

## 2020-03-03 RX ADMIN — Medication 30 MILLIGRAM(S): at 06:50

## 2020-03-03 RX ADMIN — Medication 500 MILLIGRAM(S): at 17:32

## 2020-03-03 RX ADMIN — Medication 30 MILLIGRAM(S): at 17:28

## 2020-03-03 NOTE — PROVIDER CONTACT NOTE (CHANGE IN STATUS NOTIFICATION) - ASSESSMENT
Pt alert and oriented x 2-3. Pt denies any chest pain , Palpitations or SOB. No acute distress observed.   /52
Rapid response form filled out.
Pt alert and oriented x 4. Pt with flat affect & diaphoretic on assessment. /58.
pt is asymptomatic, /71, temp 97.9, O2 - 95%, HR 78

## 2020-03-03 NOTE — PROGRESS NOTE ADULT - SUBJECTIVE AND OBJECTIVE BOX
Patient seen and examined at bedside. Overnight, pt had one minute of SVT. No acute complaints at this time. Pain well controlled. Denies chest pain, shortness of breath, nausea or vomiting.     PE:  Vital Signs Last 24 Hrs  T(C): 37.1 (03-03-20 @ 05:52), Max: 37.1 (03-03-20 @ 05:52)  T(F): 98.7 (03-03-20 @ 05:52), Max: 98.7 (03-03-20 @ 05:52)  HR: 79 (03-03-20 @ 05:52) (73 - 79)  BP: 117/63 (03-03-20 @ 05:52) (105/58 - 126/69)  BP(mean): --  RR: 18 (03-03-20 @ 05:52) (18 - 18)  SpO2: 93% (03-03-20 @ 05:52) (93% - 97%)    General: NAD, resting comfortably in bed  R LE:   trilam C/D/I  SCDs present contralateral  Compartments soft and compressible  No calf tenderness bilaterally  grossly moving toes, sensation intact  caprefill<2 sceoeconds                          11.5   11.25 )-----------( 396      ( 02 Mar 2020 07:19 )             35.4     02 Mar 2020 07:19    137    |  104    |  14     ----------------------------<  100    4.0     |  30     |  0.71     Ca    8.8        02 Mar 2020 07:19          A/P:  29y f s/p ORIf right pilon POD 5  -PT/OT - NWB RLE  -Pain Control  -DVT ppx w/ A81 and Lvenox  -Continue abx   -FU AM Labs  -Rest, ice, compress and elevate the extremity as we needed  -Incentive Spirometry  -Medical management appreciated    Ortho

## 2020-03-03 NOTE — PROGRESS NOTE ADULT - ASSESSMENT
28 y/o F with a h/o depression, PTSD, generalized anxiety disorder, heroin abuse (now on suboxone), HTN, prior CVA with residual left sided weakness, right carotid artery dissection/stenosis, paroxysmal atrial tachycardia, admitted on 2/21 with right distal tib/fib fracture secondary to a fall after experiencing dizziness. S/p ORIF on 2/26.  Patient currently being treated for PNA.  She has recurrent symptomatic SVT requiring adenosine, despite being on metoprolol   	  SVT:  Continue tele monitoring.  Pt is reluctant to undergo SVT ablation at this time  Continue cardizem 30 bid  continue metoprolol 75mg bid   Keep Electrolytes WNL  Dispo per medicine, pt wants to go to rehab  OOB to chair, PT

## 2020-03-03 NOTE — PROGRESS NOTE ADULT - SUBJECTIVE AND OBJECTIVE BOX
HPI:  c/c: fall/ right leg pain    28 y/o F with a h/o depression, PTSD, generalized anxiety disorder, heroin abuse (now on suboxone), HTN, prior CVA with residual left sided weakness, right carotid artery dissection/stenosis, paroxysmal atrial tachycardia, admitted on 2/21 with right distal tib/fib fracture secondary to a fall after experiencing dizziness. S/p ORIF on 2/26.  Patient currently being treated for PNA.  Rapid response called on patient this morning for sustained SVT according to notes.  Telemetry and EKG are unavailable.  Patient was given Atropine 6mg and 12 mg and transferred to telemetry.  Upon arrival to telemetry patient was Sinus Tach with HR in the 100s.  Patient is very lethargic secondary to medications and answered minimal questions.  Reports that she experienced some chest pain, palpitations and headache with SVT episode but symptoms have resolved. On tele patient given Lopressor 2.5mg IV once for blood pressure control.  EP asked to consult for SVT.    3/3/20: overnight brief burst 's, pt was asleep.  When asked she is a poor historian regarding having any symptoms during SVT.  Currently on lopressor 75mg BID and cardizem 30 BID  TELE: NSR 70's      MEDICATIONS  (STANDING):  ascorbic acid 500 milliGRAM(s) Oral two times a day  aspirin  chewable 81 milliGRAM(s) Oral daily  atorvastatin 10 milliGRAM(s) Oral at bedtime  buprenorphine 8 mG/naloxone 2 mG SL  Tablet 1 Tablet(s) SubLingual <User Schedule>  cefepime   IVPB 2000 milliGRAM(s) IV Intermittent every 12 hours  cloNIDine 0.1 milliGRAM(s) Oral two times a day  diltiazem    Tablet 30 milliGRAM(s) Oral two times a day  doxycycline hyclate Capsule 100 milliGRAM(s) Oral every 12 hours  enoxaparin Injectable 40 milliGRAM(s) SubCutaneous every 24 hours  ferrous    sulfate 325 milliGRAM(s) Oral three times a day with meals  folic acid 1 milliGRAM(s) Oral daily  gabapentin 300 milliGRAM(s) Oral two times a day  gabapentin 600 milliGRAM(s) Oral at bedtime  lactated ringers. 1000 milliLiter(s) (75 mL/Hr) IV Continuous <Continuous>  lactated ringers. 1000 milliLiter(s) (75 mL/Hr) IV Continuous <Continuous>  lactated ringers. 1000 milliLiter(s) (75 mL/Hr) IV Continuous <Continuous>  lamoTRIgine 25 milliGRAM(s) Oral two times a day  metoprolol tartrate 75 milliGRAM(s) Oral two times a day  multivitamin 1 Tablet(s) Oral daily  QUEtiapine 25 milliGRAM(s) Oral two times a day  venlafaxine XR. 375 milliGRAM(s) Oral daily    MEDICATIONS  (PRN):  acetaminophen   Tablet .. 650 milliGRAM(s) Oral every 6 hours PRN Mild Pain (1 - 3)  acetaminophen   Tablet .. 650 milliGRAM(s) Oral every 6 hours PRN Temp greater or equal to 38C (100.4F)  calcium carbonate    500 mG (Tums) Chewable 3 Tablet(s) Chew every 6 hours PRN Dyspepsia  clonazePAM  Tablet 0.5 milliGRAM(s) Oral daily PRN Anxiety  HYDROmorphone  Injectable 0.25 milliGRAM(s) IV Push every 4 hours PRN Severe Pain (7 - 10)  melatonin 3 milliGRAM(s) Oral at bedtime PRN Sleep  ondansetron Injectable 4 milliGRAM(s) IV Push every 6 hours PRN Nausea and/or Vomiting  oxyCODONE    IR 5 milliGRAM(s) Oral every 6 hours PRN Moderate Pain (4 - 6)  prochlorperazine   Injectable 10 milliGRAM(s) IV Push once PRN Nausea  senna 2 Tablet(s) Oral at bedtime PRN Constipation  traZODone 50 milliGRAM(s) Oral at bedtime PRN insomnia    Allergies    Geodon (Other)  Zyprexa (Other)    Intolerances    Vital Signs Last 24 Hrs  T(C): 36.6 (03 Mar 2020 11:30), Max: 37.1 (03 Mar 2020 05:52)  T(F): 97.9 (03 Mar 2020 11:30), Max: 98.7 (03 Mar 2020 05:52)  HR: 83 (03 Mar 2020 11:30) (73 - 83)  BP: 113/59 (03 Mar 2020 11:30) (105/58 - 126/69)  BP(mean): --  RR: 18 (03 Mar 2020 11:30) (18 - 18)  SpO2: 94% (03 Mar 2020 11:30) (93% - 97%)                          11.5   11.25 )-----------( 396      ( 02 Mar 2020 07:19 )             35.4   03-03    142  |  109<H>  |  14  ----------------------------<  103<H>  3.9   |  29  |  0.72    Ca    9.1      03 Mar 2020 09:46  Phos  3.7     03-03  Mg     2.1     03-03    PHYSICAL EXAMINATION:  GENERAL: NAD, A&Ox2 (person and place)  HEAD:  Atraumatic, Normocephalic  EYES: EOMI, PERRLA  HEENT: Moist mucous membranes  NECK: Supple, No JVD  CHEST/LUNG: Clear to auscultation bilaterally  HEART: Regular rate and rhythm, tachycardic  ABDOMEN: Soft, Nontender, Nondistended; Bowel sounds present  EXTREMITIES:  No clubbing, cyanosis, or edema. RLE cast to knee in place, toes warm  SKIN-no rash

## 2020-03-03 NOTE — PROGRESS NOTE ADULT - ASSESSMENT
SVT- Pt refusing ablation   Will continue metoprolol  Cardizem was added   EP team input appreciated.  Goal K of 4 and mag of 2.       HTN- BP optimal this am.   Pain control.     CVA- continue home meds.    Other medical issues- Management per primary team.   Thank you for allowing me to participate in the care of this patient. Please feel free to contact me with any questions.

## 2020-03-03 NOTE — PROGRESS NOTE ADULT - SUBJECTIVE AND OBJECTIVE BOX
c/c: fall/ right leg pain    HPI: 29F, pmh of Depression, PTSD, Generalized anxiety d/o, heroin abuse now on suboxone, HTN, Stroke with left sided weakness, RIght carotid A dissection/stenosis on asa/statin, paroxysmal atrial tachycardia, who presented to the ER with right leg pain inability to ambulate after a fall. She states she felt dizzy and fell onto her right leg. Denies sob/chest pain/n/v/d/abd pain. Felt fine prior to falling. no loc.   IN ED had imaging which showed right distal tib/fib fracture.   Underwent ex-fix 2/21. Followed by psychiatry and meds titrated down as it was felt her high doses contributed to her dizziness. Post op course notable for episodes of SVT requiring adenosine.     3/3: pt seen and examined this am. Feeling better. Had svt overnight which resolved without intervention.     Review of system- All 10 systems reviewed and is as per HPI otherwise negative.     Vital Signs Last 24 Hrs  T(C): 36.6 (03 Mar 2020 11:30), Max: 37.1 (03 Mar 2020 05:52)  T(F): 97.9 (03 Mar 2020 11:30), Max: 98.7 (03 Mar 2020 05:52)  HR: 83 (03 Mar 2020 11:30) (73 - 83)  BP: 113/59 (03 Mar 2020 11:30) (105/58 - 126/69)  RR: 18 (03 Mar 2020 11:30) (18 - 18)  SpO2: 94% (03 Mar 2020 11:30) (93% - 97%)    PHYSICAL EXAM:  GENERAL: Comfortable, lethargic, no acute distress  HEAD:  Atraumatic, Normocephalic  EYES: EOMI, PERRLA  HEENT: Moist mucous membranes  NECK: Supple, No JVD  NERVOUS SYSTEM:  nonfocal  CHEST/LUNG: Clear to auscultation bilaterally  HEART: Regular rate and rhythm  ABDOMEN: Soft, Nontender, Nondistended; Bowel sounds present  GENITOURINARY- Voiding, no palpable bladder  EXTREMITIES:  No clubbing, cyanosis, or edema  MUSCULOSKELETAL- RLE in splint/ace-wrap  SKIN-no rash    LABS:                        11.2   10.91 )-----------( 379      ( 01 Mar 2020 07:28 )             34.1     03-01    139  |  107  |  18  ----------------------------<  135<H>  3.7   |  27  |  0.68    Ca    8.8      01 Mar 2020 07:28    MEDICATIONS  (STANDING):  ascorbic acid 500 milliGRAM(s) Oral two times a day  aspirin  chewable 81 milliGRAM(s) Oral daily  atorvastatin 10 milliGRAM(s) Oral at bedtime  buprenorphine 8 mG/naloxone 2 mG SL  Tablet 1 Tablet(s) SubLingual <User Schedule>  cefepime   IVPB 2000 milliGRAM(s) IV Intermittent every 12 hours  cloNIDine 0.1 milliGRAM(s) Oral two times a day  diltiazem    Tablet 30 milliGRAM(s) Oral two times a day  doxycycline hyclate Capsule 100 milliGRAM(s) Oral every 12 hours  enoxaparin Injectable 40 milliGRAM(s) SubCutaneous every 24 hours  ferrous    sulfate 325 milliGRAM(s) Oral three times a day with meals  folic acid 1 milliGRAM(s) Oral daily  gabapentin 300 milliGRAM(s) Oral two times a day  gabapentin 600 milliGRAM(s) Oral at bedtime  lactated ringers. 1000 milliLiter(s) (75 mL/Hr) IV Continuous <Continuous>  lactated ringers. 1000 milliLiter(s) (75 mL/Hr) IV Continuous <Continuous>  lactated ringers. 1000 milliLiter(s) (75 mL/Hr) IV Continuous <Continuous>  lamoTRIgine 25 milliGRAM(s) Oral two times a day  metoprolol tartrate 75 milliGRAM(s) Oral two times a day  multivitamin 1 Tablet(s) Oral daily  QUEtiapine 25 milliGRAM(s) Oral two times a day  venlafaxine XR. 375 milliGRAM(s) Oral daily    MEDICATIONS  (PRN):  acetaminophen   Tablet .. 650 milliGRAM(s) Oral every 6 hours PRN Mild Pain (1 - 3)  acetaminophen   Tablet .. 650 milliGRAM(s) Oral every 6 hours PRN Temp greater or equal to 38C (100.4F)  calcium carbonate    500 mG (Tums) Chewable 3 Tablet(s) Chew every 6 hours PRN Dyspepsia  clonazePAM  Tablet 0.5 milliGRAM(s) Oral daily PRN Anxiety  HYDROmorphone  Injectable 0.25 milliGRAM(s) IV Push every 4 hours PRN Severe Pain (7 - 10)  melatonin 3 milliGRAM(s) Oral at bedtime PRN Sleep  ondansetron Injectable 4 milliGRAM(s) IV Push every 6 hours PRN Nausea and/or Vomiting  oxyCODONE    IR 5 milliGRAM(s) Oral every 6 hours PRN Moderate Pain (4 - 6)  prochlorperazine   Injectable 10 milliGRAM(s) IV Push once PRN Nausea  senna 2 Tablet(s) Oral at bedtime PRN Constipation  traZODone 50 milliGRAM(s) Oral at bedtime PRN insomnia      ASSESSMENT AND PLAN:  #Fall/Right tib/fib fracture:  -S/P ex fix   -S/p ORIF 2/26/20  -pain control  -physical therapy  -incentive spirometry    #SVT  -s/p adenosine  -bb increased to 75mg bid   -started on ccb.  -started on ccb today per EP.  -declining ablation at this time.     #HCAP likely d/t resistant gram negative organism:  -on cefepime/doxy D#7  -will complete treatment today.    #Dizziness:  -likely related to multiple sedative meds/high clonidine dosing +/-SVT    #H/o right carotid A dissection/stenosis, CVA/mild lue weakness:  -on asa/statin  -vascular eval appreciated  -repeat CTA with healed carotid dissection    #HTN:  -now on bb/ccb.    #Heroin abuse in past:  -continue suboxone.    #Depression/anxiety/PTSD:  -continue effexor, klonipin, trazodone, gabapentin, seroquel  -prn klonipin decreased from 1mg to 0.5mg.  -clonidine decreased from 0.2mg daily and 0.3mg HS to 0.1 mg bid.  -continue effexor, trazodone, gabapentin, seroquel at current doses.  -upon review of meds pt hasn't gotten lamictal since 2/21  -d/w psychiatry, needs to be restarted @ lower dose and titrated up slowly .  -started lamictal @ 25mg bid, titrate up in 2 weeks.    #DVT px- Lovenox    #Dispo-  Dc planning once Episodes of SVT improved.

## 2020-03-03 NOTE — PROGRESS NOTE ADULT - SUBJECTIVE AND OBJECTIVE BOX
Patient is a 29y old  Female who presents with a chief complaint of tib/fib fracture.       HPI:  c/c: fall/ right leg pain    HPI: 29F, pmh of Depression, PTSD, Generalized anxiety d/o, heroin abuse now on suboxone, HTN, Stroke with left sided weakness, RIght carotid A dissection/stenosis on asa/statin, paroxysmal atrial tachycardia, who presented to the ER with right leg pain inability to ambulate after a fall.   -   This am pt was noted to be tachycardic.  /min per documentation and report. There is no tele strip or EKG in chart.  Per report she was given adenosine.  She denies any symptoms but she is not verbally telling the answers but only nodding , staring     3/2- pt seen and examined by me today. pt denies any palpitations or any symptoms.      3/3- pt seen and examined by me this am. she denies any symptoms.   PMH: as above  PSH: denies  F/H: denies significant medical conditions in immediate family members.  Social: smokes 1ppd , drinks ETOH once in a while, heroin abuse in past       PAST MEDICAL & SURGICAL HISTORY:  Cyst, breast  Transaminitis  Aneurysm of right internal carotid artery  Cerebrovascular accident (CVA)  Substance abuse  Anxiety  Depression  PTSD (post-traumatic stress disorder)  H/O breast biopsy      MEDICATIONS  (STANDING):  ascorbic acid 500 milliGRAM(s) Oral two times a day  aspirin  chewable 81 milliGRAM(s) Oral daily  atorvastatin 10 milliGRAM(s) Oral at bedtime  buprenorphine 8 mG/naloxone 2 mG SL  Tablet 1 Tablet(s) SubLingual <User Schedule>  cefepime   IVPB 2000 milliGRAM(s) IV Intermittent every 12 hours  cloNIDine 0.1 milliGRAM(s) Oral two times a day  doxycycline hyclate Capsule 100 milliGRAM(s) Oral every 12 hours  enoxaparin Injectable 40 milliGRAM(s) SubCutaneous every 24 hours  ferrous    sulfate 325 milliGRAM(s) Oral three times a day with meals  folic acid 1 milliGRAM(s) Oral daily  gabapentin 300 milliGRAM(s) Oral two times a day  gabapentin 600 milliGRAM(s) Oral at bedtime  lactated ringers. 1000 milliLiter(s) (75 mL/Hr) IV Continuous <Continuous>  lactated ringers. 1000 milliLiter(s) (75 mL/Hr) IV Continuous <Continuous>  lactated ringers. 1000 milliLiter(s) (75 mL/Hr) IV Continuous <Continuous>  multivitamin 1 Tablet(s) Oral daily  QUEtiapine 25 milliGRAM(s) Oral two times a day  venlafaxine XR. 375 milliGRAM(s) Oral daily    MEDICATIONS  (PRN):  acetaminophen   Tablet .. 650 milliGRAM(s) Oral every 6 hours PRN Temp greater or equal to 38C (100.4F)  calcium carbonate    500 mG (Tums) Chewable 3 Tablet(s) Chew every 6 hours PRN Dyspepsia  clonazePAM  Tablet 0.5 milliGRAM(s) Oral daily PRN Anxiety  HYDROmorphone  Injectable 0.5 milliGRAM(s) IV Push every 6 hours PRN Severe Pain (7 - 10)  melatonin 3 milliGRAM(s) Oral at bedtime PRN Sleep  ondansetron Injectable 4 milliGRAM(s) IV Push every 6 hours PRN Nausea and/or Vomiting  oxyCODONE    IR 10 milliGRAM(s) Oral every 4 hours PRN Moderate Pain (4 - 6)  oxyCODONE    IR 5 milliGRAM(s) Oral every 4 hours PRN Mild Pain (1 - 3)  prochlorperazine   Injectable 10 milliGRAM(s) IV Push once PRN Nausea  senna 2 Tablet(s) Oral at bedtime PRN Constipation  traZODone 50 milliGRAM(s) Oral at bedtime PRN insomnia      FAMILY HISTORY:  Medical history unknown: in father, unknown if living or   Family history of drug use: IN MOTHER, LIVING      SOCIAL HISTORY:    REVIEW OF SYSTEMS:  CONSTITUTIONAL:    No fatigue, malaise, lethargy.  No fever or chills.  RESPIRATORY:  No cough.  No wheeze.  No hemoptysis.  No shortness of breath.  CARDIOVASCULAR:  No chest pains.  No palpitations. No shortness of breath, No orthopnea or PND.  GASTROINTESTINAL:  No abdominal pain.  No nausea or vomiting.    GENITOURINARY:    No hematuria.    MUSCULOSKELETAL:  No musculoskeletal pain.  No joint swelling.  No arthritis.  NEUROLOGICAL:  No tingling or numbness or weakness.  PSYCHIATRIC:  No confusion          ICU Vital Signs Last 24 Hrs  T(C): 37.1 (03 Mar 2020 05:52), Max: 37.1 (03 Mar 2020 05:52)  T(F): 98.7 (03 Mar 2020 05:52), Max: 98.7 (03 Mar 2020 05:52)  HR: 79 (03 Mar 2020 05:52) (73 - 79)  BP: 117/63 (03 Mar 2020 05:52) (105/58 - 126/69)  BP(mean): --  ABP: --  ABP(mean): --  RR: 18 (03 Mar 2020 05:52) (18 - 18)  SpO2: 93% (03 Mar 2020 05:52) (93% - 97%)    PHYSICAL EXAM-    Constitutional: The patient appears to be normal, well developed, well nourished and alert and oriented to time, place and person. The patient does not appear acutely ill.     Head: Head is normocephalic and atraumatic.      Neck: No jugular venous distention. No audible carotid bruits. There are strong carotid pulses bilaterally. No JVD.     Cardiovascular: Regular rate and rhythm without S3, S4. No murmurs or rubs are appreciated.      Respiratory: Breath sounds are normal. No rales. No wheezing.    Abdomen: Soft, nontender, nondistended with positive bowel sounds.      Extremity: R leg in cast     Neurologic: The patient is alert and oriented.      Skin: No rash, no obvious lesions noted.      Psychiatric: The patient appears to be emotionally stable.      INTERPRETATION OF TELEMETRY: SR , PACs . SVT - narrow complex one episode last night     ECG: Sinus rythm , normal axis, t wave inversion in V2-3, isolated PVCs.     I&O's Detail      LABS:                                   11.5   11.25 )-----------( 396      ( 02 Mar 2020 07:19 )             35.4     03-    137  |  104  |  14  ----------------------------<  100<H>  4.0   |  30  |  0.71    Ca    8.8      02 Mar 2020 07:19                                           I&O's Summary    BNP  RADIOLOGY & ADDITIONAL STUDIES:  < from: Transthoracic Echocardiogram (19 @ 21:32) >     Summary     The left ventricle is normal in size, wall thickness, wall motion and   contractility.   Estimated left ventricular ejection fraction is 55 -60%.   Normal appearing left atrium.   Normalappearing right atrium.   Normal appearing right ventricle structure and function.   Normal aortic valve structure and function.   Normal appearing mitral valve structure and function.   Mild (1+) mitral regurgitation is present.   Normal appearing tricuspid valve structure and function.   No evidence of pericardial effusion.     Signature     ----------------------------------------------------------------   Electronically signed by Fer Gomez MD(Interpreting physician)   on 2019 02:14 PM   ----------------------------------------------------------------    < end of copied text >

## 2020-03-03 NOTE — PROVIDER CONTACT NOTE (CHANGE IN STATUS NOTIFICATION) - RECOMMENDATIONS
increase Metoprolol to 75 mg
Adenosine 6 mg IVP  EP consult.
followed by EP and cardiologist in morning

## 2020-03-04 PROCEDURE — 99231 SBSQ HOSP IP/OBS SF/LOW 25: CPT

## 2020-03-04 PROCEDURE — 99232 SBSQ HOSP IP/OBS MODERATE 35: CPT

## 2020-03-04 RX ORDER — VENLAFAXINE HCL 75 MG
225 CAPSULE, EXT RELEASE 24 HR ORAL DAILY
Refills: 0 | Status: DISCONTINUED | OUTPATIENT
Start: 2020-03-04 | End: 2020-03-06

## 2020-03-04 RX ORDER — BUPRENORPHINE AND NALOXONE 2; .5 MG/1; MG/1
1 TABLET SUBLINGUAL
Refills: 0 | Status: DISCONTINUED | OUTPATIENT
Start: 2020-03-04 | End: 2020-03-06

## 2020-03-04 RX ORDER — CLONAZEPAM 1 MG
0.5 TABLET ORAL EVERY 12 HOURS
Refills: 0 | Status: DISCONTINUED | OUTPATIENT
Start: 2020-03-04 | End: 2020-03-06

## 2020-03-04 RX ORDER — QUETIAPINE FUMARATE 200 MG/1
50 TABLET, FILM COATED ORAL
Refills: 0 | Status: DISCONTINUED | OUTPATIENT
Start: 2020-03-04 | End: 2020-03-06

## 2020-03-04 RX ADMIN — Medication 30 MILLIGRAM(S): at 17:59

## 2020-03-04 RX ADMIN — ATORVASTATIN CALCIUM 10 MILLIGRAM(S): 80 TABLET, FILM COATED ORAL at 21:41

## 2020-03-04 RX ADMIN — QUETIAPINE FUMARATE 25 MILLIGRAM(S): 200 TABLET, FILM COATED ORAL at 06:25

## 2020-03-04 RX ADMIN — Medication 500 MILLIGRAM(S): at 06:25

## 2020-03-04 RX ADMIN — Medication 75 MILLIGRAM(S): at 06:25

## 2020-03-04 RX ADMIN — Medication 325 MILLIGRAM(S): at 11:08

## 2020-03-04 RX ADMIN — ENOXAPARIN SODIUM 40 MILLIGRAM(S): 100 INJECTION SUBCUTANEOUS at 06:25

## 2020-03-04 RX ADMIN — Medication 0.1 MILLIGRAM(S): at 17:59

## 2020-03-04 RX ADMIN — QUETIAPINE FUMARATE 50 MILLIGRAM(S): 200 TABLET, FILM COATED ORAL at 18:02

## 2020-03-04 RX ADMIN — Medication 325 MILLIGRAM(S): at 17:59

## 2020-03-04 RX ADMIN — Medication 1 TABLET(S): at 11:08

## 2020-03-04 RX ADMIN — BUPRENORPHINE AND NALOXONE 1 TABLET(S): 2; .5 TABLET SUBLINGUAL at 17:51

## 2020-03-04 RX ADMIN — BUPRENORPHINE AND NALOXONE 1 TABLET(S): 2; .5 TABLET SUBLINGUAL at 11:06

## 2020-03-04 RX ADMIN — Medication 30 MILLIGRAM(S): at 06:25

## 2020-03-04 RX ADMIN — GABAPENTIN 300 MILLIGRAM(S): 400 CAPSULE ORAL at 06:25

## 2020-03-04 RX ADMIN — Medication 0.5 MILLIGRAM(S): at 17:50

## 2020-03-04 RX ADMIN — GABAPENTIN 600 MILLIGRAM(S): 400 CAPSULE ORAL at 21:42

## 2020-03-04 RX ADMIN — Medication 81 MILLIGRAM(S): at 11:07

## 2020-03-04 RX ADMIN — LAMOTRIGINE 25 MILLIGRAM(S): 25 TABLET, ORALLY DISINTEGRATING ORAL at 17:59

## 2020-03-04 RX ADMIN — LAMOTRIGINE 25 MILLIGRAM(S): 25 TABLET, ORALLY DISINTEGRATING ORAL at 06:25

## 2020-03-04 RX ADMIN — Medication 1 MILLIGRAM(S): at 11:07

## 2020-03-04 RX ADMIN — Medication 0.1 MILLIGRAM(S): at 06:25

## 2020-03-04 RX ADMIN — Medication 500 MILLIGRAM(S): at 17:59

## 2020-03-04 RX ADMIN — GABAPENTIN 300 MILLIGRAM(S): 400 CAPSULE ORAL at 17:51

## 2020-03-04 RX ADMIN — Medication 375 MILLIGRAM(S): at 11:07

## 2020-03-04 RX ADMIN — Medication 75 MILLIGRAM(S): at 17:58

## 2020-03-04 NOTE — PHYSICAL THERAPY INITIAL EVALUATION ADULT - SKIN INTEGRITY
surgical incision/surgical incisiona L ankle/distal leg are not visulaized due to placement of tri-ulrich splint to fx secured with ACE bandages

## 2020-03-04 NOTE — PHYSICAL THERAPY INITIAL EVALUATION ADULT - PLANNED THERAPY INTERVENTIONS, PT EVAL
gait training/postural re-education/bed mobility training/transfer training
balance training/bed mobility training/ROM/gait training/strengthening/transfer training

## 2020-03-04 NOTE — PHYSICAL THERAPY INITIAL EVALUATION ADULT - ACTIVE RANGE OF MOTION EXAMINATION, REHAB EVAL
L ankle NOT TESTED /immobilized post-op/bilateral upper extremity Active ROM was WFL (within functional limits)/bilateral  lower extremity Active ROM was WFL (within functional limits)

## 2020-03-04 NOTE — PHYSICAL THERAPY INITIAL EVALUATION ADULT - CRITERIA FOR SKILLED THERAPEUTIC INTERVENTIONS
impairments found/functional limitations in following categories/risk reduction/prevention
impairments found/anticipated discharge recommendation/rehab potential/therapy frequency/predicted duration of therapy intervention/anticipated equipment needs at discharge/functional limitations in following categories/risk reduction/prevention

## 2020-03-04 NOTE — PROGRESS NOTE ADULT - SUBJECTIVE AND OBJECTIVE BOX
Patient is a 29y old  Female who presents with a chief complaint of tib/fib fracture.       HPI:  c/c: fall/ right leg pain    HPI: 29F, pmh of Depression, PTSD, Generalized anxiety d/o, heroin abuse now on suboxone, HTN, Stroke with left sided weakness, RIght carotid A dissection/stenosis on asa/statin, paroxysmal atrial tachycardia, who presented to the ER with right leg pain inability to ambulate after a fall.   2/28-   This am pt was noted to be tachycardic.  /min per documentation and report. There is no tele strip or EKG in chart.  Per report she was given adenosine.  She denies any symptoms but she is not verbally telling the answers but only nodding , staring     3/2- pt seen and examined by me today. pt denies any palpitations or any symptoms.      3/3- pt seen and examined by me this am. she denies any symptoms.     3/4/2020: Patient seen this morning, lethargic.  Unable to discuss any symptoms.  Spoke with damon who is at the bedside, who reports patient has had long history of palpitations.  Patient and damon do not wish to proceed with ablation at this time given recent fracture, casting and concerns about anesthesia due to history of heroin use.  Discussed with damon that concerns are reasonable.  Per damon, patient's mother has questions and would like to speak with team.  Will wait until patient is awake to confirm that it is okay to speak with her mother.      PAST MEDICAL & SURGICAL HISTORY:  Cyst, breast  Transaminitis  Aneurysm of right internal carotid artery  Cerebrovascular accident (CVA)  Substance abuse  Anxiety  Depression  PTSD (post-traumatic stress disorder)  H/O breast biopsy      MEDICATIONS  (STANDING):  ascorbic acid 500 milliGRAM(s) Oral two times a day  aspirin  chewable 81 milliGRAM(s) Oral daily  atorvastatin 10 milliGRAM(s) Oral at bedtime  buprenorphine 8 mG/naloxone 2 mG SL  Tablet 1 Tablet(s) SubLingual <User Schedule>  cloNIDine 0.1 milliGRAM(s) Oral two times a day  diltiazem    Tablet 30 milliGRAM(s) Oral two times a day  enoxaparin Injectable 40 milliGRAM(s) SubCutaneous every 24 hours  ferrous    sulfate 325 milliGRAM(s) Oral three times a day with meals  folic acid 1 milliGRAM(s) Oral daily  gabapentin 300 milliGRAM(s) Oral two times a day  gabapentin 600 milliGRAM(s) Oral at bedtime  lactated ringers. 1000 milliLiter(s) (75 mL/Hr) IV Continuous <Continuous>  lactated ringers. 1000 milliLiter(s) (75 mL/Hr) IV Continuous <Continuous>  lactated ringers. 1000 milliLiter(s) (75 mL/Hr) IV Continuous <Continuous>  lamoTRIgine 25 milliGRAM(s) Oral two times a day  metoprolol tartrate 75 milliGRAM(s) Oral two times a day  multivitamin 1 Tablet(s) Oral daily  QUEtiapine 25 milliGRAM(s) Oral two times a day  venlafaxine XR. 375 milliGRAM(s) Oral daily    MEDICATIONS  (PRN):  acetaminophen   Tablet .. 650 milliGRAM(s) Oral every 6 hours PRN Mild Pain (1 - 3)  acetaminophen   Tablet .. 650 milliGRAM(s) Oral every 6 hours PRN Temp greater or equal to 38C (100.4F)  calcium carbonate    500 mG (Tums) Chewable 3 Tablet(s) Chew every 6 hours PRN Dyspepsia  clonazePAM  Tablet 0.5 milliGRAM(s) Oral daily PRN Anxiety  HYDROmorphone  Injectable 0.25 milliGRAM(s) IV Push every 4 hours PRN Severe Pain (7 - 10)  melatonin 3 milliGRAM(s) Oral at bedtime PRN Sleep  ondansetron Injectable 4 milliGRAM(s) IV Push every 6 hours PRN Nausea and/or Vomiting  oxyCODONE    IR 5 milliGRAM(s) Oral every 6 hours PRN Moderate Pain (4 - 6)  prochlorperazine   Injectable 10 milliGRAM(s) IV Push once PRN Nausea  senna 2 Tablet(s) Oral at bedtime PRN Constipation  traZODone 50 milliGRAM(s) Oral at bedtime PRN insomnia    ICU Vital Signs Last 24 Hrs  T(C): 36.7 (04 Mar 2020 05:05), Max: 36.7 (04 Mar 2020 05:05)  T(F): 98.1 (04 Mar 2020 05:05), Max: 98.1 (04 Mar 2020 05:05)  HR: 76 (04 Mar 2020 05:05) (70 - 83)  BP: 113/52 (04 Mar 2020 05:05) (110/58 - 113/59)  BP(mean): --  ABP: --  ABP(mean): --  RR: 18 (04 Mar 2020 05:05) (18 - 20)  SpO2: 97% (04 Mar 2020 05:05) (94% - 97%)      PHYSICAL EXAM-    Constitutional: The patient appears to be normal, well developed, well nourished and alert and oriented to time, place and person. The patient does not appear acutely ill.     Head: Head is normocephalic and atraumatic.      Neck: No jugular venous distention. No audible carotid bruits. There are strong carotid pulses bilaterally. No JVD.     Cardiovascular: Regular rate and rhythm without S3, S4. No murmurs or rubs are appreciated.      Respiratory: Breath sounds are normal. No rales. No wheezing.    Abdomen: Soft, nontender, nondistended with positive bowel sounds.      Extremity: RLE in cast, warm to touch.    Neurologic: The patient is alert and oriented.      Skin: No rash, no obvious lesions noted.      Psychiatric: Patient is lethargic but arousable this morning.      INTERPRETATION OF TELEMETRY: SR, brief episode of SVT on 3/3 @21:43, resolved spontaneously.      ECG: Sinus rythm , normal axis, t wave inversion in V2-3, isolated PVCs.     I&O's Detail      LABS:  Basic Metabolic Panel - STAT (03.03.20 @ 09:46)    Sodium, Serum: 142 mmol/L    Potassium, Serum: 3.9 mmol/L    Chloride, Serum: 109 mmol/L    Carbon Dioxide, Serum: 29 mmol/L    Anion Gap, Serum: 4 mmol/L    Blood Urea Nitrogen, Serum: 14 mg/dL    Creatinine, Serum: 0.72 mg/dL    Glucose, Serum: 103 mg/dL    Calcium, Total Serum: 9.1 mg/dL    eGFR if Non : 112:                                      11.5   11.25 )-----------( 396      ( 02 Mar 2020 07:19 )             35.4         RADIOLOGY & ADDITIONAL STUDIES:  < from: Transthoracic Echocardiogram (08.02.19 @ 21:32) >     Summary     The left ventricle is normal in size, wall thickness, wall motion and   contractility.   Estimated left ventricular ejection fraction is 55 -60%.   Normal appearing left atrium.   Normalappearing right atrium.   Normal appearing right ventricle structure and function.   Normal aortic valve structure and function.   Normal appearing mitral valve structure and function.   Mild (1+) mitral regurgitation is present.   Normal appearing tricuspid valve structure and function.   No evidence of pericardial effusion.     Signature     ----------------------------------------------------------------   Electronically signed by Fer Gomez MD(Interpreting physician)   on 08/03/2019 02:14 PM   ----------------------------------------------------------------    < end of copied text > 28 y/o F with a h/o depression, PTSD, generalized anxiety disorder, heroin abuse (now on suboxone), HTN, prior CVA with residual left sided weakness, right carotid artery dissection/stenosis, paroxysmal atrial tachycardia, admitted on 2/21 with right distal tib/fib fracture secondary to a fall after experiencing dizziness. S/p ORIF on 2/26.  Patient currently being treated for PNA.  Rapid response called on patient this morning for sustained SVT according to notes.  Telemetry and EKG are unavailable.  Patient was given Atropine 6mg and 12 mg and transferred to telemetry.  Upon arrival to telemetry patient was Sinus Tach with HR in the 100s.  Patient is very lethargic secondary to medications and answered minimal questions.  Reports that she experienced some chest pain, palpitations and headache with SVT episode but symptoms have resolved. On tele patient given Lopressor 2.5mg IV once for blood pressure control.  EP asked to consult for SVT.    3/3/20: overnight brief burst 's, pt was asleep.  When asked she is a poor historian regarding having any symptoms during SVT.  Currently on lopressor 75mg BID and cardizem 30 BID  TELE: NSR 70's    3/4/2020: Patient seen this morning, lethargic.  Unable to discuss any symptoms.  Spoke with damon who is at the bedside, who reports patient has had long history of palpitations.  Patient and fiance do not wish to proceed with ablation at this time given recent fracture, casting and concerns about anesthesia due to history of heroin use.  Discussed with damon that concerns are reasonable.  Per damon, patient's mother has questions and would like to speak with team.  Will wait until patient is awake to confirm that it is okay to speak with her mother.      PAST MEDICAL & SURGICAL HISTORY:  Cyst, breast  Transaminitis  Aneurysm of right internal carotid artery  Cerebrovascular accident (CVA)  Substance abuse  Anxiety  Depression  PTSD (post-traumatic stress disorder)  H/O breast biopsy      MEDICATIONS  (STANDING):  ascorbic acid 500 milliGRAM(s) Oral two times a day  aspirin  chewable 81 milliGRAM(s) Oral daily  atorvastatin 10 milliGRAM(s) Oral at bedtime  buprenorphine 8 mG/naloxone 2 mG SL  Tablet 1 Tablet(s) SubLingual <User Schedule>  cloNIDine 0.1 milliGRAM(s) Oral two times a day  diltiazem    Tablet 30 milliGRAM(s) Oral two times a day  enoxaparin Injectable 40 milliGRAM(s) SubCutaneous every 24 hours  ferrous    sulfate 325 milliGRAM(s) Oral three times a day with meals  folic acid 1 milliGRAM(s) Oral daily  gabapentin 300 milliGRAM(s) Oral two times a day  gabapentin 600 milliGRAM(s) Oral at bedtime  lactated ringers. 1000 milliLiter(s) (75 mL/Hr) IV Continuous <Continuous>  lactated ringers. 1000 milliLiter(s) (75 mL/Hr) IV Continuous <Continuous>  lactated ringers. 1000 milliLiter(s) (75 mL/Hr) IV Continuous <Continuous>  lamoTRIgine 25 milliGRAM(s) Oral two times a day  metoprolol tartrate 75 milliGRAM(s) Oral two times a day  multivitamin 1 Tablet(s) Oral daily  QUEtiapine 25 milliGRAM(s) Oral two times a day  venlafaxine XR. 375 milliGRAM(s) Oral daily    MEDICATIONS  (PRN):  acetaminophen   Tablet .. 650 milliGRAM(s) Oral every 6 hours PRN Mild Pain (1 - 3)  acetaminophen   Tablet .. 650 milliGRAM(s) Oral every 6 hours PRN Temp greater or equal to 38C (100.4F)  calcium carbonate    500 mG (Tums) Chewable 3 Tablet(s) Chew every 6 hours PRN Dyspepsia  clonazePAM  Tablet 0.5 milliGRAM(s) Oral daily PRN Anxiety  HYDROmorphone  Injectable 0.25 milliGRAM(s) IV Push every 4 hours PRN Severe Pain (7 - 10)  melatonin 3 milliGRAM(s) Oral at bedtime PRN Sleep  ondansetron Injectable 4 milliGRAM(s) IV Push every 6 hours PRN Nausea and/or Vomiting  oxyCODONE    IR 5 milliGRAM(s) Oral every 6 hours PRN Moderate Pain (4 - 6)  prochlorperazine   Injectable 10 milliGRAM(s) IV Push once PRN Nausea  senna 2 Tablet(s) Oral at bedtime PRN Constipation  traZODone 50 milliGRAM(s) Oral at bedtime PRN insomnia    ICU Vital Signs Last 24 Hrs  T(C): 36.7 (04 Mar 2020 05:05), Max: 36.7 (04 Mar 2020 05:05)  T(F): 98.1 (04 Mar 2020 05:05), Max: 98.1 (04 Mar 2020 05:05)  HR: 76 (04 Mar 2020 05:05) (70 - 83)  BP: 113/52 (04 Mar 2020 05:05) (110/58 - 113/59)  BP(mean): --  ABP: --  ABP(mean): --  RR: 18 (04 Mar 2020 05:05) (18 - 20)  SpO2: 97% (04 Mar 2020 05:05) (94% - 97%)      PHYSICAL EXAM-    Constitutional: The patient appears to be normal, well developed, well nourished and alert and oriented to time, place and person. The patient does not appear acutely ill.     Head: Head is normocephalic and atraumatic.      Neck: No jugular venous distention. No audible carotid bruits. There are strong carotid pulses bilaterally. No JVD.     Cardiovascular: Regular rate and rhythm without S3, S4. No murmurs or rubs are appreciated.      Respiratory: Breath sounds are normal. No rales. No wheezing.    Abdomen: Soft, nontender, nondistended with positive bowel sounds.      Extremity: RLE in cast, warm to touch.    Neurologic: The patient is alert and oriented.      Skin: No rash, no obvious lesions noted.      Psychiatric: Patient is lethargic but arousable this morning.      INTERPRETATION OF TELEMETRY: SR, brief episode of SVT on 3/3 @21:43, resolved spontaneously.      ECG: Sinus rythm , normal axis, t wave inversion in V2-3, isolated PVCs.     I&O's Detail      LABS:  Basic Metabolic Panel - STAT (03.03.20 @ 09:46)    Sodium, Serum: 142 mmol/L    Potassium, Serum: 3.9 mmol/L    Chloride, Serum: 109 mmol/L    Carbon Dioxide, Serum: 29 mmol/L    Anion Gap, Serum: 4 mmol/L    Blood Urea Nitrogen, Serum: 14 mg/dL    Creatinine, Serum: 0.72 mg/dL    Glucose, Serum: 103 mg/dL    Calcium, Total Serum: 9.1 mg/dL    eGFR if Non : 112:                                      11.5   11.25 )-----------( 396      ( 02 Mar 2020 07:19 )             35.4         RADIOLOGY & ADDITIONAL STUDIES:  < from: Transthoracic Echocardiogram (08.02.19 @ 21:32) >     Summary     The left ventricle is normal in size, wall thickness, wall motion and   contractility.   Estimated left ventricular ejection fraction is 55 -60%.   Normal appearing left atrium.   Normalappearing right atrium.   Normal appearing right ventricle structure and function.   Normal aortic valve structure and function.   Normal appearing mitral valve structure and function.   Mild (1+) mitral regurgitation is present.   Normal appearing tricuspid valve structure and function.   No evidence of pericardial effusion.     Signature     ----------------------------------------------------------------   Electronically signed by Fer Gomez MD(Interpreting physician)   on 08/03/2019 02:14 PM   ----------------------------------------------------------------    < end of copied text > 30 y/o F with a h/o depression, PTSD, generalized anxiety disorder, heroin abuse (now on suboxone), HTN, prior CVA with residual left sided weakness, right carotid artery dissection/stenosis, paroxysmal atrial tachycardia, admitted on 2/21 with right distal tib/fib fracture secondary to a fall after experiencing dizziness. S/p ORIF on 2/26.  Patient currently being treated for PNA.  Rapid response called on patient this morning for sustained SVT according to notes.  Telemetry and EKG are unavailable.  Patient was given Atropine 6mg and 12 mg and transferred to telemetry.  Upon arrival to telemetry patient was Sinus Tach with HR in the 100s.  Patient is very lethargic secondary to medications and answered minimal questions.  Reports that she experienced some chest pain, palpitations and headache with SVT episode but symptoms have resolved. On tele patient given Lopressor 2.5mg IV once for blood pressure control.  EP asked to consult for SVT.    3/3/20: overnight brief burst 's, pt was asleep.  When asked she is a poor historian regarding having any symptoms during SVT.  Currently on lopressor 75mg BID and cardizem 30 BID  TELE: NSR 70's    3/4/2020: Patient seen this morning, lethargic. Had brief burst of SVT last night lasting seconds.  Unable to discuss any symptoms.  Spoke with damon who is at the bedside, who reports patient has had long history of palpitations.  Patient and fiance do not wish to proceed with ablation at this time given recent fracture, casting and concerns about anesthesia due to history of heroin use.  Discussed with damon that concerns are reasonable.  Per damon, patient's mother has questions and would like to speak with team.  Will wait until patient is awake to confirm that it is okay to speak with her mother.      PAST MEDICAL & SURGICAL HISTORY:  Cyst, breast  Transaminitis  Aneurysm of right internal carotid artery  Cerebrovascular accident (CVA)  Substance abuse  Anxiety  Depression  PTSD (post-traumatic stress disorder)  H/O breast biopsy      MEDICATIONS  (STANDING):  ascorbic acid 500 milliGRAM(s) Oral two times a day  aspirin  chewable 81 milliGRAM(s) Oral daily  atorvastatin 10 milliGRAM(s) Oral at bedtime  buprenorphine 8 mG/naloxone 2 mG SL  Tablet 1 Tablet(s) SubLingual <User Schedule>  cloNIDine 0.1 milliGRAM(s) Oral two times a day  diltiazem    Tablet 30 milliGRAM(s) Oral two times a day  enoxaparin Injectable 40 milliGRAM(s) SubCutaneous every 24 hours  ferrous    sulfate 325 milliGRAM(s) Oral three times a day with meals  folic acid 1 milliGRAM(s) Oral daily  gabapentin 300 milliGRAM(s) Oral two times a day  gabapentin 600 milliGRAM(s) Oral at bedtime  lactated ringers. 1000 milliLiter(s) (75 mL/Hr) IV Continuous <Continuous>  lactated ringers. 1000 milliLiter(s) (75 mL/Hr) IV Continuous <Continuous>  lactated ringers. 1000 milliLiter(s) (75 mL/Hr) IV Continuous <Continuous>  lamoTRIgine 25 milliGRAM(s) Oral two times a day  metoprolol tartrate 75 milliGRAM(s) Oral two times a day  multivitamin 1 Tablet(s) Oral daily  QUEtiapine 25 milliGRAM(s) Oral two times a day  venlafaxine XR. 375 milliGRAM(s) Oral daily    MEDICATIONS  (PRN):  acetaminophen   Tablet .. 650 milliGRAM(s) Oral every 6 hours PRN Mild Pain (1 - 3)  acetaminophen   Tablet .. 650 milliGRAM(s) Oral every 6 hours PRN Temp greater or equal to 38C (100.4F)  calcium carbonate    500 mG (Tums) Chewable 3 Tablet(s) Chew every 6 hours PRN Dyspepsia  clonazePAM  Tablet 0.5 milliGRAM(s) Oral daily PRN Anxiety  HYDROmorphone  Injectable 0.25 milliGRAM(s) IV Push every 4 hours PRN Severe Pain (7 - 10)  melatonin 3 milliGRAM(s) Oral at bedtime PRN Sleep  ondansetron Injectable 4 milliGRAM(s) IV Push every 6 hours PRN Nausea and/or Vomiting  oxyCODONE    IR 5 milliGRAM(s) Oral every 6 hours PRN Moderate Pain (4 - 6)  prochlorperazine   Injectable 10 milliGRAM(s) IV Push once PRN Nausea  senna 2 Tablet(s) Oral at bedtime PRN Constipation  traZODone 50 milliGRAM(s) Oral at bedtime PRN insomnia    ICU Vital Signs Last 24 Hrs  T(C): 36.7 (04 Mar 2020 05:05), Max: 36.7 (04 Mar 2020 05:05)  T(F): 98.1 (04 Mar 2020 05:05), Max: 98.1 (04 Mar 2020 05:05)  HR: 76 (04 Mar 2020 05:05) (70 - 83)  BP: 113/52 (04 Mar 2020 05:05) (110/58 - 113/59)  BP(mean): --  ABP: --  ABP(mean): --  RR: 18 (04 Mar 2020 05:05) (18 - 20)  SpO2: 97% (04 Mar 2020 05:05) (94% - 97%)      PHYSICAL EXAM-    Constitutional: The patient appears to be normal, well developed, well nourished and alert and oriented to time, place and person. The patient does not appear acutely ill.     Head: Head is normocephalic and atraumatic.      Neck: No jugular venous distention. No audible carotid bruits. There are strong carotid pulses bilaterally. No JVD.     Cardiovascular: Regular rate and rhythm without S3, S4. No murmurs or rubs are appreciated.      Respiratory: Breath sounds are normal. No rales. No wheezing.    Abdomen: Soft, nontender, nondistended with positive bowel sounds.      Extremity: RLE in cast, warm to touch.    Neurologic: The patient is alert and oriented.      Skin: No rash, no obvious lesions noted.      Psychiatric: Patient is lethargic but arousable this morning.      TELEMETRY: SR, brief episode of SVT on 3/3 @21:43, resolved spontaneously.      LABS:  Basic Metabolic Panel - STAT (03.03.20 @ 09:46)    Sodium, Serum: 142 mmol/L    Potassium, Serum: 3.9 mmol/L    Chloride, Serum: 109 mmol/L    Carbon Dioxide, Serum: 29 mmol/L    Anion Gap, Serum: 4 mmol/L    Blood Urea Nitrogen, Serum: 14 mg/dL    Creatinine, Serum: 0.72 mg/dL    Glucose, Serum: 103 mg/dL    Calcium, Total Serum: 9.1 mg/dL    eGFR if Non : 112:                                      11.5   11.25 )-----------( 396      ( 02 Mar 2020 07:19 )             35.4         RADIOLOGY & ADDITIONAL STUDIES:  < from: Transthoracic Echocardiogram (08.02.19 @ 21:32) >     Summary     The left ventricle is normal in size, wall thickness, wall motion and   contractility.   Estimated left ventricular ejection fraction is 55 -60%.   Normal appearing left atrium.   Normalappearing right atrium.   Normal appearing right ventricle structure and function.   Normal aortic valve structure and function.   Normal appearing mitral valve structure and function.   Mild (1+) mitral regurgitation is present.   Normal appearing tricuspid valve structure and function.   No evidence of pericardial effusion.     Signature     ----------------------------------------------------------------   Electronically signed by Fer Gomez MD(Interpreting physician)   on 08/03/2019 02:14 PM   ----------------------------------------------------------------

## 2020-03-04 NOTE — PROGRESS NOTE ADULT - ASSESSMENT
A 30 y/o woman with pmhx of depression, PTSD, heroin abuse, stroke with left sided weakness, right carotid A dissection/stenosis on asa/statin, paroxysmal atrial tachycardia, who presented to the ER with right leg pain inability to ambulate after a fall. She is found to have pilon fx of right tibia. S/p Right ankle ORIF,  Pt with VTE risk factors due to immobility and NWB of RLE.     PLAN:  - c/w home med ASA 81 mg daily  - Continue Lovenox 40 mg SQ daily while in the hospital/Rehab then switch toASA 325 mg PO BID at time of discharge to home home  - Monitor CBC/BMP  - maintain LLE venodynes  -Dispo:Rehab     Will continue to follow

## 2020-03-04 NOTE — PHYSICAL THERAPY INITIAL EVALUATION ADULT - IMPAIRMENTS FOUND, PT EVAL
poor safety awareness/cognitive impairment/gait, locomotion, and balance/posture
aerobic capacity/endurance/muscle strength/tone/joint integrity and mobility/gait, locomotion, and balance

## 2020-03-04 NOTE — PROGRESS NOTE ADULT - ASSESSMENT
SVT- Pt refusing ablation   Will continue metoprolol and cardizem     EP team input appreciated.  Goal K of 4 and mag of 2.       HTN- BP optimal this am.   Pain control.     CVA- continue home meds.    Other medical issues- Management per primary team.   Thank you for allowing me to participate in the care of this patient. Please feel free to contact me with any questions.

## 2020-03-04 NOTE — PHYSICAL THERAPY INITIAL EVALUATION ADULT - DID THE PATIENT HAVE SURGERY?
yes/ORIF R tibial pilon fx yes/ORIF R distal tib/fib comminuted bimalleolar fxs ORIF R distal tib/fib comminuted bimalleolar fxs & removal of external fixator/yes

## 2020-03-04 NOTE — PHYSICAL THERAPY INITIAL EVALUATION ADULT - PERTINENT HX OF CURRENT PROBLEM, REHAB EVAL
dizziness attributed to multiple PSYCH meds,fell sustaining R tib/fib fxs (pilon fx) dizziness attributed to multiple PSYCH meds,fell sustaining distal R tib/fib fxs (comminuted bimalleolar fxs)
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
## 2020-03-04 NOTE — PROGRESS NOTE BEHAVIORAL HEALTH - NSBHCHARTREVIEWVS_PSY_A_CORE FT
Vital Signs Last 24 Hrs  T(C): 36.4 (04 Mar 2020 11:09), Max: 36.7 (04 Mar 2020 05:05)  T(F): 97.6 (04 Mar 2020 11:09), Max: 98.1 (04 Mar 2020 05:05)  HR: 77 (04 Mar 2020 11:09) (70 - 77)  BP: 106/57 (04 Mar 2020 11:09) (106/57 - 113/52)  BP(mean): --  RR: 18 (04 Mar 2020 11:09) (18 - 20)  SpO2: 98% (04 Mar 2020 11:09) (96% - 98%)

## 2020-03-04 NOTE — PROGRESS NOTE ADULT - ASSESSMENT
29F s/p ORIF R pilon fracture    Analgesia  DVT ppx  NWB RLE in splint  Keep splint clean/dry/intact  Elevate RLE as needed  PT/OT  Incentive spirometry  Cont care per primary team  No further orthopedic surgery intervention indicated at this time  FU with Dr. Tyson as outpatient in 7-10 days, call office for appointment  Ortho stable for d/c

## 2020-03-04 NOTE — PROGRESS NOTE BEHAVIORAL HEALTH - NSBHCONSULTFOLLOWAFTERCARE_PSY_A_CORE FT
follow-up with out-patient psychiatrist, Dr. Redd [phone] continue psychiatric treatment   CSW to make f/u helena.

## 2020-03-04 NOTE — PROGRESS NOTE ADULT - SUBJECTIVE AND OBJECTIVE BOX
HPI: This is a 28 y/o woman with pmhx of Depression, PTSD, Generalized anxiety d/o, heroin abuse now on subloxone, HTN, Stroke with left sided weakness, right carotid A dissection/stenosis on asa/statin, paroxysmal atrial tachycardia, who presented to the ER with right leg pain inability to ambulate after a fall. She states she felt dizzy and fell onto her right leg. Denies sob/chest pain/n/v/d/abd pain. Felt fine prior to falling. no loc.   IN ED had imaging which showed right distal tib/fib fracture. Now s/p Right ankle ORIF on 2020: Pt seen at bedside on 2N, she is AAOx3, delayed response. Informed her of VTE ppx with ASA. Inquired about prior use of oral AC and patient reports "I don't know."   2020: Pt seen at bedside on 2N, lethargic and not following commands; poor engagement with conversation  2020: Pt seen at bedside on 2 N awake not following commands, pending surgery tomorrow  2020: Pt seen at bedside NPO for OR today, Pt  had an episode of SOB and tachycardia CT angio done negative for PE.  2020: Pt seen at bedside, s/p Right ankle ORIF, denies any pain, no c/o SOB or chest pain will continue lovenox while in the hospital  ; rapid response this am due to episode of SVT treated with Adenosine  3/2/2020: Pt seen at bedside, resting in bed, no acute events   3/4/2020: Pt seen at bedside, sitting in the chair, denies any concerns possible Rehab Friday      Patient is a 29y old  Female who presents with a chief complaint of tib/fib fracture (2020 13:03)    Consulted by Dr. Urbano for VTE prophylaxis, risk stratification, and anticoagulation management.    PAST MEDICAL & SURGICAL HISTORY:  Cyst, breast  Transaminitis  Aneurysm of right internal carotid artery  Cerebrovascular accident (CVA)  Substance abuse  Anxiety  Depression  PTSD (post-traumatic stress disorder)  H/O breast biopsy    BMI: 30.4    CrCL: 126.86    CAPRINI SCORE  AGE RELATED RISK FACTORS                                                       MOBILITY RELATED FACTORS  [ ] Age 41-60 years                                            (1 Point)                  [ ] Bed rest                                                        (1 Point)  [ ] Age: 61-74 years                                           (2 Points)                [ ] Plaster cast                                                   (2 Points)  [ ] Age= 75 years                                              (3 Points)                 [ ] Bed bound for more than 72 hours                   (2 Points)    DISEASE RELATED RISK FACTORS                                               GENDER SPECIFIC FACTORS  [ ] Edema in the lower extremities                       (1 Point)           [ ] Pregnancy                                                            (1 Point)  [ ] Varicose veins                                               (1 Point)                  [ ] Post-partum < 6 weeks                                      (1 Point)             [X ] BMI > 25 Kg/m2                                            (1 Point)                  [ ] Hormonal therapy or oral contraception       (1 Point)                 [ ] Sepsis (in the previous month)                        (1 Point)             [ ] History of pregnancy complications                (1Point)  [ ] Pneumonia or serious lung disease                                             [ ] Unexplained or recurrent  (=3), premature                                 (In the previous month)                               (1 Point)                birth with toxemia or growth-restricted infant (1 Point)  [ ] Abnormal pulmonary function test            (1 Point)                                   SURGERY RELATED RISK FACTORS  [ ] Acute myocardial infarction                       (1 Point)                  [ ]  Section                                         (1 Point)  [ ] Congestive heart failure (in the previous month) (1 Point)   [ ] Minor surgery   lasting <45 minutes       (1 Point)   [ ] Inflammatory bowel disease                             (1 Point)          [ ] Arthroscopic surgery                                  (2 Points)  [ ] Central venous access                                    (2 Points)            [ ] General surgery lasting >45 minutes      (2 Points)       [ ] Stroke (in the previous month)                  (5 Points)            [ ] Elective major lower extremity arthroplasty (5 Points)                                   [  ] Malignancy (present or past include skin melanoma                                          but exclude  basal skin cell)    (2 points)                                      TRAUMA RELATED RISK FACTORS                HEMATOLOGY RELATED FACTORS                                  [X ] Fracture of the hip, pelvis, or leg                       (5 Points)  [ ] Prior episodes of VTE                                     (3 Points)          [ ] Acute spinal cord injury (in the previous month)  (5 Points)  [ ] Positive family history for VTE                         (3 Points)       [ ] Paralysis (less than 1 month)                          (5 Points)  [ ] Prothrombin 60915 A                                      (3 Points)         [ ] Multiple Trauma (within 1month)                 (5Points)                                                                                                                                                                [ ] Factor V Leiden                                          (3 Points)                                OTHER RISK FACTORS                          [ ] Lupus anticoagulants                                     (3 Points)                       [ ] BMI > 40                          (1 Point)                                                         [ ] Anticardiolipin antibodies                                (3 Points)                   [ ] Smoking                              (1Point)                                                [ ] High homocysteine in the blood                      (3 Points)                [  ] Diabetes requiring insulin (1point)                         [ ] Other congenital or acquired thrombophilia       (3 Points)          [  ] Chemotherapy                   (1 Point)  [ ] Heparin induced thrombocytopenia                  (3 Points)             [  ] Blood Transfusion                (1 point)                                                                                                         Total Score [  6  ]                                                                                                                   IMPROVE Bleeding Risk Score: 0    Falls Risk:   High ( X )  Mod (  )  Low (  )    EBL: 5 ml    PROCEDURE START:   PROCEDURE END:     Allergies  Geodon (Other)  Seroquel (Unknown)  Zyprexa (Other)    Intolerances    REVIEW OF SYSTEMS    (  )Fever	     (  )Constipation	(  )SOB			(  )Headache	(  )Dysuria  (  )Chills	     (  )Melena	(  )Dyspnea present on exertion    (  )Dizziness                    (  )Polyuria  (  )Nausea	     (  )Hematochezia	(  )Cough		                    (  )Syncope   	(  )Hematuria  (  )Vomiting    (  )Chest Pain	(  )Wheezing		(  )Weakness  (  )Diarrhea     (  )Palpitations	(  )Anorexia		(  )Myalgia    + pain in her right leg. All other review of systems negative: Yes    Vital Signs Last 24 Hrs  T(C): 36.4 (04 Mar 2020 11:09), Max: 36.7 (04 Mar 2020 05:05)  T(F): 97.6 (04 Mar 2020 11:09), Max: 98.1 (04 Mar 2020 05:05)  HR: 77 (04 Mar 2020 11:09) (70 - 77)  BP: 106/57 (04 Mar 2020 11:09) (106/57 - 113/52)  BP(mean): --  RR: 18 (04 Mar 2020 11:09) (18 - 20)  SpO2: 98% (04 Mar 2020 11:09) (96% - 98%)      Neurological: Lethargic     Skin: Warm    Respiratory and Thorax: normal effort; Breath sounds: normal; No rales/wheezing/rhonchi  	  Cardiovascular: S1, S2, regular, NMBR SVT this am, now NSR on tele monitor    Gastrointestinal: BS + x 4Q, nontender	    Genitourinary:  Bladder nondistended, nontender    Musculoskeletal:   General Right:   no muscle/joint tenderness,   normal tone, no joint swelling,   ROM: limited	    General Left:   no muscle/joint tenderness,   normal tone, no joint swelling,   ROM: full    RLE: Dsg:C/D/I, capillary refill :normal    Lower extrems:   Right: no calf tenderness              negative tammi's sign                pedal pulses Non accessable    Left:   no calf tenderness              negative tammi's sign               + pedal pulses    LABS:                     142  |  109<H>  |  14  ----------------------------<  103<H>  3.9   |  29  |  0.72    Ca    9.1      03 Mar 2020 09:46  Phos  3.7     03-03  Mg     2.1     03-03                                  11.5   11.25 )-----------( 396      ( 02 Mar 2020 07:19 )             35.4       03-02    137  |  104  |  14  ----------------------------<  100<H>  4.0   |  30  |  0.71    Ca    8.8      02 Mar 2020 07:19                       11.7   12.71 )-----------( 383      ( 2020 08:55 )             36.4       02-    139  |  105  |  12  ----------------------------<  98  3.7   |  28  |  0.68    Ca    9.0      2020 08:55  Mg     2.1     02-28                            11.1   16.27 )-----------( 389      ( 2020 07:04 )             34.5       02-27    140  |  106  |  12  ----------------------------<  109<H>  4.0   |  28  |  0.78    Ca    9.0      2020 07:04        PT/INR - ( 2020 06:19 )   PT: 12.8 sec;   INR: 1.15 ratio         PTT - ( 2020 06:19 )  PTT:30.1 sec                          12.6   12.75 )-----------( 414      ( 2020 06:19 )             38.1           139  |  105  |  14  ----------------------------<  107<H>  3.9   |  31  |  0.65    Ca    9.1      2020 06:19        PT/INR - ( 2020 06:19 )   PT: 12.8 sec;   INR: 1.15 ratio         PTT - ( 2020 06:19 )  PTT:30.1 sec                    12.6   11.02 )-----------( 413      ( 2020 07:12 )             38.7       02    137  |  105  |  15  ----------------------------<  107<H>  3.8   |  27  |  0.77    Ca    9.0      2020 07:12                        11.8   12.43 )-----------( 406      ( 2020 06:37 )             37.6       02    138  |  104  |  9   ----------------------------<  91  4.0   |  29  |  0.73    Ca    8.9      2020 06:37                       12.6   8.93  )-----------( 401      ( 2020 06:25 )             40.1     0222    140  |  107  |  7   ----------------------------<  123<H>  4.4   |  27  |  0.92    Ca    8.8      2020 06:25      PT/INR - ( 2020 07:36 )   PT: 12.6 sec;   INR: 1.13 ratio    PTT - ( 2020 07:36 )  PTT:30.8 sec  Urinalysis Basic - ( 2020 04:13 )  Color: Yellow / Appearance: Clear / S.005 / pH: x  Gluc: x / Ketone: Negative  / Bili: Negative / Urobili: Negative mg/dL   Blood: x / Protein: Negative mg/dL / Nitrite: Negative   Leuk Esterase: Trace / RBC: 0-2 /HPF / WBC 6-10   Sq Epi: x / Non Sq Epi: Few / Bacteria: Moderate    RADIOLOGY, EKG & ADDITIONAL TESTS: Reviewed.     EXAM:  CT ANKLE ONLY RT                        EXAM:  CT 3D RECONSTRUCT WO MITRA                        PROCEDURE DATE:  2020    IMPRESSION:  1.  Status post external fixation.  2.  Comminuted mildly displaced fractures of the lateral tibial plafond with intra-articular extension as described above.  3.  Nondisplaced coronally oriented intra-articular fracture at the posterior tibial malleolus.  4.  Mildly displaced distal fibular fracture.  5.  Suspected widening of the tibiotalar joint space  6.  Soft tissue swelling.    < from: CT Angio Chest PE Protocol w/ IV Cont (20 @ 09:07) >  IMPRESSION:     Enhancing segmental consolidation in the left lower lobe with air bronchograms likely representing atelectasis, clinically correlate for pneumonia. Subsegmental atelectasis in the right lower lobe.    No definite evidence of acute pulmonary embolism.    < end of copied text >          MEDICATIONS  (STANDING):  ascorbic acid 500 milliGRAM(s) Oral two times a day  aspirin  chewable 81 milliGRAM(s) Oral daily  atorvastatin 10 milliGRAM(s) Oral at bedtime  buprenorphine 8 mG/naloxone 2 mG SL  Tablet 1 Tablet(s) SubLingual <User Schedule>  cefepime   IVPB 2000 milliGRAM(s) IV Intermittent every 12 hours  cloNIDine 0.1 milliGRAM(s) Oral two times a day  diltiazem    Tablet 30 milliGRAM(s) Oral two times a day  doxycycline hyclate Capsule 100 milliGRAM(s) Oral every 12 hours  enoxaparin Injectable 40 milliGRAM(s) SubCutaneous every 24 hours  ferrous    sulfate 325 milliGRAM(s) Oral three times a day with meals  folic acid 1 milliGRAM(s) Oral daily  gabapentin 300 milliGRAM(s) Oral two times a day  gabapentin 600 milliGRAM(s) Oral at bedtime  lactated ringers. 1000 milliLiter(s) (75 mL/Hr) IV Continuous <Continuous>  lactated ringers. 1000 milliLiter(s) (75 mL/Hr) IV Continuous <Continuous>  lactated ringers. 1000 milliLiter(s) (75 mL/Hr) IV Continuous <Continuous>  lamoTRIgine 25 milliGRAM(s) Oral two times a day  metoprolol tartrate 75 milliGRAM(s) Oral two times a day  multivitamin 1 Tablet(s) Oral daily  QUEtiapine 25 milliGRAM(s) Oral two times a day  venlafaxine XR. 375 milliGRAM(s) Oral daily                DVT Prophylaxis:  LMWH                   ( x )  Heparin SQ           (  )  Coumadin             (  )  Xarelto                  (  )  Eliquis                   (  )  Venodynes           ( X )  Ambulation          (X )  UFH                       (  )  Contraindicated  (  )  ASA                       (  X )

## 2020-03-04 NOTE — PROGRESS NOTE ADULT - ATTENDING COMMENTS
Patient seen and examined  No new neurological deficits  CTA reviewed. improvement in right carotid filling defect likely due to healing of intimal flap  No vascular contraindication to planned orthopedic procedure  Patient advised to follow up with me as outpatient
Patient seen and examined with damon at bedside.  All relevant imaging reviewed.  H&P reviewed.  Pt with history of psychiatric illness and opioid use.  Surgical indications for ex-fix removal and ORIF of the fibula and the tibia.  All RBA discussed and the possible need for future surgery.  All questions answered. Plan to proceed with OR management.  Postoperative recovery discussed at length and nwb status prolonged agreed upon.
Pt seen and examined.  More alert. Agree with above.  DVT ppx  NWB  Elevation and Ice  Medical management as per the medical team.  All questions answered.  Fiance at bedside.  F/U 2 weeks for splint -> cast and suture removal.
Pt seen and examined.  Able to open eyes but not conversing.  Unable to answer my questions.  Lethargic.  Able to wiggle toes.  Splint C/D/I  DVT ppx - via anticoagulation team  Cardiac input appreciated  NWB  F/U in 2 weeks from surgery  elevation
agree with above plan.

## 2020-03-04 NOTE — PROGRESS NOTE ADULT - SUBJECTIVE AND OBJECTIVE BOX
Pt S/E at bedside, no acute events overnight, pain controlled. Resting comfortably. Denies CP/SOB, palpitations, numbness/tingling.    Vital Signs Last 24 Hrs  T(C): 36.7 (04 Mar 2020 05:05), Max: 36.7 (04 Mar 2020 05:05)  T(F): 98.1 (04 Mar 2020 05:05), Max: 98.1 (04 Mar 2020 05:05)  HR: 76 (04 Mar 2020 05:05) (70 - 83)  BP: 113/52 (04 Mar 2020 05:05) (110/58 - 113/59)  BP(mean): --  RR: 18 (04 Mar 2020 05:05) (18 - 20)  SpO2: 97% (04 Mar 2020 05:05) (94% - 97%)    Gen: NAD, AAOx3    Right Lower Extremity:  +trilam splint clean/dry/intact  +EHL/FHL  SILT all 5 toes  Warm/well perfused toes  Compartments soft  No calf TTP B/L   No pain with passive stretch of toes

## 2020-03-04 NOTE — PROGRESS NOTE ADULT - SUBJECTIVE AND OBJECTIVE BOX
Patient is a 29y old  Female who presents with a chief complaint of tib/fib fracture (04 Mar 2020 06:21)      3/4- no complaints , brief episodes of PSVT last night   MEDICATIONS  (STANDING):  ascorbic acid 500 milliGRAM(s) Oral two times a day  aspirin  chewable 81 milliGRAM(s) Oral daily  atorvastatin 10 milliGRAM(s) Oral at bedtime  buprenorphine 8 mG/naloxone 2 mG SL  Tablet 1 Tablet(s) SubLingual <User Schedule>  cloNIDine 0.1 milliGRAM(s) Oral two times a day  diltiazem    Tablet 30 milliGRAM(s) Oral two times a day  enoxaparin Injectable 40 milliGRAM(s) SubCutaneous every 24 hours  ferrous    sulfate 325 milliGRAM(s) Oral three times a day with meals  folic acid 1 milliGRAM(s) Oral daily  gabapentin 300 milliGRAM(s) Oral two times a day  gabapentin 600 milliGRAM(s) Oral at bedtime  lactated ringers. 1000 milliLiter(s) (75 mL/Hr) IV Continuous <Continuous>  lactated ringers. 1000 milliLiter(s) (75 mL/Hr) IV Continuous <Continuous>  lactated ringers. 1000 milliLiter(s) (75 mL/Hr) IV Continuous <Continuous>  lamoTRIgine 25 milliGRAM(s) Oral two times a day  metoprolol tartrate 75 milliGRAM(s) Oral two times a day  multivitamin 1 Tablet(s) Oral daily  QUEtiapine 25 milliGRAM(s) Oral two times a day  venlafaxine XR. 375 milliGRAM(s) Oral daily    MEDICATIONS  (PRN):  acetaminophen   Tablet .. 650 milliGRAM(s) Oral every 6 hours PRN Mild Pain (1 - 3)  acetaminophen   Tablet .. 650 milliGRAM(s) Oral every 6 hours PRN Temp greater or equal to 38C (100.4F)  calcium carbonate    500 mG (Tums) Chewable 3 Tablet(s) Chew every 6 hours PRN Dyspepsia  clonazePAM  Tablet 0.5 milliGRAM(s) Oral daily PRN Anxiety  HYDROmorphone  Injectable 0.25 milliGRAM(s) IV Push every 4 hours PRN Severe Pain (7 - 10)  melatonin 3 milliGRAM(s) Oral at bedtime PRN Sleep  ondansetron Injectable 4 milliGRAM(s) IV Push every 6 hours PRN Nausea and/or Vomiting  oxyCODONE    IR 5 milliGRAM(s) Oral every 6 hours PRN Moderate Pain (4 - 6)  prochlorperazine   Injectable 10 milliGRAM(s) IV Push once PRN Nausea  senna 2 Tablet(s) Oral at bedtime PRN Constipation  traZODone 50 milliGRAM(s) Oral at bedtime PRN insomnia            Vital Signs Last 24 Hrs  T(C): 36.7 (04 Mar 2020 05:05), Max: 36.7 (04 Mar 2020 05:05)  T(F): 98.1 (04 Mar 2020 05:05), Max: 98.1 (04 Mar 2020 05:05)  HR: 76 (04 Mar 2020 05:05) (70 - 83)  BP: 113/52 (04 Mar 2020 05:05) (110/58 - 113/59)  BP(mean): --  RR: 18 (04 Mar 2020 05:05) (18 - 20)  SpO2: 97% (04 Mar 2020 05:05) (94% - 97%)            INTERPRETATION OF TELEMETRY: PSVT     ECG:        LABS:    03-03    142  |  109<H>  |  14  ----------------------------<  103<H>  3.9   |  29  |  0.72    Ca    9.1      03 Mar 2020 09:46  Phos  3.7     03-03  Mg     2.1     03-03              I&O's Summary    BNP  RADIOLOGY & ADDITIONAL STUDIES:

## 2020-03-04 NOTE — PHYSICAL THERAPY INITIAL EVALUATION ADULT - LEVEL OF INDEPENDENCE: GAIT, REHAB EVAL
moderate assist (50% patients effort)
chair follow for safety in anticipation of fatigue/minimum assist (75% patients effort)

## 2020-03-04 NOTE — PHYSICAL THERAPY INITIAL EVALUATION ADULT - PHYSICAL ASSIST/NONPHYSICAL ASSIST: GAIT, REHAB EVAL
verbal cues/1 person + 1 person to manage equipment
nonverbal cues (demo/gestures)/verbal cues/1 person assist

## 2020-03-04 NOTE — PROGRESS NOTE ADULT - ASSESSMENT
30 y/o F with a h/o depression, PTSD, generalized anxiety disorder, heroin abuse (now on suboxone), HTN, prior CVA with residual left sided weakness, right carotid artery dissection/stenosis, paroxysmal atrial tachycardia, admitted on 2/21 with right distal tib/fib fracture secondary to a fall after experiencing dizziness. S/p ORIF on 2/26.  Patient currently being treated for PNA.  She has recurrent symptomatic SVT requiring adenosine, despite being on metoprolol  	  SVT:  Continue tele monitoring.  Pt is reluctant to undergo SVT ablation at this time  Continue cardizem 30 bid  continue metoprolol 75mg bid   Keep Electrolytes WNL  Dispo per medicine, pt wants to go to rehab  Consider MCOT or Zio patch upon discharge  Plan discussed with damon and RN. 30 y/o F with a h/o depression, PTSD, generalized anxiety disorder, heroin abuse (now on suboxone), HTN, prior CVA with residual left sided weakness, right carotid artery dissection/stenosis, paroxysmal atrial tachycardia, admitted on 2/21 with right distal tib/fib fracture secondary to a fall after experiencing dizziness. S/p ORIF on 2/26.  Patient currently being treated for PNA.  She has recurrent symptomatic SVT requiring adenosine, despite being on metoprolol  	  SVT:  Continue tele monitoring.  Pt is reluctant to undergo SVT ablation at this time  Continue cardizem 30 bid  continue metoprolol 75mg bid   Keep Electrolytes WNL  Dispo per medicine, pt wants to go to rehab  Plan discussed with damon and RN. 28 y/o F with a h/o depression, PTSD, generalized anxiety disorder, heroin abuse (now on suboxone), HTN, prior CVA with residual left sided weakness, right carotid artery dissection/stenosis, paroxysmal atrial tachycardia, admitted on 2/21 with right distal tib/fib fracture secondary to a fall after experiencing dizziness. S/p ORIF on 2/26.  Patient currently being treated for PNA.  She has recurrent symptomatic SVT requiring adenosine, despite being on metoprolol  	  SVT:  Continue tele monitoring.  Pt is reluctant to undergo SVT ablation at this time  Continue cardizem 30 bid  continue metoprolol 75mg bid   Keep Electrolytes WNL  Dispo per medicine, pt wants to go to rehab  Outpatient EP follow up.  Plan discussed with damon and RN.

## 2020-03-04 NOTE — PHYSICAL THERAPY INITIAL EVALUATION ADULT - LEVEL OF CONSCIOUSNESS, REHAB EVAL
confused
alert/occasional states she is confused,appears slow to process at times ,damon provides support,clarification

## 2020-03-04 NOTE — PHYSICAL THERAPY INITIAL EVALUATION ADULT - PRECAUTIONS/LIMITATIONS, REHAB EVAL
h/o PTSD,h/o anxiety/depression, h/o heroin abuse currently on Suboxone/fall precautions h/o PTSD, h/o anxiety/depression, h/o heroin abuse currently on Suboxone/fall precautions

## 2020-03-04 NOTE — PHYSICAL THERAPY INITIAL EVALUATION ADULT - RANGE OF MOTION EXAMINATION, REHAB EVAL
Distal right LE splinted/ elevated, the pt was unable to perform active movement of her right toes, the pt also reports a complete lack of sensation of the distal Right LE/ Toes, RN aware
pt is R hand dominant ,min decreased fine motor coordination/dexterity L hand  , L fingers appear mildly hyperextended at MCP joints s/p CVA

## 2020-03-04 NOTE — PROGRESS NOTE ADULT - SUBJECTIVE AND OBJECTIVE BOX
c/c: fall/ right leg pain    HPI: 29F, pmh of Depression, PTSD, Generalized anxiety d/o, heroin abuse now on suboxone, HTN, Stroke with left sided weakness, RIght carotid A dissection/stenosis on asa/statin, paroxysmal atrial tachycardia, who presented to the ER with right leg pain inability to ambulate after a fall. She states she felt dizzy and fell onto her right leg. Denies sob/chest pain/n/v/d/abd pain. Felt fine prior to falling. no loc.   IN ED had imaging which showed right distal tib/fib fracture.   Underwent ex-fix 2/21. Followed by psychiatry and meds titrated down as it was felt her high doses contributed to her dizziness. Post op course notable for episodes of SVT requiring adenosine. Seen by EP, felt not to be a good candidate for ablation at this time given RLE splint. started on bb/ccb and episodes seem to be decreasing in duration.     3/4: pt seen and examined this am. [place] ok. Seen by psychiatry, meds adjusted for anxiety.      Review of system- All 10 systems reviewed and is as per HPI otherwise negative.     Vital Signs Last 24 Hrs  T(C): 36.4 (04 Mar 2020 11:09), Max: 36.7 (04 Mar 2020 05:05)  T(F): 97.6 (04 Mar 2020 11:09), Max: 98.1 (04 Mar 2020 05:05)  HR: 77 (04 Mar 2020 11:09) (70 - 77)  BP: 106/57 (04 Mar 2020 11:09) (106/57 - 113/52)  RR: 18 (04 Mar 2020 11:09) (18 - 20)  SpO2: 98% (04 Mar 2020 11:09) (96% - 98%)    PHYSICAL EXAM:  GENERAL: Comfortable,no acute distress  HEAD:  Atraumatic, Normocephalic  EYES: EOMI, PERRLA  HEENT: Moist mucous membranes  NECK: Supple, No JVD  NERVOUS SYSTEM:  nonfocal  CHEST/LUNG: Clear to auscultation bilaterally  HEART: Regular rate and rhythm  ABDOMEN: Soft, Nontender, Nondistended; Bowel sounds present  GENITOURINARY- Voiding, no palpable bladder  EXTREMITIES:  No clubbing, cyanosis, or edema  MUSCULOSKELETAL- RLE in splint/ace-wrap  SKIN-no rash  LABS:    03-03    142  |  109<H>  |  14  ----------------------------<  103<H>  3.9   |  29  |  0.72    Ca    9.1      03 Mar 2020 09:46  Phos  3.7     03-03  Mg     2.1     03-03        MEDICATIONS  (STANDING):  ascorbic acid 500 milliGRAM(s) Oral two times a day  aspirin  chewable 81 milliGRAM(s) Oral daily  atorvastatin 10 milliGRAM(s) Oral at bedtime  buprenorphine 8 mG/naloxone 2 mG SL  Tablet 1 Tablet(s) SubLingual <User Schedule>  cloNIDine 0.1 milliGRAM(s) Oral two times a day  diltiazem    Tablet 30 milliGRAM(s) Oral two times a day  enoxaparin Injectable 40 milliGRAM(s) SubCutaneous every 24 hours  ferrous    sulfate 325 milliGRAM(s) Oral three times a day with meals  folic acid 1 milliGRAM(s) Oral daily  gabapentin 300 milliGRAM(s) Oral two times a day  gabapentin 600 milliGRAM(s) Oral at bedtime  lactated ringers. 1000 milliLiter(s) (75 mL/Hr) IV Continuous <Continuous>  lactated ringers. 1000 milliLiter(s) (75 mL/Hr) IV Continuous <Continuous>  lactated ringers. 1000 milliLiter(s) (75 mL/Hr) IV Continuous <Continuous>  lamoTRIgine 25 milliGRAM(s) Oral two times a day  metoprolol tartrate 75 milliGRAM(s) Oral two times a day  multivitamin 1 Tablet(s) Oral daily  QUEtiapine 50 milliGRAM(s) Oral two times a day  venlafaxine XR. 225 milliGRAM(s) Oral daily    MEDICATIONS  (PRN):  acetaminophen   Tablet .. 650 milliGRAM(s) Oral every 6 hours PRN Mild Pain (1 - 3)  acetaminophen   Tablet .. 650 milliGRAM(s) Oral every 6 hours PRN Temp greater or equal to 38C (100.4F)  calcium carbonate    500 mG (Tums) Chewable 3 Tablet(s) Chew every 6 hours PRN Dyspepsia  clonazePAM  Tablet 0.5 milliGRAM(s) Oral every 12 hours PRN Anxiety  HYDROmorphone  Injectable 0.25 milliGRAM(s) IV Push every 4 hours PRN Severe Pain (7 - 10)  melatonin 3 milliGRAM(s) Oral at bedtime PRN Sleep  ondansetron Injectable 4 milliGRAM(s) IV Push every 6 hours PRN Nausea and/or Vomiting  oxyCODONE    IR 5 milliGRAM(s) Oral every 6 hours PRN Moderate Pain (4 - 6)  prochlorperazine   Injectable 10 milliGRAM(s) IV Push once PRN Nausea  senna 2 Tablet(s) Oral at bedtime PRN Constipation  traZODone 50 milliGRAM(s) Oral at bedtime PRN insomnia      ASSESSMENT AND PLAN:  #Fall/Right tib/fib fracture:  -S/P ex fix   -S/p ORIF 2/26/20  -pain control  -physical therapy  -incentive spirometry    #SVT  -s/p adenosine  -continue bb/ccb as ordered  -F/u official EP recs from today, but may place monitor prior to dc.    #HCAP likely d/t resistant gram negative organism:  -completed 7 days treatment with abx.    #Dizziness:  -likely related to multiple sedative meds/high clonidine dosing +/-SVT    #H/o right carotid A dissection/stenosis, CVA/mild lue weakness:  -on asa/statin  -vascular eval appreciated  -repeat CTA with healed carotid dissection    #HTN:  -now on bb/ccb.    #Heroin abuse in past:  -continue suboxone.    #Depression/anxiety/PTSD:  -continue effexor, klonipin, trazodone, gabapentin, seroquel  -prn klonipin decreased from 1mg to 0.5mg.  -clonidine decreased from 0.2mg daily and 0.3mg HS to 0.1 mg bid.  -effexor decreased from 375 to 225mg   -seroquel increased from 25 bid to 50 bid for anxiety.   -continue trazodone, gabapentin at current doses  -upon review of meds pt hasn't gotten lamictal since 2/21  -d/w psychiatry, needs to be restarted @ lower dose and titrated up slowly .  -started lamictal @ 25mg bid, titrate up in 2 weeks.    #DVT px- Lovenox    #Dispo-  f/u EP recs.  may need monitor prior to dc.  SANJAY 3/5 or 3/6 if no further episodes of SVT and no further ep intervention.

## 2020-03-04 NOTE — PROGRESS NOTE BEHAVIORAL HEALTH - NSBHFUPINTERVALHXFT_PSY_A_CORE
Patient in bed, damon present at the bedside.   PT is complaining abut being anxious. She denies being depressed, denies insomnia, takes trazodone and that works. Denies any type of SI, HI AH, Vh, PI.   PT is asking fro meds to be adjusted since she feels anxious.

## 2020-03-04 NOTE — PHYSICAL THERAPY INITIAL EVALUATION ADULT - MANUAL MUSCLE TESTING RESULTS, REHAB EVAL
all extremities grossly WFL/WNL except right distal LE/ ankle grossly 3-/5/grossly assessed due to
WFL ,L hand  =5/5,finger EXT 4/5 ,wrist EXT /FLEX 4-/5 ,elbow FLEX/EXT 4/5 ,shoulder elevation 4+/5

## 2020-03-04 NOTE — PHYSICAL THERAPY INITIAL EVALUATION ADULT - PATIENT/FAMILY/SIGNIFICANT OTHER GOALS STATEMENT, PT EVAL
The pt hopes to go home once medically stable for discharge
pt wants to go to rehab after discharge from hospital

## 2020-03-04 NOTE — PHYSICAL THERAPY INITIAL EVALUATION ADULT - ADDITIONAL COMMENTS
pt has been followed by PT while hospitalized, was discontinued from PT due to SVT/transfer to Telemetry  and remains NWB RLE pt has been followed by PT while hospitalized, was discontinued from PT due to Rapid Response + SVT/transfer to Telemetry  and remains NWB RLE

## 2020-03-04 NOTE — PHYSICAL THERAPY INITIAL EVALUATION ADULT - DIAGNOSIS, PT EVAL
R tibial pilon fx s/p ORIF R distal tibial /fib fxs s/p ORIF & removal of external fixator 2/26/20 ,old R CVA with mild residual LUE paresis

## 2020-03-04 NOTE — PHYSICAL THERAPY INITIAL EVALUATION ADULT - GENERAL OBSERVATIONS, REHAB EVAL
sitting up in bedside chair with splinted R leg dependent and pulled under chair,awake,alert,Ox3,fiance at bedside reports this is only the 2nd time pt has sat up in a chair ,he brought clothes in for patient including TED maya

## 2020-03-04 NOTE — PHYSICAL THERAPY INITIAL EVALUATION ADULT - ASR EQUIP NEEDS DISCH PT EVAL
3:1 commode/rolling walker (5 inch wheels)
TBD at rehab ,using RW currently ,may be able to progress to ax crutches in future

## 2020-03-04 NOTE — PHYSICAL THERAPY INITIAL EVALUATION ADULT - FOLLOWS COMMANDS/ANSWERS QUESTIONS, REHAB EVAL
able to follow single-step instructions/50% of the time
100% of the time/able to follow single-step instructions

## 2020-03-04 NOTE — PHYSICAL THERAPY INITIAL EVALUATION ADULT - PATIENT PROFILE REVIEW, REHAB EVAL
yes/pt had Rapid Response called due to sustained SVT in the 100s ,refusing SVT ablation ,on Metoprolol & Cardizem per Cardiology

## 2020-03-05 PROCEDURE — 99239 HOSP IP/OBS DSCHRG MGMT >30: CPT

## 2020-03-05 PROCEDURE — 99231 SBSQ HOSP IP/OBS SF/LOW 25: CPT

## 2020-03-05 RX ORDER — CEFUROXIME AXETIL 250 MG
1 TABLET ORAL
Qty: 0 | Refills: 0 | DISCHARGE
Start: 2020-03-05

## 2020-03-05 RX ORDER — ASPIRIN/CALCIUM CARB/MAGNESIUM 324 MG
1 TABLET ORAL
Qty: 0 | Refills: 0 | DISCHARGE

## 2020-03-05 RX ORDER — TRAZODONE HCL 50 MG
1 TABLET ORAL
Qty: 0 | Refills: 0 | DISCHARGE

## 2020-03-05 RX ORDER — CLONAZEPAM 1 MG
1 TABLET ORAL
Qty: 0 | Refills: 0 | DISCHARGE

## 2020-03-05 RX ORDER — TRAZODONE HCL 50 MG
1 TABLET ORAL
Qty: 0 | Refills: 0 | DISCHARGE
Start: 2020-03-05

## 2020-03-05 RX ORDER — VENLAFAXINE HCL 75 MG
1 CAPSULE, EXT RELEASE 24 HR ORAL
Qty: 0 | Refills: 0 | DISCHARGE

## 2020-03-05 RX ORDER — QUETIAPINE FUMARATE 200 MG/1
1 TABLET, FILM COATED ORAL
Qty: 0 | Refills: 0 | DISCHARGE

## 2020-03-05 RX ORDER — ACETAMINOPHEN 500 MG
2 TABLET ORAL
Qty: 0 | Refills: 0 | DISCHARGE
Start: 2020-03-05

## 2020-03-05 RX ORDER — LAMOTRIGINE 25 MG/1
1 TABLET, ORALLY DISINTEGRATING ORAL
Qty: 0 | Refills: 0 | DISCHARGE
Start: 2020-03-05

## 2020-03-05 RX ORDER — OXYCODONE HYDROCHLORIDE 5 MG/1
1 TABLET ORAL
Qty: 0 | Refills: 0 | DISCHARGE
Start: 2020-03-05

## 2020-03-05 RX ORDER — CLONAZEPAM 1 MG
1 TABLET ORAL
Qty: 0 | Refills: 0 | DISCHARGE
Start: 2020-03-05

## 2020-03-05 RX ORDER — VENLAFAXINE HCL 75 MG
0 CAPSULE, EXT RELEASE 24 HR ORAL
Qty: 0 | Refills: 0 | DISCHARGE

## 2020-03-05 RX ORDER — GABAPENTIN 400 MG/1
1 CAPSULE ORAL
Qty: 0 | Refills: 0 | DISCHARGE

## 2020-03-05 RX ORDER — GABAPENTIN 400 MG/1
2 CAPSULE ORAL
Qty: 0 | Refills: 0 | DISCHARGE
Start: 2020-03-05

## 2020-03-05 RX ORDER — LAMOTRIGINE 25 MG/1
1 TABLET, ORALLY DISINTEGRATING ORAL
Qty: 0 | Refills: 0 | DISCHARGE

## 2020-03-05 RX ORDER — QUETIAPINE FUMARATE 200 MG/1
1 TABLET, FILM COATED ORAL
Qty: 0 | Refills: 0 | DISCHARGE
Start: 2020-03-05

## 2020-03-05 RX ORDER — FOLIC ACID 0.8 MG
1 TABLET ORAL
Qty: 0 | Refills: 0 | DISCHARGE
Start: 2020-03-05

## 2020-03-05 RX ORDER — CEFUROXIME AXETIL 250 MG
500 TABLET ORAL EVERY 12 HOURS
Refills: 0 | Status: DISCONTINUED | OUTPATIENT
Start: 2020-03-05 | End: 2020-03-06

## 2020-03-05 RX ORDER — VENLAFAXINE HCL 75 MG
3 CAPSULE, EXT RELEASE 24 HR ORAL
Qty: 0 | Refills: 0 | DISCHARGE
Start: 2020-03-05

## 2020-03-05 RX ORDER — METOPROLOL TARTRATE 50 MG
1 TABLET ORAL
Qty: 0 | Refills: 0 | DISCHARGE
Start: 2020-03-05

## 2020-03-05 RX ORDER — FERROUS SULFATE 325(65) MG
1 TABLET ORAL
Qty: 0 | Refills: 0 | DISCHARGE
Start: 2020-03-05

## 2020-03-05 RX ORDER — GABAPENTIN 400 MG/1
1 CAPSULE ORAL
Qty: 0 | Refills: 0 | DISCHARGE
Start: 2020-03-05

## 2020-03-05 RX ORDER — DILTIAZEM HCL 120 MG
1 CAPSULE, EXT RELEASE 24 HR ORAL
Qty: 0 | Refills: 0 | DISCHARGE
Start: 2020-03-05

## 2020-03-05 RX ORDER — SENNA PLUS 8.6 MG/1
2 TABLET ORAL
Qty: 0 | Refills: 0 | DISCHARGE
Start: 2020-03-05

## 2020-03-05 RX ORDER — ASCORBIC ACID 60 MG
1 TABLET,CHEWABLE ORAL
Qty: 0 | Refills: 0 | DISCHARGE
Start: 2020-03-05

## 2020-03-05 RX ADMIN — Medication 75 MILLIGRAM(S): at 05:22

## 2020-03-05 RX ADMIN — Medication 325 MILLIGRAM(S): at 11:43

## 2020-03-05 RX ADMIN — BUPRENORPHINE AND NALOXONE 1 TABLET(S): 2; .5 TABLET SUBLINGUAL at 05:22

## 2020-03-05 RX ADMIN — QUETIAPINE FUMARATE 50 MILLIGRAM(S): 200 TABLET, FILM COATED ORAL at 18:12

## 2020-03-05 RX ADMIN — BUPRENORPHINE AND NALOXONE 1 TABLET(S): 2; .5 TABLET SUBLINGUAL at 13:18

## 2020-03-05 RX ADMIN — Medication 225 MILLIGRAM(S): at 11:43

## 2020-03-05 RX ADMIN — QUETIAPINE FUMARATE 50 MILLIGRAM(S): 200 TABLET, FILM COATED ORAL at 05:22

## 2020-03-05 RX ADMIN — LAMOTRIGINE 25 MILLIGRAM(S): 25 TABLET, ORALLY DISINTEGRATING ORAL at 18:12

## 2020-03-05 RX ADMIN — Medication 0.5 MILLIGRAM(S): at 18:17

## 2020-03-05 RX ADMIN — Medication 0.1 MILLIGRAM(S): at 05:22

## 2020-03-05 RX ADMIN — LAMOTRIGINE 25 MILLIGRAM(S): 25 TABLET, ORALLY DISINTEGRATING ORAL at 05:22

## 2020-03-05 RX ADMIN — GABAPENTIN 600 MILLIGRAM(S): 400 CAPSULE ORAL at 21:29

## 2020-03-05 RX ADMIN — Medication 0.1 MILLIGRAM(S): at 18:54

## 2020-03-05 RX ADMIN — Medication 500 MILLIGRAM(S): at 05:22

## 2020-03-05 RX ADMIN — Medication 1 MILLIGRAM(S): at 11:42

## 2020-03-05 RX ADMIN — Medication 500 MILLIGRAM(S): at 18:11

## 2020-03-05 RX ADMIN — BUPRENORPHINE AND NALOXONE 1 TABLET(S): 2; .5 TABLET SUBLINGUAL at 18:11

## 2020-03-05 RX ADMIN — Medication 1 TABLET(S): at 11:42

## 2020-03-05 RX ADMIN — Medication 500 MILLIGRAM(S): at 15:37

## 2020-03-05 RX ADMIN — GABAPENTIN 300 MILLIGRAM(S): 400 CAPSULE ORAL at 05:22

## 2020-03-05 RX ADMIN — Medication 500 MILLIGRAM(S): at 21:29

## 2020-03-05 RX ADMIN — Medication 30 MILLIGRAM(S): at 18:12

## 2020-03-05 RX ADMIN — GABAPENTIN 300 MILLIGRAM(S): 400 CAPSULE ORAL at 18:13

## 2020-03-05 RX ADMIN — Medication 30 MILLIGRAM(S): at 05:22

## 2020-03-05 RX ADMIN — ATORVASTATIN CALCIUM 10 MILLIGRAM(S): 80 TABLET, FILM COATED ORAL at 21:29

## 2020-03-05 RX ADMIN — Medication 325 MILLIGRAM(S): at 18:18

## 2020-03-05 RX ADMIN — ENOXAPARIN SODIUM 40 MILLIGRAM(S): 100 INJECTION SUBCUTANEOUS at 05:22

## 2020-03-05 RX ADMIN — Medication 81 MILLIGRAM(S): at 11:43

## 2020-03-05 NOTE — PROGRESS NOTE ADULT - SUBJECTIVE AND OBJECTIVE BOX
HPI: This is a 28 y/o woman with pmhx of Depression, PTSD, Generalized anxiety d/o, heroin abuse now on subloxone, HTN, Stroke with left sided weakness, right carotid A dissection/stenosis on asa/statin, paroxysmal atrial tachycardia, who presented to the ER with right leg pain inability to ambulate after a fall. She states she felt dizzy and fell onto her right leg. Denies sob/chest pain/n/v/d/abd pain. Felt fine prior to falling. no loc.   IN ED had imaging which showed right distal tib/fib fracture. Now s/p Right ankle ORIF on 2020: Pt seen at bedside on 2N, she is AAOx3, delayed response. Informed her of VTE ppx with ASA. Inquired about prior use of oral AC and patient reports "I don't know."   2020: Pt seen at bedside on 2N, lethargic and not following commands; poor engagement with conversation  2020: Pt seen at bedside on 2 N awake not following commands, pending surgery tomorrow  2020: Pt seen at bedside NPO for OR today, Pt  had an episode of SOB and tachycardia CT angio done negative for PE.  2020: Pt seen at bedside, s/p Right ankle ORIF, denies any pain, no c/o SOB or chest pain will continue lovenox while in the hospital  ; rapid response this am due to episode of SVT treated with Adenosine  3/2/2020: Pt seen at bedside, resting in bed, no acute events   3/4/2020: Pt seen at bedside, sitting in the chair, denies any concerns possible Rehab Friday  3-5-2020 Pt seen at bedside on  oob in chair.  Discussed her lovenox inj for prophylaxis while in hosp or rehab.       Patient is a 29y old  Female who presents with a chief complaint of tib/fib fracture (2020 13:03)    Consulted by Dr. Urbano for VTE prophylaxis, risk stratification, and anticoagulation management.    PAST MEDICAL & SURGICAL HISTORY:  Cyst, breast  Transaminitis  Aneurysm of right internal carotid artery  Cerebrovascular accident (CVA)  Substance abuse  Anxiety  Depression  PTSD (post-traumatic stress disorder)  H/O breast biopsy    BMI: 30.4    CrCL: 126.86---- 161.2    CAPRINI SCORE  AGE RELATED RISK FACTORS                                                       MOBILITY RELATED FACTORS  [ ] Age 41-60 years                                            (1 Point)                  [ ] Bed rest                                                        (1 Point)  [ ] Age: 61-74 years                                           (2 Points)                [ ] Plaster cast                                                   (2 Points)  [ ] Age= 75 years                                              (3 Points)                 [ ] Bed bound for more than 72 hours                   (2 Points)    DISEASE RELATED RISK FACTORS                                               GENDER SPECIFIC FACTORS  [ ] Edema in the lower extremities                       (1 Point)           [ ] Pregnancy                                                            (1 Point)  [ ] Varicose veins                                               (1 Point)                  [ ] Post-partum < 6 weeks                                      (1 Point)             [X ] BMI > 25 Kg/m2                                            (1 Point)                  [ ] Hormonal therapy or oral contraception       (1 Point)                 [ ] Sepsis (in the previous month)                        (1 Point)             [ ] History of pregnancy complications                (1Point)  [ ] Pneumonia or serious lung disease                                             [ ] Unexplained or recurrent  (=3), premature                                 (In the previous month)                               (1 Point)                birth with toxemia or growth-restricted infant (1 Point)  [ ] Abnormal pulmonary function test            (1 Point)                                   SURGERY RELATED RISK FACTORS  [ ] Acute myocardial infarction                       (1 Point)                  [ ]  Section                                         (1 Point)  [ ] Congestive heart failure (in the previous month) (1 Point)   [ ] Minor surgery   lasting <45 minutes       (1 Point)   [ ] Inflammatory bowel disease                             (1 Point)          [ ] Arthroscopic surgery                                  (2 Points)  [ ] Central venous access                                    (2 Points)            [ ] General surgery lasting >45 minutes      (2 Points)       [ ] Stroke (in the previous month)                  (5 Points)            [ ] Elective major lower extremity arthroplasty (5 Points)                                   [  ] Malignancy (present or past include skin melanoma                                          but exclude  basal skin cell)    (2 points)                                      TRAUMA RELATED RISK FACTORS                HEMATOLOGY RELATED FACTORS                                  [X ] Fracture of the hip, pelvis, or leg                       (5 Points)  [ ] Prior episodes of VTE                                     (3 Points)          [ ] Acute spinal cord injury (in the previous month)  (5 Points)  [ ] Positive family history for VTE                         (3 Points)       [ ] Paralysis (less than 1 month)                          (5 Points)  [ ] Prothrombin 26761 A                                      (3 Points)         [ ] Multiple Trauma (within 1month)                 (5Points)                                                                                                                                                                [ ] Factor V Leiden                                          (3 Points)                                OTHER RISK FACTORS                          [ ] Lupus anticoagulants                                     (3 Points)                       [ ] BMI > 40                          (1 Point)                                                         [ ] Anticardiolipin antibodies                                (3 Points)                   [ ] Smoking                              (1Point)                                                [ ] High homocysteine in the blood                      (3 Points)                [  ] Diabetes requiring insulin (1point)                         [ ] Other congenital or acquired thrombophilia       (3 Points)          [  ] Chemotherapy                   (1 Point)  [ ] Heparin induced thrombocytopenia                  (3 Points)             [  ] Blood Transfusion                (1 point)                                                                                                         Total Score [  6  ]                                                                                                                   IMPROVE Bleeding Risk Score: 0    Falls Risk:   High ( X )  Mod (  )  Low (  )    EBL: 5 ml    PROCEDURE START:   PROCEDURE END:     Allergies  Geodon (Other)  Seroquel (Unknown)  Zyprexa (Other)    Intolerances    REVIEW OF SYSTEMS    (  )Fever	     (  )Constipation	(  )SOB			(  )Headache	(  )Dysuria  (  )Chills	     (  )Melena	(  )Dyspnea present on exertion    (  )Dizziness                    (  )Polyuria  (  )Nausea	     (  )Hematochezia	(  )Cough		                    (  )Syncope   	(  )Hematuria  (  )Vomiting    (  )Chest Pain	(  )Wheezing		(  )Weakness  (  )Diarrhea     (  )Palpitations	(  )Anorexia		(  )Myalgia    + pain in her right leg. All other review of systems negative: Yes    Vital Signs Last 24 Hrs  T(C): 36.8 (20 @ 11:12), Max: 36.8 (20 @ 11:12)  T(F): 98.2 (20 @ 11:12), Max: 98.2 (20 @ 11:12)  HR: 90 (20 @ 11:12) (72 - 90)  BP: 107/81 (20 @ 11:12) (99/53 - 126/68)  BP(mean): --  RR: 18 (20 @ 11:12) (17 - 18)  SpO2: 96% (20 @ 11:12) (95% - 97%)      Neurological: Lethargic     Skin: Warm    Respiratory and Thorax: normal effort; Breath sounds: normal; No rales/wheezing/rhonchi  	  Cardiovascular: S1, S2, regular, NMBR SVT this am, now NSR on tele monitor    Gastrointestinal: BS + x 4Q, nontender	    Genitourinary:  Bladder nondistended, nontender    Musculoskeletal:   General Right:   no muscle/joint tenderness,   normal tone, no joint swelling,   ROM: limited	    General Left:   no muscle/joint tenderness,   normal tone, no joint swelling,   ROM: full    RLE: Dsg:C/D/I, capillary refill :normal    Lower extrems:   Right: no calf tenderness              negative tammi's sign                pedal pulses Non accessable    Left:   no calf tenderness              negative tammi's sign               + pedal pulses    LABS:                     142  |  109<H>  |  14  ----------------------------<  103<H>  3.9   |  29  |  0.72    Ca    9.1      03 Mar 2020 09:46  Phos  3.7     03-03  Mg     2.1     03-                                  11.5   11.25 )-----------( 396      ( 02 Mar 2020 07:19 )             35.4       03-02    137  |  104  |  14  ----------------------------<  100<H>  4.0   |  30  |  0.71    Ca    8.8      02 Mar 2020 07:19                       11.7   12.71 )-----------( 383      ( 2020 08:55 )             36.4       02-    139  |  105  |  12  ----------------------------<  98  3.7   |  28  |  0.68    Ca    9.0      2020 08:55  Mg     2.1                                 11.1   16.27 )-----------( 389      ( 2020 07:04 )             34.5       02-    140  |  106  |  12  ----------------------------<  109<H>  4.0   |  28  |  0.78    Ca    9.0      2020 07:04        PT/INR - ( 2020 06:19 )   PT: 12.8 sec;   INR: 1.15 ratio         PTT - ( 2020 06:19 )  PTT:30.1 sec                          12.6   12.75 )-----------( 414      ( 2020 06:19 )             38.1           139  |  105  |  14  ----------------------------<  107<H>  3.9   |  31  |  0.65    Ca    9.1      2020 06:19        PT/INR - ( 2020 06:19 )   PT: 12.8 sec;   INR: 1.15 ratio         PTT - ( 2020 06:19 )  PTT:30.1 sec                    12.6   11.02 )-----------( 413      ( 2020 07:12 )             38.7       02-    137  |  105  |  15  ----------------------------<  107<H>  3.8   |  27  |  0.77    Ca    9.0      2020 07:12                        11.8   12.43 )-----------( 406      ( 2020 06:37 )             37.6       02    138  |  104  |  9   ----------------------------<  91  4.0   |  29  |  0.73    Ca    8.9      2020 06:37                       12.6   8.93  )-----------( 401      ( 2020 06:25 )             40.1     02-22    140  |  107  |  7   ----------------------------<  123<H>  4.4   |  27  |  0.92    Ca    8.8      2020 06:25      PT/INR - ( 2020 07:36 )   PT: 12.6 sec;   INR: 1.13 ratio    PTT - ( 2020 07:36 )  PTT:30.8 sec  Urinalysis Basic - ( 2020 04:13 )  Color: Yellow / Appearance: Clear / S.005 / pH: x  Gluc: x / Ketone: Negative  / Bili: Negative / Urobili: Negative mg/dL   Blood: x / Protein: Negative mg/dL / Nitrite: Negative   Leuk Esterase: Trace / RBC: 0-2 /HPF / WBC 6-10   Sq Epi: x / Non Sq Epi: Few / Bacteria: Moderate    RADIOLOGY, EKG & ADDITIONAL TESTS: Reviewed.     EXAM:  CT ANKLE ONLY RT                        EXAM:  CT 3D RECONSTRUCT AYANA MANRIQUEZ                        PROCEDURE DATE:  2020    IMPRESSION:  1.  Status post external fixation.  2.  Comminuted mildly displaced fractures of the lateral tibial plafond with intra-articular extension as described above.  3.  Nondisplaced coronally oriented intra-articular fracture at the posterior tibial malleolus.  4.  Mildly displaced distal fibular fracture.  5.  Suspected widening of the tibiotalar joint space  6.  Soft tissue swelling.    < from: CT Angio Chest PE Protocol w/ IV Cont (20 @ 09:07) >  IMPRESSION:     Enhancing segmental consolidation in the left lower lobe with air bronchograms likely representing atelectasis, clinically correlate for pneumonia. Subsegmental atelectasis in the right lower lobe.    No definite evidence of acute pulmonary embolism.    < end of copied text >    MEDICATIONS  (STANDING):  ascorbic acid 500 milliGRAM(s) Oral two times a day  aspirin  chewable 81 milliGRAM(s) Oral daily  atorvastatin 10 milliGRAM(s) Oral at bedtime  buprenorphine 8 mG/naloxone 2 mG SL  Tablet 1 Tablet(s) SubLingual <User Schedule>  cloNIDine 0.1 milliGRAM(s) Oral two times a day  diltiazem    Tablet 30 milliGRAM(s) Oral two times a day  enoxaparin Injectable 40 milliGRAM(s) SubCutaneous every 24 hours  ferrous    sulfate 325 milliGRAM(s) Oral three times a day with meals  folic acid 1 milliGRAM(s) Oral daily  gabapentin 300 milliGRAM(s) Oral two times a day  gabapentin 600 milliGRAM(s) Oral at bedtime  lactated ringers. 1000 milliLiter(s) IV Continuous <Continuous>  lactated ringers. 1000 milliLiter(s) IV Continuous <Continuous>  lactated ringers. 1000 milliLiter(s) IV Continuous <Continuous>  lamoTRIgine 25 milliGRAM(s) Oral two times a day  metoprolol tartrate 75 milliGRAM(s) Oral two times a day  multivitamin 1 Tablet(s) Oral daily  QUEtiapine 50 milliGRAM(s) Oral two times a day  venlafaxine XR. 225 milliGRAM(s) Oral daily                DVT Prophylaxis:  LMWH                   ( x )  Heparin SQ           (  )  Coumadin             (  )  Xarelto                  (  )  Eliquis                   (  )  Venodynes           ( X )  Ambulation          (X )  UFH                       (  )  Contraindicated  (  )  ASA                       (  X )

## 2020-03-05 NOTE — PROGRESS NOTE ADULT - ASSESSMENT
A 28 y/o woman with pmhx of depression, PTSD, heroin abuse, stroke with left sided weakness, right carotid A dissection/stenosis on asa/statin, paroxysmal atrial tachycardia, who presented to the ER with right leg pain inability to ambulate after a fall. She is found to have pilon fx of right tibia. S/p Right ankle ORIF,  Pt with VTE risk factors due to immobility and NWB of RLE.     PLAN:  - c/w home med ASA 81 mg daily  - Continue Lovenox 40 mg SQ daily while in the hospital/Rehab then switch toASA 325 mg PO BID at time of discharge to home home if she is stil NWB  - Monitor CBC/BMP  - maintain LLE venodynes  -Dispo:Rehab TOMORROW    Will continue to follow

## 2020-03-05 NOTE — CHART NOTE - NSCHARTNOTEFT_GEN_A_CORE
This 29 year old female has SVT but has been stable for over 48 hours.  She is in BB and CCB nad being treated for PNA.  PMH:  h/o depression, PTSD, generalized anxiety disorder, heroin abuse (now on suboxone), HTN, prior CVA with residual left sided weakness, right carotid artery dissection/stenosis, paroxysmal atrial tachycardia,   She was admitted on 2/21 with right distal tib/fib fracture secondary to a fall after experiencing dizziness. S/p ORIF on 2/26.     Pt is being prepared for discharge in the am. EP signing off and will see this pt PRN

## 2020-03-05 NOTE — PROGRESS NOTE ADULT - ASSESSMENT
SVT- Pt refusing ablation   Will continue metoprolol, no recurrence of SVT. Continue cardizem.   EP team input appreciated.  Goal K of 4 and mag of 2.     HTN- BP optimal this am.   Pain control.     CVA- continue home meds.    Other medical issues- Management per primary team.   Thank you for allowing me to participate in the care of this patient. Please feel free to contact me with any questions.

## 2020-03-05 NOTE — PROGRESS NOTE ADULT - SUBJECTIVE AND OBJECTIVE BOX
Patient is a 29y old  Female who presents with a chief complaint of tib/fib fracture.       HPI:  c/c: fall/ right leg pain    HPI: 29F, pmh of Depression, PTSD, Generalized anxiety d/o, heroin abuse now on suboxone, HTN, Stroke with left sided weakness, RIght carotid A dissection/stenosis on asa/statin, paroxysmal atrial tachycardia, who presented to the ER with right leg pain inability to ambulate after a fall.   -   This am pt was noted to be tachycardic.  /min per documentation and report. There is no tele strip or EKG in chart.  Per report she was given adenosine.  She denies any symptoms but she is not verbally telling the answers but only nodding , staring     3/2- pt seen and examined by me today. pt denies any palpitations or any symptoms.      3/3- pt seen and examined by me this am. she denies any symptoms.     3/5- pt seen and examined by me today. Pt denies any CP or SOB.    PMH: as above  PSH: denies  F/H: denies significant medical conditions in immediate family members.  Social: smokes 1ppd , drinks ETOH once in a while, heroin abuse in past       PAST MEDICAL & SURGICAL HISTORY:  Cyst, breast  Transaminitis  Aneurysm of right internal carotid artery  Cerebrovascular accident (CVA)  Substance abuse  Anxiety  Depression  PTSD (post-traumatic stress disorder)  H/O breast biopsy      MEDICATIONS  (STANDING):  ascorbic acid 500 milliGRAM(s) Oral two times a day  aspirin  chewable 81 milliGRAM(s) Oral daily  atorvastatin 10 milliGRAM(s) Oral at bedtime  buprenorphine 8 mG/naloxone 2 mG SL  Tablet 1 Tablet(s) SubLingual <User Schedule>  cefepime   IVPB 2000 milliGRAM(s) IV Intermittent every 12 hours  cloNIDine 0.1 milliGRAM(s) Oral two times a day  doxycycline hyclate Capsule 100 milliGRAM(s) Oral every 12 hours  enoxaparin Injectable 40 milliGRAM(s) SubCutaneous every 24 hours  ferrous    sulfate 325 milliGRAM(s) Oral three times a day with meals  folic acid 1 milliGRAM(s) Oral daily  gabapentin 300 milliGRAM(s) Oral two times a day  gabapentin 600 milliGRAM(s) Oral at bedtime  lactated ringers. 1000 milliLiter(s) (75 mL/Hr) IV Continuous <Continuous>  lactated ringers. 1000 milliLiter(s) (75 mL/Hr) IV Continuous <Continuous>  lactated ringers. 1000 milliLiter(s) (75 mL/Hr) IV Continuous <Continuous>  multivitamin 1 Tablet(s) Oral daily  QUEtiapine 25 milliGRAM(s) Oral two times a day  venlafaxine XR. 375 milliGRAM(s) Oral daily    MEDICATIONS  (PRN):  acetaminophen   Tablet .. 650 milliGRAM(s) Oral every 6 hours PRN Temp greater or equal to 38C (100.4F)  calcium carbonate    500 mG (Tums) Chewable 3 Tablet(s) Chew every 6 hours PRN Dyspepsia  clonazePAM  Tablet 0.5 milliGRAM(s) Oral daily PRN Anxiety  HYDROmorphone  Injectable 0.5 milliGRAM(s) IV Push every 6 hours PRN Severe Pain (7 - 10)  melatonin 3 milliGRAM(s) Oral at bedtime PRN Sleep  ondansetron Injectable 4 milliGRAM(s) IV Push every 6 hours PRN Nausea and/or Vomiting  oxyCODONE    IR 10 milliGRAM(s) Oral every 4 hours PRN Moderate Pain (4 - 6)  oxyCODONE    IR 5 milliGRAM(s) Oral every 4 hours PRN Mild Pain (1 - 3)  prochlorperazine   Injectable 10 milliGRAM(s) IV Push once PRN Nausea  senna 2 Tablet(s) Oral at bedtime PRN Constipation  traZODone 50 milliGRAM(s) Oral at bedtime PRN insomnia      FAMILY HISTORY:  Medical history unknown: in father, unknown if living or   Family history of drug use: IN MOTHER, LIVING      SOCIAL HISTORY: no recent smoking     REVIEW OF SYSTEMS:  CONSTITUTIONAL:    No fatigue, malaise, lethargy.  No fever or chills.  RESPIRATORY:  No cough.  No wheeze.  No hemoptysis.  No shortness of breath.  CARDIOVASCULAR:  No chest pains.  No palpitations. No shortness of breath, No orthopnea or PND.  GASTROINTESTINAL:  No abdominal pain.  No nausea or vomiting.    GENITOURINARY:    No hematuria.    MUSCULOSKELETAL:  No musculoskeletal pain.  No joint swelling.  No arthritis.  NEUROLOGICAL:  No tingling or numbness or weakness.  PSYCHIATRIC:  No confusion          ICU Vital Signs Last 24 Hrs  T(C): 36.7 (05 Mar 2020 05:13), Max: 36.7 (04 Mar 2020 17:55)  T(F): 98.1 (05 Mar 2020 05:13), Max: 98.1 (05 Mar 2020 05:13)  HR: 74 (05 Mar 2020 05:13) (72 - 79)  BP: 107/55 (05 Mar 2020 05:13) (99/53 - 126/68)  BP(mean): --  ABP: --  ABP(mean): --  RR: 18 (05 Mar 2020 05:13) (17 - 18)  SpO2: 95% (05 Mar 2020 05:13) (95% - 98%)    PHYSICAL EXAM-    Constitutional: The patient appears to be normal, well developed, well nourished and alert and oriented to time, place and person. The patient does not appear acutely ill.     Head: Head is normocephalic and atraumatic.      Neck: No JVD.     Cardiovascular: Regular rate and rhythm without S3, S4. No murmurs or rubs are appreciated.      Respiratory: Breath sounds are normal. No rales. No wheezing.    Abdomen: Soft, nontender, nondistended with positive bowel sounds.      Extremity: R leg in cast     Neurologic: The patient is alert and oriented.      Skin: No rash, no obvious lesions noted.      Psychiatric: The patient appears to be emotionally stable.      INTERPRETATION OF TELEMETRY: SR ,no SVT overnight    ECG: Sinus rythm , normal axis, t wave inversion in V2-3, isolated PVCs.     I&O's Detail      LABS:                   142  |  109<H>  |  14  ----------------------------<  103<H>  3.9   |  29  |  0.72    Ca    9.1      03 Mar 2020 09:46  Phos  3.7     -  Mg     2.1     -03    I&O's Summary    BNP  RADIOLOGY & ADDITIONAL STUDIES:  < from: Transthoracic Echocardiogram (19 @ 21:32) >     Summary     The left ventricle is normal in size, wall thickness, wall motion and   contractility.   Estimated left ventricular ejection fraction is 55 -60%.   Normal appearing left atrium.   Normalappearing right atrium.   Normal appearing right ventricle structure and function.   Normal aortic valve structure and function.   Normal appearing mitral valve structure and function.   Mild (1+) mitral regurgitation is present.   Normal appearing tricuspid valve structure and function.   No evidence of pericardial effusion.     Signature     ----------------------------------------------------------------   Electronically signed by Fer WINKLERInterpreting physician)   on 2019 02:14 PM   ----------------------------------------------------------------    < end of copied text >

## 2020-03-06 ENCOUNTER — INPATIENT (INPATIENT)
Facility: HOSPITAL | Age: 30
LOS: 9 days | Discharge: HOME | DRG: 949 | End: 2020-03-16
Attending: PHYSICAL MEDICINE & REHABILITATION | Admitting: PHYSICAL MEDICINE & REHABILITATION
Payer: MEDICARE

## 2020-03-06 ENCOUNTER — TRANSCRIPTION ENCOUNTER (OUTPATIENT)
Age: 30
End: 2020-03-06

## 2020-03-06 VITALS
WEIGHT: 217.6 LBS | SYSTOLIC BLOOD PRESSURE: 119 MMHG | HEIGHT: 68 IN | DIASTOLIC BLOOD PRESSURE: 75 MMHG | HEART RATE: 81 BPM | RESPIRATION RATE: 14 BRPM | TEMPERATURE: 98 F | OXYGEN SATURATION: 97 %

## 2020-03-06 VITALS
TEMPERATURE: 98 F | SYSTOLIC BLOOD PRESSURE: 113 MMHG | RESPIRATION RATE: 18 BRPM | OXYGEN SATURATION: 99 % | HEART RATE: 81 BPM | DIASTOLIC BLOOD PRESSURE: 54 MMHG

## 2020-03-06 DIAGNOSIS — Z98.890 OTHER SPECIFIED POSTPROCEDURAL STATES: Chronic | ICD-10-CM

## 2020-03-06 DIAGNOSIS — S82.209A UNSPECIFIED FRACTURE OF SHAFT OF UNSPECIFIED TIBIA, INITIAL ENCOUNTER FOR CLOSED FRACTURE: ICD-10-CM

## 2020-03-06 PROCEDURE — 99239 HOSP IP/OBS DSCHRG MGMT >30: CPT

## 2020-03-06 PROCEDURE — 99231 SBSQ HOSP IP/OBS SF/LOW 25: CPT

## 2020-03-06 PROCEDURE — 99232 SBSQ HOSP IP/OBS MODERATE 35: CPT

## 2020-03-06 RX ORDER — ACETAMINOPHEN 500 MG
650 TABLET ORAL EVERY 6 HOURS
Refills: 0 | Status: DISCONTINUED | OUTPATIENT
Start: 2020-03-06 | End: 2020-03-16

## 2020-03-06 RX ORDER — CEFUROXIME AXETIL 250 MG
500 TABLET ORAL EVERY 12 HOURS
Refills: 0 | Status: COMPLETED | OUTPATIENT
Start: 2020-03-06 | End: 2020-03-08

## 2020-03-06 RX ORDER — FOLIC ACID 0.8 MG
1 TABLET ORAL DAILY
Refills: 0 | Status: DISCONTINUED | OUTPATIENT
Start: 2020-03-06 | End: 2020-03-16

## 2020-03-06 RX ORDER — SENNA PLUS 8.6 MG/1
2 TABLET ORAL AT BEDTIME
Refills: 0 | Status: DISCONTINUED | OUTPATIENT
Start: 2020-03-06 | End: 2020-03-12

## 2020-03-06 RX ORDER — TRAZODONE HCL 50 MG
50 TABLET ORAL AT BEDTIME
Refills: 0 | Status: DISCONTINUED | OUTPATIENT
Start: 2020-03-06 | End: 2020-03-16

## 2020-03-06 RX ORDER — ASCORBIC ACID 60 MG
500 TABLET,CHEWABLE ORAL
Refills: 0 | Status: DISCONTINUED | OUTPATIENT
Start: 2020-03-06 | End: 2020-03-16

## 2020-03-06 RX ORDER — ATORVASTATIN CALCIUM 80 MG/1
10 TABLET, FILM COATED ORAL AT BEDTIME
Refills: 0 | Status: DISCONTINUED | OUTPATIENT
Start: 2020-03-06 | End: 2020-03-16

## 2020-03-06 RX ORDER — VENLAFAXINE HCL 75 MG
225 CAPSULE, EXT RELEASE 24 HR ORAL DAILY
Refills: 0 | Status: DISCONTINUED | OUTPATIENT
Start: 2020-03-06 | End: 2020-03-16

## 2020-03-06 RX ORDER — LAMOTRIGINE 25 MG/1
25 TABLET, ORALLY DISINTEGRATING ORAL
Refills: 0 | Status: DISCONTINUED | OUTPATIENT
Start: 2020-03-06 | End: 2020-03-16

## 2020-03-06 RX ORDER — CALCIUM CARBONATE 500(1250)
3 TABLET ORAL EVERY 6 HOURS
Refills: 0 | Status: DISCONTINUED | OUTPATIENT
Start: 2020-03-06 | End: 2020-03-16

## 2020-03-06 RX ORDER — OXYCODONE HYDROCHLORIDE 5 MG/1
5 TABLET ORAL EVERY 6 HOURS
Refills: 0 | Status: DISCONTINUED | OUTPATIENT
Start: 2020-03-06 | End: 2020-03-11

## 2020-03-06 RX ORDER — FERROUS SULFATE 325(65) MG
325 TABLET ORAL
Refills: 0 | Status: DISCONTINUED | OUTPATIENT
Start: 2020-03-06 | End: 2020-03-16

## 2020-03-06 RX ORDER — METOPROLOL TARTRATE 50 MG
75 TABLET ORAL
Refills: 0 | Status: DISCONTINUED | OUTPATIENT
Start: 2020-03-06 | End: 2020-03-09

## 2020-03-06 RX ORDER — INFLUENZA VIRUS VACCINE 15; 15; 15; 15 UG/.5ML; UG/.5ML; UG/.5ML; UG/.5ML
0.5 SUSPENSION INTRAMUSCULAR ONCE
Refills: 0 | Status: DISCONTINUED | OUTPATIENT
Start: 2020-03-06 | End: 2020-03-16

## 2020-03-06 RX ORDER — QUETIAPINE FUMARATE 200 MG/1
50 TABLET, FILM COATED ORAL
Refills: 0 | Status: DISCONTINUED | OUTPATIENT
Start: 2020-03-06 | End: 2020-03-16

## 2020-03-06 RX ORDER — BUPRENORPHINE AND NALOXONE 2; .5 MG/1; MG/1
1 TABLET SUBLINGUAL
Refills: 0 | Status: DISCONTINUED | OUTPATIENT
Start: 2020-03-06 | End: 2020-03-12

## 2020-03-06 RX ORDER — ENOXAPARIN SODIUM 100 MG/ML
40 INJECTION SUBCUTANEOUS EVERY 24 HOURS
Refills: 0 | Status: DISCONTINUED | OUTPATIENT
Start: 2020-03-06 | End: 2020-03-16

## 2020-03-06 RX ORDER — GABAPENTIN 400 MG/1
600 CAPSULE ORAL AT BEDTIME
Refills: 0 | Status: DISCONTINUED | OUTPATIENT
Start: 2020-03-06 | End: 2020-03-16

## 2020-03-06 RX ORDER — DILTIAZEM HCL 120 MG
30 CAPSULE, EXT RELEASE 24 HR ORAL
Refills: 0 | Status: DISCONTINUED | OUTPATIENT
Start: 2020-03-06 | End: 2020-03-16

## 2020-03-06 RX ORDER — ASPIRIN/CALCIUM CARB/MAGNESIUM 324 MG
81 TABLET ORAL DAILY
Refills: 0 | Status: DISCONTINUED | OUTPATIENT
Start: 2020-03-06 | End: 2020-03-16

## 2020-03-06 RX ORDER — LANOLIN ALCOHOL/MO/W.PET/CERES
3 CREAM (GRAM) TOPICAL AT BEDTIME
Refills: 0 | Status: DISCONTINUED | OUTPATIENT
Start: 2020-03-06 | End: 2020-03-16

## 2020-03-06 RX ORDER — CLONAZEPAM 1 MG
0.5 TABLET ORAL EVERY 12 HOURS
Refills: 0 | Status: DISCONTINUED | OUTPATIENT
Start: 2020-03-06 | End: 2020-03-12

## 2020-03-06 RX ORDER — ONDANSETRON 8 MG/1
4 TABLET, FILM COATED ORAL EVERY 6 HOURS
Refills: 0 | Status: DISCONTINUED | OUTPATIENT
Start: 2020-03-06 | End: 2020-03-16

## 2020-03-06 RX ORDER — GABAPENTIN 400 MG/1
300 CAPSULE ORAL
Refills: 0 | Status: DISCONTINUED | OUTPATIENT
Start: 2020-03-06 | End: 2020-03-16

## 2020-03-06 RX ADMIN — ENOXAPARIN SODIUM 40 MILLIGRAM(S): 100 INJECTION SUBCUTANEOUS at 05:31

## 2020-03-06 RX ADMIN — Medication 225 MILLIGRAM(S): at 11:43

## 2020-03-06 RX ADMIN — Medication 500 MILLIGRAM(S): at 10:25

## 2020-03-06 RX ADMIN — BUPRENORPHINE AND NALOXONE 1 TABLET(S): 2; .5 TABLET SUBLINGUAL at 11:44

## 2020-03-06 RX ADMIN — Medication 325 MILLIGRAM(S): at 10:25

## 2020-03-06 RX ADMIN — ATORVASTATIN CALCIUM 10 MILLIGRAM(S): 80 TABLET, FILM COATED ORAL at 21:41

## 2020-03-06 RX ADMIN — Medication 0.1 MILLIGRAM(S): at 16:20

## 2020-03-06 RX ADMIN — Medication 500 MILLIGRAM(S): at 05:31

## 2020-03-06 RX ADMIN — LAMOTRIGINE 25 MILLIGRAM(S): 25 TABLET, ORALLY DISINTEGRATING ORAL at 16:20

## 2020-03-06 RX ADMIN — Medication 30 MILLIGRAM(S): at 05:31

## 2020-03-06 RX ADMIN — Medication 500 MILLIGRAM(S): at 21:41

## 2020-03-06 RX ADMIN — LAMOTRIGINE 25 MILLIGRAM(S): 25 TABLET, ORALLY DISINTEGRATING ORAL at 05:31

## 2020-03-06 RX ADMIN — Medication 500 MILLIGRAM(S): at 16:20

## 2020-03-06 RX ADMIN — Medication 325 MILLIGRAM(S): at 11:54

## 2020-03-06 RX ADMIN — QUETIAPINE FUMARATE 50 MILLIGRAM(S): 200 TABLET, FILM COATED ORAL at 05:31

## 2020-03-06 RX ADMIN — BUPRENORPHINE AND NALOXONE 1 TABLET(S): 2; .5 TABLET SUBLINGUAL at 05:31

## 2020-03-06 RX ADMIN — Medication 75 MILLIGRAM(S): at 05:31

## 2020-03-06 RX ADMIN — BUPRENORPHINE AND NALOXONE 1 TABLET(S): 2; .5 TABLET SUBLINGUAL at 16:21

## 2020-03-06 RX ADMIN — Medication 1 MILLIGRAM(S): at 11:54

## 2020-03-06 RX ADMIN — Medication 30 MILLIGRAM(S): at 16:20

## 2020-03-06 RX ADMIN — QUETIAPINE FUMARATE 50 MILLIGRAM(S): 200 TABLET, FILM COATED ORAL at 16:20

## 2020-03-06 RX ADMIN — GABAPENTIN 600 MILLIGRAM(S): 400 CAPSULE ORAL at 21:41

## 2020-03-06 RX ADMIN — GABAPENTIN 300 MILLIGRAM(S): 400 CAPSULE ORAL at 05:31

## 2020-03-06 RX ADMIN — GABAPENTIN 300 MILLIGRAM(S): 400 CAPSULE ORAL at 16:20

## 2020-03-06 RX ADMIN — Medication 81 MILLIGRAM(S): at 11:43

## 2020-03-06 RX ADMIN — Medication 0.1 MILLIGRAM(S): at 05:31

## 2020-03-06 RX ADMIN — Medication 1 TABLET(S): at 11:54

## 2020-03-06 RX ADMIN — Medication 75 MILLIGRAM(S): at 16:20

## 2020-03-06 NOTE — PROGRESS NOTE ADULT - SUBJECTIVE AND OBJECTIVE BOX
HPI: This is a 30 y/o woman with pmhx of Depression, PTSD, Generalized anxiety d/o, heroin abuse now on subloxone, HTN, Stroke with left sided weakness, right carotid A dissection/stenosis on asa/statin, paroxysmal atrial tachycardia, who presented to the ER with right leg pain inability to ambulate after a fall. She states she felt dizzy and fell onto her right leg. Denies sob/chest pain/n/v/d/abd pain. Felt fine prior to falling. no loc.   IN ED had imaging which showed right distal tib/fib fracture. Now s/p Right ankle ORIF on 2020: Pt seen at bedside on 2N, she is AAOx3, delayed response. Informed her of VTE ppx with ASA. Inquired about prior use of oral AC and patient reports "I don't know."   2020: Pt seen at bedside on 2N, lethargic and not following commands; poor engagement with conversation  2020: Pt seen at bedside on 2 N awake not following commands, pending surgery tomorrow  2020: Pt seen at bedside NPO for OR today, Pt  had an episode of SOB and tachycardia CT angio done negative for PE.  2020: Pt seen at bedside, s/p Right ankle ORIF, denies any pain, no c/o SOB or chest pain will continue lovenox while in the hospital  ; rapid response this am due to episode of SVT treated with Adenosine  3/2/2020: Pt seen at bedside, resting in bed, no acute events   3/4/2020: Pt seen at bedside, sitting in the chair, denies any concerns possible Rehab Friday  3-5-2020 Pt seen at bedside on  oob in chair.  Discussed her lovenox inj for prophylaxis while in hosp or rehab.   3/6/2020: Pt seen at bedside no concerns will continue Lovenox      Patient is a 29y old  Female who presents with a chief complaint of tib/fib fracture (2020 13:03)    Consulted by Dr. Urbano for VTE prophylaxis, risk stratification, and anticoagulation management.    PAST MEDICAL & SURGICAL HISTORY:  Cyst, breast  Transaminitis  Aneurysm of right internal carotid artery  Cerebrovascular accident (CVA)  Substance abuse  Anxiety  Depression  PTSD (post-traumatic stress disorder)  H/O breast biopsy    BMI: 30.4    CrCL: 126.86---- 161.2    CAPRINI SCORE  AGE RELATED RISK FACTORS                                                       MOBILITY RELATED FACTORS  [ ] Age 41-60 years                                            (1 Point)                  [ ] Bed rest                                                        (1 Point)  [ ] Age: 61-74 years                                           (2 Points)                [ ] Plaster cast                                                   (2 Points)  [ ] Age= 75 years                                              (3 Points)                 [ ] Bed bound for more than 72 hours                   (2 Points)    DISEASE RELATED RISK FACTORS                                               GENDER SPECIFIC FACTORS  [ ] Edema in the lower extremities                       (1 Point)           [ ] Pregnancy                                                            (1 Point)  [ ] Varicose veins                                               (1 Point)                  [ ] Post-partum < 6 weeks                                      (1 Point)             [X ] BMI > 25 Kg/m2                                            (1 Point)                  [ ] Hormonal therapy or oral contraception       (1 Point)                 [ ] Sepsis (in the previous month)                        (1 Point)             [ ] History of pregnancy complications                (1Point)  [ ] Pneumonia or serious lung disease                                             [ ] Unexplained or recurrent  (=3), premature                                 (In the previous month)                               (1 Point)                birth with toxemia or growth-restricted infant (1 Point)  [ ] Abnormal pulmonary function test            (1 Point)                                   SURGERY RELATED RISK FACTORS  [ ] Acute myocardial infarction                       (1 Point)                  [ ]  Section                                         (1 Point)  [ ] Congestive heart failure (in the previous month) (1 Point)   [ ] Minor surgery   lasting <45 minutes       (1 Point)   [ ] Inflammatory bowel disease                             (1 Point)          [ ] Arthroscopic surgery                                  (2 Points)  [ ] Central venous access                                    (2 Points)            [ ] General surgery lasting >45 minutes      (2 Points)       [ ] Stroke (in the previous month)                  (5 Points)            [ ] Elective major lower extremity arthroplasty (5 Points)                                   [  ] Malignancy (present or past include skin melanoma                                          but exclude  basal skin cell)    (2 points)                                      TRAUMA RELATED RISK FACTORS                HEMATOLOGY RELATED FACTORS                                  [X ] Fracture of the hip, pelvis, or leg                       (5 Points)  [ ] Prior episodes of VTE                                     (3 Points)          [ ] Acute spinal cord injury (in the previous month)  (5 Points)  [ ] Positive family history for VTE                         (3 Points)       [ ] Paralysis (less than 1 month)                          (5 Points)  [ ] Prothrombin 90841 A                                      (3 Points)         [ ] Multiple Trauma (within 1month)                 (5Points)                                                                                                                                                                [ ] Factor V Leiden                                          (3 Points)                                OTHER RISK FACTORS                          [ ] Lupus anticoagulants                                     (3 Points)                       [ ] BMI > 40                          (1 Point)                                                         [ ] Anticardiolipin antibodies                                (3 Points)                   [ ] Smoking                              (1Point)                                                [ ] High homocysteine in the blood                      (3 Points)                [  ] Diabetes requiring insulin (1point)                         [ ] Other congenital or acquired thrombophilia       (3 Points)          [  ] Chemotherapy                   (1 Point)  [ ] Heparin induced thrombocytopenia                  (3 Points)             [  ] Blood Transfusion                (1 point)                                                                                                         Total Score [  6  ]                                                                                                                   IMPROVE Bleeding Risk Score: 0    Falls Risk:   High ( X )  Mod (  )  Low (  )    EBL: 5 ml    PROCEDURE START:   PROCEDURE END:     Allergies  Geodon (Other)  Seroquel (Unknown)  Zyprexa (Other)    Intolerances    REVIEW OF SYSTEMS    (  )Fever	     (  )Constipation	(  )SOB			(  )Headache	(  )Dysuria  (  )Chills	     (  )Melena	(  )Dyspnea present on exertion    (  )Dizziness                    (  )Polyuria  (  )Nausea	     (  )Hematochezia	(  )Cough		                    (  )Syncope   	(  )Hematuria  (  )Vomiting    (  )Chest Pain	(  )Wheezing		(  )Weakness  (  )Diarrhea     (  )Palpitations	(  )Anorexia		(  )Myalgia    + pain in her right leg. All other review of systems negative: Yes    Vital Signs Last 24 Hrs  T(C): 36.6 (06 Mar 2020 10:18), Max: 36.8 (05 Mar 2020 21:17)  T(F): 97.8 (06 Mar 2020 10:18), Max: 98.3 (05 Mar 2020 21:17)  HR: 81 (06 Mar 2020 10:18) (72 - 84)  BP: 113/54 (06 Mar 2020 10:18) (100/65 - 114/65)  BP(mean): --  RR: 18 (06 Mar 2020 10:18) (18 - 18)  SpO2: 99% (06 Mar 2020 10:18) (95% - 99%)    Neurological: Lethargic     Skin: Warm    Respiratory and Thorax: normal effort; Breath sounds: normal; No rales/wheezing/rhonchi  	  Cardiovascular: S1, S2, regular, NMBR SVT this am, now NSR on tele monitor    Gastrointestinal: BS + x 4Q, nontender	    Genitourinary:  Bladder nondistended, nontender    Musculoskeletal:   General Right:   no muscle/joint tenderness,   normal tone, no joint swelling,   ROM: limited	    General Left:   no muscle/joint tenderness,   normal tone, no joint swelling,   ROM: full    RLE: Dsg:C/D/I, capillary refill :normal    Lower extrems:   Right: no calf tenderness              negative tammi's sign                pedal pulses Non accessable    Left:   no calf tenderness              negative tammi's sign               + pedal pulses    LABS:                     142  |  109<H>  |  14  ----------------------------<  103<H>  3.9   |  29  |  0.72    Ca    9.1      03 Mar 2020 09:46  Phos  3.7     03-03  Mg     2.1     03-03                                  11.5   11.25 )-----------( 396      ( 02 Mar 2020 07:19 )             35.4       03-02    137  |  104  |  14  ----------------------------<  100<H>  4.0   |  30  |  0.71    Ca    8.8      02 Mar 2020 07:19                       11.7   12.71 )-----------( 383      ( 2020 08:55 )             36.4       02-    139  |  105  |  12  ----------------------------<  98  3.7   |  28  |  0.68    Ca    9.0      2020 08:55  Mg     2.1     -28                            11.1   16.27 )-----------( 389      ( 2020 07:04 )             34.5       02-27    140  |  106  |  12  ----------------------------<  109<H>  4.0   |  28  |  0.78    Ca    9.0      2020 07:04        PT/INR - ( 2020 06:19 )   PT: 12.8 sec;   INR: 1.15 ratio         PTT - ( 2020 06:19 )  PTT:30.1 sec                          12.6   12.75 )-----------( 414      ( 2020 06:19 )             38.1           139  |  105  |  14  ----------------------------<  107<H>  3.9   |  31  |  0.65    Ca    9.1      2020 06:19        PT/INR - ( 2020 06:19 )   PT: 12.8 sec;   INR: 1.15 ratio         PTT - ( 2020 06:19 )  PTT:30.1 sec                    12.6   11.02 )-----------( 413      ( 2020 07:12 )             38.7       02-    137  |  105  |  15  ----------------------------<  107<H>  3.8   |  27  |  0.77    Ca    9.0      2020 07:12                        11.8   12.43 )-----------( 406      ( 2020 06:37 )             37.6       02-23    138  |  104  |  9   ----------------------------<  91  4.0   |  29  |  0.73    Ca    8.9      2020 06:37                       12.6   8.93  )-----------( 401      ( 2020 06:25 )             40.1     02-22    140  |  107  |  7   ----------------------------<  123<H>  4.4   |  27  |  0.92    Ca    8.8      2020 06:25      PT/INR - ( 2020 07:36 )   PT: 12.6 sec;   INR: 1.13 ratio    PTT - ( 2020 07:36 )  PTT:30.8 sec  Urinalysis Basic - ( 2020 04:13 )  Color: Yellow / Appearance: Clear / S.005 / pH: x  Gluc: x / Ketone: Negative  / Bili: Negative / Urobili: Negative mg/dL   Blood: x / Protein: Negative mg/dL / Nitrite: Negative   Leuk Esterase: Trace / RBC: 0-2 /HPF / WBC 6-10   Sq Epi: x / Non Sq Epi: Few / Bacteria: Moderate    RADIOLOGY, EKG & ADDITIONAL TESTS: Reviewed.     EXAM:  CT ANKLE ONLY RT                        EXAM:  CT 3D RECONSTRUCT AYANA MANRIQUEZ                        PROCEDURE DATE:  2020    IMPRESSION:  1.  Status post external fixation.  2.  Comminuted mildly displaced fractures of the lateral tibial plafond with intra-articular extension as described above.  3.  Nondisplaced coronally oriented intra-articular fracture at the posterior tibial malleolus.  4.  Mildly displaced distal fibular fracture.  5.  Suspected widening of the tibiotalar joint space  6.  Soft tissue swelling.    < from: CT Angio Chest PE Protocol w/ IV Cont (20 @ 09:07) >  IMPRESSION:     Enhancing segmental consolidation in the left lower lobe with air bronchograms likely representing atelectasis, clinically correlate for pneumonia. Subsegmental atelectasis in the right lower lobe.    No definite evidence of acute pulmonary embolism.    < end of copied text >    MEDICATIONS  (STANDING):  ascorbic acid 500 milliGRAM(s) Oral two times a day  aspirin  chewable 81 milliGRAM(s) Oral daily  atorvastatin 10 milliGRAM(s) Oral at bedtime  buprenorphine 8 mG/naloxone 2 mG SL  Tablet 1 Tablet(s) SubLingual <User Schedule>  cloNIDine 0.1 milliGRAM(s) Oral two times a day  diltiazem    Tablet 30 milliGRAM(s) Oral two times a day  enoxaparin Injectable 40 milliGRAM(s) SubCutaneous every 24 hours  ferrous    sulfate 325 milliGRAM(s) Oral three times a day with meals  folic acid 1 milliGRAM(s) Oral daily  gabapentin 300 milliGRAM(s) Oral two times a day  gabapentin 600 milliGRAM(s) Oral at bedtime  lactated ringers. 1000 milliLiter(s) IV Continuous <Continuous>  lactated ringers. 1000 milliLiter(s) IV Continuous <Continuous>  lactated ringers. 1000 milliLiter(s) IV Continuous <Continuous>  lamoTRIgine 25 milliGRAM(s) Oral two times a day  metoprolol tartrate 75 milliGRAM(s) Oral two times a day  multivitamin 1 Tablet(s) Oral daily  QUEtiapine 50 milliGRAM(s) Oral two times a day  venlafaxine XR. 225 milliGRAM(s) Oral daily                DVT Prophylaxis:  LMWH                   ( x )  Heparin SQ           (  )  Coumadin             (  )  Xarelto                  (  )  Eliquis                   (  )  Venodynes           ( X )  Ambulation          (X )  UFH                       (  )  Contraindicated  (  )  ASA                       (  X )

## 2020-03-06 NOTE — PROGRESS NOTE BEHAVIORAL HEALTH - NSBHCONSULTMEDANXIETY_PSY_A_CORE FT
Please see plan above.

## 2020-03-06 NOTE — PROGRESS NOTE BEHAVIORAL HEALTH - NSBHFUPVIOL_PSY_A_CORE
unable to assess
none known
unable to assess
none known

## 2020-03-06 NOTE — PROGRESS NOTE BEHAVIORAL HEALTH - NSBHCHARTREVIEWVS_PSY_A_CORE FT
Vital Signs Last 24 Hrs  T(C): 36.6 (06 Mar 2020 10:18), Max: 36.8 (05 Mar 2020 21:17)  T(F): 97.8 (06 Mar 2020 10:18), Max: 98.3 (05 Mar 2020 21:17)  HR: 81 (06 Mar 2020 10:18) (72 - 84)  BP: 113/54 (06 Mar 2020 10:18) (100/65 - 114/65)  BP(mean): --  RR: 18 (06 Mar 2020 10:18) (18 - 18)  SpO2: 99% (06 Mar 2020 10:18) (95% - 99%)
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
## 2020-03-06 NOTE — PROGRESS NOTE ADULT - ASSESSMENT
A 28 y/o woman with pmhx of depression, PTSD, heroin abuse, stroke with left sided weakness, right carotid A dissection/stenosis on asa/statin, paroxysmal atrial tachycardia, who presented to the ER with right leg pain inability to ambulate after a fall. She is found to have pilon fx of right tibia. S/p Right ankle ORIF,  Pt with VTE risk factors due to immobility and NWB of RLE.     PLAN:  - c/w home med ASA 81 mg daily  - Continue Lovenox 40 mg SQ daily while in the hospital/Rehab then switch toASA 325 mg PO BID at time of discharge to home  if she is stil NWB  - Monitor CBC/BMP  - maintain LLE venodynes  -Dispo:Rehab today    Will continue to follow

## 2020-03-06 NOTE — PROGRESS NOTE BEHAVIORAL HEALTH - NSBHFUPINTERVALHXFT_PSY_A_CORE
Patient in bed, damon present at the bedside. Pt reports feeling better, but still  anxious. She denies being depressed, denies insomnia. Denies any type of SI, HI, AH, VH, PI.   Comfortable, she  states she worries about corona virus. her boyfriend is supportive.

## 2020-03-06 NOTE — PROGRESS NOTE BEHAVIORAL HEALTH - NSBHCONSULTFOLLOWAFTERCARE_PSY_A_CORE FT
follow-up with out-patient psychiatrist, Dr. Redd [phone] continue psychiatric treatment   CSW to make f/u helena.

## 2020-03-06 NOTE — PROGRESS NOTE BEHAVIORAL HEALTH - NSBHPTASSESSDT_PSY_A_CORE
06-Mar-2020 12:59
22-Feb-2020 10:53
23-Feb-2020 12:38
24-Feb-2020 13:40
25-Feb-2020 21:49
28-Feb-2020 13:49
04-Mar-2020 12:18

## 2020-03-06 NOTE — PROGRESS NOTE BEHAVIORAL HEALTH - PRIMARY DX
KASHIF (generalized anxiety disorder)

## 2020-03-06 NOTE — PROGRESS NOTE ADULT - ASSESSMENT
SVT- Pt refusing ablation   Will continue metoprolol, no recurrence of SVT. Continue cardizem.  SVT better with AV marjan blockers.  EP team input appreciated.  Goal K of 4 and mag of 2.     HTN- BP optimal this am.   Pain control.     CVA- continue home meds.    Other medical issues- Management per primary team.   Thank you for allowing me to participate in the care of this patient. Please feel free to contact me with any questions.

## 2020-03-06 NOTE — PROGRESS NOTE ADULT - SUBJECTIVE AND OBJECTIVE BOX
Patient is a 29y old  Female who presents with a chief complaint of tib/fib fracture.       HPI:  c/c: fall/ right leg pain    HPI: 29F, pmh of Depression, PTSD, Generalized anxiety d/o, heroin abuse now on suboxone, HTN, Stroke with left sided weakness, RIght carotid A dissection/stenosis on asa/statin, paroxysmal atrial tachycardia, who presented to the ER with right leg pain inability to ambulate after a fall.   -   This am pt was noted to be tachycardic.  /min per documentation and report. There is no tele strip or EKG in chart.  Per report she was given adenosine.  She denies any symptoms but she is not verbally telling the answers but only nodding , staring     3/2- pt seen and examined by me today. pt denies any palpitations or any symptoms.      3/3- pt seen and examined by me this am. she denies any symptoms.     3/5- pt seen and examined by me today. Pt denies any CP or SOB.  3/6- pt seen and examined by me today. Pt denies any symptoms this am.   Pt is more conversant this am.    PMH: as above  PSH: denies  F/H: denies significant medical conditions in immediate family members.  Social: smokes 1ppd , drinks ETOH once in a while, heroin abuse in past       PAST MEDICAL & SURGICAL HISTORY:  Cyst, breast  Transaminitis  Aneurysm of right internal carotid artery  Cerebrovascular accident (CVA)  Substance abuse  Anxiety  Depression  PTSD (post-traumatic stress disorder)  H/O breast biopsy      MEDICATIONS  (STANDING):  ascorbic acid 500 milliGRAM(s) Oral two times a day  aspirin  chewable 81 milliGRAM(s) Oral daily  atorvastatin 10 milliGRAM(s) Oral at bedtime  buprenorphine 8 mG/naloxone 2 mG SL  Tablet 1 Tablet(s) SubLingual <User Schedule>  cefepime   IVPB 2000 milliGRAM(s) IV Intermittent every 12 hours  cloNIDine 0.1 milliGRAM(s) Oral two times a day  doxycycline hyclate Capsule 100 milliGRAM(s) Oral every 12 hours  enoxaparin Injectable 40 milliGRAM(s) SubCutaneous every 24 hours  ferrous    sulfate 325 milliGRAM(s) Oral three times a day with meals  folic acid 1 milliGRAM(s) Oral daily  gabapentin 300 milliGRAM(s) Oral two times a day  gabapentin 600 milliGRAM(s) Oral at bedtime  lactated ringers. 1000 milliLiter(s) (75 mL/Hr) IV Continuous <Continuous>  lactated ringers. 1000 milliLiter(s) (75 mL/Hr) IV Continuous <Continuous>  lactated ringers. 1000 milliLiter(s) (75 mL/Hr) IV Continuous <Continuous>  multivitamin 1 Tablet(s) Oral daily  QUEtiapine 25 milliGRAM(s) Oral two times a day  venlafaxine XR. 375 milliGRAM(s) Oral daily    MEDICATIONS  (PRN):  acetaminophen   Tablet .. 650 milliGRAM(s) Oral every 6 hours PRN Temp greater or equal to 38C (100.4F)  calcium carbonate    500 mG (Tums) Chewable 3 Tablet(s) Chew every 6 hours PRN Dyspepsia  clonazePAM  Tablet 0.5 milliGRAM(s) Oral daily PRN Anxiety  HYDROmorphone  Injectable 0.5 milliGRAM(s) IV Push every 6 hours PRN Severe Pain (7 - 10)  melatonin 3 milliGRAM(s) Oral at bedtime PRN Sleep  ondansetron Injectable 4 milliGRAM(s) IV Push every 6 hours PRN Nausea and/or Vomiting  oxyCODONE    IR 10 milliGRAM(s) Oral every 4 hours PRN Moderate Pain (4 - 6)  oxyCODONE    IR 5 milliGRAM(s) Oral every 4 hours PRN Mild Pain (1 - 3)  prochlorperazine   Injectable 10 milliGRAM(s) IV Push once PRN Nausea  senna 2 Tablet(s) Oral at bedtime PRN Constipation  traZODone 50 milliGRAM(s) Oral at bedtime PRN insomnia      FAMILY HISTORY:  Medical history unknown: in father, unknown if living or   Family history of drug use: IN MOTHER, LIVING      SOCIAL HISTORY: no recent smoking     REVIEW OF SYSTEMS:  CONSTITUTIONAL:    No fatigue, malaise, lethargy.  No fever or chills.  RESPIRATORY:  No cough.  No wheeze.  No hemoptysis.  No shortness of breath.  CARDIOVASCULAR:  No chest pains.  No palpitations. No shortness of breath, No orthopnea or PND.  GASTROINTESTINAL:  No abdominal pain.  No nausea or vomiting.    GENITOURINARY:    No hematuria.    MUSCULOSKELETAL:  No musculoskeletal pain.  No joint swelling.  No arthritis.  NEUROLOGICAL:  No tingling or numbness or weakness.  PSYCHIATRIC:  No confusion        ICU Vital Signs Last 24 Hrs  T(C): 36.7 (06 Mar 2020 05:28), Max: 36.8 (05 Mar 2020 11:12)  T(F): 98.1 (06 Mar 2020 05:28), Max: 98.3 (05 Mar 2020 21:17)  HR: 84 (06 Mar 2020 05:28) (72 - 90)  BP: 114/65 (06 Mar 2020 05:28) (100/65 - 114/65)  BP(mean): --  ABP: --  ABP(mean): --  RR: 18 (06 Mar 2020 05:28) (18 - 18)  SpO2: 95% (06 Mar 2020 05:28) (95% - 97%)      PHYSICAL EXAM-    Constitutional: The patient appears to be normal, well developed, well nourished and alert and oriented to time, place and person. The patient does not appear acutely ill.     Head: Head is normocephalic and atraumatic.      Neck: No JVD.     Cardiovascular: Regular rate and rhythm without S3, S4. No murmurs or rubs are appreciated.      Respiratory: Breath sounds are normal. No rales. No wheezing.    Abdomen: Soft, nontender, nondistended with positive bowel sounds.      Extremity: R leg in cast     Neurologic: The patient is alert and oriented.      Skin: No rash, no obvious lesions noted.      Psychiatric: The patient appears to be emotionally stable.      INTERPRETATION OF TELEMETRY: SR ,no SVT overnight    ECG: Sinus rythm , normal axis, t wave inversion in V2-3, isolated PVCs.     I&O's Detail      LABS:                 I&O's Summary    BNP  RADIOLOGY & ADDITIONAL STUDIES:  < from: Transthoracic Echocardiogram (19 @ 21:32) >     Summary     The left ventricle is normal in size, wall thickness, wall motion and   contractility.   Estimated left ventricular ejection fraction is 55 -60%.   Normal appearing left atrium.   Normalappearing right atrium.   Normal appearing right ventricle structure and function.   Normal aortic valve structure and function.   Normal appearing mitral valve structure and function.   Mild (1+) mitral regurgitation is present.   Normal appearing tricuspid valve structure and function.   No evidence of pericardial effusion.     Signature     ----------------------------------------------------------------   Electronically signed by Fer Gomez MD([facility] physician)   on 2019 02:14 PM   ----------------------------------------------------------------    < end of copied text >

## 2020-03-06 NOTE — H&P ADULT - NSHPLABSRESULTS_GEN_ALL_CORE
< from: CT Lower Extremity No Cont, Right (02.21.20 @ 01:04) >  Impression:  Comminuted intra-articular fracture of the distal tibia extending from the tibial plafond to the distal shaft..  Displaced spiral fracture of the distal fibula without articular involvement.  No fracture in the proximal tibia or fibula.  Small knee and ankle effusion with subcutaneous edema/hematoma around the fracture sites.  < end of copied text >    < from: CT Ankle No Cont, Right (02.22.20 @ 09:50) >  IMPRESSION:  1.  Status post external fixation.  2.  Comminuted mildly displaced fractures of the lateral tibial plafond with intra-articular extension as described above.  3.  Nondisplaced coronally oriented intra-articular fracture at the posterior tibial malleolus.  4.  Mildly displaced distal fibular fracture.  5.  Suspected widening of the tibiotalar joint space  6.  Soft tissue swelling.  < end of copied text >

## 2020-03-06 NOTE — PROGRESS NOTE BEHAVIORAL HEALTH - RISK ASSESSMENT
LOW RISK     ACUTE RISK FACTORS: acute medical comorbidities    CHRONIC RISK FACTORS: anxiety, panic, PTSD, trauma, depression, prior in-patient hospitalization    PROTECTIVE FACTORS: no suicidal ideation/intent/plan, future oriented, supportive family, access to healthcare, no suicide attempt, medication compliance, motivation for out-patient treatment.    Patient symptoms not indicating imminent risk for harm to self; not warranting involuntary in-patient hospitalization.
NO changes in risks:   LOW RISK     ACUTE RISK FACTORS: acute medical comorbidities    CHRONIC RISK FACTORS: anxiety, panic, PTSD, trauma, depression, prior in-patient hospitalization    PROTECTIVE FACTORS: no suicidal ideation/intent/plan, future oriented, supportive family, access to healthcare, no suicide attempt, medication compliance, motivation for out-patient treatment.    Patient symptoms not indicating imminent risk for harm to self; not warranting involuntary in-patient hospitalization.
NO changes in risks:   LOW RISK     ACUTE RISK FACTORS: acute medical comorbidities    CHRONIC RISK FACTORS: anxiety, panic, PTSD, trauma, depression, prior in-patient hospitalization    PROTECTIVE FACTORS: no suicidal ideation/intent/plan, future oriented, supportive family, access to healthcare, no suicide attempt, medication compliance, motivation for out-patient treatment.    Patient symptoms not indicating imminent risk for harm to self; not warranting involuntary in-patient hospitalization.

## 2020-03-06 NOTE — PROGRESS NOTE BEHAVIORAL HEALTH - NSBHCONSULTOBSREASON_PSY_A_CORE FT
no acute risk for harm to self / others on unit

## 2020-03-06 NOTE — H&P ADULT - NSHPSOCIALHISTORY_GEN_ALL_CORE
FUNCTIONAL HISTORY  She reports living in 2nd floor apt with  her yris Francois # [phone]. She has 2-3 steps to enter apt building and  1 flight to her apt.     Previous Functional Status:  Independent in ambulation, ADL's, transfers prior to hospitalization    Current Functional Status:  Bed mobility: Min assist CG min A x1  Transfers: CG to Min A x1  Gait / ambulation: 15ft RLE trilam splint R ankle/leg  ADL's:  Speech:

## 2020-03-06 NOTE — PROGRESS NOTE BEHAVIORAL HEALTH - NSBHFUPTYPE_PSY_A_CORE
Consult Follow Up
Inpatient

## 2020-03-06 NOTE — PROGRESS NOTE ADULT - SUBJECTIVE AND OBJECTIVE BOX
c/c: fall/ right leg pain    HPI: 29F, pmh of Depression, PTSD, Generalized anxiety d/o, heroin abuse now on suboxone, HTN, Stroke with left sided weakness, RIght carotid A dissection/stenosis on asa/statin, paroxysmal atrial tachycardia, who presented to the ER with right leg pain inability to ambulate after a fall. She states she felt dizzy and fell onto her right leg. Denies sob/chest pain/n/v/d/abd pain. Felt fine prior to falling. no loc.   IN ED had imaging which showed right distal tib/fib fracture.   Underwent ex-fix 2/21. Followed by psychiatry and meds titrated down as it was felt her high doses contributed to her dizziness.  Post op course notable for episodes of SVT requiring adenosine. Seen by EP, felt not to be a good candidate for ablation at this time given RLE splint. Started on bb/ccb with improvement.    3/6: pt seen and examined this am. Felt well. Somewhat anxious. pain controlled. no sob/chest pain.      Review of system- All 10 systems reviewed and is as per HPI otherwise negative.     Vital Signs Last 24 Hrs  T(C): 36.6 (06 Mar 2020 10:18), Max: 36.8 (05 Mar 2020 21:17)  T(F): 97.8 (06 Mar 2020 10:18), Max: 98.3 (05 Mar 2020 21:17)  HR: 81 (06 Mar 2020 10:18) (72 - 84)  BP: 113/54 (06 Mar 2020 10:18) (100/65 - 114/65)  RR: 18 (06 Mar 2020 10:18) (18 - 18)  SpO2: 99% (06 Mar 2020 10:18) (95% - 99%)    PHYSICAL EXAM:  GENERAL: Comfortable n o acute distress  HEAD:  Atraumatic, Normocephalic  EYES: EOMI, PERRLA  HEENT: Moist mucous membranes  NECK: Supple, No JVD  NERVOUS SYSTEM:  nonfocal  CHEST/LUNG: Clear to auscultation bilaterally  HEART: Regular rate and rhythm  ABDOMEN: Soft, Nontender, Nondistended; Bowel sounds present  GENITOURINARY- Voiding, no palpable bladder  EXTREMITIES:  No clubbing, cyanosis, or edema  MUSCULOSKELETAL- RLE in splint/ace-wrap  SKIN-no rash    LABS:  none today.    MEDICATIONS  (STANDING):  ascorbic acid 500 milliGRAM(s) Oral two times a day  aspirin  chewable 81 milliGRAM(s) Oral daily  atorvastatin 10 milliGRAM(s) Oral at bedtime  buprenorphine 8 mG/naloxone 2 mG SL  Tablet 1 Tablet(s) SubLingual <User Schedule>  cefuroxime   Tablet 500 milliGRAM(s) Oral every 12 hours  cloNIDine 0.1 milliGRAM(s) Oral two times a day  diltiazem    Tablet 30 milliGRAM(s) Oral two times a day  enoxaparin Injectable 40 milliGRAM(s) SubCutaneous every 24 hours  ferrous    sulfate 325 milliGRAM(s) Oral three times a day with meals  folic acid 1 milliGRAM(s) Oral daily  gabapentin 300 milliGRAM(s) Oral two times a day  gabapentin 600 milliGRAM(s) Oral at bedtime  lamoTRIgine 25 milliGRAM(s) Oral two times a day  metoprolol tartrate 75 milliGRAM(s) Oral two times a day  multivitamin 1 Tablet(s) Oral daily  QUEtiapine 50 milliGRAM(s) Oral two times a day  venlafaxine XR. 225 milliGRAM(s) Oral daily    MEDICATIONS  (PRN):  acetaminophen   Tablet .. 650 milliGRAM(s) Oral every 6 hours PRN Mild Pain (1 - 3)  acetaminophen   Tablet .. 650 milliGRAM(s) Oral every 6 hours PRN Temp greater or equal to 38C (100.4F)  calcium carbonate    500 mG (Tums) Chewable 3 Tablet(s) Chew every 6 hours PRN Dyspepsia  clonazePAM  Tablet 0.5 milliGRAM(s) Oral every 12 hours PRN Anxiety  HYDROmorphone  Injectable 0.25 milliGRAM(s) IV Push every 4 hours PRN Severe Pain (7 - 10)  melatonin 3 milliGRAM(s) Oral at bedtime PRN Sleep  ondansetron Injectable 4 milliGRAM(s) IV Push every 6 hours PRN Nausea and/or Vomiting  oxyCODONE    IR 5 milliGRAM(s) Oral every 6 hours PRN Moderate Pain (4 - 6)  prochlorperazine   Injectable 10 milliGRAM(s) IV Push once PRN Nausea  senna 2 Tablet(s) Oral at bedtime PRN Constipation  traZODone 50 milliGRAM(s) Oral at bedtime PRN insomnia      ASSESSMENT AND PLAN:  #Fall/Right tib/fib fracture:  -S/P ex fix   -S/p ORIF 2/26/20  -pain control  -physical therapy  -incentive spirometry    #SVT  -s/p adenosine  -continue bb/ccb as ordered  -no further EP intervention per documentation.    #HCAP likely d/t resistant gram negative organism:  -completed 7 days treatment with abx.    #Dizziness:  -likely related to multiple sedative meds/high clonidine dosing +/-SVT    #H/o right carotid A dissection/stenosis, CVA/mild lue weakness:  -on asa/statin  -vascular eval appreciated  -repeat CTA with healed carotid dissection    #HTN:  -now on bb/ccb.    #Heroin abuse in past:  -continue suboxone.    #Depression/anxiety/PTSD:  -continue effexor, klonipin, trazodone, gabapentin, seroquel  -prn klonipin decreased from 1mg to 0.5mg.  -clonidine decreased from 0.2mg daily and 0.3mg HS to 0.1 mg bid.  -effexor decreased from 375 to 225mg   -seroquel increased from 25 bid to 50 bid for anxiety.   -continue trazodone, gabapentin at current doses  -upon review of meds pt hasn't gotten lamictal since 2/21  -d/w psychiatry, needs to be restarted @ lower dose and titrated up slowly .  -started lamictal @ 25mg bid, titrate up in 2 weeks.    #DVT px  -change to asa on dc per a/c services    #Dispo-  Rehab today

## 2020-03-06 NOTE — PROGRESS NOTE ADULT - REASON FOR ADMISSION
tib/fib fracture
tib/fib fracture, SVT
tib/fib fracture

## 2020-03-06 NOTE — H&P ADULT - NSHPREVIEWOFSYSTEMS_GEN_ALL_CORE
REVIEW OF SYSTEMS:    CONSTITUTIONAL: No fevers or chills  EYES/ENT: No visual changes;  No vertigo or throat pain   NECK: No pain or stiffness  RESPIRATORY: No cough, wheezing, or shortness of breath  CARDIOVASCULAR: No chest pain or palpitations  GASTROINTESTINAL: No abdominal or epigastric pain. No nausea or vomiting. No diarrhea or constipation.   GENITOURINARY: No dysuria, frequency or hematuria  NEUROLOGICAL: No numbness, +weakness  SKIN: No itching, rashes  MSK: +right leg pain

## 2020-03-06 NOTE — H&P ADULT - HISTORY OF PRESENT ILLNESS
30 F with PMH for depression/ anxiety/ PTSD, h/o heroin abuse now on suboxone, previous CVA (with L sided deficits) R carotid A dissection/stenosis (on ASA and Statin), pAfib, who presented to the ED on 2/21 at [facility] with complaints of R leg pain and inability to ambulate after a fall.  Fall was secondary to lightheadedness/ dizziness and fell onto her R leg. Denied presyncopal symptoms or LOC, chest pain or palpitations.  In the ED she had imaging of her R knee which demonstrated right tib/fib fracture. Subsequently underwent external fixation on 2/21 without any complications.  She was seen by psychiatry who assisted with med titration as there was concern that psych meds were contributing to dizziness.  Post op hospital course was significant for episodes of SVT requiring management with adenosine.  EP was consulted to evaluate for ablation, however patient was reluctant to undergo ablation at this time. She was managed on AV marjan agents Cardizem and metoprolol at this time. Electrolytes remained in normal limits.  Recommend outpatient EP follow up.  She endorsed dysuria and was treated for UTI with 3 day course of antibiotics.  She remained hemodynamically stable and medically optimized for discharge to [place] rehab on 3/6/20. Patient is a 30 F with PMH PAF, depression/ anxiety/ PTSD, h/o heroin abuse now on suboxone, previous CVA (with residual L-sided deficits) R carotid A dissection/stenosis (on ASA and Statin),  who presented to the ED on 2/21/20 at [facility] with complaints of R leg pain and inability to ambulate after a fall.  Fall was secondary to lightheadedness/ dizziness, landing  onto her R leg. Denied presyncopal symptoms or LOC, chest pain or palpitations.  In the ED she had imaging of her R knee which demonstrated right tib/fib fracture (Comminuted intra-articular fracture of the distal tibia extending from the tibial plafond to the distal shaft; Displaced spiral fracture of the distal fibula without articular involvement).   Subsequently underwent external fixation on 2/21 without any complications.  She was seen by psychiatry who assisted with med titration as there was concern that psych meds were contributing to dizziness.  Post op hospital course was significant for episodes of SVT requiring management with adenosine.  EP was consulted to evaluate for ablation, however patient was reluctant to undergo ablation at this time. She was managed on AV marjan agents Cardizem and metoprolol at this time. Electrolytes remained in normal limits.  Recommend outpatient EP follow up.  She endorsed dysuria and was treated for UTI with 3 day course of antibiotics.  She remained hemodynamically stable and medically optimized for discharge to [place] rehab on 3/6/20. ARON RAMIREZ

## 2020-03-06 NOTE — H&P ADULT - ASSESSMENT
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 Hospital course complicated by SVT controlled on AV marjan agents.     *Rehab  - Gait Instability, ADL, and Functional impairments - start Comprehensive Rehab Program of PT/OT  - NWB RLE -- will clarify with Ortho duration and if allowed ROM  - orthostatic/ dizziness precautions    *Right Tib fib Fracture s/p ORIF  - ORIF R distal tib/fib comminuted bimalleolar fxs & removal of external fixator  - NWB to RLE -- follow up with Ortho to discuss clearance  - continue trilam splint R ankle/leg  - pain management as below    *Dizziness/ lightheadedness   - likely related to multiple sedative meds/high clonidine dosing +/-SVT    *SVT  - s/p adenosine. seen by cardiology. Patient reluctant to undergo ablation  - continue AV marjan agents: cardizem and Lopressor    *Leukocytosis likely due to UTI   - discharged on Cefuroxime 500mg BID for 3 day course (end date on 3/8/20)    *HCAP likely d/t resistant gram negative organism - -resolved  -completed 7 days treatment with abx.    MEDICAL COMORBIDITIES:    *Psych: PTSD/ Generalized Anxiety/ Substance abuse (heroin)  - seen by psych inpatient; psych meds adjusted thought to be contributing to dizziness  - Consider psych consult in rehab if needing further titration  - Per Psych: continue meds as follows  - continue effexor, klonipin, trazodone, gabapentin, seroquel  - prn klonipin decreased from 1mg to 0.5mg.  - clonidine decreased from 0.2mg daily and 0.3mg HS to 0.1 mg bid.  - effexor decreased from 375 to 225mg   - seroquel increased from 25 bid to 50 bid for anxiety.   - continue trazodone, gabapentin at current doses  - upon review of meds pt hasn't gotten lamictal since 2/21 per psychiatry, needs to be restarted at lower dose and titrated up slowly .  - started lamictal  25mg bid 3/2/20, titrate up in 2 weeks.    * h/o CVA with mild LUE weakness  - secondary to R carotid artery dissection/stenosis. Evaluated by Vascular, repeat CTA with healed dissection  - continue ASA and Statin    *HTN: now on bb and ccb    --------------------------------------------------  Pain Mgmt   - Tylenol PRN  - Oxycodone 5 PRN     GI/Bowel Mgmt   -  Senna 2 tabs daily. Miralax daily PRN  - TUMs    /Bladder Mgmt   - PVRx1 on admission - retention screen    FEN   - Diet - Regular    Precautions / PROPHYLAXIS:   - Falls  - Weight bearing status: NWB to RLE until cleared by Ortho  - Lungs: Aspiration, Incentive Spirometer   - Pressure injury/Skin: Turn Q2hrs in bed while awake, OOB to Chair, PT/OT/SLP    - DVT: Per a/c management NP - Continue Lovenox 40 mg SQ daily while in the hospital/Rehab then switch to   mg PO BID at time of discharge to home  if she is stil NWB    --------------------------------------------  Discharge Follow up:  Antwon Tyson ()- Orthopaedic Surgery  Junior Marie)-- Surgery  Radha Dean)- Cardiovascular Disease; Internal Medicine  Ruben Pinto) - Internal Medicine    --------------------------------------------      MEDICAL PROGNOSIS: GOOD            REHAB POTENTIAL: GOOD             ESTIMATED DISPOSITION: HOME WITH HOME CARE            ELOS: 10-14 Days   EXPECTED THERAPY:     P.T. 2hr/day       O.T. 1hr/day       P&O Unnecessary     EXP FREQUENCY: 5 days per 7 day period     PRESCREEN COMPARISION:   I have reviewed the prescreen information and I have found no relevant changes between the preadmission screening and my post admission evaluation     RATIONALE FOR INPATIENT ADMISSION - Patient demonstrates the following: (check all that apply)  [X] Medically appropriate for rehabilitation admission  [X] Has attainable rehab goals with an appropriate initial discharge plan  [X] Has rehabilitation potential (expected to make a significant improvement within a reasonable period of time)   [X] Requires close medical management by a rehab physician, rehab nursing care, Hospitalist and comprehensive interdisciplinary team (including PT, OT, & or SLP, Prosthetics and Orthotics) ASSESSMENT/ PLAN:  30 F with significant psychiatric history and heroin abuse (on Suboxone), 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 Hospital course complicated by SVT controlled on AV marjan agents.     *Rehab  - Gait Instability, ADL, and Functional impairments - start Comprehensive Rehab Program of PT/OT  - NWB RLE -- will clarify with Ortho duration and if allowed ROM  - orthostatic/ dizziness precautions    *Right Tib fib Fracture s/p ORIF  - ORIF R distal tib/fib comminuted bimalleolar fxs & removal of external fixator  - NWB to RLE -- follow up with Ortho to discuss clearance  - continue trilam splint R ankle/leg  - pain management as below    *Dizziness/ lightheadedness   - likely related to multiple sedative meds/high clonidine dosing +/-SVT    *SVT  - s/p adenosine. seen by cardiology. Patient reluctant to undergo ablation  - continue AV marjan agents: cardizem and Lopressor    *Leukocytosis likely due to UTI   - discharged on Cefuroxime 500mg BID for 3 day course (end date on 3/8/20)    *HCAP likely d/t resistant gram negative organism - -resolved  -completed 7 days treatment with abx.    MEDICAL COMORBIDITIES:    *Psych: PTSD/ Generalized Anxiety/ Substance abuse (heroin)  - seen by psych inpatient; psych meds adjusted thought to be contributing to dizziness  - Consider psych consult in rehab if needing further titration  - Per Psych: continue meds as follows  - continue effexor, klonipin, trazodone, gabapentin, seroquel  - prn klonipin decreased from 1mg to 0.5mg.  - clonidine decreased from 0.2mg daily and 0.3mg HS to 0.1 mg bid.  - effexor decreased from 375 to 225mg   - seroquel increased from 25 bid to 50 bid for anxiety.   - continue trazodone, gabapentin at current doses  - upon review of meds pt hasn't gotten lamictal since 2/21 per psychiatry, needs to be restarted at lower dose and titrated up slowly .  - started lamictal  25mg bid 3/2/20, titrate up in 2 weeks.    * h/o CVA with mild LUE weakness  - secondary to R carotid artery dissection/stenosis. Evaluated by Vascular, repeat CTA with healed dissection  - continue ASA and Statin    *HTN: now on bb and ccb    --------------------------------------------------  Pain Mgmt   - Tylenol PRN  - Oxycodone 5 PRN   - Gabapentin    GI/Bowel Mgmt   -  Senna 2 tabs daily. Miralax daily PRN  - TUMs    /Bladder Mgmt   - PVRx1 on admission - retention screen    FEN   - Diet - Regular    Precautions / PROPHYLAXIS:   - Falls  - Weight bearing status: NWB to RLE until cleared by Ortho  - Lungs: Aspiration, Incentive Spirometer   - Pressure injury/Skin: Turn Q2hrs in bed while awake, OOB to Chair, PT/OT/SLP    - DVT: Per a/c management NP - Continue Lovenox 40 mg SQ daily while in the hospital/Rehab then switch to   mg PO BID at time of discharge to home  if she is stil NWB    --------------------------------------------  Discharge Follow up:  Antwon Tyson ()- Orthopaedic Surgery  Junior Marie)-- Surgery  Radha Dean)- Cardiovascular Disease; Internal Medicine  Ruben Pinto) - Internal Medicine    --------------------------------------------      MEDICAL PROGNOSIS: GOOD            REHAB POTENTIAL: GOOD             ESTIMATED DISPOSITION: HOME WITH HOME CARE            ELOS: 10-14 Days   EXPECTED THERAPY:     P.T. 2hr/day       O.T. 1hr/day       P&O Unnecessary     EXP FREQUENCY: 5 days per 7 day period     PRESCREEN COMPARISION:   I have reviewed the prescreen information and I have found no relevant changes between the preadmission screening and my post admission evaluation     RATIONALE FOR INPATIENT ADMISSION - Patient demonstrates the following: (check all that apply)  [X] Medically appropriate for rehabilitation admission  [X] Has attainable rehab goals with an appropriate initial discharge plan  [X] Has rehabilitation potential (expected to make a significant improvement within a reasonable period of time)   [X] Requires close medical management by a rehab physician, rehab nursing care, Hospitalist and comprehensive interdisciplinary team (including PT, OT, & or SLP, Prosthetics and Orthotics) ASSESSMENT/ PLAN:  30 F with significant psychiatric history and heroin abuse (on Suboxone), 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 Hospital course complicated by SVT controlled on AV marjan agents.     * s/p ORIF R distal tib/fib comminuted bimalleolar fxs & removal of external fixator  - NWB to RLE  - continue trilam splint R ankle/leg  - pain management as below  - Gait Instability, ADL, and Functional impairments - start Comprehensive Rehab Program of PT/OT  - orthostatic/ dizziness precautions  -cardiac precautions  -orthopedic follow up on discharge. Will need to assess whether home is wheelchair accessible    *Dizziness/ lightheadedness   - likely related to multiple sedative meds/high clonidine dosing +/-SVT  -monitor vitals  hospitalist consult    *SVT  - s/p adenosine. seen by cardiology. Patient reluctant to undergo ablation  - continue AV marjan agents: cardizem and Lopressor  -cardiac precautions, hospitalist consult    *Leukocytosis likely due to UTI   - discharged on Cefuroxime 500mg BID for 3 day course (end date on 3/8/20)    *h/o HCAP secondary resistant gram negative organism - -resolved  -completed 7 days treatment with abx.    *Psych: PTSD/ Generalized Anxiety/ Substance abuse (heroin)  - seen by psych inpatient; psych meds adjusted thought to be contributing to dizziness  - Consider psych consult in rehab if needing further titration  - Per Psych: continue meds as follows  - continue effexor, klonipin, trazodone, gabapentin, seroquel  - prn klonipin decreased from 1mg to 0.5mg.  - clonidine decreased from 0.2mg daily and 0.3mg HS to 0.1 mg bid.  - effexor decreased from 375 to 225mg   - seroquel increased from 25 bid to 50 bid for anxiety.   - continue trazodone, gabapentin at current doses  - upon review of meds pt hasn't gotten lamictal since 2/21 per psychiatry, needs to be restarted at lower dose and titrated up slowly .  - started lamictal  25mg bid 3/2/20, titrate up in 2 weeks.  -psychology supportive counseling    * h/o CVA with mild LUE weakness  - secondary to R carotid artery dissection/stenosis. Evaluated by Vascular, repeat CTA with healed dissection  - continue ASA and Statin    *HTN:   now on bb and ccb  -monitor vitals  -hospitalist consult    *Pain Mgmt   - Tylenol PRN  - Oxycodone 5 PRN   - Gabapentin    GI/Bowel Mgmt   -  Senna 2 tabs daily. Miralax daily PRN  - TUMs    /Bladder Mgmt   - PVRx1 on admission - retention screen    FEN   - Diet - Regular    - DVT PPX:   Per a/c management NP - Continue Lovenox 40 mg SQ daily while in the hospital/Rehab then switch to   mg PO BID at time of discharge to home  if she is stil NWB    Precautions / PROPHYLAXIS:   - Falls  - Weight bearing status: NWB to RLE until cleared by Ortho  - Lungs: Aspiration, Incentive Spirometer   - Pressure injury/Skin: Turn Q2hrs in bed while awake, OOB to Chair, PT/OT/SLP        --------------------------------------------  Discharge Follow up:  Antwon Tyson (DO)- Orthopaedic Surgery  Junior Marie)-- Surgery  Radha Dean)- Cardiovascular Disease; Internal Medicine  Ruben Pinto) - Internal Medicine    --------------------------------------------      MEDICAL PROGNOSIS: GOOD            REHAB POTENTIAL: GOOD             ESTIMATED DISPOSITION: HOME WITH HOME CARE            ELOS: 10-14 Days   EXPECTED THERAPY:     P.T. 2hr/day       O.T. 1hr/day       P&O Unnecessary     EXP FREQUENCY: 5 days per 7 day period     PRESCREEN COMPARISION:   I have reviewed the prescreen information and I have found no relevant changes between the preadmission screening and my post admission evaluation     RATIONALE FOR INPATIENT ADMISSION - Patient demonstrates the following: (check all that apply)  [X] Medically appropriate for rehabilitation admission  [X] Has attainable rehab goals with an appropriate initial discharge plan  [X] Has rehabilitation potential (expected to make a significant improvement within a reasonable period of time)   [X] Requires close medical management by a rehab physician, rehab nursing care, Hospitalist and comprehensive interdisciplinary team (including PT, OT, & or SLP, Prosthetics and Orthotics)

## 2020-03-06 NOTE — PROGRESS NOTE BEHAVIORAL HEALTH - AXIS III
see hospitalist note
s/p right leg tibial fx surgery
see hospitalist note
s/p right leg tibial fx surgery

## 2020-03-06 NOTE — H&P ADULT - NSHPPHYSICALEXAM_GEN_ALL_CORE
Constitutional - NAD, Comfortable  HEENT - NCAT, EOMI  Neck - Supple, No limited ROM  Chest - good chest expansion, good respiratory effort, CTAB   Cardio - warm and well perfused, RRR, no murmur  Abdomen -  Soft, NTND  Extremities - No peripheral edema, No calf tenderness   Neurologic Exam:                    Cognitive -             Orientation: Awake, Alert, AAO to self, place, date, year, situation            Attention:  Days of week, recall 3 objects without cuing            Memory: Recent/ remote memory intact            Thought: process, content appropriate     Speech - Fluent, Comprehensible, No dysarthria, No aphasia      Cranial Nerves - CN 2-12 intact     Motor -  Good effort,  ? gross hemiparesis                    LEFT    UE - ShAB 5/5, EF 5/5, EE 5/5, WE 5/5,  WNL                    RIGHT UE - ShAB 5/5, EF 5/5, EE 5/5, WE 5/5,  WNL                    LEFT    LE - HF 5/5, KE 5/5, DF 5/5, PF 5/5                    RIGHT LE - HF 5/5, KE 5/5, DF 5/5, PF 5/5        Sensory - Intact to LT bilateral     Reflexes - DTR _ / 4 , neg Tidwell's b/l, neg Babinski's b/l     Coordination - FTN / HTS intact     OculoVestibular - No saccades, No nystagmus, VOR      Psychiatric - Mood stable, Affect WNL Constitutional - NAD, Comfortably lying in bed  HEENT - NCAT, EOMI  Neck - Supple, No limited ROM  Chest - good chest expansion, good respiratory effort, CTAB   Cardio - warm and well perfused, RRR, no murmur  Abdomen -  Soft, NTND  Extremities - No peripheral edema, No calf tenderness, RLE splint present  Neurologic Exam:                    Cognitive -             Orientation: Awake, Alert, AAO to self, place, date, year, situation            Memory: Recent memory intact from last 3 days, but does not remember the 7 days prior to 3 days ago.            Thought: process, content appropriate     Speech - Fluent, Comprehensible, No dysarthria, No aphasia      Cranial Nerves - CN 2-12 intact     Motor -  Good effort                    LEFT    UE - ShAB 5/5, EF 5/5, EE 5/5, WE 5/5,  WNL                    RIGHT UE - ShAB 5/5, EF 5/5, EE 5/5, WE 5/5,  WNL                    LEFT    LE - HF 5/5, KE 5/5, DF 5/5, PF 5/5                    RIGHT LE - HF 5/5, KE 5/5, DF 5/5, PF 5/5, splint present     Sensory - Intact to LT bilateral  Psychiatric - Mood stable, Affect WNL

## 2020-03-06 NOTE — PROGRESS NOTE BEHAVIORAL HEALTH - SECONDARY DX2
Opioid dependence in remission

## 2020-03-06 NOTE — PROGRESS NOTE BEHAVIORAL HEALTH - SECONDARY DX1
Major depressive disorder in full remission, unspecified whether recurrent

## 2020-03-06 NOTE — DISCHARGE NOTE NURSING/CASE MANAGEMENT/SOCIAL WORK - PATIENT PORTAL LINK FT
You can access the FollowMyHealth Patient Portal offered by [facility] by registering at the following website: [email or web address]. By joining [employer]’s FollowMyHealth portal, you will also be able to view your health information using other applications (apps) compatible with our system.
You can access the FollowMyHealth Patient Portal offered by [facility] by registering at the following website: [email or web address]. By joining [employer]’s FollowMyHealth portal, you will also be able to view your health information using other applications (apps) compatible with our system.

## 2020-03-07 LAB
ALBUMIN SERPL ELPH-MCNC: 2.8 G/DL — LOW (ref 3.3–5)
ALP SERPL-CCNC: 99 U/L — SIGNIFICANT CHANGE UP (ref 40–120)
ALT FLD-CCNC: 24 U/L — SIGNIFICANT CHANGE UP (ref 10–45)
ANION GAP SERPL CALC-SCNC: 6 MMOL/L — SIGNIFICANT CHANGE UP (ref 5–17)
AST SERPL-CCNC: 14 U/L — SIGNIFICANT CHANGE UP (ref 10–40)
BASOPHILS # BLD AUTO: 0.03 K/UL — SIGNIFICANT CHANGE UP (ref 0–0.2)
BASOPHILS NFR BLD AUTO: 0.4 % — SIGNIFICANT CHANGE UP (ref 0–2)
BILIRUB SERPL-MCNC: 0.2 MG/DL — SIGNIFICANT CHANGE UP (ref 0.2–1.2)
BUN SERPL-MCNC: 6 MG/DL — LOW (ref 7–23)
CALCIUM SERPL-MCNC: 8.5 MG/DL — SIGNIFICANT CHANGE UP (ref 8.4–10.5)
CHLORIDE SERPL-SCNC: 107 MMOL/L — SIGNIFICANT CHANGE UP (ref 96–108)
CO2 SERPL-SCNC: 32 MMOL/L — HIGH (ref 22–31)
CREAT SERPL-MCNC: 0.7 MG/DL — SIGNIFICANT CHANGE UP (ref 0.5–1.3)
EOSINOPHIL # BLD AUTO: 0.18 K/UL — SIGNIFICANT CHANGE UP (ref 0–0.5)
EOSINOPHIL NFR BLD AUTO: 2.4 % — SIGNIFICANT CHANGE UP (ref 0–6)
GLUCOSE SERPL-MCNC: 118 MG/DL — HIGH (ref 70–99)
HCT VFR BLD CALC: 35.3 % — SIGNIFICANT CHANGE UP (ref 34.5–45)
HGB BLD-MCNC: 10.8 G/DL — LOW (ref 11.5–15.5)
IMM GRANULOCYTES NFR BLD AUTO: 0.5 % — SIGNIFICANT CHANGE UP (ref 0–1.5)
LYMPHOCYTES # BLD AUTO: 2.6 K/UL — SIGNIFICANT CHANGE UP (ref 1–3.3)
LYMPHOCYTES # BLD AUTO: 35.2 % — SIGNIFICANT CHANGE UP (ref 13–44)
MCHC RBC-ENTMCNC: 27.3 PG — SIGNIFICANT CHANGE UP (ref 27–34)
MCHC RBC-ENTMCNC: 30.6 GM/DL — LOW (ref 32–36)
MCV RBC AUTO: 89.1 FL — SIGNIFICANT CHANGE UP (ref 80–100)
MONOCYTES # BLD AUTO: 0.57 K/UL — SIGNIFICANT CHANGE UP (ref 0–0.9)
MONOCYTES NFR BLD AUTO: 7.7 % — SIGNIFICANT CHANGE UP (ref 2–14)
NEUTROPHILS # BLD AUTO: 3.96 K/UL — SIGNIFICANT CHANGE UP (ref 1.8–7.4)
NEUTROPHILS NFR BLD AUTO: 53.8 % — SIGNIFICANT CHANGE UP (ref 43–77)
NRBC # BLD: 0 /100 WBCS — SIGNIFICANT CHANGE UP (ref 0–0)
PLATELET # BLD AUTO: 327 K/UL — SIGNIFICANT CHANGE UP (ref 150–400)
POTASSIUM SERPL-MCNC: 4 MMOL/L — SIGNIFICANT CHANGE UP (ref 3.5–5.3)
POTASSIUM SERPL-SCNC: 4 MMOL/L — SIGNIFICANT CHANGE UP (ref 3.5–5.3)
PROT SERPL-MCNC: 6.1 G/DL — SIGNIFICANT CHANGE UP (ref 6–8.3)
RBC # BLD: 3.96 M/UL — SIGNIFICANT CHANGE UP (ref 3.8–5.2)
RBC # FLD: 14.4 % — SIGNIFICANT CHANGE UP (ref 10.3–14.5)
SODIUM SERPL-SCNC: 145 MMOL/L — SIGNIFICANT CHANGE UP (ref 135–145)
WBC # BLD: 7.38 K/UL — SIGNIFICANT CHANGE UP (ref 3.8–10.5)
WBC # FLD AUTO: 7.38 K/UL — SIGNIFICANT CHANGE UP (ref 3.8–10.5)

## 2020-03-07 PROCEDURE — 99232 SBSQ HOSP IP/OBS MODERATE 35: CPT

## 2020-03-07 PROCEDURE — 99223 1ST HOSP IP/OBS HIGH 75: CPT

## 2020-03-07 RX ADMIN — GABAPENTIN 300 MILLIGRAM(S): 400 CAPSULE ORAL at 17:41

## 2020-03-07 RX ADMIN — Medication 1 MILLIGRAM(S): at 11:37

## 2020-03-07 RX ADMIN — GABAPENTIN 300 MILLIGRAM(S): 400 CAPSULE ORAL at 06:00

## 2020-03-07 RX ADMIN — Medication 1 TABLET(S): at 11:37

## 2020-03-07 RX ADMIN — ATORVASTATIN CALCIUM 10 MILLIGRAM(S): 80 TABLET, FILM COATED ORAL at 21:00

## 2020-03-07 RX ADMIN — Medication 30 MILLIGRAM(S): at 17:41

## 2020-03-07 RX ADMIN — Medication 500 MILLIGRAM(S): at 05:59

## 2020-03-07 RX ADMIN — Medication 500 MILLIGRAM(S): at 17:41

## 2020-03-07 RX ADMIN — QUETIAPINE FUMARATE 50 MILLIGRAM(S): 200 TABLET, FILM COATED ORAL at 05:59

## 2020-03-07 RX ADMIN — Medication 0.1 MILLIGRAM(S): at 17:41

## 2020-03-07 RX ADMIN — ENOXAPARIN SODIUM 40 MILLIGRAM(S): 100 INJECTION SUBCUTANEOUS at 13:02

## 2020-03-07 RX ADMIN — Medication 75 MILLIGRAM(S): at 17:41

## 2020-03-07 RX ADMIN — BUPRENORPHINE AND NALOXONE 1 TABLET(S): 2; .5 TABLET SUBLINGUAL at 17:41

## 2020-03-07 RX ADMIN — QUETIAPINE FUMARATE 50 MILLIGRAM(S): 200 TABLET, FILM COATED ORAL at 17:41

## 2020-03-07 RX ADMIN — Medication 0.1 MILLIGRAM(S): at 05:59

## 2020-03-07 RX ADMIN — LAMOTRIGINE 25 MILLIGRAM(S): 25 TABLET, ORALLY DISINTEGRATING ORAL at 17:41

## 2020-03-07 RX ADMIN — BUPRENORPHINE AND NALOXONE 1 TABLET(S): 2; .5 TABLET SUBLINGUAL at 13:02

## 2020-03-07 RX ADMIN — Medication 225 MILLIGRAM(S): at 11:37

## 2020-03-07 RX ADMIN — BUPRENORPHINE AND NALOXONE 1 TABLET(S): 2; .5 TABLET SUBLINGUAL at 05:59

## 2020-03-07 RX ADMIN — Medication 81 MILLIGRAM(S): at 11:36

## 2020-03-07 RX ADMIN — LAMOTRIGINE 25 MILLIGRAM(S): 25 TABLET, ORALLY DISINTEGRATING ORAL at 05:59

## 2020-03-07 RX ADMIN — Medication 325 MILLIGRAM(S): at 17:40

## 2020-03-07 RX ADMIN — Medication 325 MILLIGRAM(S): at 11:37

## 2020-03-07 RX ADMIN — Medication 325 MILLIGRAM(S): at 08:45

## 2020-03-07 RX ADMIN — GABAPENTIN 600 MILLIGRAM(S): 400 CAPSULE ORAL at 21:00

## 2020-03-07 NOTE — CONSULT NOTE ADULT - ATTENDING COMMENTS
I have personally interviewed and examined this patient, reviewed pertinent clinical information and performed the evaluation and management service provided at today's patient consultation for inpatient medical comanagement

## 2020-03-07 NOTE — PROGRESS NOTE ADULT - SUBJECTIVE AND OBJECTIVE BOX
Pt. seen and examined at bedside.  No overnight events.  Noted to be lethargic.      REVIEW OF SYSTEMS  Constitutional - No fever,  No fatigue  Neurological - No headaches, No loss of strength  Musculoskeletal - RIGHT LEG PAIN. No muscle pain    VITALS  T(C): 36.6 (03-07-20 @ 08:44), Max: 36.7 (03-06-20 @ 19:44)  HR: 78 (03-07-20 @ 08:44) (78 - 85)  BP: 110/61 (03-07-20 @ 08:44) (95/66 - 119/75)  RR: 14 (03-07-20 @ 08:44) (14 - 18)  SpO2: 96% (03-07-20 @ 08:44) (96% - 99%)  Wt(kg): --       MEDICATIONS   acetaminophen   Tablet .. 650 milliGRAM(s) every 6 hours PRN  ascorbic acid 500 milliGRAM(s) two times a day  aspirin  chewable 81 milliGRAM(s) daily  atorvastatin 10 milliGRAM(s) at bedtime  buprenorphine 8 mG/naloxone 2 mG SL  Tablet 1 Tablet(s) <User Schedule>  calcium carbonate    500 mG (Tums) Chewable 3 Tablet(s) every 6 hours PRN  cefuroxime   Tablet 500 milliGRAM(s) every 12 hours  clonazePAM  Tablet 0.5 milliGRAM(s) every 12 hours PRN  cloNIDine 0.1 milliGRAM(s) two times a day  diltiazem    Tablet 30 milliGRAM(s) two times a day  enoxaparin Injectable 40 milliGRAM(s) every 24 hours  ferrous    sulfate 325 milliGRAM(s) three times a day with meals  folic acid 1 milliGRAM(s) daily  gabapentin 300 milliGRAM(s) two times a day  gabapentin 600 milliGRAM(s) at bedtime  influenza   Vaccine 0.5 milliLiter(s) once  lamoTRIgine 25 milliGRAM(s) two times a day  melatonin 3 milliGRAM(s) at bedtime PRN  metoprolol tartrate 75 milliGRAM(s) two times a day  multivitamin 1 Tablet(s) daily  ondansetron    Tablet 4 milliGRAM(s) every 6 hours PRN  oxyCODONE    IR 5 milliGRAM(s) every 6 hours PRN  QUEtiapine 50 milliGRAM(s) two times a day  senna 2 Tablet(s) at bedtime PRN  traZODone 50 milliGRAM(s) at bedtime PRN  venlafaxine XR. 225 milliGRAM(s) daily      RECENT LABS/IMAGING                        10.8   7.38  )-----------( 327      ( 07 Mar 2020 05:30 )             35.3     03-07    145  |  107  |  6<L>  ----------------------------<  118<H>  4.0   |  32<H>  |  0.70    Ca    8.5      07 Mar 2020 05:30    TPro  6.1  /  Alb  2.8<L>  /  TBili  0.2  /  DBili  x   /  AST  14  /  ALT  24  /  AlkPhos  99  03-07                  ---------  PHYSICAL EXAM  Constitutional - NAD, Comfortable  Pulm - Breathing comfortably, No wheezing  Abd - Soft, NTND  Extremities - No edema, No calf tenderness  Neurologic Exam -                    Cognitive - Awake, Alert     Communication - Fluent     Motor - No focal deficits     Sensory - Intact to LT  Psychiatric - Mood WNL, Affect WNL    ASSESSMENT/PLAN  29y Female with functional deficits s/p fall and right tib/fib fx, s/p fixation. NWB RLE.  Continue current medical management  Pain - Tylenol PRN; oxy; melissa  DVT PPX - enoxaparin Injectable 40 milliGRAM(s) every 24 hours  Active issues - psych + h/o polysubstance abuse on buprenorphine 8 mG/naloxone 2 mG SL  Tablet  Continue 3hrs a day of comprehensive rehab program.

## 2020-03-07 NOTE — CONSULT NOTE ADULT - ASSESSMENT
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 Hospital course complicated by SVT controlled on AV marjan agents.     > s/p ORIF R distal tib/fib comminuted bimalleolar fxs & removal of external fixator  - NWB to RLE  - continue trilam splint R ankle/leg  - pain management as below  - Gait Instability, ADL, and Functional impairments - start Comprehensive Rehab Program of PT/OT  - orthostatic/ dizziness precautions  -cardiac precautions  -orthopedic follow up on discharge. Will need to assess whether home is wheelchair accessible    > Dizziness/ lightheadedness   - likely related to multiple sedative meds/high clonidine dosing +/-SVT  -monitor vitals  hospitalist consult    > SVT  - s/p adenosine. seen by cardiology. Patient reluctant to undergo ablation  - continue AV marjan agents: cardizem and Lopressor  -cardiac precautions, hospitalist consult      >ID :  h/o HCAP secondary resistant gram negative organism - resolved; Hx UTI  -completed 7 days treatment with abx and cefuroxime end date 3/8    > Psych: PTSD/ Generalized Anxiety/ Substance abuse (heroin)  - continue effexor, klonipin, trazodone, gabapentin, seroquel  - prn klonipin decreased from 1mg to 0.5mg.  - clonidine decreased from 0.2mg daily and 0.3mg HS to 0.1 mg bid.  - effexor decreased from 375 to 225mg   - seroquel increased from 25 bid to 50 bid for anxiety.   - continue trazodone, gabapentin at current doses  - lamictal  25mg bid 3/2/20, titrate up in 2 weeks.    > h/o CVA with mild LUE weakness  - secondary to R carotid artery dissection/stenosis. Evaluated by Vascular, repeat CTA with healed dissection  - continue ASA and Statin    > CARDIO: Essesntial HTN/ SVT  -now on bb and ccb with parameters (held today)  -monitor for signs / symptoms of tacchycardia as meds held due to low bp    >Pain Mgmt   - Tylenol PRN  - Oxycodone 5 PRN   - Gabapentin    >GI/Bowel Mgmt  -  Senna 2 tabs daily. Miralax daily PRN    > DVT PPX:  - Continue Lovenox 40 mg SQ daily

## 2020-03-08 PROCEDURE — 99232 SBSQ HOSP IP/OBS MODERATE 35: CPT

## 2020-03-08 RX ADMIN — ENOXAPARIN SODIUM 40 MILLIGRAM(S): 100 INJECTION SUBCUTANEOUS at 12:31

## 2020-03-08 RX ADMIN — Medication 0.5 MILLIGRAM(S): at 21:16

## 2020-03-08 RX ADMIN — QUETIAPINE FUMARATE 50 MILLIGRAM(S): 200 TABLET, FILM COATED ORAL at 05:33

## 2020-03-08 RX ADMIN — GABAPENTIN 300 MILLIGRAM(S): 400 CAPSULE ORAL at 17:38

## 2020-03-08 RX ADMIN — Medication 500 MILLIGRAM(S): at 17:37

## 2020-03-08 RX ADMIN — Medication 500 MILLIGRAM(S): at 17:38

## 2020-03-08 RX ADMIN — Medication 500 MILLIGRAM(S): at 05:33

## 2020-03-08 RX ADMIN — ATORVASTATIN CALCIUM 10 MILLIGRAM(S): 80 TABLET, FILM COATED ORAL at 21:16

## 2020-03-08 RX ADMIN — QUETIAPINE FUMARATE 50 MILLIGRAM(S): 200 TABLET, FILM COATED ORAL at 17:37

## 2020-03-08 RX ADMIN — Medication 75 MILLIGRAM(S): at 17:41

## 2020-03-08 RX ADMIN — BUPRENORPHINE AND NALOXONE 1 TABLET(S): 2; .5 TABLET SUBLINGUAL at 17:52

## 2020-03-08 RX ADMIN — GABAPENTIN 600 MILLIGRAM(S): 400 CAPSULE ORAL at 21:16

## 2020-03-08 RX ADMIN — LAMOTRIGINE 25 MILLIGRAM(S): 25 TABLET, ORALLY DISINTEGRATING ORAL at 17:37

## 2020-03-08 RX ADMIN — Medication 0.1 MILLIGRAM(S): at 17:38

## 2020-03-08 RX ADMIN — BUPRENORPHINE AND NALOXONE 1 TABLET(S): 2; .5 TABLET SUBLINGUAL at 05:33

## 2020-03-08 RX ADMIN — Medication 325 MILLIGRAM(S): at 08:24

## 2020-03-08 RX ADMIN — Medication 325 MILLIGRAM(S): at 17:41

## 2020-03-08 RX ADMIN — GABAPENTIN 300 MILLIGRAM(S): 400 CAPSULE ORAL at 05:33

## 2020-03-08 RX ADMIN — Medication 325 MILLIGRAM(S): at 12:29

## 2020-03-08 RX ADMIN — Medication 225 MILLIGRAM(S): at 12:32

## 2020-03-08 RX ADMIN — Medication 81 MILLIGRAM(S): at 12:29

## 2020-03-08 RX ADMIN — Medication 30 MILLIGRAM(S): at 17:37

## 2020-03-08 RX ADMIN — Medication 1 MILLIGRAM(S): at 12:31

## 2020-03-08 RX ADMIN — BUPRENORPHINE AND NALOXONE 1 TABLET(S): 2; .5 TABLET SUBLINGUAL at 12:41

## 2020-03-08 RX ADMIN — LAMOTRIGINE 25 MILLIGRAM(S): 25 TABLET, ORALLY DISINTEGRATING ORAL at 05:33

## 2020-03-08 RX ADMIN — Medication 1 TABLET(S): at 12:30

## 2020-03-09 LAB
ANION GAP SERPL CALC-SCNC: 5 MMOL/L — SIGNIFICANT CHANGE UP (ref 5–17)
BASOPHILS # BLD AUTO: 0.04 K/UL — SIGNIFICANT CHANGE UP (ref 0–0.2)
BASOPHILS NFR BLD AUTO: 0.5 % — SIGNIFICANT CHANGE UP (ref 0–2)
BUN SERPL-MCNC: 8 MG/DL — SIGNIFICANT CHANGE UP (ref 7–23)
CALCIUM SERPL-MCNC: 8.7 MG/DL — SIGNIFICANT CHANGE UP (ref 8.4–10.5)
CHLORIDE SERPL-SCNC: 104 MMOL/L — SIGNIFICANT CHANGE UP (ref 96–108)
CO2 SERPL-SCNC: 33 MMOL/L — HIGH (ref 22–31)
CREAT SERPL-MCNC: 0.73 MG/DL — SIGNIFICANT CHANGE UP (ref 0.5–1.3)
EOSINOPHIL # BLD AUTO: 0.23 K/UL — SIGNIFICANT CHANGE UP (ref 0–0.5)
EOSINOPHIL NFR BLD AUTO: 2.8 % — SIGNIFICANT CHANGE UP (ref 0–6)
GLUCOSE SERPL-MCNC: 109 MG/DL — HIGH (ref 70–99)
HCT VFR BLD CALC: 36.6 % — SIGNIFICANT CHANGE UP (ref 34.5–45)
HGB BLD-MCNC: 11.4 G/DL — LOW (ref 11.5–15.5)
IMM GRANULOCYTES NFR BLD AUTO: 0.6 % — SIGNIFICANT CHANGE UP (ref 0–1.5)
LYMPHOCYTES # BLD AUTO: 2.72 K/UL — SIGNIFICANT CHANGE UP (ref 1–3.3)
LYMPHOCYTES # BLD AUTO: 33.5 % — SIGNIFICANT CHANGE UP (ref 13–44)
MCHC RBC-ENTMCNC: 28.1 PG — SIGNIFICANT CHANGE UP (ref 27–34)
MCHC RBC-ENTMCNC: 31.1 GM/DL — LOW (ref 32–36)
MCV RBC AUTO: 90.1 FL — SIGNIFICANT CHANGE UP (ref 80–100)
MONOCYTES # BLD AUTO: 0.63 K/UL — SIGNIFICANT CHANGE UP (ref 0–0.9)
MONOCYTES NFR BLD AUTO: 7.8 % — SIGNIFICANT CHANGE UP (ref 2–14)
NEUTROPHILS # BLD AUTO: 4.45 K/UL — SIGNIFICANT CHANGE UP (ref 1.8–7.4)
NEUTROPHILS NFR BLD AUTO: 54.8 % — SIGNIFICANT CHANGE UP (ref 43–77)
NRBC # BLD: 0 /100 WBCS — SIGNIFICANT CHANGE UP (ref 0–0)
PLATELET # BLD AUTO: 349 K/UL — SIGNIFICANT CHANGE UP (ref 150–400)
POTASSIUM SERPL-MCNC: 4.1 MMOL/L — SIGNIFICANT CHANGE UP (ref 3.5–5.3)
POTASSIUM SERPL-SCNC: 4.1 MMOL/L — SIGNIFICANT CHANGE UP (ref 3.5–5.3)
RBC # BLD: 4.06 M/UL — SIGNIFICANT CHANGE UP (ref 3.8–5.2)
RBC # FLD: 14.6 % — HIGH (ref 10.3–14.5)
SODIUM SERPL-SCNC: 142 MMOL/L — SIGNIFICANT CHANGE UP (ref 135–145)
WBC # BLD: 8.12 K/UL — SIGNIFICANT CHANGE UP (ref 3.8–10.5)
WBC # FLD AUTO: 8.12 K/UL — SIGNIFICANT CHANGE UP (ref 3.8–10.5)

## 2020-03-09 PROCEDURE — 99232 SBSQ HOSP IP/OBS MODERATE 35: CPT

## 2020-03-09 PROCEDURE — 96116 NUBHVL XM PHYS/QHP 1ST HR: CPT

## 2020-03-09 RX ORDER — METOPROLOL TARTRATE 50 MG
50 TABLET ORAL
Refills: 0 | Status: DISCONTINUED | OUTPATIENT
Start: 2020-03-09 | End: 2020-03-16

## 2020-03-09 RX ORDER — NYSTATIN CREAM 100000 [USP'U]/G
1 CREAM TOPICAL
Refills: 0 | Status: DISCONTINUED | OUTPATIENT
Start: 2020-03-09 | End: 2020-03-16

## 2020-03-09 RX ADMIN — GABAPENTIN 300 MILLIGRAM(S): 400 CAPSULE ORAL at 05:12

## 2020-03-09 RX ADMIN — Medication 0.5 MILLIGRAM(S): at 21:54

## 2020-03-09 RX ADMIN — Medication 225 MILLIGRAM(S): at 12:07

## 2020-03-09 RX ADMIN — ATORVASTATIN CALCIUM 10 MILLIGRAM(S): 80 TABLET, FILM COATED ORAL at 21:54

## 2020-03-09 RX ADMIN — BUPRENORPHINE AND NALOXONE 1 TABLET(S): 2; .5 TABLET SUBLINGUAL at 05:12

## 2020-03-09 RX ADMIN — GABAPENTIN 600 MILLIGRAM(S): 400 CAPSULE ORAL at 21:54

## 2020-03-09 RX ADMIN — BUPRENORPHINE AND NALOXONE 1 TABLET(S): 2; .5 TABLET SUBLINGUAL at 17:47

## 2020-03-09 RX ADMIN — Medication 325 MILLIGRAM(S): at 08:21

## 2020-03-09 RX ADMIN — Medication 0.1 MILLIGRAM(S): at 08:21

## 2020-03-09 RX ADMIN — Medication 500 MILLIGRAM(S): at 17:47

## 2020-03-09 RX ADMIN — Medication 30 MILLIGRAM(S): at 17:47

## 2020-03-09 RX ADMIN — QUETIAPINE FUMARATE 50 MILLIGRAM(S): 200 TABLET, FILM COATED ORAL at 05:12

## 2020-03-09 RX ADMIN — Medication 50 MILLIGRAM(S): at 18:10

## 2020-03-09 RX ADMIN — LAMOTRIGINE 25 MILLIGRAM(S): 25 TABLET, ORALLY DISINTEGRATING ORAL at 05:12

## 2020-03-09 RX ADMIN — Medication 325 MILLIGRAM(S): at 17:47

## 2020-03-09 RX ADMIN — NYSTATIN CREAM 1 APPLICATION(S): 100000 CREAM TOPICAL at 17:50

## 2020-03-09 RX ADMIN — Medication 1 TABLET(S): at 12:06

## 2020-03-09 RX ADMIN — Medication 0.1 MILLIGRAM(S): at 17:47

## 2020-03-09 RX ADMIN — Medication 500 MILLIGRAM(S): at 05:12

## 2020-03-09 RX ADMIN — Medication 325 MILLIGRAM(S): at 12:06

## 2020-03-09 RX ADMIN — Medication 30 MILLIGRAM(S): at 08:21

## 2020-03-09 RX ADMIN — ENOXAPARIN SODIUM 40 MILLIGRAM(S): 100 INJECTION SUBCUTANEOUS at 12:07

## 2020-03-09 RX ADMIN — QUETIAPINE FUMARATE 50 MILLIGRAM(S): 200 TABLET, FILM COATED ORAL at 17:48

## 2020-03-09 RX ADMIN — BUPRENORPHINE AND NALOXONE 1 TABLET(S): 2; .5 TABLET SUBLINGUAL at 12:10

## 2020-03-09 RX ADMIN — GABAPENTIN 300 MILLIGRAM(S): 400 CAPSULE ORAL at 17:47

## 2020-03-09 RX ADMIN — LAMOTRIGINE 25 MILLIGRAM(S): 25 TABLET, ORALLY DISINTEGRATING ORAL at 17:48

## 2020-03-09 RX ADMIN — Medication 1 MILLIGRAM(S): at 12:06

## 2020-03-09 RX ADMIN — Medication 81 MILLIGRAM(S): at 12:06

## 2020-03-09 NOTE — PROGRESS NOTE ADULT - ASSESSMENT
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 Hospital course complicated by SVT controlled on AV marjan agents.     *s/p ORIF R distal tib/fib comminuted bimalleolar fxs & removal of external fixator NWB to RLE  - continue trilam splint R ankle/leg  - continue Comprehensive Rehab Program of PT/OT  - orthopedic follow up on discharge. Will need to assess whether home is wheelchair accessible    *Dizziness/ lightheadedness   - likely related to multiple sedative meds/high clonidine dosing +/-SVT, anemia  - monitor vitals  - HR 64-90 3/9  Hb 11.4 3/9  monitor    *SVT  - s/p adenosine. seen by cardiology. Patient reluctant to undergo ablation  - continue AV marjan agents: cardizem and Lopressor    *Psych: PTSD/ Generalized Anxiety/ Substance abuse (heroin)  - seen by psych inpatient; psych meds adjusted thought to be contributing to dizziness  - prn klonopin  0.5mg.  - clonidine  0.1 mg bid.  - effexor decreased from 375 to 225mg   - seroquel increased from 25 bid to 50 bid for anxiety.   - continue trazodone, gabapentin at current doses  - started lamictal 25mg bid 3/2/20, titrate up in 2 weeks.  - psychology supportive counseling and cognitive evaluation  - pscyhiatry consult for medication adjustments.    *Leukocytosis likely due to UTI:   -s/p 3 day course cefuroxime (end date on 3/8/20)  -normalized 8.12 3/9    *Vaginal pruritis:  - Discussed proper hygiene with patient, who now agrees to shower  - Nystatin powder    *h/o HCAP secondary resistant gram negative organism - resolved  - completed 7 days treatment with abx.    *h/o CVA with mild LUE weakness  - secondary to R carotid artery dissection/stenosis. Evaluated by Vascular, repeat CTA with healed dissection  - continue ASA and Statin    *HTN:  - now on bb and ccb  - monitor vitals   - BP trending low past few days,  (90/53 - 118/71) discussed with hospitalist, will adjust meds      *Pain Mgmt   - Tylenol PRN  - Oxycodone 5 PRN   - Gabapentin    GI/Bowel Mgmt  - Senna 2 tabs daily. Miralax daily PRN    FEN   - Diet - Regular    DVT PPX:   -Continue Lovenox 40 mg SQ daily while in the hospital/Rehab then switch to  mg PO BID at time of discharge      LABS:  psychiatry  psychology

## 2020-03-09 NOTE — PROGRESS NOTE ADULT - SUBJECTIVE AND OBJECTIVE BOX
DAILY PROGRESS NOTE:  HPI: Patient is a 30 F with PMH PAF, depression/ anxiety/ PTSD, h/o heroin abuse now on suboxone, previous CVA (with residual L-sided deficits) R carotid A dissection/stenosis (on ASA and Statin),  who presented to the ED on 20 at [facility] with complaints of R leg pain and inability to ambulate after a fall.  Fall was secondary to lightheadedness/ dizziness, landing  onto her R leg. Denied presyncopal symptoms or LOC, chest pain or palpitations.  In the ED she had imaging of her R knee which demonstrated right tib/fib fracture (Comminuted intra-articular fracture of the distal tibia extending from the tibial plafond to the distal shaft; Displaced spiral fracture of the distal fibula without articular involvement).   Subsequently underwent external fixation on  without any complications.  She was seen by psychiatry who assisted with med titration as there was concern that psych meds were contributing to dizziness.  Post op hospital course was significant for episodes of SVT requiring management with adenosine.  EP was consulted to evaluate for ablation, however patient was reluctant to undergo ablation at this time. She was managed on AV marjan agents Cardizem and metoprolol at this time. Electrolytes remained in normal limits.  Recommend outpatient EP follow up.  She endorsed dysuria and was treated for UTI with 3 day course of antibiotics.  She remained hemodynamically stable and medically optimized for discharge to [place] rehab on 3/6/20. ARON RAMIREZ (06 Mar 2020 15:13)    Subjective: Patient seen and examined at bedside. Reports good pain control. Also reports feeling anxious overnight, received medications which helped. Also reports vaginal pruritis, per RN patient refusing to shower since she attributes her recent UTI to to the soap at the hospital. This was discussed with patient and explained that soap was unlikely to cause UTI, patient agrees to shower.     REVIEW OF SYSTEMS  Constitutional: No fever, No Chills, No fatigue  HEENT: No eye pain, No visual disturbances, No difficulty hearing, No Dysphagia   Pulm: No cough,  No shortness of breath  Cardio: No chest pain, No palpitations  GI:  No abdominal pain, No nausea, No vomiting, No diarrhea, No constipation, No incontinence  : No dysuria, No frequency, No hematuria, No incontinence  Neuro: No headaches, No memory loss, No loss of strength, No numbness, No tremors  Skin: + vaginal itching, No rashes, No lesions   Endo: No temperature intolerance  MSK: No joint pain, No joint swelling, No muscle pain, No Neck or back pain  Psych:  No depression, + anxiety, + substance abuse history on saboxone    Physical Exam:  Vital Signs Last 24 Hrs  T(C): 36.5 (09 Mar 2020 07:43), Max: 36.7 (08 Mar 2020 21:19)  T(F): 97.7 (09 Mar 2020 07:43), Max: 98 (08 Mar 2020 21:19)  HR: 74 (09 Mar 2020 07:43) (68 - 90)  BP: 104/66 (09 Mar 2020 08:16) (90/53 - 118/71)  BP(mean): --  RR: 14 (09 Mar 2020 07:43) (14 - 14)  SpO2: 97% (09 Mar 2020 07:43) (97% - 98%)    PHYSICAL EXAM  Constitutional - NAD, Comfortably lying in bed  HEENT - NCAT, EOMI  Neck - Supple, No limited ROM  Chest - good chest expansion, good respiratory effort, CTAB   Cardio - warm and well perfused, RRR, no murmur  Abdomen -  Soft, NTND  Extremities - No peripheral edema, No calf tenderness, RLE splint present  Neurologic Exam:                    Cognitive -      Orientation: Awake, Alert, AAO to self, place, date, year, situation     Memory: Recent memory intact from last 3 days, but does not remember the 7 days prior to 3 days ago.     Thought: process, content appropriate     Speech - Fluent, Comprehensible, No dysarthria, No aphasia      Cranial Nerves - CN 2-12 intact     Motor -  Good effort                    LEFT    UE - ShAB 5/5, EF 5/5, EE 5/5, WE 5/5,  WNL                    RIGHT UE - ShAB 5/5, EF 5/5, EE 5/5, WE 5/5,  WNL                    LEFT    LE - HF 5/5, KE 5/5, DF 5/5, PF 5/5                    RIGHT LE - HF 5/5, KE 5/5, DF 5/5, PF 5/5, splint present    Sensory - Intact to LT bilateral  Psychiatric - Mood stable, Affect WNL    Recent Labs/Imagin.4   8.12  )-----------( 349      ( 09 Mar 2020 05:30 )             36.6     -    142  |  104  |  8   ----------------------------<  109<H>  4.1   |  33<H>  |  0.73    Ca    8.7      09 Mar 2020 05:30    Medications:  acetaminophen   Tablet .. 650 milliGRAM(s) Oral every 6 hours PRN  ascorbic acid 500 milliGRAM(s) Oral two times a day  aspirin  chewable 81 milliGRAM(s) Oral daily  atorvastatin 10 milliGRAM(s) Oral at bedtime  buprenorphine 8 mG/naloxone 2 mG SL  Tablet 1 Tablet(s) SubLingual <User Schedule>  calcium carbonate    500 mG (Tums) Chewable 3 Tablet(s) Chew every 6 hours PRN  clonazePAM  Tablet 0.5 milliGRAM(s) Oral every 12 hours PRN  cloNIDine 0.1 milliGRAM(s) Oral two times a day  diltiazem    Tablet 30 milliGRAM(s) Oral two times a day  enoxaparin Injectable 40 milliGRAM(s) SubCutaneous every 24 hours  ferrous    sulfate 325 milliGRAM(s) Oral three times a day with meals  folic acid 1 milliGRAM(s) Oral daily  gabapentin 300 milliGRAM(s) Oral two times a day  gabapentin 600 milliGRAM(s) Oral at bedtime  influenza   Vaccine 0.5 milliLiter(s) IntraMuscular once  lamoTRIgine 25 milliGRAM(s) Oral two times a day  melatonin 3 milliGRAM(s) Oral at bedtime PRN  metoprolol tartrate 75 milliGRAM(s) Oral two times a day  multivitamin 1 Tablet(s) Oral daily  nystatin Powder 1 Application(s) Topical two times a day  ondansetron    Tablet 4 milliGRAM(s) Oral every 6 hours PRN  oxyCODONE    IR 5 milliGRAM(s) Oral every 6 hours PRN  QUEtiapine 50 milliGRAM(s) Oral two times a day  senna 2 Tablet(s) Oral at bedtime PRN  traZODone 50 milliGRAM(s) Oral at bedtime PRN  venlafaxine XR. 225 milliGRAM(s) Oral daily    ASSESSMENT/ PLAN:  30 F with significant psychiatric history and heroin abuse (on Suboxone), previous CVA with CA dissection/stenosis with residual deficits presented to [facility] on  after a fall, found to have R distal Tib/fib fracture, underwent ORIF on . Hospital course complicated by SVT controlled on AV marjan agents.     *s/p ORIF R distal tib/fib comminuted bimalleolar fxs & removal of external fixator  - NWB to RLE  - continue trilam splint R ankle/leg  - pain management as below  - Gait Instability, ADL, and Functional impairments - start Comprehensive Rehab Program of PT/OT  - orthostatic/ dizziness precautions  - cardiac precautions  - orthopedic follow up on discharge. Will need to assess whether home is wheelchair accessible    *Dizziness/ lightheadedness   - likely related to multiple sedative meds/high clonidine dosing +/-SVT  - monitor vitals  - hospitalist consult    *SVT  - s/p adenosine. seen by cardiology. Patient reluctant to undergo ablation  - continue AV marjan agents: cardizem and Lopressor  - cardiac precautions, hospitalist consult    *Psych: PTSD/ Generalized Anxiety/ Substance abuse (heroin)  - seen by psych inpatient; psych meds adjusted thought to be contributing to dizziness  - prn klonipin decreased from 1mg to 0.5mg.  - clonidine decreased from 0.2mg daily and 0.3mg HS to 0.1 mg bid.  - effexor decreased from 375 to 225mg   - seroquel increased from 25 bid to 50 bid for anxiety.   - continue trazodone, gabapentin at current doses  - started lamictal 25mg bid 3/2/20, titrate up in 2 weeks.  - psychology supportive counseling  - pscyhiatry consult for medication adjustments.    *Leukocytosis likely due to UTI: s/p 3 day course cefuroxime (end date on 3/8/20)    *Vaginal pruritis:  - Discussed proper hygiene with patient, who now agrees to shower  - Nystatin powder    *h/o HCAP secondary resistant gram negative organism - resolved  - completed 7 days treatment with abx.    *h/o CVA with mild LUE weakness  - secondary to R carotid artery dissection/stenosis. Evaluated by Vascular, repeat CTA with healed dissection  - continue ASA and Statin    *HTN:  - now on bb and ccb  - monitor vitals - BP trending low past few days, discussed with hospitalist, will adjust  - hospitalist consult    *Pain Mgmt   - Tylenol PRN  - Oxycodone 5 PRN   - Gabapentin    GI/Bowel Mgmt  - Senna 2 tabs daily. Miralax daily PRN  - TUMs    /Bladder Mgmt   - PVRx1 on admission - retention screen    FEN   - Diet - Regular    DVT PPX: Continue Lovenox 40 mg SQ daily while in the hospital/Rehab then switch to   mg PO BID at time of discharge to home if she is still NWB    Precautions / PROPHYLAXIS:   - Falls  - Weight bearing status: NWB to RLE until cleared by Ortho  - Lungs: Aspiration, Incentive Spirometer   - Pressure injury/Skin: OOB to Chair, PT/OT DAILY PROGRESS NOTE:  HPI: Patient is a 30 F with PMH PAF, depression/ anxiety/ PTSD, h/o heroin abuse now on suboxone, previous CVA (with residual L-sided deficits) R carotid A dissection/stenosis (on ASA and Statin),  who presented to the ED on 20 at [facility] with complaints of R leg pain and inability to ambulate after a fall.  Fall was secondary to lightheadedness/ dizziness, landing  onto her R leg. Denied presyncopal symptoms or LOC, chest pain or palpitations.  In the ED she had imaging of her R knee which demonstrated right tib/fib fracture (Comminuted intra-articular fracture of the distal tibia extending from the tibial plafond to the distal shaft; Displaced spiral fracture of the distal fibula without articular involvement).     Subsequently underwent external fixation on  without any complications.  She was seen by psychiatry who assisted with med titration as there was concern that psych meds were contributing to dizziness.  Post op hospital course was significant for episodes of SVT requiring management with adenosine.  EP was consulted to evaluate for ablation, however patient was reluctant to undergo ablation at this time. She was managed on AV marjan agents Cardizem and metoprolol at this time. Electrolytes remained in normal limits.  Recommend outpatient EP follow up.  She endorsed dysuria and was treated for UTI with 3 day course of antibiotics.  She remained hemodynamically stable and medically optimized for discharge to [place] rehab on 3/6/20. NWB RLE (06 Mar 2020 15:13)    Subjective: Patient seen and examined at bedside. Reports good pain control. Also reports feeling anxious overnight, received medications which helped. Also reports vaginal pruritis, per RN patient refusing to shower since she attributes her recent UTI to to the soap at the hospital. This was discussed with patient and explained that soap was unlikely to cause UTI, patient agrees to shower.     Patient also reports cognitive impairment, memory deficits which are new since accident. She is aware of them, and is mildly distressing. Appears distracted, difficult to focus. some pain RLE but controlled on medications    REVIEW OF SYSTEMS  Constitutional: No fever, No Chills, No fatigue  HEENT: No eye pain, No visual disturbances, No difficulty hearing, No Dysphagia   Pulm: No cough,  No shortness of breath  Cardio: No chest pain, No palpitations  GI:  No abdominal pain, No nausea, No vomiting, No diarrhea, No constipation, No incontinence  : No dysuria, No frequency, No hematuria, No incontinence  Neuro: No headaches, No memory loss, No loss of strength, No numbness, No tremors  Skin: + vaginal itching, No rashes, No lesions   Endo: No temperature intolerance  MSK: No joint pain, No joint swelling, No muscle pain, No Neck or back pain  Psych:  No depression, + anxiety, + substance abuse history on saboxone    Physical Exam:  Vital Signs Last 24 Hrs  T(C): 36.5 (09 Mar 2020 07:43), Max: 36.7 (08 Mar 2020 21:19)  T(F): 97.7 (09 Mar 2020 07:43), Max: 98 (08 Mar 2020 21:19)  HR: 74 (09 Mar 2020 07:43) (68 - 90)  BP: 104/66 (09 Mar 2020 08:16) (90/53 - 118/71)  BP(mean): --  RR: 14 (09 Mar 2020 07:43) (14 - 14)  SpO2: 97% (09 Mar 2020 07:43) (97% - 98%)    PHYSICAL EXAM  Constitutional -[place] OT, mildly distracted, requires frequent cues to attend adn for safety.  mood overall fair  grossly O x 3 (knows month, year, day of week--originally states the 8th of month, later corrects to 9th)    HEENT - NCAT, EOMI. no facial swelling or ecchymoses  Chest - good chest expansion, good respiratory effort, CTAB   Cardio - warm and well perfused, RRR, no murmur  Abdomen -  Soft, NTND  Extremities - No peripheral edema, No calf tenderness, RLE splint present  no tightness proximal and distal ends of cast  calves soft, NT bilaterally  wiggles toes, good capillary refill  no sensory defictis LT   Neurologic Exam:                    Cognitive -      Orientation: Awake, Alert, AAO to self, place, date, year, situation     Memory: Recent memory intact from last 3 days, but does not remember the 7 days prior to 3 days ago.      Recent Labs/Imagin.4   8.12  )-----------( 349      ( 09 Mar 2020 05:30 )             36.6     03-09    142  |  104  |  8   ----------------------------<  109<H>  4.1   |  33<H>  |  0.73    Ca    8.7      09 Mar 2020 05:30    Medications:  acetaminophen   Tablet .. 650 milliGRAM(s) Oral every 6 hours PRN  ascorbic acid 500 milliGRAM(s) Oral two times a day  aspirin  chewable 81 milliGRAM(s) Oral daily  atorvastatin 10 milliGRAM(s) Oral at bedtime  buprenorphine 8 mG/naloxone 2 mG SL  Tablet 1 Tablet(s) SubLingual <User Schedule>  calcium carbonate    500 mG (Tums) Chewable 3 Tablet(s) Chew every 6 hours PRN  clonazePAM  Tablet 0.5 milliGRAM(s) Oral every 12 hours PRN  cloNIDine 0.1 milliGRAM(s) Oral two times a day  diltiazem    Tablet 30 milliGRAM(s) Oral two times a day  enoxaparin Injectable 40 milliGRAM(s) SubCutaneous every 24 hours  ferrous    sulfate 325 milliGRAM(s) Oral three times a day with meals  folic acid 1 milliGRAM(s) Oral daily  gabapentin 300 milliGRAM(s) Oral two times a day  gabapentin 600 milliGRAM(s) Oral at bedtime  influenza   Vaccine 0.5 milliLiter(s) IntraMuscular once  lamoTRIgine 25 milliGRAM(s) Oral two times a day  melatonin 3 milliGRAM(s) Oral at bedtime PRN  metoprolol tartrate 75 milliGRAM(s) Oral two times a day  multivitamin 1 Tablet(s) Oral daily  nystatin Powder 1 Application(s) Topical two times a day  ondansetron    Tablet 4 milliGRAM(s) Oral every 6 hours PRN  oxyCODONE    IR 5 milliGRAM(s) Oral every 6 hours PRN  QUEtiapine 50 milliGRAM(s) Oral two times a day  senna 2 Tablet(s) Oral at bedtime PRN  traZODone 50 milliGRAM(s) Oral at bedtime PRN  venlafaxine XR. 225 milliGRAM(s) Oral daily

## 2020-03-09 NOTE — PROGRESS NOTE ADULT - SUBJECTIVE AND OBJECTIVE BOX
Patient is a 29y old  Female who presents with a chief complaint of s/p fall and right tib/fib fx, s/p fixation NWB RLE (08 Mar 2020 09:02)    HPI:  Patient is a 30 F with PMH PAF, depression/ anxiety/ PTSD, h/o heroin abuse now on suboxone, previous CVA (with residual L-sided deficits) R carotid A dissection/stenosis (on ASA and Statin),  who presented to the ED on 2/21/20 at [facility] with complaints of R leg pain and inability to ambulate after a fall.  Fall was secondary to lightheadedness/ dizziness, landing  onto her R leg. Denied presyncopal symptoms or LOC, chest pain or palpitations.  In the ED she had imaging of her R knee which demonstrated right tib/fib fracture (Comminuted intra-articular fracture of the distal tibia extending from the tibial plafond to the distal shaft; Displaced spiral fracture of the distal fibula without articular involvement).   Subsequently underwent external fixation on 2/21 without any complications.  She was seen by psychiatry who assisted with med titration as there was concern that psych meds were contributing to dizziness.  Post op hospital course was significant for episodes of SVT requiring management with adenosine.  EP was consulted to evaluate for ablation, however patient was reluctant to undergo ablation at this time. She was managed on AV marjan agents Cardizem and metoprolol at this time. Electrolytes remained in normal limits.  Recommend outpatient EP follow up.  She endorsed dysuria and was treated for UTI with 3 day course of antibiotics.  She remained hemodynamically stable and medically optimized for discharge to [place] [facility] on 3/6/20. NWB RLE (06 Mar 2020 15:13)      ----------------------------------------------------------------------  SUBJECTIVE:    ***    REVIEW OF SYSTEMS:  Positive: Neurological deficits  Negative: headache, lightheadedness, fevers, abdominal pain, nausea, diarrhea, shortness of breath    --------------------------------------------------------------------  PHYSICAL EXAM:    Vital Signs Last 24 Hrs  T(C): 36.7 (08 Mar 2020 21:19), Max: 37 (08 Mar 2020 08:18)  T(F): 98 (08 Mar 2020 21:19), Max: 98.6 (08 Mar 2020 08:18)  HR: 69 (09 Mar 2020 06:36) (64 - 90)  BP: 99/65 (09 Mar 2020 06:36) (99/63 - 118/71)  BP(mean): --  RR: 14 (08 Mar 2020 21:19) (14 - 16)  SpO2: 98% (08 Mar 2020 21:19) (97% - 98%)  Daily     Daily       ***      ----------------------------------------------------------------------  RECENT LABS:                    CAPILLARY BLOOD GLUCOSE        ----------------------------------------------------------------------  RECENT IMAGING:    ***      ----------------------------------------------------------------------  MEDICATIONS:  MEDICATIONS  (STANDING):  ascorbic acid 500 milliGRAM(s) Oral two times a day  aspirin  chewable 81 milliGRAM(s) Oral daily  atorvastatin 10 milliGRAM(s) Oral at bedtime  buprenorphine 8 mG/naloxone 2 mG SL  Tablet 1 Tablet(s) SubLingual <User Schedule>  cloNIDine 0.1 milliGRAM(s) Oral two times a day  diltiazem    Tablet 30 milliGRAM(s) Oral two times a day  enoxaparin Injectable 40 milliGRAM(s) SubCutaneous every 24 hours  ferrous    sulfate 325 milliGRAM(s) Oral three times a day with meals  folic acid 1 milliGRAM(s) Oral daily  gabapentin 300 milliGRAM(s) Oral two times a day  gabapentin 600 milliGRAM(s) Oral at bedtime  influenza   Vaccine 0.5 milliLiter(s) IntraMuscular once  lamoTRIgine 25 milliGRAM(s) Oral two times a day  metoprolol tartrate 75 milliGRAM(s) Oral two times a day  multivitamin 1 Tablet(s) Oral daily  QUEtiapine 50 milliGRAM(s) Oral two times a day  venlafaxine XR. 225 milliGRAM(s) Oral daily    MEDICATIONS  (PRN):  acetaminophen   Tablet .. 650 milliGRAM(s) Oral every 6 hours PRN Temp greater or equal to 38C (100.4F), Mild Pain (1 - 3)  calcium carbonate    500 mG (Tums) Chewable 3 Tablet(s) Chew every 6 hours PRN Dyspepsia  clonazePAM  Tablet 0.5 milliGRAM(s) Oral every 12 hours PRN Anxiety  melatonin 3 milliGRAM(s) Oral at bedtime PRN Sleep  ondansetron    Tablet 4 milliGRAM(s) Oral every 6 hours PRN Nausea and/or Vomiting  oxyCODONE    IR 5 milliGRAM(s) Oral every 6 hours PRN Moderate Pain (4 - 6)  senna 2 Tablet(s) Oral at bedtime PRN Constipation  traZODone 50 milliGRAM(s) Oral at bedtime PRN insomnia    ----------------------------------------------------------------------

## 2020-03-09 NOTE — PROGRESS NOTE ADULT - ASSESSMENT
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 Hospital course complicated by SVT controlled on AV marjan agents.     * s/p ORIF R distal tib/fib comminuted bimalleolar fxs & removal of external fixator  - NWB to RLE  - continue trilam splint R ankle/leg  - pain management as below  - Gait Instability, ADL, and Functional impairments - start Comprehensive Rehab Program of PT/OT  - orthostatic/ dizziness precautions  -cardiac precautions  -orthopedic follow up on discharge. Will need to assess whether home is wheelchair accessible    *Dizziness/ lightheadedness   - likely related to multiple sedative meds/high clonidine dosing +/-SVT  -monitor vitals  hospitalist consult    *SVT  - s/p adenosine. seen by cardiology. Patient reluctant to undergo ablation  - continue AV marjan agents: cardizem and Lopressor  -cardiac precautions, hospitalist consult    *Leukocytosis likely due to UTI   - discharged on Cefuroxime 500mg BID for 3 day course (end date on 3/8/20)    *h/o HCAP secondary resistant gram negative organism - -resolved  -completed 7 days treatment with abx.    *Psych: PTSD/ Generalized Anxiety/ Substance abuse (heroin)  - seen by psych inpatient; psych meds adjusted thought to be contributing to dizziness  - Consider psych consult in rehab if needing further titration  - Per Psych: continue meds as follows  - continue effexor, klonipin, trazodone, gabapentin, seroquel  - prn klonipin decreased from 1mg to 0.5mg.  - clonidine decreased from 0.2mg daily and 0.3mg HS to 0.1 mg bid.  - effexor decreased from 375 to 225mg   - seroquel increased from 25 bid to 50 bid for anxiety.   - continue trazodone, gabapentin at current doses  - upon review of meds pt hasn't gotten lamictal since 2/21 per psychiatry, needs to be restarted at lower dose and titrated up slowly .  - started lamictal  25mg bid 3/2/20, titrate up in 2 weeks.  -psychology supportive counseling    * h/o CVA with mild LUE weakness  - secondary to R carotid artery dissection/stenosis. Evaluated by Vascular, repeat CTA with healed dissection  - continue ASA and Statin    *HTN:   now on bb and ccb  -monitor vitals  -hospitalist consult    *Pain Mgmt   - Tylenol PRN  - Oxycodone 5 PRN   - Gabapentin    GI/Bowel Mgmt   -  Senna 2 tabs daily. Miralax daily PRN  - TUMs    /Bladder Mgmt   - PVRx1 on admission - retention screen    FEN   - Diet - Regular    - DVT PPX:   Per a/c management NP - Continue Lovenox 40 mg SQ daily while in the hospital/Rehab then switch to   mg PO BID at time of discharge to home  if she is stil NWB    Precautions / PROPHYLAXIS:   - Falls  - Weight bearing status: NWB to RLE until cleared by Ortho  - Lungs: Aspiration, Incentive Spirometer   - Pressure injury/Skin: Turn Q2hrs in bed while awake, OOB to Chair, PT/OT/SLP        --------------------------------------------  Discharge Follow up:  Antwon Tyson ()- Orthopaedic Surgery  Junior Marie)-- Surgery  Radha Dean)- Cardiovascular Disease; Internal Medicine  Ruben Pinto) - Internal Medicine    --------------------------------------------

## 2020-03-09 NOTE — PROGRESS NOTE ADULT - SUBJECTIVE AND OBJECTIVE BOX
Dr. Michaud Hospitalist Progress Note  NORMA ADAMS [id number]    Patient is a 29y old  Female who presents with a chief complaint of s/p fall and right tib/fib fx, s/p fixation NWB RLE (09 Mar 2020 10:17    interval: fluctuating memory since accident. pain controlled. no other complaints. BP low. meds adjusted    HPI:  Patient is a 30 F with PMH PAF, depression/ anxiety/ PTSD, h/o heroin abuse now on suboxone, previous CVA (with residual L-sided deficits) R carotid A dissection/stenosis (on ASA and Statin),  who presented to the ED on 2/21/20 at [facility] with complaints of R leg pain and inability to ambulate after a fall.  Fall was secondary to lightheadedness/ dizziness, landing  onto her R leg. Denied presyncopal symptoms or LOC, chest pain or palpitations.  In the ED she had imaging of her R knee which demonstrated right tib/fib fracture (Comminuted intra-articular fracture of the distal tibia extending from the tibial plafond to the distal shaft; Displaced spiral fracture of the distal fibula without articular involvement).   Subsequently underwent external fixation on 2/21 without any complications.  She was seen by psychiatry who assisted with med titration as there was concern that psych meds were contributing to dizziness.  Post op hospital course was significant for episodes of SVT requiring management with adenosine.  EP was consulted to evaluate for ablation, however patient was reluctant to undergo ablation at this time. She was managed on AV marjan agents Cardizem and metoprolol at this time. Electrolytes remained in normal limits.  Recommend outpatient EP follow up.  She endorsed dysuria and was treated for UTI with 3 day course of antibiotics.  She remained hemodynamically stable and medically optimized for discharge to [place] re[facility] on 3/6/20. NWB RLE (06 Mar 2020 15:13)      ROS:  denied fever/chills/chest pain/SOB/palpitation/abd pain/n/v/d/headaches/focal limb weakness. all other ROS neg    T(C): 36.5 (03-09-20 @ 07:43), Max: 36.7 (03-08-20 @ 21:19)  HR: 74 (03-09-20 @ 07:43) (68 - 90)  BP: 104/66 (03-09-20 @ 08:16) (90/53 - 118/71)  RR: 14 (03-09-20 @ 07:43) (14 - 14)  SpO2: 97% (03-09-20 @ 07:43) (97% - 98%)  CAPILLARY BLOOD GLUCOSE          Physical Exam:  GENERAL: Not in distress. Alert    HEENT:  Normocephalic and atraumatic. PEARLA,EOMI  NECK: Supple.  No JVD.    CARDIOVASCULAR: RRR S1, S2. No murmur/rubs/gallop  LUNGS: BLAE+, no rales, no wheezing, no rhonchi.    ABDOMEN: ND. Soft,  NT, no guarding / rebound / rigidity. BS normoactive. No CVA tenderness.    BACK: No spine tenderness.  EXTREMITIES: no cyanosis, no clubbing, no edema.   SKIN: no rash. No skin break or ulcer. No cellulitis.  NEUROLOGIC: AAO*3.strength is symmetric, sensation intact, speech fluent.    PSYCHIATRIC: Calm.  No agitation.    Labs                        11.4   8.12  )-----------( 349      ( 09 Mar 2020 05:30 )             36.6     03-09    142  |  104  |  8   ----------------------------<  109<H>  4.1   |  33<H>  |  0.73    Ca    8.7      09 Mar 2020 05:30       MEDICATIONS  (STANDING):  ascorbic acid 500 milliGRAM(s) Oral two times a day  aspirin  chewable 81 milliGRAM(s) Oral daily  atorvastatin 10 milliGRAM(s) Oral at bedtime  buprenorphine 8 mG/naloxone 2 mG SL  Tablet 1 Tablet(s) SubLingual <User Schedule>  cloNIDine 0.1 milliGRAM(s) Oral two times a day  diltiazem    Tablet 30 milliGRAM(s) Oral two times a day  enoxaparin Injectable 40 milliGRAM(s) SubCutaneous every 24 hours  ferrous    sulfate 325 milliGRAM(s) Oral three times a day with meals  folic acid 1 milliGRAM(s) Oral daily  gabapentin 300 milliGRAM(s) Oral two times a day  gabapentin 600 milliGRAM(s) Oral at bedtime  influenza   Vaccine 0.5 milliLiter(s) IntraMuscular once  lamoTRIgine 25 milliGRAM(s) Oral two times a day  metoprolol tartrate 50 milliGRAM(s) Oral two times a day  multivitamin 1 Tablet(s) Oral daily  nystatin Powder 1 Application(s) Topical two times a day  QUEtiapine 50 milliGRAM(s) Oral two times a day  venlafaxine XR. 225 milliGRAM(s) Oral daily    MEDICATIONS  (PRN):  acetaminophen   Tablet .. 650 milliGRAM(s) Oral every 6 hours PRN Temp greater or equal to 38C (100.4F), Mild Pain (1 - 3)  calcium carbonate    500 mG (Tums) Chewable 3 Tablet(s) Chew every 6 hours PRN Dyspepsia  clonazePAM  Tablet 0.5 milliGRAM(s) Oral every 12 hours PRN Anxiety  melatonin 3 milliGRAM(s) Oral at bedtime PRN Sleep  ondansetron    Tablet 4 milliGRAM(s) Oral every 6 hours PRN Nausea and/or Vomiting  oxyCODONE    IR 5 milliGRAM(s) Oral every 6 hours PRN Moderate Pain (4 - 6)  senna 2 Tablet(s) Oral at bedtime PRN Constipation  traZODone 50 milliGRAM(s) Oral at bedtime PRN insomnia

## 2020-03-09 NOTE — PROGRESS NOTE ADULT - ASSESSMENT
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 Hospital course complicated by SVT controlled on AV marjan agents.     * s/p ORIF R distal tib/fib comminuted bimalleolar fxs & removal of external fixator  - NWB to RLE  - continue trilam splint R ankle/leg  - pain management as below  - Gait Instability, ADL, and Functional impairments   - Comprehensive Rehab Program of PT/OT  - orthostatic/ dizziness precautions  -cardiac precautions  -orthopedic follow up on discharge. Will need to assess whether home is wheelchair accessible    *Dizziness/ lightheadedness   - likely related to multiple sedative meds/high clonidine dosing +/-SVT  -monitor vitals  - reduce metoprolol dose to 50 BID. c/w other meds. closely monitor BP and HR    *SVT  - s/p adenosine. seen by cardiology. Patient reluctant to undergo ablation  - continue AV marjan agents: cardizem and Lopressor  -cardiac precautions,     *Leukocytosis likely due to UTI   - discharged on Cefuroxime 500mg BID for 3 day course (end date on 3/8/20)    *h/o HCAP secondary resistant gram negative organism - -resolved  -completed 7 days treatment with abx.    *Psych: PTSD/ Generalized Anxiety/ Substance abuse (heroin)  - no SI/HI  - seen by psych inpatient; psych meds adjusted thought to be contributing to dizziness  - psych reconsult in rehab for further titration. ?can DC clinidine  - Per Psych: continue meds as follows  - psychology supportive counseling    * h/o CVA with mild LUE weakness  - secondary to R carotid artery dissection/stenosis. Evaluated by Vascular, repeat CTA with healed dissection  - continue ASA and Statin    *HTN:   now on bb and ccb  -monitor vitals    *Pain Mgmt   - Tylenol PRN  - Oxycodone 5 PRN   - Gabapentin    GI/Bowel Mgmt   -  Senna 2 tabs daily. Miralax daily PRN  - TUMs    /Bladder Mgmt   - PVRx1 on admission - retention screen    FEN   - Diet - Regular    - DVT PPX:   Per a/c management NP - Continue Lovenox 40 mg SQ daily while in the hospital/Rehab then switch to   mg PO BID at time of discharge to home  if she is stil NWB    Precautions / PROPHYLAXIS:   - Falls  - Weight bearing status: NWB to RLE until cleared by Ortho  - Lungs: Aspiration, Incentive Spirometer   - Pressure injury/Skin: Turn Q2hrs in bed while awake, OOB to Chair, PT/OT/SLP        --------------------------------------------  Discharge Follow up:  Antwon Tyson ()- Orthopaedic Surgery  Junior Marie)-- Surgery  Radha Dean)- Cardiovascular Disease; Internal Medicine  Ruben Pinto) - Internal Medicine    --------------------------------------------

## 2020-03-10 LAB
APPEARANCE UR: CLEAR — SIGNIFICANT CHANGE UP
BILIRUB UR-MCNC: NEGATIVE — SIGNIFICANT CHANGE UP
COLOR SPEC: YELLOW — SIGNIFICANT CHANGE UP
DIFF PNL FLD: NEGATIVE — SIGNIFICANT CHANGE UP
GLUCOSE UR QL: NEGATIVE — SIGNIFICANT CHANGE UP
KETONES UR-MCNC: NEGATIVE — SIGNIFICANT CHANGE UP
LEUKOCYTE ESTERASE UR-ACNC: NEGATIVE — SIGNIFICANT CHANGE UP
NITRITE UR-MCNC: NEGATIVE — SIGNIFICANT CHANGE UP
PH UR: 5 — SIGNIFICANT CHANGE UP (ref 5–8)
PROT UR-MCNC: NEGATIVE — SIGNIFICANT CHANGE UP
SP GR SPEC: 1.01 — SIGNIFICANT CHANGE UP (ref 1.01–1.02)
UROBILINOGEN FLD QL: NEGATIVE — SIGNIFICANT CHANGE UP

## 2020-03-10 PROCEDURE — 99232 SBSQ HOSP IP/OBS MODERATE 35: CPT

## 2020-03-10 PROCEDURE — 90792 PSYCH DIAG EVAL W/MED SRVCS: CPT

## 2020-03-10 RX ADMIN — Medication 1 MILLIGRAM(S): at 11:04

## 2020-03-10 RX ADMIN — GABAPENTIN 300 MILLIGRAM(S): 400 CAPSULE ORAL at 05:31

## 2020-03-10 RX ADMIN — BUPRENORPHINE AND NALOXONE 1 TABLET(S): 2; .5 TABLET SUBLINGUAL at 18:06

## 2020-03-10 RX ADMIN — GABAPENTIN 300 MILLIGRAM(S): 400 CAPSULE ORAL at 18:07

## 2020-03-10 RX ADMIN — Medication 30 MILLIGRAM(S): at 18:07

## 2020-03-10 RX ADMIN — NYSTATIN CREAM 1 APPLICATION(S): 100000 CREAM TOPICAL at 18:08

## 2020-03-10 RX ADMIN — Medication 225 MILLIGRAM(S): at 11:04

## 2020-03-10 RX ADMIN — QUETIAPINE FUMARATE 50 MILLIGRAM(S): 200 TABLET, FILM COATED ORAL at 18:07

## 2020-03-10 RX ADMIN — Medication 325 MILLIGRAM(S): at 18:07

## 2020-03-10 RX ADMIN — LAMOTRIGINE 25 MILLIGRAM(S): 25 TABLET, ORALLY DISINTEGRATING ORAL at 18:07

## 2020-03-10 RX ADMIN — Medication 0.1 MILLIGRAM(S): at 18:07

## 2020-03-10 RX ADMIN — QUETIAPINE FUMARATE 50 MILLIGRAM(S): 200 TABLET, FILM COATED ORAL at 05:31

## 2020-03-10 RX ADMIN — Medication 500 MILLIGRAM(S): at 05:32

## 2020-03-10 RX ADMIN — Medication 81 MILLIGRAM(S): at 11:04

## 2020-03-10 RX ADMIN — Medication 325 MILLIGRAM(S): at 08:34

## 2020-03-10 RX ADMIN — ATORVASTATIN CALCIUM 10 MILLIGRAM(S): 80 TABLET, FILM COATED ORAL at 21:26

## 2020-03-10 RX ADMIN — NYSTATIN CREAM 1 APPLICATION(S): 100000 CREAM TOPICAL at 05:33

## 2020-03-10 RX ADMIN — Medication 325 MILLIGRAM(S): at 11:59

## 2020-03-10 RX ADMIN — GABAPENTIN 600 MILLIGRAM(S): 400 CAPSULE ORAL at 21:26

## 2020-03-10 RX ADMIN — Medication 30 MILLIGRAM(S): at 05:31

## 2020-03-10 RX ADMIN — ENOXAPARIN SODIUM 40 MILLIGRAM(S): 100 INJECTION SUBCUTANEOUS at 12:02

## 2020-03-10 RX ADMIN — Medication 50 MILLIGRAM(S): at 18:07

## 2020-03-10 RX ADMIN — BUPRENORPHINE AND NALOXONE 1 TABLET(S): 2; .5 TABLET SUBLINGUAL at 05:31

## 2020-03-10 RX ADMIN — Medication 1 TABLET(S): at 11:04

## 2020-03-10 RX ADMIN — Medication 0.1 MILLIGRAM(S): at 05:31

## 2020-03-10 RX ADMIN — LAMOTRIGINE 25 MILLIGRAM(S): 25 TABLET, ORALLY DISINTEGRATING ORAL at 05:32

## 2020-03-10 RX ADMIN — Medication 0.5 MILLIGRAM(S): at 18:06

## 2020-03-10 RX ADMIN — Medication 50 MILLIGRAM(S): at 05:31

## 2020-03-10 RX ADMIN — BUPRENORPHINE AND NALOXONE 1 TABLET(S): 2; .5 TABLET SUBLINGUAL at 12:02

## 2020-03-10 RX ADMIN — Medication 500 MILLIGRAM(S): at 18:08

## 2020-03-10 NOTE — BEHAVIORAL HEALTH ASSESSMENT NOTE - HPI (INCLUDE ILLNESS QUALITY, SEVERITY, DURATION, TIMING, CONTEXT, MODIFYING FACTORS, ASSOCIATED SIGNS AND SYMPTOMS)
Patient is a 30 F with PMH PAF, depression/ anxiety/ PTSD, h/o heroin abuse now on Suboxone, previous CVA (with residual L-sided deficits) R carotid A dissection/stenosis (on ASA and Statin),  who presented to the ED on 2/21/20 at [facility] with complaints of R leg pain and inability to ambulate after a fall.  Fall was secondary to lightheadedness/ dizziness, landing  onto her R leg. Denied presyncopal symptoms or LOC, chest pain or palpitations.  In the ED she had imaging of her R knee which demonstrated right tib/fib fracture (Comminuted intra-articular fracture of the distal tibia extending from the tibial plafond to the distal shaft; Displaced spiral fracture of the distal fibula without articular involvement). Subsequently underwent external fixation on 2/21 without any complications.  She was seen by Psychiatry who assisted with med titration as there was concern that psych meds were contributing to dizziness.  Post op hospital course was significant for episodes of SVT requiring management with adenosine.  EP was consulted to evaluate for ablation, however patient was reluctant to undergo ablation at this time. She was managed on AV marjan agents Cardizem and metoprolol at this time. Electrolytes remained in normal limits.  Recommend outpatient EP follow up.  She endorsed dysuria and was treated for UTI with 3 day course of antibiotics.  She remained hemodynamically stable and medically optimized for discharge to [place] [facility] on 3/6/20. (06 Mar 2020 15:13)    3/10/2020 Psychiatry: 30 y/o female with hx of depression/ anxiety/ PTSD, h/o heroin abuse now on Suboxone consulted in rehab for evaluation of medication management. The pt was cooperative during interview and denies any side effects and or concerns to current Psychiatric medications. She is alert and oriented x3 to person, place, and time and denies suicidal ideation/ homicidal ideations. Has complaints that her sleep has been not well for the last two nights which she attributes to the staff frequently waking her up to administer medication. She denies any further Psychiatric complaints at this time and would like to continue her current medication regimen. Patient is a 30 F with PMH PAF, depression/ anxiety/ PTSD, h/o heroin abuse now on Suboxone, previous CVA (with residual L-sided deficits) R carotid A dissection/stenosis (on ASA and Statin),  who presented to the ED on 2/21/20 at [facility] with complaints of R leg pain and inability to ambulate after a fall.  Fall was secondary to lightheadedness/ dizziness, landing  onto her R leg. Denied presyncopal symptoms or LOC, chest pain or palpitations.  In the ED she had imaging of her R knee which demonstrated right tib/fib fracture (Comminuted intra-articular fracture of the distal tibia extending from the tibial plafond to the distal shaft; Displaced spiral fracture of the distal fibula without articular involvement). Subsequently underwent external fixation on 2/21 without any complications.  She was seen by Psychiatry who assisted with med titration as there was concern that psych meds were contributing to dizziness.  Post op hospital course was significant for episodes of SVT requiring management with adenosine.  EP was consulted to evaluate for ablation, however patient was reluctant to undergo ablation at this time. She was managed on AV marjan agents Cardizem and metoprolol at this time. Electrolytes remained in normal limits.  Recommend outpatient EP follow up.  She endorsed dysuria and was treated for UTI with 3 day course of antibiotics.  She remained hemodynamically stable and medically optimized for discharge to [place] [facility] on 3/6/20. (06 Mar 2020 15:13)    3/10/2020 Psychiatry: 28 y/o female with hx of depression/ anxiety/ PTSD, h/o heroin abuse now on Suboxone consulted in rehab for evaluation of medication management. The pt was cooperative during interview and denies any side effects and or concerns to current Psychiatric medications. She is alert and oriented x3 to person, place, and time and denies suicidal ideation/ homicidal ideations. Has complaints that her sleep has been not well for the last two nights which she attributes to the staff frequently waking her up to administer medication. She denies any further Psychiatric complaints at this time and would like to continue her current medication regimen. Her chief complaint is her concentration and focus that is poor, but feels this would improve over time, rest of psychiatric review of systems unremarkable.

## 2020-03-10 NOTE — BEHAVIORAL HEALTH ASSESSMENT NOTE - NSBHCHARTREVIEWLAB_PSY_A_CORE FT
11.4   8.12  )-----------( 349      ( 09 Mar 2020 05:30 )             36.6     142  |  104  |  8   ----------------------------<  109<H>  4.1   |  33<H>  |  0.73    Ca    8.7      09 Mar 2020 05:30

## 2020-03-10 NOTE — BEHAVIORAL HEALTH ASSESSMENT NOTE - NSBHCONSULTRECOMMENDOTHER_PSY_A_CORE FT
unclear how Oxycodone would function as pain relief as the point of Suboxone is to block effects of narcotics.   Consider non narcotic alternative and taper / discontinue Oxycodone   would review with pharmacist.

## 2020-03-10 NOTE — BEHAVIORAL HEALTH ASSESSMENT NOTE - SUICIDE RISK FACTORS
PTSD current/past/Other/Mood Disorder current/past Mood Disorder current/past/Alcohol/Substance abuse disorders/Other/PTSD current/past

## 2020-03-10 NOTE — BEHAVIORAL HEALTH ASSESSMENT NOTE - NSBHCHARTREVIEWINVESTIGATE_PSY_A_CORE FT
Ventricular Rate 79 BPM  Atrial Rate 80 BPM  P-R Interval 164 ms  QRS Duration 96 ms  Q-T Interval 368 ms  QTC Calculation (Bezet)  421 ms  P Axis 38 degrees  R Axis 18 degrees  T Axis 36 degrees

## 2020-03-10 NOTE — PROGRESS NOTE ADULT - ASSESSMENT
ASSESSMENT/ PLAN:  30 F with significant psychiatric history and heroin abuse (on Suboxone), previous CVA with CA dissection/stenosis with residual deficits presented to [facility] on 2/21 after a fall, found to have R distal Tib/fib fracture, underwent ORIF on 2/21. Hospital course complicated by SVT controlled on AV marjan agents.     *s/p ORIF R distal tib/fib comminuted bimalleolar fxs & removal of external fixator NWB to RLE  - continue trilam splint R ankle/leg  - continue Comprehensive Rehab Program of PT/OT  - orthopedic follow up on discharge    *Dizziness/ lightheadedness   - likely related to multiple sedative meds/high clonidine dosing +/-SVT, anemia. Improved  - monitor vitals  - HR 74-88, (101/63 - 122/76) 3/10  Hb 11.4 3/9  -CBC in AM 3/11      *SVT  - s/p adenosine. seen by cardiology. Patient reluctant to undergo ablation  - continue AV marjan agents: cardizem and Lopressor  HR controlled    *Psych: PTSD/ Generalized Anxiety/ Substance abuse (heroin)  - seen by psych inpatient; psych meds adjusted thought to be contributing to dizziness  - prn klonopin  0.5mg.  - clonidine  0.1 mg bid.  - effexor decreased from 375 to 225mg   - seroquel increased from 25 bid to 50 bid for anxiety.   - continue trazodone, gabapentin at current doses  - started lamictal 25mg bid 3/2/20, titrate up in 2 weeks.  - psychology supportive counseling and cognitive evaluation  - pscyhiatry consult for medication adjustments. Seen, official consult pending  Reviewed with family    *Leukocytosis likely due to UTI:   -s/p 3 day course cefuroxime  -normalized 8.12 3/9  -patient with persistent dysuria but resolved leukocytosis and afebrile  -resend UA, C+S, reviewed with patient in detail    *Vaginal pruritis:  - Discussed proper hygiene with patient, who now agrees to shower  - Nystatin powder    *h/o HCAP secondary resistant gram negative organism - resolved  - completed 7 days treatment with abx.    *h/o CVA with mild LUE weakness  - secondary to R carotid artery dissection/stenosis. Evaluated by Vascular, repeat CTA with healed dissection  - continue ASA and Statin    *HTN:  - now on bb and ccb  - controlled      *Pain Mgmt   - Tylenol PRN  - Oxycodone 5 PRN   - Gabapentin    GI/Bowel Mgmt  - Senna 2 tabs daily. Miralax daily PRN    FEN   - Diet - Regular    DVT PPX:   -Continue Lovenox 40 mg SQ daily while in the hospital/Rehab then switch to  mg PO BID at time of discharge      LABS:  psychiatry  UA, C+S  CBC 3/11

## 2020-03-10 NOTE — BEHAVIORAL HEALTH ASSESSMENT NOTE - NSBHCONSULTMEDS_PSY_A_CORE FT
MEDICATIONS  (STANDING):  ascorbic acid 500 milliGRAM(s) Oral two times a day  aspirin  chewable 81 milliGRAM(s) Oral daily  atorvastatin 10 milliGRAM(s) Oral at bedtime  buprenorphine 8 mG/naloxone 2 mG SL  Tablet 1 Tablet(s) SubLingual <User Schedule>  cloNIDine 0.1 milliGRAM(s) Oral two times a day  diltiazem    Tablet 30 milliGRAM(s) Oral two times a day  enoxaparin Injectable 40 milliGRAM(s) SubCutaneous every 24 hours  ferrous    sulfate 325 milliGRAM(s) Oral three times a day with meals  folic acid 1 milliGRAM(s) Oral daily  gabapentin 300 milliGRAM(s) Oral two times a day  gabapentin 600 milliGRAM(s) Oral at bedtime  influenza   Vaccine 0.5 milliLiter(s) IntraMuscular once  lamoTRIgine 25 milliGRAM(s) Oral two times a day  metoprolol tartrate 50 milliGRAM(s) Oral two times a day  multivitamin 1 Tablet(s) Oral daily  nystatin Powder 1 Application(s) Topical two times a day  QUEtiapine 50 milliGRAM(s) Oral two times a day  venlafaxine XR. 225 milliGRAM(s) Oral daily
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
## 2020-03-10 NOTE — PROGRESS NOTE ADULT - SUBJECTIVE AND OBJECTIVE BOX
Patient is a 29y old  Female who presents with a chief complaint of s/p fall and right tib/fib fx, s/p fixation NWB RLE (10 Mar 2020 14:35)      HPI:  Patient is a 30 F with PMH PAF, depression/ anxiety/ PTSD, h/o heroin abuse now on suboxone, previous CVA (with residual L-sided deficits) R carotid A dissection/stenosis (on ASA and Statin),  who presented to the ED on 2/21/20 at [facility] with complaints of R leg pain and inability to ambulate after a fall.  Fall was secondary to lightheadedness/ dizziness, landing  onto her R leg. Denied presyncopal symptoms or LOC, chest pain or palpitations.  In the ED she had imaging of her R knee which demonstrated right tib/fib fracture (Comminuted intra-articular fracture of the distal tibia extending from the tibial plafond to the distal shaft; Displaced spiral fracture of the distal fibula without articular involvement).   Subsequently underwent external fixation on 2/21 without any complications.  She was seen by psychiatry who assisted with med titration as there was concern that psych meds were contributing to dizziness.  Post op hospital course was significant for episodes of SVT requiring management with adenosine.  EP was consulted to evaluate for ablation, however patient was reluctant to undergo ablation at this time. She was managed on AV marjan agents Cardizem and metoprolol at this time. Electrolytes remained in normal limits.  Recommend outpatient EP follow up.  She endorsed dysuria and was treated for UTI with 3 day course of antibiotics.  She remained hemodynamically stable and medically optimized for discharge to [place] rehab on 3/6/20. ARON RAMIREZ (06 Mar 2020 15:13)      PAST MEDICAL & SURGICAL HISTORY:  Cyst, breast  Transaminitis  Aneurysm of right internal carotid artery  Cerebrovascular accident (CVA)  Substance abuse  Anxiety  Depression  PTSD (post-traumatic stress disorder)  H/O breast biopsy      MEDICATIONS  (STANDING):  ascorbic acid 500 milliGRAM(s) Oral two times a day  aspirin  chewable 81 milliGRAM(s) Oral daily  atorvastatin 10 milliGRAM(s) Oral at bedtime  buprenorphine 8 mG/naloxone 2 mG SL  Tablet 1 Tablet(s) SubLingual <User Schedule>  cloNIDine 0.1 milliGRAM(s) Oral two times a day  diltiazem    Tablet 30 milliGRAM(s) Oral two times a day  enoxaparin Injectable 40 milliGRAM(s) SubCutaneous every 24 hours  ferrous    sulfate 325 milliGRAM(s) Oral three times a day with meals  folic acid 1 milliGRAM(s) Oral daily  gabapentin 300 milliGRAM(s) Oral two times a day  gabapentin 600 milliGRAM(s) Oral at bedtime  influenza   Vaccine 0.5 milliLiter(s) IntraMuscular once  lamoTRIgine 25 milliGRAM(s) Oral two times a day  metoprolol tartrate 50 milliGRAM(s) Oral two times a day  multivitamin 1 Tablet(s) Oral daily  nystatin Powder 1 Application(s) Topical two times a day  QUEtiapine 50 milliGRAM(s) Oral two times a day  venlafaxine XR. 225 milliGRAM(s) Oral daily    MEDICATIONS  (PRN):  acetaminophen   Tablet .. 650 milliGRAM(s) Oral every 6 hours PRN Temp greater or equal to 38C (100.4F), Mild Pain (1 - 3)  calcium carbonate    500 mG (Tums) Chewable 3 Tablet(s) Chew every 6 hours PRN Dyspepsia  clonazePAM  Tablet 0.5 milliGRAM(s) Oral every 12 hours PRN Anxiety  melatonin 3 milliGRAM(s) Oral at bedtime PRN Sleep  ondansetron    Tablet 4 milliGRAM(s) Oral every 6 hours PRN Nausea and/or Vomiting  oxyCODONE    IR 5 milliGRAM(s) Oral every 6 hours PRN Moderate Pain (4 - 6)  senna 2 Tablet(s) Oral at bedtime PRN Constipation  traZODone 50 milliGRAM(s) Oral at bedtime PRN insomnia      Allergies    Geodon (Other)  Zyprexa (Other)    Intolerances          VITALS  29y  Vital Signs Last 24 Hrs  T(C): 36.9 (10 Mar 2020 07:47), Max: 36.9 (10 Mar 2020 07:47)  T(F): 98.4 (10 Mar 2020 07:47), Max: 98.4 (10 Mar 2020 07:47)  HR: 82 (10 Mar 2020 12:19) (74 - 88)  BP: 110/69 (10 Mar 2020 12:19) (101/63 - 122/76)  BP(mean): --  RR: 14 (10 Mar 2020 07:47) (14 - 14)  SpO2: 96% (10 Mar 2020 07:47) (96% - 98%)  Daily     Daily         RECENT LABS:                          11.4   8.12  )-----------( 349      ( 09 Mar 2020 05:30 )             36.6     03-09    142  |  104  |  8   ----------------------------<  109<H>  4.1   |  33<H>  |  0.73    Ca    8.7      09 Mar 2020 05:30                CAPILLARY BLOOD GLUCOSE

## 2020-03-10 NOTE — BEHAVIORAL HEALTH ASSESSMENT NOTE - SUICIDE PROTECTIVE FACTORS
Responsibility to family and others/Has future plans Responsibility to family and others/Identifies reasons for living/Has future plans/Positive therapeutic relationships

## 2020-03-10 NOTE — BEHAVIORAL HEALTH ASSESSMENT NOTE - RISK ASSESSMENT
Low Acute Suicide Risk Pt has attained some measure of sobriety, is engaged in treatment, has a fiancee who is also sober, states she is involved in 12 step , although she does not have a home group or commitments or a sponsor.

## 2020-03-10 NOTE — PROGRESS NOTE ADULT - SUBJECTIVE AND OBJECTIVE BOX
Dr. Michaud Hospitalist Progress Note  NORMA ADAMS [id number]    Patient is a 29y old  Female who presents with a chief complaint of s/p fall and right tib/fib fx, s/p fixation NWB RLE (09 Mar 2020 10:17    interval: wants to check UTI, Pneumonia. answered all ROS positive.  much worried about PNA/UTI. completed cefepime on 3/8/20.       HPI:  Patient is a 30 F with PMH PAF, depression/ anxiety/ PTSD, h/o heroin abuse now on suboxone, previous CVA (with residual L-sided deficits) R carotid A dissection/stenosis (on ASA and Statin),  who presented to the ED on 2/21/20 at [facility] with complaints of R leg pain and inability to ambulate after a fall.  Fall was secondary to lightheadedness/ dizziness, landing  onto her R leg. Denied presyncopal symptoms or LOC, chest pain or palpitations.  In the ED she had imaging of her R knee which demonstrated right tib/fib fracture (Comminuted intra-articular fracture of the distal tibia extending from the tibial plafond to the distal shaft; Displaced spiral fracture of the distal fibula without articular involvement).   Subsequently underwent external fixation on 2/21 without any complications.  She was seen by psychiatry who assisted with med titration as there was concern that psych meds were contributing to dizziness.  Post op hospital course was significant for episodes of SVT requiring management with adenosine.  EP was consulted to evaluate for ablation, however patient was reluctant to undergo ablation at this time. She was managed on AV marjan agents Cardizem and metoprolol at this time. Electrolytes remained in normal limits.  Recommend outpatient EP follow up.  She endorsed dysuria and was treated for UTI with 3 day course of antibiotics.  She remained hemodynamically stable and medically optimized for discharge to [place] rehab on 3/6/20. NWB RLE (06 Mar 2020 15:13)        Vital Signs Last 24 Hrs  T(C): 36.9 (10 Mar 2020 07:47), Max: 36.9 (10 Mar 2020 07:47)  T(F): 98.4 (10 Mar 2020 07:47), Max: 98.4 (10 Mar 2020 07:47)  HR: 82 (10 Mar 2020 12:19) (74 - 88)  BP: 110/69 (10 Mar 2020 12:19) (101/63 - 122/76)  BP(mean): --  RR: 14 (10 Mar 2020 07:47) (14 - 14)  SpO2: 96% (10 Mar 2020 07:47) (96% - 98%)        Physical Exam:  GENERAL: Not in distress. Alert    HEENT:  Normocephalic and atraumatic. PEARLA,EOMI  NECK: Supple.    CARDIOVASCULAR: RRR S1, S2. No murmur/rubs/gallop  LUNGS: BLAE+, no rales, no wheezing, no rhonchi.    ABDOMEN: ND. Soft,  NT, no guarding / rebound / rigidity. BS normoactive. No CVA tenderness.    BACK: No spine tenderness.  EXTREMITIES: no cyanosis, no clubbing, no edema.   NEUROLOGIC: AAO*3.strength is symmetric, sensation intact, speech fluent. somewhat confused. cannot emember whether she was treated for PNA or UTI this admission  PSYCHIATRIC: Calm.  No agitation.    Labs                        11.4   8.12  )-----------( 349      ( 09 Mar 2020 05:30 )             36.6     03-09    142  |  104  |  8   ----------------------------<  109<H>  4.1   |  33<H>  |  0.73    Ca    8.7      09 Mar 2020 05:30       MEDICATIONS  (STANDING):  ascorbic acid 500 milliGRAM(s) Oral two times a day  aspirin  chewable 81 milliGRAM(s) Oral daily  atorvastatin 10 milliGRAM(s) Oral at bedtime  buprenorphine 8 mG/naloxone 2 mG SL  Tablet 1 Tablet(s) SubLingual <User Schedule>  cloNIDine 0.1 milliGRAM(s) Oral two times a day  diltiazem    Tablet 30 milliGRAM(s) Oral two times a day  enoxaparin Injectable 40 milliGRAM(s) SubCutaneous every 24 hours  ferrous    sulfate 325 milliGRAM(s) Oral three times a day with meals  folic acid 1 milliGRAM(s) Oral daily  gabapentin 300 milliGRAM(s) Oral two times a day  gabapentin 600 milliGRAM(s) Oral at bedtime  influenza   Vaccine 0.5 milliLiter(s) IntraMuscular once  lamoTRIgine 25 milliGRAM(s) Oral two times a day  metoprolol tartrate 50 milliGRAM(s) Oral two times a day  multivitamin 1 Tablet(s) Oral daily  nystatin Powder 1 Application(s) Topical two times a day  QUEtiapine 50 milliGRAM(s) Oral two times a day  venlafaxine XR. 225 milliGRAM(s) Oral daily    MEDICATIONS  (PRN):  acetaminophen   Tablet .. 650 milliGRAM(s) Oral every 6 hours PRN Temp greater or equal to 38C (100.4F), Mild Pain (1 - 3)  calcium carbonate    500 mG (Tums) Chewable 3 Tablet(s) Chew every 6 hours PRN Dyspepsia  clonazePAM  Tablet 0.5 milliGRAM(s) Oral every 12 hours PRN Anxiety  melatonin 3 milliGRAM(s) Oral at bedtime PRN Sleep  ondansetron    Tablet 4 milliGRAM(s) Oral every 6 hours PRN Nausea and/or Vomiting  oxyCODONE    IR 5 milliGRAM(s) Oral every 6 hours PRN Moderate Pain (4 - 6)  senna 2 Tablet(s) Oral at bedtime PRN Constipation  traZODone 50 milliGRAM(s) Oral at bedtime PRN insomnia

## 2020-03-10 NOTE — BEHAVIORAL HEALTH ASSESSMENT NOTE - DETAILS
Pt had previous suicide attempt when she was 16 years old by attempting to hang herself and was found by her mother and admitted to into a Psychiatric facility for treatment . Mother: Depression, Anxiety, ADHD.      Brother: Autism     Sisters (3 of them, Depression). PTSD Right ankle injury. Acute fractures of the distal tibia and fibula (See XRAY report). Pt states she feels slightly confused at times.

## 2020-03-10 NOTE — BEHAVIORAL HEALTH ASSESSMENT NOTE - NSBHCHARTREVIEWVS_PSY_A_CORE FT
T(C): 36.9 (10 Mar 2020 07:47), Max: 36.9 (10 Mar 2020 07:47)  T(F): 98.4 (10 Mar 2020 07:47), Max: 98.4 (10 Mar 2020 07:47)  HR: 82 (10 Mar 2020 12:19) (74 - 88)  BP: 110/69 (10 Mar 2020 12:19) (101/63 - 122/76)  RR: 14 (10 Mar 2020 07:47) (14 - 14)  SpO2: 96% (10 Mar 2020 07:47) (96% - 98%)

## 2020-03-10 NOTE — BEHAVIORAL HEALTH ASSESSMENT NOTE - NSBHMEDSOTHERFT_PSY_A_CORE
Home Medications:  acetaminophen 325 mg oral tablet: 2 tab(s) orally every 6 hours, As needed, Mild Pain (1 - 3) (05 Mar 2020 14:47)  ascorbic acid 500 mg oral tablet: 1 tab(s) orally 2 times a day (05 Mar 2020 14:48)  aspirin 325 mg oral tablet: 1 tab(s) orally 2 times a day for dvt ppx until weight bearing  (05 Mar 2020 14:47)  atorvastatin 10 mg oral tablet: 1 tab(s) orally once a day (at bedtime) (21 Feb 2020 09:15)  buprenorphine-naloxone 8 mg-2 mg sublingual tablet: 1 tab(s) sublingual 3 times a day (21 Feb 2020 09:15)  cefuroxime 500 mg oral tablet: 1 tab(s) orally every 12 hours until 3/8 (05 Mar 2020 14:48)  clonazePAM 0.5 mg oral tablet: 1 tab(s) orally every 12 hours, As needed, Anxiety (05 Mar 2020 14:47)  cloNIDine 0.1 mg oral tablet: 1 tab(s) orally 2 times a day (05 Mar 2020 14:47)  dilTIAZem 30 mg oral tablet: 1 tab(s) orally 2 times a day (05 Mar 2020 14:48)  FeroSul 325 mg (65 mg elemental iron) oral tablet: 1 tab(s) orally once a day (05 Mar 2020 14:48)  folic acid 1 mg oral tablet: 1 tab(s) orally once a day (05 Mar 2020 14:48)  gabapentin 300 mg oral capsule: 1 cap(s) orally 2 times a day (05 Mar 2020 14:47)  gabapentin 300 mg oral capsule: 2 cap(s) orally once a day (at bedtime) (05 Mar 2020 14:47)  lamoTRIgine 25 mg oral tablet: 1 tab(s) orally 2 times a day (05 Mar 2020 14:47)  metoprolol tartrate 75 mg oral tablet: 1 tab(s) orally 2 times a day (05 Mar 2020 14:48)  Multiple Vitamins oral tablet: 1 tab(s) orally once a day (05 Mar 2020 14:48)  oxyCODONE 5 mg oral tablet: 1 tab(s) orally every 6 hours, As needed, Moderate to severe (05 Mar 2020 14:47)  QUEtiapine 50 mg oral tablet: 1 tab(s) orally 2 times a day (05 Mar 2020 14:47)  senna oral tablet: 2 tab(s) orally once a day (at bedtime), As needed, Constipation (05 Mar 2020 14:48)  traZODone 50 mg oral tablet: 1 tab(s) orally once a day (at bedtime), As needed, insomnia (05 Mar 2020 14:47)  venlafaxine 75 mg oral capsule, extended release: 3 cap(s) orally once a day (05 Mar 2020 14:47)

## 2020-03-10 NOTE — PROGRESS NOTE ADULT - COMMENTS
Case discussed in detail with mother including prior psychiatric history and medications, current medications and cessation Abx after 3 days per hospitalist recommendations from . Mother states she has extensive psychiatric history and very sensitive to changes in medications. Continuation of med dosages/frequencies from  and in-house psychiatry follow up also discussed    Patient reports continued dysuria and ?blood in urine. Afebrile, no leukocytosis

## 2020-03-10 NOTE — BEHAVIORAL HEALTH ASSESSMENT NOTE - OTHER
Yomaira Has a Fiance Not tested. reported "Good" Good, however patient was observed to have a flat affect during consultation. flat Hx of suicide attempt at 16 years old by attempting to hang herself (found by mother). NA h/o heroin abuse Diego Has a Fifabiene at times would have tip of tongue phenomena, or appear to " zone out" while talking for brief moments.

## 2020-03-10 NOTE — PROGRESS NOTE ADULT - ASSESSMENT
ASSESSMENT/ PLAN:  30 F with significant psychiatric history and heroin abuse (on Suboxone), previous CVA with CA dissection/stenosis with residual deficits presented to [facility] on 2/21 after a fall, found to have R distal Tib/fib fracture, underwent ORIF on 2/21. Hospital course complicated by SVT controlled on AV marjan agents.     * s/p ORIF R distal tib/fib comminuted bimalleolar fxs & removal of external fixator  - NWB to RLE  - continue trilam splint R ankle/leg  - pain management as below  - Gait Instability, ADL, and Functional impairments   - Comprehensive Rehab Program of PT/OT  - orthostatic/ dizziness precautions  -cardiac precautions  -orthopedic follow up on discharge. Will need to assess whether home is wheelchair accessible    *Dizziness/ lightheadedness   - likely related to multiple sedative meds/high clonidine dosing +/-SVT  -monitor vitals  - reduce metoprolol dose to 50 BID 3/9. c/w other meds. closely monitor BP and HR  - check orthostasis daily    *SVT  - s/p adenosine. seen by cardiology. Patient reluctant to undergo ablation  - continue AV marjan agents: cardizem and Lopressor  -cardiac precautions,     *Leukocytosis likely due to UTI   - completed Cefuroxime 500mg BID for 3 day course (end date on 3/8/20)  - rpeat UA ordered as patient was concerned about UTI.  - RN and I did not notice any sign of PNA. would defer CXR    *h/o HCAP secondary resistant gram negative organism - -resolved  -completed 7 days treatment with abx. repeat CXR in 4-6 weeks for resolution    *Psych: PTSD/ Generalized Anxiety/ Substance abuse (heroin)  - no SI/HI  - seen by psych inpatient; psych meds adjusted thought to be contributing to dizziness  - psych reconsult in rehab for further titration. ?can DC clinidine  - Per Psych: continue meds as follows  - psychology supportive counseling    * h/o CVA with mild LUE weakness  - secondary to R carotid artery dissection/stenosis. Evaluated by Vascular, repeat CTA with healed dissection  - continue ASA and Statin    *HTN:   now on bb and ccb  -monitor vitals    *Pain Mgmt   - Tylenol PRN  - Oxycodone 5 PRN   - Gabapentin    GI/Bowel Mgmt   -  Senna 2 tabs daily. Miralax daily PRN  - TUMs    /Bladder Mgmt   - PVRx1 on admission - retention screen    FEN   - Diet - Regular    - DVT PPX:   Per a/c management NP - Continue Lovenox 40 mg SQ daily while in the hospital/Rehab then switch to   mg PO BID at time of discharge to home  if she is stil NWB    Precautions / PROPHYLAXIS:   - Falls  - Weight bearing status: NWB to RLE until cleared by Ortho  - Lungs: Aspiration, Incentive Spirometer   - Pressure injury/Skin: Turn Q2hrs in bed while awake, OOB to Chair, PT/OT/SLP        --------------------------------------------  Discharge Follow up:  Antwon Tyson ()- Orthopaedic Surgery  Junior Marie)-- Surgery  Radha Dean)- Cardiovascular Disease; Internal Medicine  Ruben Pinto) - Internal Medicine    --------------------------------------------

## 2020-03-11 LAB
HCT VFR BLD CALC: 36.1 % — SIGNIFICANT CHANGE UP (ref 34.5–45)
HGB BLD-MCNC: 11.1 G/DL — LOW (ref 11.5–15.5)
MCHC RBC-ENTMCNC: 27.6 PG — SIGNIFICANT CHANGE UP (ref 27–34)
MCHC RBC-ENTMCNC: 30.7 GM/DL — LOW (ref 32–36)
MCV RBC AUTO: 89.8 FL — SIGNIFICANT CHANGE UP (ref 80–100)
NRBC # BLD: 0 /100 WBCS — SIGNIFICANT CHANGE UP (ref 0–0)
PLATELET # BLD AUTO: 321 K/UL — SIGNIFICANT CHANGE UP (ref 150–400)
RBC # BLD: 4.02 M/UL — SIGNIFICANT CHANGE UP (ref 3.8–5.2)
RBC # FLD: 14.6 % — HIGH (ref 10.3–14.5)
WBC # BLD: 8.02 K/UL — SIGNIFICANT CHANGE UP (ref 3.8–10.5)
WBC # FLD AUTO: 8.02 K/UL — SIGNIFICANT CHANGE UP (ref 3.8–10.5)

## 2020-03-11 PROCEDURE — 99232 SBSQ HOSP IP/OBS MODERATE 35: CPT

## 2020-03-11 RX ADMIN — ATORVASTATIN CALCIUM 10 MILLIGRAM(S): 80 TABLET, FILM COATED ORAL at 21:45

## 2020-03-11 RX ADMIN — Medication 30 MILLIGRAM(S): at 17:25

## 2020-03-11 RX ADMIN — GABAPENTIN 300 MILLIGRAM(S): 400 CAPSULE ORAL at 05:56

## 2020-03-11 RX ADMIN — Medication 325 MILLIGRAM(S): at 17:26

## 2020-03-11 RX ADMIN — Medication 500 MILLIGRAM(S): at 05:57

## 2020-03-11 RX ADMIN — BUPRENORPHINE AND NALOXONE 1 TABLET(S): 2; .5 TABLET SUBLINGUAL at 05:57

## 2020-03-11 RX ADMIN — Medication 0.5 MILLIGRAM(S): at 14:19

## 2020-03-11 RX ADMIN — LAMOTRIGINE 25 MILLIGRAM(S): 25 TABLET, ORALLY DISINTEGRATING ORAL at 05:57

## 2020-03-11 RX ADMIN — Medication 30 MILLIGRAM(S): at 05:56

## 2020-03-11 RX ADMIN — Medication 325 MILLIGRAM(S): at 14:19

## 2020-03-11 RX ADMIN — Medication 1 TABLET(S): at 11:52

## 2020-03-11 RX ADMIN — Medication 325 MILLIGRAM(S): at 08:58

## 2020-03-11 RX ADMIN — Medication 50 MILLIGRAM(S): at 05:57

## 2020-03-11 RX ADMIN — NYSTATIN CREAM 1 APPLICATION(S): 100000 CREAM TOPICAL at 05:58

## 2020-03-11 RX ADMIN — Medication 1 MILLIGRAM(S): at 14:20

## 2020-03-11 RX ADMIN — Medication 50 MILLIGRAM(S): at 17:26

## 2020-03-11 RX ADMIN — Medication 500 MILLIGRAM(S): at 17:25

## 2020-03-11 RX ADMIN — GABAPENTIN 600 MILLIGRAM(S): 400 CAPSULE ORAL at 21:44

## 2020-03-11 RX ADMIN — QUETIAPINE FUMARATE 50 MILLIGRAM(S): 200 TABLET, FILM COATED ORAL at 17:26

## 2020-03-11 RX ADMIN — BUPRENORPHINE AND NALOXONE 1 TABLET(S): 2; .5 TABLET SUBLINGUAL at 17:32

## 2020-03-11 RX ADMIN — Medication 225 MILLIGRAM(S): at 11:52

## 2020-03-11 RX ADMIN — GABAPENTIN 300 MILLIGRAM(S): 400 CAPSULE ORAL at 17:26

## 2020-03-11 RX ADMIN — BUPRENORPHINE AND NALOXONE 1 TABLET(S): 2; .5 TABLET SUBLINGUAL at 14:19

## 2020-03-11 RX ADMIN — Medication 0.1 MILLIGRAM(S): at 05:56

## 2020-03-11 RX ADMIN — Medication 81 MILLIGRAM(S): at 11:52

## 2020-03-11 RX ADMIN — ENOXAPARIN SODIUM 40 MILLIGRAM(S): 100 INJECTION SUBCUTANEOUS at 14:20

## 2020-03-11 RX ADMIN — QUETIAPINE FUMARATE 50 MILLIGRAM(S): 200 TABLET, FILM COATED ORAL at 05:57

## 2020-03-11 RX ADMIN — Medication 0.1 MILLIGRAM(S): at 17:26

## 2020-03-11 RX ADMIN — LAMOTRIGINE 25 MILLIGRAM(S): 25 TABLET, ORALLY DISINTEGRATING ORAL at 17:26

## 2020-03-11 NOTE — PROGRESS NOTE ADULT - ASSESSMENT
ASSESSMENT/ PLAN:  30 F with significant psychiatric history and heroin abuse (on Suboxone), previous CVA with CA dissection/stenosis with residual deficits presented to [facility] on 2/21 after a fall, found to have R distal Tib/fib fracture, underwent ORIF on 2/21. Hospital course complicated by SVT controlled on AV marjan agents.     *s/p ORIF R distal tib/fib comminuted bimalleolar fxs & removal of external fixator NWB to RLE  - continue trilam splint R ankle/leg  - continue Comprehensive Rehab Program of PT/OT  -barriers for dc, need to performs stairs to access apartment, reviewed [facility] patient. Patient's mother lives in ranch-style home, but when asked, patient adamantly states that living with her mother is not an option on dc 3/11  - orthopedic follow up on discharge    *Dizziness/ lightheadedness   - likely related to multiple sedative meds/high clonidine dosing +/-SVT, anemia. Improved  - HR, BP controlled 3/11  Hb 11.4 3/9--> 11.1 3/11      *SVT  - s/p adenosine. seen by cardiology. Patient reluctant to undergo ablation  - continue AV marjan agents: cardizem and Lopressor  HR controlled    *Psych: PTSD/ Generalized Anxiety/ Substance abuse (heroin)  - seen by psych inpatient; psych meds adjusted thought to be contributing to dizziness  - prn klonopin  0.5mg.  - clonidine  0.1 mg bid.  - effexor decreased from 375 to 225mg   - seroquel increased from 25 bid to 50 bid for anxiety.   - continue trazodone, gabapentin at current doses  - started lamictal 25mg bid 3/2/20, titrate up in 2 weeks. (3/16/20)  - psychology supportive counseling and cognitive evaluation  - psychiatry consult  Reviewed with family    *Leukocytosis likely due to UTI:   -s/p 3 day course cefuroxime (3/5-3/7)  -normalized 8.12 3/9-->8.06 3/11  -patient with persistent dysuria but resolved leukocytosis and afebrile  -UA 3/11 NEGATIVE  - consult    *Vaginal pruritis:  - Discussed proper hygiene with patient, who now agrees to shower  - Nystatin powder    *h/o HCAP secondary resistant gram negative organism - resolved  - completed 7 days treatment with abx.    *h/o CVA with mild LUE weakness  - secondary to R carotid artery dissection/stenosis. Evaluated by Vascular, repeat CTA with healed dissection  - continue ASA and Statin    *HTN:  - now on bb and ccb  - controlled      *Pain Mgmt   - Tylenol PRN  - Oxycodone 5 PRN   - Gabapentin    GI/Bowel Mgmt  - Senna 2 tabs daily. Miralax daily PRN    FEN   - Diet - Regular    DVT PPX:   -Continue Lovenox 40 mg SQ daily while in the hospital/Rehab then switch to  mg PO BID at time of discharge    #Case discussedi n IDT rounds 3/11:  Patient requires supervision bathing and UE dressing, min assist LE and tub transfers, min assist transfers. Reduced memory, requires VC for walker manipulation, ambulates 15 feet RW and min assist. Impulsive and cognitive deficits with reduced carryover  -goals: supervision shower, mod indepnedent wheelchair propulsion long distances, mod independent bADLs on dc  -will need caregiver training for stairs. [employer] dc home 3/20 with home Pt and OT and caregiver support      LABS:  psychiatry

## 2020-03-11 NOTE — PROGRESS NOTE ADULT - SUBJECTIVE AND OBJECTIVE BOX
Patient is a 29y old  Female who presents with a chief complaint of s/p fall and right tib/fib fx, s/p fixation NWRHONA RLE (10 Mar 2020 16:26)      HPI:  Patient is a 30 F with PMH PAF, depression/ anxiety/ PTSD, h/o heroin abuse now on suboxone, previous CVA (with residual L-sided deficits) R carotid A dissection/stenosis (on ASA and Statin),  who presented to the ED on 20 at [facility] with complaints of R leg pain and inability to ambulate after a fall.  Fall was secondary to lightheadedness/ dizziness, landing  onto her R leg. Denied presyncopal symptoms or LOC, chest pain or palpitations.  In the ED she had imaging of her R knee which demonstrated right tib/fib fracture (Comminuted intra-articular fracture of the distal tibia extending from the tibial plafond to the distal shaft; Displaced spiral fracture of the distal fibula without articular involvement).   Subsequently underwent external fixation on  without any complications.  She was seen by psychiatry who assisted with med titration as there was concern that psych meds were contributing to dizziness.  Post op hospital course was significant for episodes of SVT requiring management with adenosine.  EP was consulted to evaluate for ablation, however patient was reluctant to undergo ablation at this time. She was managed on AV marjan agents Cardizem and metoprolol at this time. Electrolytes remained in normal limits.  Recommend outpatient EP follow up.  She endorsed dysuria and was treated for UTI with 3 day course of antibiotics.  She remained hemodynamically stable and medically optimized for discharge to [place] [facility] on 3/6/20. ARON RAMIREZ (06 Mar 2020 15:13)      PAST MEDICAL & SURGICAL HISTORY:  Cyst, breast  Transaminitis  Aneurysm of right internal carotid artery  Cerebrovascular accident (CVA)  Substance abuse  Anxiety  Depression  PTSD (post-traumatic stress disorder)  H/O breast biopsy      MEDICATIONS  (STANDING):  ascorbic acid 500 milliGRAM(s) Oral two times a day  aspirin  chewable 81 milliGRAM(s) Oral daily  atorvastatin 10 milliGRAM(s) Oral at bedtime  buprenorphine 8 mG/naloxone 2 mG SL  Tablet 1 Tablet(s) SubLingual <User Schedule>  cloNIDine 0.1 milliGRAM(s) Oral two times a day  diltiazem    Tablet 30 milliGRAM(s) Oral two times a day  enoxaparin Injectable 40 milliGRAM(s) SubCutaneous every 24 hours  ferrous    sulfate 325 milliGRAM(s) Oral three times a day with meals  folic acid 1 milliGRAM(s) Oral daily  gabapentin 300 milliGRAM(s) Oral two times a day  gabapentin 600 milliGRAM(s) Oral at bedtime  influenza   Vaccine 0.5 milliLiter(s) IntraMuscular once  lamoTRIgine 25 milliGRAM(s) Oral two times a day  metoprolol tartrate 50 milliGRAM(s) Oral two times a day  multivitamin 1 Tablet(s) Oral daily  nystatin Powder 1 Application(s) Topical two times a day  QUEtiapine 50 milliGRAM(s) Oral two times a day  venlafaxine XR. 225 milliGRAM(s) Oral daily    MEDICATIONS  (PRN):  acetaminophen   Tablet .. 650 milliGRAM(s) Oral every 6 hours PRN Temp greater or equal to 38C (100.4F), Mild Pain (1 - 3)  calcium carbonate    500 mG (Tums) Chewable 3 Tablet(s) Chew every 6 hours PRN Dyspepsia  clonazePAM  Tablet 0.5 milliGRAM(s) Oral every 12 hours PRN Anxiety  melatonin 3 milliGRAM(s) Oral at bedtime PRN Sleep  ondansetron    Tablet 4 milliGRAM(s) Oral every 6 hours PRN Nausea and/or Vomiting  oxyCODONE    IR 5 milliGRAM(s) Oral every 6 hours PRN Moderate Pain (4 - 6)  senna 2 Tablet(s) Oral at bedtime PRN Constipation  traZODone 50 milliGRAM(s) Oral at bedtime PRN insomnia      Allergies    Geodon (Other)  Zyprexa (Other)    Intolerances          VITALS  29y  Vital Signs Last 24 Hrs  T(C): 36.7 (11 Mar 2020 08:36), Max: 37 (10 Mar 2020 23:19)  T(F): 98 (11 Mar 2020 08:36), Max: 98.6 (10 Mar 2020 23:19)  HR: 90 (11 Mar 2020 08:36) (77 - 90)  BP: 108/73 (11 Mar 2020 08:36) (100/60 - 127/75)  BP(mean): --  RR: 14 (11 Mar 2020 08:36) (14 - 14)  SpO2: 96% (11 Mar 2020 08:36) (96% - 99%)  Daily     Daily         RECENT LABS:                          11.1   8.02  )-----------( 321      ( 11 Mar 2020 05:40 )             36.1               Urinalysis Basic - ( 10 Mar 2020 16:39 )    Color: Yellow / Appearance: Clear / S.010 / pH: x  Gluc: x / Ketone: Negative  / Bili: Negative / Urobili: Negative   Blood: x / Protein: Negative / Nitrite: Negative   Leuk Esterase: Negative / RBC: x / WBC x   Sq Epi: x / Non Sq Epi: x / Bacteria: x          CAPILLARY BLOOD GLUCOSE

## 2020-03-11 NOTE — PROGRESS NOTE ADULT - COMMENTS
Patient seen in OT. Has reduced recall; initially didn't remember examiner from yesterday but quickly corrects. Still complains of dysuria, states she did not urinate this morning. We had discussed prior treatment of UTI and plan to work up dysuria but she did not recall details. Later, discovered she was also irritable after learning tentative dc date , as she wishes to go home sooner. Barriers for dc , including stairs, reviewed

## 2020-03-12 DIAGNOSIS — R74.0 NONSPECIFIC ELEVATION OF LEVELS OF TRANSAMINASE AND LACTIC ACID DEHYDROGENASE [LDH]: ICD-10-CM

## 2020-03-12 DIAGNOSIS — S82.201A UNSPECIFIED FRACTURE OF SHAFT OF RIGHT TIBIA, INITIAL ENCOUNTER FOR CLOSED FRACTURE: ICD-10-CM

## 2020-03-12 DIAGNOSIS — Z79.82 LONG TERM (CURRENT) USE OF ASPIRIN: ICD-10-CM

## 2020-03-12 DIAGNOSIS — I69.954 HEMIPLEGIA AND HEMIPARESIS FOLLOWING UNSPECIFIED CEREBROVASCULAR DISEASE AFFECTING LEFT NON-DOMINANT SIDE: ICD-10-CM
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
## 2020-03-12 DIAGNOSIS — R30.0 DYSURIA: ICD-10-CM

## 2020-03-12 DIAGNOSIS — I47.1 SUPRAVENTRICULAR TACHYCARDIA: ICD-10-CM

## 2020-03-12 DIAGNOSIS — Z88.8 ALLERGY STATUS TO OTHER DRUGS, MEDICAMENTS AND BIOLOGICAL SUBSTANCES STATUS: ICD-10-CM

## 2020-03-12 DIAGNOSIS — F11.21 OPIOID DEPENDENCE, IN REMISSION: ICD-10-CM

## 2020-03-12 DIAGNOSIS — I10 ESSENTIAL (PRIMARY) HYPERTENSION: ICD-10-CM

## 2020-03-12 DIAGNOSIS — F11.20 OPIOID DEPENDENCE, UNCOMPLICATED: ICD-10-CM

## 2020-03-12 DIAGNOSIS — F43.10 POST-TRAUMATIC STRESS DISORDER, UNSPECIFIED: ICD-10-CM

## 2020-03-12 DIAGNOSIS — F17.210 NICOTINE DEPENDENCE, CIGARETTES, UNCOMPLICATED: ICD-10-CM
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
## 2020-03-12 DIAGNOSIS — F41.1 GENERALIZED ANXIETY DISORDER: ICD-10-CM

## 2020-03-12 DIAGNOSIS — N39.0 URINARY TRACT INFECTION, SITE NOT SPECIFIED: ICD-10-CM

## 2020-03-12 DIAGNOSIS — F32.9 MAJOR DEPRESSIVE DISORDER, SINGLE EPISODE, UNSPECIFIED: ICD-10-CM

## 2020-03-12 DIAGNOSIS — J15.6 PNEUMONIA DUE TO OTHER GRAM-NEGATIVE BACTERIA: ICD-10-CM

## 2020-03-12 DIAGNOSIS — G31.84 MILD COGNITIVE IMPAIRMENT OF UNCERTAIN OR UNKNOWN ETIOLOGY: ICD-10-CM

## 2020-03-12 PROCEDURE — 99232 SBSQ HOSP IP/OBS MODERATE 35: CPT

## 2020-03-12 RX ORDER — CLONAZEPAM 1 MG
0.5 TABLET ORAL EVERY 12 HOURS
Refills: 0 | Status: DISCONTINUED | OUTPATIENT
Start: 2020-03-12 | End: 2020-03-16

## 2020-03-12 RX ORDER — SENNA PLUS 8.6 MG/1
2 TABLET ORAL AT BEDTIME
Refills: 0 | Status: DISCONTINUED | OUTPATIENT
Start: 2020-03-12 | End: 2020-03-16

## 2020-03-12 RX ORDER — POLYETHYLENE GLYCOL 3350 17 G/17G
17 POWDER, FOR SOLUTION ORAL DAILY
Refills: 0 | Status: DISCONTINUED | OUTPATIENT
Start: 2020-03-12 | End: 2020-03-16

## 2020-03-12 RX ORDER — BUPRENORPHINE AND NALOXONE 2; .5 MG/1; MG/1
1 TABLET SUBLINGUAL
Refills: 0 | Status: DISCONTINUED | OUTPATIENT
Start: 2020-03-12 | End: 2020-03-16

## 2020-03-12 RX ADMIN — Medication 0.1 MILLIGRAM(S): at 06:52

## 2020-03-12 RX ADMIN — Medication 0.1 MILLIGRAM(S): at 17:46

## 2020-03-12 RX ADMIN — Medication 30 MILLIGRAM(S): at 17:46

## 2020-03-12 RX ADMIN — Medication 325 MILLIGRAM(S): at 10:08

## 2020-03-12 RX ADMIN — BUPRENORPHINE AND NALOXONE 1 TABLET(S): 2; .5 TABLET SUBLINGUAL at 06:50

## 2020-03-12 RX ADMIN — Medication 500 MILLIGRAM(S): at 17:46

## 2020-03-12 RX ADMIN — LAMOTRIGINE 25 MILLIGRAM(S): 25 TABLET, ORALLY DISINTEGRATING ORAL at 17:46

## 2020-03-12 RX ADMIN — BUPRENORPHINE AND NALOXONE 1 TABLET(S): 2; .5 TABLET SUBLINGUAL at 12:23

## 2020-03-12 RX ADMIN — BUPRENORPHINE AND NALOXONE 1 TABLET(S): 2; .5 TABLET SUBLINGUAL at 17:47

## 2020-03-12 RX ADMIN — Medication 325 MILLIGRAM(S): at 17:46

## 2020-03-12 RX ADMIN — Medication 1 MILLIGRAM(S): at 12:22

## 2020-03-12 RX ADMIN — GABAPENTIN 300 MILLIGRAM(S): 400 CAPSULE ORAL at 17:46

## 2020-03-12 RX ADMIN — Medication 50 MILLIGRAM(S): at 17:46

## 2020-03-12 RX ADMIN — Medication 325 MILLIGRAM(S): at 12:22

## 2020-03-12 RX ADMIN — LAMOTRIGINE 25 MILLIGRAM(S): 25 TABLET, ORALLY DISINTEGRATING ORAL at 06:49

## 2020-03-12 RX ADMIN — GABAPENTIN 300 MILLIGRAM(S): 400 CAPSULE ORAL at 06:50

## 2020-03-12 RX ADMIN — POLYETHYLENE GLYCOL 3350 17 GRAM(S): 17 POWDER, FOR SOLUTION ORAL at 12:22

## 2020-03-12 RX ADMIN — Medication 81 MILLIGRAM(S): at 12:22

## 2020-03-12 RX ADMIN — NYSTATIN CREAM 1 APPLICATION(S): 100000 CREAM TOPICAL at 17:47

## 2020-03-12 RX ADMIN — Medication 1 TABLET(S): at 12:22

## 2020-03-12 RX ADMIN — QUETIAPINE FUMARATE 50 MILLIGRAM(S): 200 TABLET, FILM COATED ORAL at 06:50

## 2020-03-12 RX ADMIN — Medication 30 MILLIGRAM(S): at 06:49

## 2020-03-12 RX ADMIN — SENNA PLUS 2 TABLET(S): 8.6 TABLET ORAL at 21:42

## 2020-03-12 RX ADMIN — QUETIAPINE FUMARATE 50 MILLIGRAM(S): 200 TABLET, FILM COATED ORAL at 17:46

## 2020-03-12 RX ADMIN — ATORVASTATIN CALCIUM 10 MILLIGRAM(S): 80 TABLET, FILM COATED ORAL at 21:42

## 2020-03-12 RX ADMIN — Medication 500 MILLIGRAM(S): at 06:50

## 2020-03-12 RX ADMIN — Medication 50 MILLIGRAM(S): at 06:49

## 2020-03-12 RX ADMIN — Medication 225 MILLIGRAM(S): at 12:22

## 2020-03-12 RX ADMIN — ENOXAPARIN SODIUM 40 MILLIGRAM(S): 100 INJECTION SUBCUTANEOUS at 12:22

## 2020-03-12 RX ADMIN — GABAPENTIN 600 MILLIGRAM(S): 400 CAPSULE ORAL at 21:41

## 2020-03-12 RX ADMIN — Medication 0.5 MILLIGRAM(S): at 17:53

## 2020-03-12 NOTE — PROGRESS NOTE BEHAVIORAL HEALTH - NSBHCHARTREVIEWVS_PSY_A_CORE FT
Vital Signs Last 24 Hrs  T(C): 36.6 (12 Mar 2020 08:00), Max: 36.7 (11 Mar 2020 21:15)  T(F): 97.9 (12 Mar 2020 08:00), Max: 98 (11 Mar 2020 21:15)  HR: 79 (12 Mar 2020 17:42) (74 - 100)  BP: 103/66 (12 Mar 2020 17:42) (91/53 - 126/78)  BP(mean): --  RR: 14 (12 Mar 2020 08:00) (14 - 15)  SpO2: 96% (12 Mar 2020 08:00) (96% - 96%)

## 2020-03-12 NOTE — PROGRESS NOTE ADULT - ASSESSMENT
ASSESSMENT/ PLAN:  30 F with significant psychiatric history and heroin abuse (on Suboxone), previous CVA with CA dissection/stenosis with residual deficits presented to [facility] on 2/21 after a fall, found to have R distal Tib/fib fracture, underwent ORIF on 2/21. Hospital course complicated by SVT controlled on AV marjan agents.     *s/p ORIF R distal tib/fib comminuted bimalleolar fxs & removal of external fixator NWB to RLE  - continue trilam splint R ankle/leg  - continue Comprehensive Rehab Program of PT/OT  - barriers for dc, need to performs stairs to access apartment, reviewed with patient. Patient's mother lives in ranch-style home, but when asked, patient adamantly states that living with her mother is not an option on dc 3/11  - orthopedic follow up on discharge    *Dizziness/ lightheadedness   - likely related to multiple sedative meds/high clonidine dosing +/-SVT, anemia. Improved  - HR, BP controlled 3/11  - Hb 11.4 3/9--> 11.1 3/11    *SVT  - s/p adenosine. seen by cardiology. Patient reluctant to undergo ablation  - continue AV marjan agents: cardizem and Lopressor  - HR controlled    *Psych: PTSD/ Generalized Anxiety/ Substance abuse (heroin)  - seen by psych inpatient; psych meds adjusted thought to be contributing to dizziness  - prn klonopin  0.5mg.  - clonidine  0.1 mg bid.  - effexor decreased from 375 to 225mg   - seroquel increased from 25 bid to 50 bid for anxiety.   - continue trazodone, gabapentin at current doses  - started lamictal 25mg bid 3/2/20, titrate up in 2 weeks. (3/16/20)  - psychology supportive counseling and cognitive evaluation  - psychiatry consult, patient reporting anxiety today and requesting f/u, reached out to Dr. Helms for f/u. appreciate recs.   - Reviewed with family    *Leukocytosis likely due to UTI:   - s/p 3 day course cefuroxime (3/5-3/7)  - normalized 8.12 3/9-->8.06 3/11  - patient with persistent dysuria but resolved leukocytosis and afebrile  - UA 3/11 NEGATIVE  -  consult - appreciate recs, monitor.     *Vaginal pruritis:  - Discussed proper hygiene with patient, who now agrees to shower  - Nystatin powder    *h/o HCAP secondary resistant gram negative organism - resolved  - completed 7 days treatment with abx.    *h/o CVA with mild LUE weakness  - secondary to R carotid artery dissection/stenosis. Evaluated by Vascular, repeat CTA with healed dissection  - continue ASA and Statin    *HTN:  - now on bb and ccb  - controlled    *Pain Mgmt   - Tylenol PRN  - Gabapentin    GI/Bowel Mgmt  - Senna and miralax added standing for constipation 3/12    FEN   - Diet - Regular    DVT PPX:   -Continue Lovenox 40 mg SQ daily while in the hospital/Rehab then switch to  mg PO BID at time of discharge    #Case discussedv in IDT rounds 3/11:  Patient requires supervision bathing and UE dressing, min assist LE and tub transfers, min assist transfers. Reduced memory, requires VC for walker manipulation, ambulates 15 feet RW and min assist. Impulsive and cognitive deficits with reduced carryover  - goals: supervision shower, mod indepnedent wheelchair propulsion long distances, mod independent bADLs on dc  - will need caregiver training for stairs. Target dc home 3/20 with home Pt and OT and caregiver support ASSESSMENT/ PLAN:  30 F with significant psychiatric history and heroin abuse (on Suboxone), previous CVA with CA dissection/stenosis with residual deficits presented to [facility] on 2/21 after a fall, found to have R distal Tib/fib fracture, underwent ORIF on 2/21. Hospital course complicated by SVT controlled on AV marjan agents.     *s/p ORIF R distal tib/fib comminuted bimalleolar fxs & removal of external fixator NWB to RLE  - continue  splint R ankle/leg  - continue Comprehensive Rehab Program of PT/OT  - barriers for dc, need to performs stairs to access apartment, reviewed with patient. Patient's mother lives in ranch-style home, but when asked, patient adamantly states that living with her mother is not an option. Will need caregiver training with fiance as patient is not able to do stairs independently due to cognitive and behavior issues  - orthopedic follow up on discharge    *Dizziness/ lightheadedness   - likely related to multiple sedative meds/high clonidine dosing +/-SVT, anemia. Improved  - HR, BP controlled  - Hb 11.4 3/9--> 11.1 3/11    *SVT  - s/p adenosine. seen by cardiology. Patient reluctant to undergo ablation  - continue AV marjan agents: cardizem and Lopressor  - HR controlled    *Psych: PTSD/ Generalized Anxiety/ Substance abuse (heroin)  - seen by psych inpatient; psych meds adjusted thought to be contributing to dizziness  - prn klonopin  0.5mg.  - clonidine  0.1 mg bid.  - effexor decreased from 375 to 225mg   - seroquel increased from 25 bid to 50 bid for anxiety.   - continue trazodone, gabapentin at current doses  - started lamictal 25mg bid 3/2/20, titrate up in 2 weeks. (3/16/20)  - psychology supportive counseling and cognitive evaluation  - psychiatry consult, patient reporting anxiety today and requesting f/u, medication doses were adjusted at previous hospital, reached out to Dr. Helms for f/u 3/12    *Leukocytosis likely due to UTI:   - s/p 3 day course cefuroxime (3/5-3/7)  - normalized 8.12 3/9-->8.06 3/11  - patient with persistent dysuria but resolved leukocytosis and afebrile  - UA 3/11 NEGATIVE  -  consult -    *Vaginal pruritis:  - Nystatin powder    *h/o CVA with mild LUE weakness  - secondary to R carotid artery dissection/stenosis. Evaluated by Vascular, repeat CTA with healed dissection  - continue ASA and Statin    *HTN:  - now on bb and ccb  - controlled    *Pain Mgmt   - Tylenol PRN  - Gabapentin    GI/Bowel Mgmt  - Senna and miralax added standing for constipation 3/12    FEN   - Diet - Regular    DVT PPX:   -Continue Lovenox 40 mg SQ daily while in the hospital/Rehab then switch to  mg PO BID at time of discharge    #Case discussedv in IDT rounds 3/11:  Patient requires supervision bathing and UE dressing, min assist LE and tub transfers, min assist transfers. Reduced memory, requires VC for walker manipulation, ambulates 15 feet RW and min assist. Impulsive and cognitive deficits with reduced carryover  - goals: supervision shower, mod indepnedent wheelchair propulsion long distances, mod independent bADLs on dc  - will need caregiver training for stairs. Target dc home 3/20 with home Pt and OT and caregiver support    LABS:  urology  psychiatry  CBc BMp 3/16

## 2020-03-12 NOTE — PROGRESS NOTE ADULT - ASSESSMENT
ASSESSMENT/ PLAN:  30 F with significant psychiatric history and heroin abuse (on Suboxone), previous CVA with CA dissection/stenosis with residual deficits presented to [facility] on 2/21 after a fall, found to have R distal Tib/fib fracture, underwent ORIF on 2/21. Hospital course complicated by SVT controlled on AV marjan agents.     * s/p ORIF R distal tib/fib comminuted bimalleolar fxs & removal of external fixator  - NWB to RLE  - continue trilam splint R ankle/leg  - pain management as below  - Gait Instability, ADL, and Functional impairments   - Comprehensive Rehab Program of PT/OT  - orthostatic/ dizziness precautions  -cardiac precautions  -orthopedic follow up on discharge. Will need to assess whether home is wheelchair accessible    *Dizziness/ lightheadedness   - likely related to multiple sedative meds/high clonidine dosing +/-SVT  -monitor vitals  - reduce metoprolol dose to 50 BID 3/9. c/w other meds. closely monitor BP and HR  - check orthostasis daily    *SVT  - s/p adenosine. seen by cardiology. Patient reluctant to undergo ablation  - continue AV marjan agents: cardizem and Lopressor  -cardiac precautions,     *Leukocytosis likely due to UTI   - completed Cefuroxime 500mg BID for 3 day course (end date on 3/8/20)  - rpeat UA neg    *h/o HCAP secondary resistant gram negative organism - -resolved  -completed 7 days treatment with abx. repeat CXR in 4-6 weeks for resolution    *Psych: PTSD/ Generalized Anxiety/ Substance abuse (heroin)  - no SI/HI  - seen by psych inpatient; psych meds adjusted thought to be contributing to dizziness  - psych reconsult in rehab for further titration. ?can DC clinidine  - Per Psych: continue meds as follows  - psychology supportive counseling    * h/o CVA with mild LUE weakness  - secondary to R carotid artery dissection/stenosis. Evaluated by Vascular, repeat CTA with healed dissection  - continue ASA and Statin    *HTN:   now on bb and ccb  -monitor vitals    *Pain Mgmt   - Tylenol PRN  - Oxycodone 5 PRN   - Gabapentin    GI/Bowel Mgmt   -  Senna 2 tabs daily. Miralax daily PRN  - add dulcolax supp PRN  - TUMs  - encourage hydration  - high fiber diet    /Bladder Mgmt   - PVRx1 on admission - retention screen    FEN   - Diet - Regular    - DVT PPX:   Per a/c management NP - Continue Lovenox 40 mg SQ daily while in the hospital/Rehab then switch to   mg PO BID at time of discharge to home  if she is stil NWB    Precautions / PROPHYLAXIS:   - Falls  - Weight bearing status: NWB to RLE until cleared by Ortho  - Lungs: Aspiration, Incentive Spirometer   - Pressure injury/Skin: Turn Q2hrs in bed while awake, OOB to Chair, PT/OT/SLP        --------------------------------------------  Discharge Follow up:  Antwon Tyson (DO)- Orthopaedic Surgery  Junior Marie)-- Surgery  Radha Dean)- Cardiovascular Disease; Internal Medicine  Ruben Pinto) - Internal Medicine    --------------------------------------------

## 2020-03-12 NOTE — CONSULT NOTE ADULT - SUBJECTIVE AND OBJECTIVE BOX
Patient is a 29y old  Female who presents with a chief complaint of s/p fall and right tib/fib fx, s/p fixation NWB RLE (11 Mar 2020 12:27)      HPI:  Patient is a 30 F with PMH PAF, depression/ anxiety/ PTSD, h/o heroin abuse now on suboxone, previous CVA (with residual L-sided deficits) R carotid A dissection/stenosis (on ASA and Statin),  who presented to the ED on 20 at [facility] with complaints of R leg pain and inability to ambulate after a fall.  Fall was secondary to lightheadedness/ dizziness, landing  onto her R leg. Denied presyncopal symptoms or LOC, chest pain or palpitations.  In the ED she had imaging of her R knee which demonstrated right tib/fib fracture (Comminuted intra-articular fracture of the distal tibia extending from the tibial plafond to the distal shaft; Displaced spiral fracture of the distal fibula without articular involvement).   Subsequently underwent external fixation on  without any complications.  She was seen by psychiatry who assisted with med titration as there was concern that psych meds were contributing to dizziness.  Post op hospital course was significant for episodes of SVT requiring management with adenosine.  EP was consulted to evaluate for ablation, however patient was reluctant to undergo ablation at this time. She was managed on AV marjan agents Cardizem and metoprolol at this time. Electrolytes remained in normal limits.  Recommend outpatient EP follow up.  She endorsed dysuria and was treated for UTI with 3 day course of antibiotics.  She remained hemodynamically stable and medically optimized for discharge to [place] [facility] on 3/6/20. NWB RLE (06 Mar 2020 15:13)    today denies dysuria/incontinence, recently treated for UTI      PAST MEDICAL & SURGICAL HISTORY:  Cyst, breast  Transaminitis  Aneurysm of right internal carotid artery  Cerebrovascular accident (CVA)  Substance abuse  Anxiety  Depression  PTSD (post-traumatic stress disorder)  H/O breast biopsy      REVIEW OF SYSTEMS:    CONSTITUTIONAL:  no fever or chills  HEENT: No visual changes  ENDO: No sweating  NECK: No pain or stiffness  MUSCULOSKELETAL: No back pain, no joint pain  RESPIRATORY: No shortness of breath  CARDIOVASCULAR: No chest pain  GASTROINTESTINAL: No abdominal or epigastric pain. No nausea, vomiting,  No diarrhea or constipation.   NEUROLOGICAL: No mental status changes  PSYCH: No depression, no mood changes  SKIN: No itching      MEDICATIONS  (STANDING):  ascorbic acid 500 milliGRAM(s) Oral two times a day  aspirin  chewable 81 milliGRAM(s) Oral daily  atorvastatin 10 milliGRAM(s) Oral at bedtime  buprenorphine 8 mG/naloxone 2 mG SL  Tablet 1 Tablet(s) SubLingual <User Schedule>  cloNIDine 0.1 milliGRAM(s) Oral two times a day  diltiazem    Tablet 30 milliGRAM(s) Oral two times a day  enoxaparin Injectable 40 milliGRAM(s) SubCutaneous every 24 hours  ferrous    sulfate 325 milliGRAM(s) Oral three times a day with meals  folic acid 1 milliGRAM(s) Oral daily  gabapentin 300 milliGRAM(s) Oral two times a day  gabapentin 600 milliGRAM(s) Oral at bedtime  influenza   Vaccine 0.5 milliLiter(s) IntraMuscular once  lamoTRIgine 25 milliGRAM(s) Oral two times a day  metoprolol tartrate 50 milliGRAM(s) Oral two times a day  multivitamin 1 Tablet(s) Oral daily  nystatin Powder 1 Application(s) Topical two times a day  QUEtiapine 50 milliGRAM(s) Oral two times a day  venlafaxine XR. 225 milliGRAM(s) Oral daily    MEDICATIONS  (PRN):  acetaminophen   Tablet .. 650 milliGRAM(s) Oral every 6 hours PRN Temp greater or equal to 38C (100.4F), Mild Pain (1 - 3)  calcium carbonate    500 mG (Tums) Chewable 3 Tablet(s) Chew every 6 hours PRN Dyspepsia  clonazePAM  Tablet 0.5 milliGRAM(s) Oral every 12 hours PRN Anxiety  melatonin 3 milliGRAM(s) Oral at bedtime PRN Sleep  ondansetron    Tablet 4 milliGRAM(s) Oral every 6 hours PRN Nausea and/or Vomiting  senna 2 Tablet(s) Oral at bedtime PRN Constipation  traZODone 50 milliGRAM(s) Oral at bedtime PRN insomnia      Allergies    Geodon (Other)  Zyprexa (Other)    Intolerances        SOCIAL HISTORY: No illicit drug use    FAMILY HISTORY:  Medical history unknown: in father, unknown if living or   Family history of drug use: IN MOTHER, LIVING        T(C): 36.7 (20 @ 21:15), Max: 37 (03-10-20 @ 23:19)  T(F): 98 (20 @ 21:15), Max: 98.6 (03-10-20 @ 23:19)  HR: 74 (20 @ 06:47) (74 - 90)  BP: 106/64 (20 @ 06:47) (91/53 - 127/75)  RR: 15 (20 @ 21:15) (14 - 15)  SpO2: 96% (20 @ 21:15) (96% - 99%)      PHYSICAL EXAM:    Constitutional: alert, no acute distress  Back: No CVA tenderness  Respiratory: No accessory respiratory muscle use  Abd: Soft, NT/ND  no organomegaly  no hernia  : no bladder distention  Extremities: no edema  Neurological: A/O x 3  Psychiatric: Normal mood, normal affect  Skin: No rashes        20 @ 07:01  -  20 @ 07:00  --------------------------------------------------------  IN: 0 mL / OUT: 2 mL / NET: -2 mL            LABS:                        11.1   8.02  )-----------( 321      ( 11 Mar 2020 05:40 )             36.1         Urinalysis Basic - ( 10 Mar 2020 16:39 )    Color: Yellow / Appearance: Clear / S.010 / pH: x  Gluc: x / Ketone: Negative  / Bili: Negative / Urobili: Negative   Blood: x / Protein: Negative / Nitrite: Negative   Leuk Esterase: Negative / RBC: x / WBC x   Sq Epi: x / Non Sq Epi: x / Bacteria: x            RADIOLOGY & ADDITIONAL STUDIES:

## 2020-03-12 NOTE — PROGRESS NOTE ADULT - SUBJECTIVE AND OBJECTIVE BOX
Dr. Michaud Hospitalist Progress Note  NORMA ADAMS [id number]    Patient is a 29y old  Female who presents with a chief complaint of s/p fall and right tib/fib fx, s/p fixation NWB RLE (09 Mar 2020 10:17    interval: no complaints. ROS neg except constipation and anxiety. no SI/HI.  UA neg.      HPI:  Patient is a 30 F with PMH PAF, depression/ anxiety/ PTSD, h/o heroin abuse now on suboxone, previous CVA (with residual L-sided deficits) R carotid A dissection/stenosis (on ASA and Statin),  who presented to the ED on 2/21/20 at [facility] with complaints of R leg pain and inability to ambulate after a fall.  Fall was secondary to lightheadedness/ dizziness, landing  onto her R leg. Denied presyncopal symptoms or LOC, chest pain or palpitations.  In the ED she had imaging of her R knee which demonstrated right tib/fib fracture (Comminuted intra-articular fracture of the distal tibia extending from the tibial plafond to the distal shaft; Displaced spiral fracture of the distal fibula without articular involvement).   Subsequently underwent external fixation on 2/21 without any complications.  She was seen by psychiatry who assisted with med titration as there was concern that psych meds were contributing to dizziness.  Post op hospital course was significant for episodes of SVT requiring management with adenosine.  EP was consulted to evaluate for ablation, however patient was reluctant to undergo ablation at this time. She was managed on AV marjan agents Cardizem and metoprolol at this time. Electrolytes remained in normal limits.  Recommend outpatient EP follow up.  She endorsed dysuria and was treated for UTI with 3 day course of antibiotics.  She remained hemodynamically stable and medically optimized for discharge to [place] on 3/6/20. NWB RLE (06 Mar 2020 15:13)    Vital Signs Last 24 Hrs  T(C): 36.6 (12 Mar 2020 08:00), Max: 36.7 (11 Mar 2020 21:15)  T(F): 97.9 (12 Mar 2020 08:00), Max: 98 (11 Mar 2020 21:15)  HR: 100 (12 Mar 2020 08:00) (74 - 100)  BP: 126/78 (12 Mar 2020 08:00) (91/53 - 126/78)  BP(mean): --  RR: 14 (12 Mar 2020 08:00) (14 - 15)  SpO2: 96% (12 Mar 2020 08:00) (96% - 96%)      Physical Exam:  GENERAL: Not in distress. Alert    HEENT:  Normocephalic and atraumatic.   NECK: Supple.    CARDIOVASCULAR: RRR S1, S2. No murmur/rubs/gallop  LUNGS: BLAE+, no rales, no wheezing, no rhonchi.    ABDOMEN: ND. Soft,  NT, no guarding / rebound / rigidity. BS normoactive. No CVA tenderness.    EXTREMITIES: no cyanosis, no clubbing, no edema.   NEUROLOGIC: AAO*3. grossly intact  PSYCHIATRIC: Calm.  No agitation. does not look anxious or depressed    Labs                                   11.1   8.02  )-----------( 321      ( 11 Mar 2020 05:40 )             36.1       MEDICATIONS  (STANDING):  ascorbic acid 500 milliGRAM(s) Oral two times a day  aspirin  chewable 81 milliGRAM(s) Oral daily  atorvastatin 10 milliGRAM(s) Oral at bedtime  buprenorphine 8 mG/naloxone 2 mG SL  Tablet 1 Tablet(s) SubLingual <User Schedule>  cloNIDine 0.1 milliGRAM(s) Oral two times a day  diltiazem    Tablet 30 milliGRAM(s) Oral two times a day  enoxaparin Injectable 40 milliGRAM(s) SubCutaneous every 24 hours  ferrous    sulfate 325 milliGRAM(s) Oral three times a day with meals  folic acid 1 milliGRAM(s) Oral daily  gabapentin 300 milliGRAM(s) Oral two times a day  gabapentin 600 milliGRAM(s) Oral at bedtime  influenza   Vaccine 0.5 milliLiter(s) IntraMuscular once  lamoTRIgine 25 milliGRAM(s) Oral two times a day  metoprolol tartrate 50 milliGRAM(s) Oral two times a day  multivitamin 1 Tablet(s) Oral daily  nystatin Powder 1 Application(s) Topical two times a day  polyethylene glycol 3350 17 Gram(s) Oral daily  QUEtiapine 50 milliGRAM(s) Oral two times a day  senna 2 Tablet(s) Oral at bedtime  venlafaxine XR. 225 milliGRAM(s) Oral daily    MEDICATIONS  (PRN):  acetaminophen   Tablet .. 650 milliGRAM(s) Oral every 6 hours PRN Temp greater or equal to 38C (100.4F), Mild Pain (1 - 3)  calcium carbonate    500 mG (Tums) Chewable 3 Tablet(s) Chew every 6 hours PRN Dyspepsia  clonazePAM  Tablet 0.5 milliGRAM(s) Oral every 12 hours PRN Anxiety  melatonin 3 milliGRAM(s) Oral at bedtime PRN Sleep  ondansetron    Tablet 4 milliGRAM(s) Oral every 6 hours PRN Nausea and/or Vomiting  traZODone 50 milliGRAM(s) Oral at bedtime PRN insomnia

## 2020-03-12 NOTE — DIETITIAN INITIAL EVALUATION ADULT. - OTHER INFO
30 F with significant psychiatric history and heroin abuse (on Suboxone), previous CVA with CA dissection/stenosis with residual deficits presented to [facility] on 2/21 after a fall, found to have R distal Tib/fib fracture, underwent ORIF on 2/21. Hospital course complicated by SVT controlled on AV marjan agents.      Pt tolerating current diet and reports that her appetite is "too good". Consumes 100% of meals provided. Denies food allergies/intolerances, chewing/swallowing difficulty. Pt reports UBW 215lbs and denies any recent changes over last 6 months. Diet PTA: regular, unrestricted. Pt states that she doesn't follow structured meal plan, but just eats when hungry. Reports drinking sugary beverages like soda and Gatorade. Encourage pt to drink less sugary drinks and increase water intake instead. Pt denies use of micronutrient supplementation PTA. Reports last BM 3/11, but states that she has regularly been feeling constipation. Declines prunes/added fiber with meals. Encouraged to ask for senna Rx as needed + adequate hydration.

## 2020-03-12 NOTE — DIETITIAN INITIAL EVALUATION ADULT. - PHYSICAL APPEARANCE
obese/other (specify) Height: 5'8", Weight: (3/6) 217.5lbs, BMI: 33.1  Skin: s/p ORIF right leg, Edema: +1 right leg  IBW range: 126-154lbs

## 2020-03-12 NOTE — PROGRESS NOTE BEHAVIORAL HEALTH - NSBHFUPINTERVALHXFT_PSY_A_CORE
HPI:  Patient is a 30 F with PMH PAF, depression/ anxiety/ PTSD, h/o heroin abuse now on suboxone, previous CVA (with residual L-sided deficits) R carotid A dissection/stenosis (on ASA and Statin),  who presented to the ED on 2/21/20 at [facility] with complaints of R leg pain and inability to ambulate after a fall.  Fall was secondary to lightheadedness/ dizziness, landing  onto her R leg. Denied presyncopal symptoms or LOC, chest pain or palpitations.  In the ED she had imaging of her R knee which demonstrated right tib/fib fracture (Comminuted intra-articular fracture of the distal tibia extending from the tibial plafond to the distal shaft; Displaced spiral fracture of the distal fibula without articular involvement).   Subsequently underwent external fixation on 2/21 without any complications.  She was seen by psychiatry who assisted with med titration as there was concern that psych meds were contributing to dizziness.  Post op hospital course was significant for episodes of SVT requiring management with adenosine.  EP was consulted to evaluate for ablation, however patient was reluctant to undergo ablation at this time. She was managed on AV marjan agents Cardizem and metoprolol at this time. Electrolytes remained in normal limits.  Recommend outpatient EP follow up.  She endorsed dysuria and was treated for UTI with 3 day course of antibiotics.  She remained hemodynamically stable and medically optimized for discharge to [facility] on 3/6/20. ARON RAMIREZ (06 Mar 2020 15:13)    Writer asked to follow up with patient for anxiety, at the time of my follow up, pt reported she was anxious but continued to fall asleep during the evaluation.   Pt I STOP checked and message left with her outpatient psychiatrist Dr. Redd [phone].  Appreciate neuropsychology consult. Per nursing pt did not require prn medication. Pt has been on as much as 2 mg of Klonopin, is now on 0.5 mg bid, can increase   another 0.25 mg if needed, pt also on low dose of Lamictal- there is also ability to adjust that medication as well.   Will follow up prn.

## 2020-03-12 NOTE — DIETITIAN INITIAL EVALUATION ADULT. - PERTINENT MEDS FT
lovenox, vitamin C, lipitor, syboxone, TUMS, klonopin, catapres, cardizem, ferrous sulfate, folic acid, neurontin, lamictal, MVI, zofran, seroquel, senna, desyrel, venlafaxine, lopressor

## 2020-03-12 NOTE — DIETITIAN INITIAL EVALUATION ADULT. - ADD RECOMMEND
Encourage adequate po hydration- water instead of sweetened beverages. Trend weights, labs & PO intake.

## 2020-03-12 NOTE — PROGRESS NOTE ADULT - SUBJECTIVE AND OBJECTIVE BOX
DAILY PROGRESS NOTE:  HPI: Patient is a 30 F with PMH PAF, depression/ anxiety/ PTSD, h/o heroin abuse now on suboxone, previous CVA (with residual L-sided deficits) R carotid A dissection/stenosis (on ASA and Statin),  who presented to the ED on 20 at [facility] with complaints of R leg pain and inability to ambulate after a fall.  Fall was secondary to lightheadedness/ dizziness, landing  onto her R leg. Denied presyncopal symptoms or LOC, chest pain or palpitations.  In the ED she had imaging of her R knee which demonstrated right tib/fib fracture (Comminuted intra-articular fracture of the distal tibia extending from the tibial plafond to the distal shaft; Displaced spiral fracture of the distal fibula without articular involvement).     Subsequently underwent external fixation on  without any complications.  She was seen by psychiatry who assisted with med titration as there was concern that psych meds were contributing to dizziness.  Post op hospital course was significant for episodes of SVT requiring management with adenosine.  EP was consulted to evaluate for ablation, however patient was reluctant to undergo ablation at this time. She was managed on AV marjan agents Cardizem and metoprolol at this time. Electrolytes remained in normal limits.  Recommend outpatient EP follow up.  She endorsed dysuria and was treated for UTI with 3 day course of antibiotics.  She remained hemodynamically stable and medically optimized for discharge to [facility] on 3/6/20. NWB RLE (06 Mar 2020 15:13)    Subjective: Patient seen and examined in PT gym. Reports feeling very anxious today, requesting to speak with psychiatry regarding her medications. Also feeling constipated. Pain well controlled. participating in therapy.     Patient also reports cognitive impairment, memory deficits which are new since accident. She is aware of them, and is mildly distressing. Appears distracted, difficult to focus. some pain RLE but controlled on medications    REVIEW OF SYSTEMS  Constitutional: No fever, No Chills, No fatigue  HEENT: No eye pain, No visual disturbances, No difficulty hearing, No Dysphagia   Pulm: No cough,  No shortness of breath  Cardio: No chest pain, No palpitations  GI:  No abdominal pain, No nausea, No vomiting, No diarrhea, No constipation, No incontinence  : No dysuria, No frequency, No hematuria, No incontinence  Neuro: No headaches, No memory loss, No loss of strength, No numbness, No tremors  Skin: + vaginal itching, No rashes, No lesions   Endo: No temperature intolerance  MSK: No joint pain, No joint swelling, No muscle pain, No Neck or back pain  Psych:  No depression, + anxiety, + substance abuse history on saboxone    Physical Exam:  Vital Signs Last 24 Hrs  T(C): 36.6 (12 Mar 2020 08:00), Max: 36.7 (11 Mar 2020 21:15)  T(F): 97.9 (12 Mar 2020 08:00), Max: 98 (11 Mar 2020 21:15)  HR: 100 (12 Mar 2020 08:00) (74 - 100)  BP: 126/78 (12 Mar 2020 08:00) (91/53 - 126/78)  BP(mean): --  RR: 14 (12 Mar 2020 08:00) (14 - 15)  SpO2: 96% (12 Mar 2020 08:00) (96% - 96%)    PHYSICAL EXAM  Constitutional - Seeing PT, mildly distracted, requires frequent cues to attend and for safety.  mood overall fair  grossly O x 3 (knows month, year, day of week--originally states the 8th of month, later corrects to 9th)    HEENT - NCAT, EOMI. no facial swelling or ecchymoses  Chest - good chest expansion, good respiratory effort, CTAB   Cardio - warm and well perfused, RRR, no murmur  Abdomen -  Soft, NTND  Extremities - No peripheral edema, No calf tenderness, RLE splint present  no tightness proximal and distal ends of cast  calves soft, NT bilaterally  wiggles toes, good capillary refill  no sensory defictis LT   Neurologic Exam:                    Cognitive -      Orientation: Awake, Alert, AAO to self, place, date, year, situation     Memory: Recent memory intact, has more difficulty remembering hospital course    Recent Labs/Imagin.1   8.02  )-----------( 321      ( 11 Mar 2020 05:40 )             36.1       Urinalysis Basic - ( 10 Mar 2020 16:39 )    Color: Yellow / Appearance: Clear / S.010 / pH: x  Gluc: x / Ketone: Negative  / Bili: Negative / Urobili: Negative   Blood: x / Protein: Negative / Nitrite: Negative   Leuk Esterase: Negative / RBC: x / WBC x   Sq Epi: x / Non Sq Epi: x / Bacteria: x    MEDICATIONS  (STANDING):  ascorbic acid 500 milliGRAM(s) Oral two times a day  aspirin  chewable 81 milliGRAM(s) Oral daily  atorvastatin 10 milliGRAM(s) Oral at bedtime  buprenorphine 8 mG/naloxone 2 mG SL  Tablet 1 Tablet(s) SubLingual <User Schedule>  cloNIDine 0.1 milliGRAM(s) Oral two times a day  diltiazem    Tablet 30 milliGRAM(s) Oral two times a day  enoxaparin Injectable 40 milliGRAM(s) SubCutaneous every 24 hours  ferrous    sulfate 325 milliGRAM(s) Oral three times a day with meals  folic acid 1 milliGRAM(s) Oral daily  gabapentin 300 milliGRAM(s) Oral two times a day  gabapentin 600 milliGRAM(s) Oral at bedtime  influenza   Vaccine 0.5 milliLiter(s) IntraMuscular once  lamoTRIgine 25 milliGRAM(s) Oral two times a day  metoprolol tartrate 50 milliGRAM(s) Oral two times a day  multivitamin 1 Tablet(s) Oral daily  nystatin Powder 1 Application(s) Topical two times a day  polyethylene glycol 3350 17 Gram(s) Oral daily  QUEtiapine 50 milliGRAM(s) Oral two times a day  senna 2 Tablet(s) Oral at bedtime  venlafaxine XR. 225 milliGRAM(s) Oral daily    MEDICATIONS  (PRN):  acetaminophen   Tablet .. 650 milliGRAM(s) Oral every 6 hours PRN Temp greater or equal to 38C (100.4F), Mild Pain (1 - 3)  calcium carbonate    500 mG (Tums) Chewable 3 Tablet(s) Chew every 6 hours PRN Dyspepsia  clonazePAM  Tablet 0.5 milliGRAM(s) Oral every 12 hours PRN Anxiety  melatonin 3 milliGRAM(s) Oral at bedtime PRN Sleep  ondansetron    Tablet 4 milliGRAM(s) Oral every 6 hours PRN Nausea and/or Vomiting  traZODone 50 milliGRAM(s) Oral at bedtime PRN insomnia DAILY PROGRESS NOTE:  HPI: Patient is a 30 F with PMH PAF, depression/ anxiety/ PTSD, h/o heroin abuse now on suboxone, previous CVA (with residual L-sided deficits) R carotid A dissection/stenosis (on ASA and Statin),  who presented to the ED on 20 at [facility] with complaints of R leg pain and inability to ambulate after a fall.  Fall was secondary to lightheadedness/ dizziness, landing  onto her R leg. Denied presyncopal symptoms or LOC, chest pain or palpitations.  In the ED she had imaging of her R knee which demonstrated right tib/fib fracture (Comminuted intra-articular fracture of the distal tibia extending from the tibial plafond to the distal shaft; Displaced spiral fracture of the distal fibula without articular involvement).     Subsequently underwent external fixation on  without any complications.  She was seen by psychiatry who assisted with med titration as there was concern that psych meds were contributing to dizziness.  Post op hospital course was significant for episodes of SVT requiring management with adenosine.  EP was consulted to evaluate for ablation, however patient was reluctant to undergo ablation at this time. She was managed on AV marjan agents Cardizem and metoprolol at this time. Electrolytes remained in normal limits.  Recommend outpatient EP follow up.  She endorsed dysuria and was treated for UTI with 3 day course of antibiotics.  She remained hemodynamically stable and medically optimized for discharge to [place] [facility] on 3/6/20. NWB RLE (06 Mar 2020 15:13)    Subjective: Patient seen and examined in PT gym. Reports feeling very anxious today, requesting to speak with psychiatry regarding her medications. Also feeling constipated. Pain well controlled. participating in therapy.     Patient also reports cognitive impairment, memory deficits which are new since accident. She is aware of them, and is mildly distressing. Appears distracted, difficult to focus. some pain RLE but controlled on medications. During therapy, needs frequent VC due to NWB status, distractability , and will likely need CS on dc home      Psych:  No depression, + anxiety, + substance abuse history on saboxone    Physical Exam:  Vital Signs Last 24 Hrs  T(C): 36.6 (12 Mar 2020 08:00), Max: 36.7 (11 Mar 2020 21:15)  T(F): 97.9 (12 Mar 2020 08:00), Max: 98 (11 Mar 2020 21:15)  HR: 100 (12 Mar 2020 08:00) (74 - 100)  BP: 126/78 (12 Mar 2020 08:00) (91/53 - 126/78)  BP(mean): --  RR: 14 (12 Mar 2020 08:00) (14 - 15)  SpO2: 96% (12 Mar 2020 08:00) (96% - 96%)    PHYSICAL EXAM  Constitutional - Seeing PT, mildly distracted, requires frequent cues to attend and for safety.    Chest - good chest expansion, good respiratory effort, CTAB   Cardio - warm and well perfused, RRR, no murmur  Abdomen -  Soft, NTND  Extremities - No peripheral edema, No calf tenderness, RLE splint present  no tightness proximal and distal ends of cast  calves soft, NT bilaterally  wiggles toes, good capillary refill  no sensory deficits LT   =    Recent Labs/Imagin.1   8.02  )-----------( 321      ( 11 Mar 2020 05:40 )             36.1       Urinalysis Basic - ( 10 Mar 2020 16:39 )    Color: Yellow / Appearance: Clear / S.010 / pH: x  Gluc: x / Ketone: Negative  / Bili: Negative / Urobili: Negative   Blood: x / Protein: Negative / Nitrite: Negative   Leuk Esterase: Negative / RBC: x / WBC x   Sq Epi: x / Non Sq Epi: x / Bacteria: x    MEDICATIONS  (STANDING):  ascorbic acid 500 milliGRAM(s) Oral two times a day  aspirin  chewable 81 milliGRAM(s) Oral daily  atorvastatin 10 milliGRAM(s) Oral at bedtime  buprenorphine 8 mG/naloxone 2 mG SL  Tablet 1 Tablet(s) SubLingual <User Schedule>  cloNIDine 0.1 milliGRAM(s) Oral two times a day  diltiazem    Tablet 30 milliGRAM(s) Oral two times a day  enoxaparin Injectable 40 milliGRAM(s) SubCutaneous every 24 hours  ferrous    sulfate 325 milliGRAM(s) Oral three times a day with meals  folic acid 1 milliGRAM(s) Oral daily  gabapentin 300 milliGRAM(s) Oral two times a day  gabapentin 600 milliGRAM(s) Oral at bedtime  influenza   Vaccine 0.5 milliLiter(s) IntraMuscular once  lamoTRIgine 25 milliGRAM(s) Oral two times a day  metoprolol tartrate 50 milliGRAM(s) Oral two times a day  multivitamin 1 Tablet(s) Oral daily  nystatin Powder 1 Application(s) Topical two times a day  polyethylene glycol 3350 17 Gram(s) Oral daily  QUEtiapine 50 milliGRAM(s) Oral two times a day  senna 2 Tablet(s) Oral at bedtime  venlafaxine XR. 225 milliGRAM(s) Oral daily    MEDICATIONS  (PRN):  acetaminophen   Tablet .. 650 milliGRAM(s) Oral every 6 hours PRN Temp greater or equal to 38C (100.4F), Mild Pain (1 - 3)  calcium carbonate    500 mG (Tums) Chewable 3 Tablet(s) Chew every 6 hours PRN Dyspepsia  clonazePAM  Tablet 0.5 milliGRAM(s) Oral every 12 hours PRN Anxiety  melatonin 3 milliGRAM(s) Oral at bedtime PRN Sleep  ondansetron    Tablet 4 milliGRAM(s) Oral every 6 hours PRN Nausea and/or Vomiting  traZODone 50 milliGRAM(s) Oral at bedtime PRN insomnia

## 2020-03-12 NOTE — CONSULT NOTE ADULT - REASON FOR ADMISSION
s/p fall and right tib/fib fx, s/p fixation NWB RLE
s/p fall and right tib/fib fx, s/p fixation NWB RLE

## 2020-03-13 PROCEDURE — 99232 SBSQ HOSP IP/OBS MODERATE 35: CPT

## 2020-03-13 RX ADMIN — LAMOTRIGINE 25 MILLIGRAM(S): 25 TABLET, ORALLY DISINTEGRATING ORAL at 17:23

## 2020-03-13 RX ADMIN — Medication 325 MILLIGRAM(S): at 08:33

## 2020-03-13 RX ADMIN — GABAPENTIN 300 MILLIGRAM(S): 400 CAPSULE ORAL at 17:22

## 2020-03-13 RX ADMIN — Medication 0.1 MILLIGRAM(S): at 06:13

## 2020-03-13 RX ADMIN — Medication 50 MILLIGRAM(S): at 06:13

## 2020-03-13 RX ADMIN — BUPRENORPHINE AND NALOXONE 1 TABLET(S): 2; .5 TABLET SUBLINGUAL at 06:11

## 2020-03-13 RX ADMIN — SENNA PLUS 2 TABLET(S): 8.6 TABLET ORAL at 21:21

## 2020-03-13 RX ADMIN — Medication 500 MILLIGRAM(S): at 17:22

## 2020-03-13 RX ADMIN — QUETIAPINE FUMARATE 50 MILLIGRAM(S): 200 TABLET, FILM COATED ORAL at 06:13

## 2020-03-13 RX ADMIN — NYSTATIN CREAM 1 APPLICATION(S): 100000 CREAM TOPICAL at 21:21

## 2020-03-13 RX ADMIN — ENOXAPARIN SODIUM 40 MILLIGRAM(S): 100 INJECTION SUBCUTANEOUS at 12:21

## 2020-03-13 RX ADMIN — Medication 325 MILLIGRAM(S): at 12:20

## 2020-03-13 RX ADMIN — BUPRENORPHINE AND NALOXONE 1 TABLET(S): 2; .5 TABLET SUBLINGUAL at 17:22

## 2020-03-13 RX ADMIN — Medication 1 MILLIGRAM(S): at 12:20

## 2020-03-13 RX ADMIN — Medication 0.1 MILLIGRAM(S): at 17:22

## 2020-03-13 RX ADMIN — GABAPENTIN 600 MILLIGRAM(S): 400 CAPSULE ORAL at 21:21

## 2020-03-13 RX ADMIN — Medication 500 MILLIGRAM(S): at 06:11

## 2020-03-13 RX ADMIN — LAMOTRIGINE 25 MILLIGRAM(S): 25 TABLET, ORALLY DISINTEGRATING ORAL at 06:12

## 2020-03-13 RX ADMIN — Medication 0.5 MILLIGRAM(S): at 06:18

## 2020-03-13 RX ADMIN — Medication 225 MILLIGRAM(S): at 12:20

## 2020-03-13 RX ADMIN — Medication 81 MILLIGRAM(S): at 14:08

## 2020-03-13 RX ADMIN — Medication 325 MILLIGRAM(S): at 17:22

## 2020-03-13 RX ADMIN — NYSTATIN CREAM 1 APPLICATION(S): 100000 CREAM TOPICAL at 06:14

## 2020-03-13 RX ADMIN — Medication 1 TABLET(S): at 12:20

## 2020-03-13 RX ADMIN — Medication 30 MILLIGRAM(S): at 06:13

## 2020-03-13 RX ADMIN — QUETIAPINE FUMARATE 50 MILLIGRAM(S): 200 TABLET, FILM COATED ORAL at 17:22

## 2020-03-13 RX ADMIN — GABAPENTIN 300 MILLIGRAM(S): 400 CAPSULE ORAL at 06:12

## 2020-03-13 RX ADMIN — ATORVASTATIN CALCIUM 10 MILLIGRAM(S): 80 TABLET, FILM COATED ORAL at 21:21

## 2020-03-13 RX ADMIN — BUPRENORPHINE AND NALOXONE 1 TABLET(S): 2; .5 TABLET SUBLINGUAL at 12:19

## 2020-03-13 NOTE — PROGRESS NOTE ADULT - SUBJECTIVE AND OBJECTIVE BOX
DAILY PROGRESS NOTE:  HPI: Patient is a 30 F with PMH PAF, depression/ anxiety/ PTSD, h/o heroin abuse now on suboxone, previous CVA (with residual L-sided deficits) R carotid A dissection/stenosis (on ASA and Statin),  who presented to the ED on 20 at [facility] with complaints of R leg pain and inability to ambulate after a fall.  Fall was secondary to lightheadedness/ dizziness, landing  onto her R leg. Denied presyncopal symptoms or LOC, chest pain or palpitations.  In the ED she had imaging of her R knee which demonstrated right tib/fib fracture (Comminuted intra-articular fracture of the distal tibia extending from the tibial plafond to the distal shaft; Displaced spiral fracture of the distal fibula without articular involvement).    Subsequently underwent external fixation on  without any complications.  She was seen by psychiatry who assisted with med titration as there was concern that psych meds were contributing to dizziness.  Post op hospital course was significant for episodes of SVT requiring management with adenosine.  EP was consulted to evaluate for ablation, however patient was reluctant to undergo ablation at this time. She was managed on AV marjan agents Cardizem and metoprolol at this time. Electrolytes remained in normal limits.  Recommend outpatient EP follow up.  She endorsed dysuria and was treated for UTI with 3 day course of antibiotics.  She remained hemodynamically stable and medically optimized for discharge to [facility] on 3/6/20. ARON RAMIREZ (06 Mar 2020 15:13)    Subjective: Patient seen and examined in PT gym. Pain well controlled. participating in therapy. Reports she had a BM yesterday after taking miralax. Patient also reporting feeling "hot and sweaty" and breathing feels "different". Says her breathing changes when she is anxious. Patient sitting comfortably in chair, doing leg exercises. Denies a fever, reports she coughs occasionally, non-productive. No sore throat. Per PT patient also just walked.     Psych:  No depression, + anxiety, + substance abuse history on saboxone    Physical Exam:  Vital Signs Last 24 Hrs  T(C): 36.9 (13 Mar 2020 07:33), Max: 36.9 (13 Mar 2020 07:33)  T(F): 98.4 (13 Mar 2020 07:33), Max: 98.4 (13 Mar 2020 07:33)  HR: 74 (13 Mar 2020 07:33) (74 - 79)  BP: 92/54 (13 Mar 2020 07:33) (92/54 - 112/61)  BP(mean): --  RR: 14 (13 Mar 2020 07:33) (14 - 14)  SpO2: 96% (13 Mar 2020 07:33) (96% - 99%)    PHYSICAL EXAM  Constitutional - Seeing PT, mildly distracted, requires frequent cues to attend and for safety.    Chest - good chest expansion, good respiratory effort, CTAB   Cardio - warm and well perfused, RRR, no murmur  Abdomen -  Soft, NTND  Extremities - No peripheral edema, No calf tenderness, RLE splint present  no tightness proximal and distal ends of cast  calves soft, NT bilaterally  wiggles toes, good capillary refill  no sensory deficits LT     Recent Labs/Imagin.1   8.02  )-----------( 321      ( 11 Mar 2020 05:40 )             36.1       Urinalysis Basic - ( 10 Mar 2020 16:39 )    Color: Yellow / Appearance: Clear / S.010 / pH: x  Gluc: x / Ketone: Negative  / Bili: Negative / Urobili: Negative   Blood: x / Protein: Negative / Nitrite: Negative   Leuk Esterase: Negative / RBC: x / WBC x   Sq Epi: x / Non Sq Epi: x / Bacteria: x    MEDICATIONS  (STANDING):  ascorbic acid 500 milliGRAM(s) Oral two times a day  aspirin  chewable 81 milliGRAM(s) Oral daily  atorvastatin 10 milliGRAM(s) Oral at bedtime  buprenorphine 8 mG/naloxone 2 mG SL  Tablet 1 Tablet(s) SubLingual <User Schedule>  cloNIDine 0.1 milliGRAM(s) Oral two times a day  diltiazem    Tablet 30 milliGRAM(s) Oral two times a day  enoxaparin Injectable 40 milliGRAM(s) SubCutaneous every 24 hours  ferrous    sulfate 325 milliGRAM(s) Oral three times a day with meals  folic acid 1 milliGRAM(s) Oral daily  gabapentin 300 milliGRAM(s) Oral two times a day  gabapentin 600 milliGRAM(s) Oral at bedtime  influenza   Vaccine 0.5 milliLiter(s) IntraMuscular once  lamoTRIgine 25 milliGRAM(s) Oral two times a day  metoprolol tartrate 50 milliGRAM(s) Oral two times a day  multivitamin 1 Tablet(s) Oral daily  nystatin Powder 1 Application(s) Topical two times a day  polyethylene glycol 3350 17 Gram(s) Oral daily  QUEtiapine 50 milliGRAM(s) Oral two times a day  senna 2 Tablet(s) Oral at bedtime  venlafaxine XR. 225 milliGRAM(s) Oral daily    MEDICATIONS  (PRN):  acetaminophen   Tablet .. 650 milliGRAM(s) Oral every 6 hours PRN Temp greater or equal to 38C (100.4F), Mild Pain (1 - 3)  calcium carbonate    500 mG (Tums) Chewable 3 Tablet(s) Chew every 6 hours PRN Dyspepsia  clonazePAM  Tablet 0.5 milliGRAM(s) Oral every 12 hours PRN Anxiety  melatonin 3 milliGRAM(s) Oral at bedtime PRN Sleep  ondansetron    Tablet 4 milliGRAM(s) Oral every 6 hours PRN Nausea and/or Vomiting  traZODone 50 milliGRAM(s) Oral at bedtime PRN insomnia DAILY PROGRESS NOTE:  HPI: Patient is a 30 F with PMH PAF, depression/ anxiety/ PTSD, h/o heroin abuse now on suboxone, previous CVA (with residual L-sided deficits) R carotid A dissection/stenosis (on ASA and Statin),  who presented to the ED on 20 at [facility] with complaints of R leg pain and inability to ambulate after a fall.  Fall was secondary to lightheadedness/ dizziness, landing  onto her R leg. Denied presyncopal symptoms or LOC, chest pain or palpitations.  In the ED she had imaging of her R knee which demonstrated right tib/fib fracture (Comminuted intra-articular fracture of the distal tibia extending from the tibial plafond to the distal shaft; Displaced spiral fracture of the distal fibula without articular involvement).    Subsequently underwent external fixation on  without any complications.  She was seen by psychiatry who assisted with med titration as there was concern that psych meds were contributing to dizziness.  Post op hospital course was significant for episodes of SVT requiring management with adenosine.  EP was consulted to evaluate for ablation, however patient was reluctant to undergo ablation at this time. She was managed on AV marjan agents Cardizem and metoprolol at this time. Electrolytes remained in normal limits.  Recommend outpatient EP follow up.  She endorsed dysuria and was treated for UTI with 3 day course of antibiotics.  She remained hemodynamically stable and medically optimized for discharge to [facility] on 3/6/20. ARON RAMIREZ (06 Mar 2020 15:13)    Subjective: Patient seen and examined in PT gym. Pain well controlled. participating in therapy. Reports she had a BM yesterday after taking miralax. Patient also reporting feeling "hot and sweaty" and breathing feels "different". Says her breathing changes when she is anxious. Patient sitting comfortably in chair, doing leg exercises. Denies a fever, reports she coughs occasionally, non-productive. No sore throat. Per PT patient also just walked.     Patient also reports that she only urinates once a day. At home 5-6 times a day. Reports incontinence at night but states couldn't get to toilet fast enough/did call for help. Previously, had reported urinary frequency, small amounts with dysuria but UA negative for infection.  greatly appreciated, recommendations discussed with patient and fiance, however both do NOT want new medication for urinary symptoms at this time,    Psych:  No depression, + anxiety, + substance abuse history on saboxone    Physical Exam:  Vital Signs Last 24 Hrs  T(C): 36.9 (13 Mar 2020 07:33), Max: 36.9 (13 Mar 2020 07:33)  T(F): 98.4 (13 Mar 2020 07:33), Max: 98.4 (13 Mar 2020 07:33)  HR: 74 (13 Mar 2020 07:33) (74 - 79)  BP: 92/54 (13 Mar 2020 07:33) (92/54 - 112/61)  BP(mean): --  RR: 14 (13 Mar 2020 07:33) (14 - 14)  SpO2: 96% (13 Mar 2020 07:33) (96% - 99%)    PHYSICAL EXAM  Constitutional - Seeing PT, mildly distracted, requires frequent cues to attend and for safety. Reduced insight and mental flexibilityl reduced processing speed    Chest - clear   Cardio -  RRR, no murmur  Abdomen -  Soft, NTND  Extremities - No peripheral edema, No calf tenderness, RLE splint present  no tightness proximal and distal ends of cast  wiggles toes, good capillary refill  no sensory deficits LT     Recent Labs/Imagin.1   8.02  )-----------( 321      ( 11 Mar 2020 05:40 )             36.1       Urinalysis Basic - ( 10 Mar 2020 16:39 )    Color: Yellow / Appearance: Clear / S.010 / pH: x  Gluc: x / Ketone: Negative  / Bili: Negative / Urobili: Negative   Blood: x / Protein: Negative / Nitrite: Negative   Leuk Esterase: Negative / RBC: x / WBC x   Sq Epi: x / Non Sq Epi: x / Bacteria: x    MEDICATIONS  (STANDING):  ascorbic acid 500 milliGRAM(s) Oral two times a day  aspirin  chewable 81 milliGRAM(s) Oral daily  atorvastatin 10 milliGRAM(s) Oral at bedtime  buprenorphine 8 mG/naloxone 2 mG SL  Tablet 1 Tablet(s) SubLingual <User Schedule>  cloNIDine 0.1 milliGRAM(s) Oral two times a day  diltiazem    Tablet 30 milliGRAM(s) Oral two times a day  enoxaparin Injectable 40 milliGRAM(s) SubCutaneous every 24 hours  ferrous    sulfate 325 milliGRAM(s) Oral three times a day with meals  folic acid 1 milliGRAM(s) Oral daily  gabapentin 300 milliGRAM(s) Oral two times a day  gabapentin 600 milliGRAM(s) Oral at bedtime  influenza   Vaccine 0.5 milliLiter(s) IntraMuscular once  lamoTRIgine 25 milliGRAM(s) Oral two times a day  metoprolol tartrate 50 milliGRAM(s) Oral two times a day  multivitamin 1 Tablet(s) Oral daily  nystatin Powder 1 Application(s) Topical two times a day  polyethylene glycol 3350 17 Gram(s) Oral daily  QUEtiapine 50 milliGRAM(s) Oral two times a day  senna 2 Tablet(s) Oral at bedtime  venlafaxine XR. 225 milliGRAM(s) Oral daily    MEDICATIONS  (PRN):  acetaminophen   Tablet .. 650 milliGRAM(s) Oral every 6 hours PRN Temp greater or equal to 38C (100.4F), Mild Pain (1 - 3)  calcium carbonate    500 mG (Tums) Chewable 3 Tablet(s) Chew every 6 hours PRN Dyspepsia  clonazePAM  Tablet 0.5 milliGRAM(s) Oral every 12 hours PRN Anxiety  melatonin 3 milliGRAM(s) Oral at bedtime PRN Sleep  ondansetron    Tablet 4 milliGRAM(s) Oral every 6 hours PRN Nausea and/or Vomiting  traZODone 50 milliGRAM(s) Oral at bedtime PRN insomnia

## 2020-03-13 NOTE — PROGRESS NOTE ADULT - SUBJECTIVE AND OBJECTIVE BOX
Patient is a 29y old  Female who presents with a chief complaint of s/p fall and right tib/fib fx, s/p fixation NWB RLE (12 Mar 2020 15:27)    HPI:  Patient is a 30 F with PMH PAF, depression/ anxiety/ PTSD, h/o heroin abuse now on suboxone, previous CVA (with residual L-sided deficits) R carotid A dissection/stenosis (on ASA and Statin),  who presented to the ED on 2/21/20 at [facility] with complaints of R leg pain and inability to ambulate after a fall.  Fall was secondary to lightheadedness/ dizziness, landing  onto her R leg. Denied presyncopal symptoms or LOC, chest pain or palpitations.  In the ED she had imaging of her R knee which demonstrated right tib/fib fracture (Comminuted intra-articular fracture of the distal tibia extending from the tibial plafond to the distal shaft; Displaced spiral fracture of the distal fibula without articular involvement).   Subsequently underwent external fixation on 2/21 without any complications.  She was seen by psychiatry who assisted with med titration as there was concern that psych meds were contributing to dizziness.  Post op hospital course was significant for episodes of SVT requiring management with adenosine.  EP was consulted to evaluate for ablation, however patient was reluctant to undergo ablation at this time. She was managed on AV marjan agents Cardizem and metoprolol at this time. Electrolytes remained in normal limits.  Recommend outpatient EP follow up.  She endorsed dysuria and was treated for UTI with 3 day course of antibiotics.  She remained hemodynamically stable and medically optimized for discharge to [facility] on 3/6/20. NWB RLE (06 Mar 2020 15:13)    some urgency, no dysuria    Interval Events:  Patient seen and examined at bedside.    MEDICATIONS:  MEDICATIONS  (STANDING):  ascorbic acid 500 milliGRAM(s) Oral two times a day  aspirin  chewable 81 milliGRAM(s) Oral daily  atorvastatin 10 milliGRAM(s) Oral at bedtime  buprenorphine 8 mG/naloxone 2 mG SL  Tablet 1 Tablet(s) SubLingual <User Schedule>  cloNIDine 0.1 milliGRAM(s) Oral two times a day  diltiazem    Tablet 30 milliGRAM(s) Oral two times a day  enoxaparin Injectable 40 milliGRAM(s) SubCutaneous every 24 hours  ferrous    sulfate 325 milliGRAM(s) Oral three times a day with meals  folic acid 1 milliGRAM(s) Oral daily  gabapentin 300 milliGRAM(s) Oral two times a day  gabapentin 600 milliGRAM(s) Oral at bedtime  influenza   Vaccine 0.5 milliLiter(s) IntraMuscular once  lamoTRIgine 25 milliGRAM(s) Oral two times a day  metoprolol tartrate 50 milliGRAM(s) Oral two times a day  multivitamin 1 Tablet(s) Oral daily  nystatin Powder 1 Application(s) Topical two times a day  polyethylene glycol 3350 17 Gram(s) Oral daily  QUEtiapine 50 milliGRAM(s) Oral two times a day  senna 2 Tablet(s) Oral at bedtime  venlafaxine XR. 225 milliGRAM(s) Oral daily    MEDICATIONS  (PRN):  acetaminophen   Tablet .. 650 milliGRAM(s) Oral every 6 hours PRN Temp greater or equal to 38C (100.4F), Mild Pain (1 - 3)  calcium carbonate    500 mG (Tums) Chewable 3 Tablet(s) Chew every 6 hours PRN Dyspepsia  clonazePAM  Tablet 0.5 milliGRAM(s) Oral every 12 hours PRN Anxiety  melatonin 3 milliGRAM(s) Oral at bedtime PRN Sleep  ondansetron    Tablet 4 milliGRAM(s) Oral every 6 hours PRN Nausea and/or Vomiting  traZODone 50 milliGRAM(s) Oral at bedtime PRN insomnia      Allergies    Geodon (Other)  Zyprexa (Other)    Intolerances        T(C): 36.9 (03-13-20 @ 07:33), Max: 36.9 (03-13-20 @ 07:33)  T(F): 98.4 (03-13-20 @ 07:33), Max: 98.4 (03-13-20 @ 07:33)  HR: 74 (03-13-20 @ 07:33) (74 - 100)  BP: 92/54 (03-13-20 @ 07:33) (91/53 - 126/78)  RR: 14 (03-13-20 @ 07:33) (14 - 15)  SpO2: 96% (03-13-20 @ 07:33) (96% - 99%)              LABS:                            Physical Exam    Constitutional: alert, no acute distress    Abdomen: soft, nontender, nondistended, no HSM    Genitourinary: no bladder distention

## 2020-03-13 NOTE — PROGRESS NOTE ADULT - ASSESSMENT
ASSESSMENT/ PLAN:  30 F with significant psychiatric history and heroin abuse (on Suboxone), previous CVA with CA dissection/stenosis with residual deficits presented to [facility] on 2/21 after a fall, found to have R distal Tib/fib fracture, underwent ORIF on 2/21. Hospital course complicated by SVT controlled on AV marjan agents.     *s/p ORIF R distal tib/fib comminuted bimalleolar fxs & removal of external fixator NWB to RLE  - continue  splint R ankle/leg  - continue Comprehensive Rehab Program of PT/OT  - barriers for dc, need to performs stairs to access apartment, reviewed with patient. Patient's mother lives in ranch-style home, but when asked, patient adamantly states that living with her mother is not an option. Will need caregiver training with fiance as patient is not able to do stairs independently due to cognitive and behavior issues  - orthopedic follow up on discharge    *Dizziness/ lightheadedness   - likely related to multiple sedative meds/high clonidine dosing +/-SVT, anemia. Improved  - HR, BP controlled  - Hb 11.4 3/9--> 11.1 3/11    *SVT  - s/p adenosine. seen by cardiology. Patient reluctant to undergo ablation  - continue AV marjan agents: cardizem and Lopressor  - HR controlled    *Psych: PTSD/ Generalized Anxiety/ Substance abuse (heroin)  - seen by psych inpatient; psych meds adjusted thought to be contributing to dizziness  - prn klonopin  0.5mg.  - clonidine  0.1 mg bid.  - effexor decreased from 375 to 225mg   - seroquel increased from 25 bid to 50 bid for anxiety.   - continue trazodone, gabapentin at current doses  - started lamictal 25mg bid 3/2/20, titrate up in 2 weeks. (3/16/20)  - psychology supportive counseling and cognitive evaluation  - psychiatry consult, patient reporting anxiety today and requesting f/u, medication doses were adjusted at previous hospital, reached out to Dr. Helms for f/u 3/12    *Dysuria  - s/p 3 day course cefuroxime (3/5-3/7)  - WBC normalized 8.12 3/9-->8.06 3/11  - patient with persistent dysuria but resolved leukocytosis and afebrile  - UA 3/11 NEGATIVE  - Per urology, continue to monitor, can consider myrbetriq or detrol    *Vaginal pruritis:  - Nystatin powder    *h/o CVA with mild LUE weakness  - secondary to R carotid artery dissection/stenosis. Evaluated by Vascular, repeat CTA with healed dissection  - continue ASA and Statin    *HTN:  - now on bb and ccb  - controlled    *Pain Mgmt   - Tylenol PRN  - Gabapentin    GI/Bowel Mgmt  - Senna and miralax added standing for constipation 3/12    FEN   - Diet - Regular    DVT PPX:   -Continue Lovenox 40 mg SQ daily while in the hospital/Rehab then switch to  mg PO BID at time of discharge    #Case discussedv in IDT rounds 3/11:  Patient requires supervision bathing and UE dressing, min assist LE and tub transfers, min assist transfers. Reduced memory, requires VC for walker manipulation, ambulates 15 feet RW and min assist. Impulsive and cognitive deficits with reduced carryover  - goals: supervision shower, mod independent wheelchair propulsion long distances, mod independent bADLs on dc  - will need caregiver training for stairs. Target dc home 3/20 with home Pt and OT and caregiver support    LABS:  CBc BMp 3/16 ASSESSMENT/ PLAN:  30 F with significant psychiatric history and heroin abuse (on Suboxone), previous CVA with CA dissection/stenosis with residual deficits presented to [facility] on 2/21 after a fall, found to have R distal Tib/fib fracture, underwent ORIF on 2/21. Hospital course complicated by SVT controlled on AV marjan agents.     *s/p ORIF R distal tib/fib comminuted bimalleolar fxs & removal of external fixator NWB to RLE  - continue  splint R ankle/leg  - continue Comprehensive Rehab Program of PT/OT  - barriers for dc, need to performs stairs to access apartment, reviewed with patient. Patient's mother lives in ranch-style home, but when asked, patient adamantly states that living with her mother is not an option. Will need caregiver training with fiance as patient is not able to do stairs independently due to cognitive and behavior issues  - orthopedic follow up on discharge    *Dizziness/ lightheadedness   - likely related to multiple sedative meds/high clonidine dosing +/-SVT, anemia. Improved  - HR, BP controlled  - Hb 11.4 3/9--> 11.1 3/11    *SVT  - s/p adenosine. seen by cardiology. Patient reluctant to undergo ablation  - continue AV marjan agents: cardizem and Lopressor  - HR controlled    *Psych: PTSD/ Generalized Anxiety/ Substance abuse (heroin)  - clonidine  0.1 mg bid.  - effexor decreased from 375 to 225mg   - seroquel increased from 25 bid to 50 bid for anxiety.   - continue trazodone, gabapentin at current doses  - started lamictal 25mg bid 3/2/20, titrate up in 2 weeks. (Monday 3/16/20)  - psychology supportive counseling and cognitive evaluation  - psychiatry consult appreciated. klonopin increased from 0.25 to 0.5 PRN 3/12    *Dysuria  - s/p 3 day course cefuroxime (3/5-3/7)  - WBC normalized 8.12 3/9-->8.06 3/11  - patient with persistent dysuria but resolved leukocytosis and afebrile  - UA 3/11 NEGATIVE  - Per urology, continue to monitor, can consider myrbetriq or detrol. Patient and fiance refusing  -continue hygiene, toileting schedule. Bladder scan as patient now states there is no frequency or dysuria but only urinating once a day    *Vaginal pruritis:  - Nystatin powder    *h/o CVA with mild LUE weakness  - secondary to R carotid artery dissection/stenosis. Evaluated by Vascular, repeat CTA with healed dissection  - continue ASA and Statin    *HTN:  - now on bb and ccb  - controlled    *Pain Mgmt   - Tylenol PRN  - Gabapentin    GI/Bowel Mgmt  - Senna and miralax added standing for constipation 3/12    FEN   - Diet - Regular    DVT PPX:   -Continue Lovenox 40 mg SQ daily while in the hospital/Rehab then switch to  mg PO BID at time of discharge    #Case discussedv in IDT rounds 3/11:  Patient requires supervision bathing and UE dressing, min assist LE and tub transfers, min assist transfers. Reduced memory, requires VC for walker manipulation, ambulates 15 feet RW and min assist. Impulsive and cognitive deficits with reduced carryover  - goals: supervision shower, mod independent wheelchair propulsion long distances, mod independent bADLs on dc  - will need caregiver training for stairs. Target dc home 3/20 with home Pt and OT and caregiver support    LABS:  CBc BMp 3/16  bladder scan

## 2020-03-13 NOTE — PROGRESS NOTE ADULT - NSHPATTENDINGPLANDISCUSS_GEN_ALL_CORE
PATIENT
RESIDENT
patient, RN, resident
Olga Fisher, DO; patient; OT
Olga Fisher, DO; patient; PT
Olga Fisher, DO; patient; fifabien; RN

## 2020-03-14 PROCEDURE — 93010 ELECTROCARDIOGRAM REPORT: CPT

## 2020-03-14 PROCEDURE — 99232 SBSQ HOSP IP/OBS MODERATE 35: CPT

## 2020-03-14 PROCEDURE — 99232 SBSQ HOSP IP/OBS MODERATE 35: CPT | Mod: GC

## 2020-03-14 RX ADMIN — Medication 30 MILLIGRAM(S): at 17:44

## 2020-03-14 RX ADMIN — Medication 1 MILLIGRAM(S): at 12:38

## 2020-03-14 RX ADMIN — NYSTATIN CREAM 1 APPLICATION(S): 100000 CREAM TOPICAL at 06:25

## 2020-03-14 RX ADMIN — GABAPENTIN 300 MILLIGRAM(S): 400 CAPSULE ORAL at 17:44

## 2020-03-14 RX ADMIN — Medication 0.1 MILLIGRAM(S): at 17:44

## 2020-03-14 RX ADMIN — Medication 1 TABLET(S): at 12:38

## 2020-03-14 RX ADMIN — GABAPENTIN 300 MILLIGRAM(S): 400 CAPSULE ORAL at 06:24

## 2020-03-14 RX ADMIN — Medication 500 MILLIGRAM(S): at 06:24

## 2020-03-14 RX ADMIN — Medication 81 MILLIGRAM(S): at 12:38

## 2020-03-14 RX ADMIN — NYSTATIN CREAM 1 APPLICATION(S): 100000 CREAM TOPICAL at 21:28

## 2020-03-14 RX ADMIN — POLYETHYLENE GLYCOL 3350 17 GRAM(S): 17 POWDER, FOR SOLUTION ORAL at 12:38

## 2020-03-14 RX ADMIN — Medication 30 MILLIGRAM(S): at 06:24

## 2020-03-14 RX ADMIN — LAMOTRIGINE 25 MILLIGRAM(S): 25 TABLET, ORALLY DISINTEGRATING ORAL at 17:44

## 2020-03-14 RX ADMIN — QUETIAPINE FUMARATE 50 MILLIGRAM(S): 200 TABLET, FILM COATED ORAL at 17:44

## 2020-03-14 RX ADMIN — BUPRENORPHINE AND NALOXONE 1 TABLET(S): 2; .5 TABLET SUBLINGUAL at 06:23

## 2020-03-14 RX ADMIN — QUETIAPINE FUMARATE 50 MILLIGRAM(S): 200 TABLET, FILM COATED ORAL at 06:24

## 2020-03-14 RX ADMIN — Medication 500 MILLIGRAM(S): at 17:44

## 2020-03-14 RX ADMIN — Medication 325 MILLIGRAM(S): at 17:44

## 2020-03-14 RX ADMIN — GABAPENTIN 600 MILLIGRAM(S): 400 CAPSULE ORAL at 21:28

## 2020-03-14 RX ADMIN — LAMOTRIGINE 25 MILLIGRAM(S): 25 TABLET, ORALLY DISINTEGRATING ORAL at 06:24

## 2020-03-14 RX ADMIN — BUPRENORPHINE AND NALOXONE 1 TABLET(S): 2; .5 TABLET SUBLINGUAL at 17:44

## 2020-03-14 RX ADMIN — Medication 325 MILLIGRAM(S): at 08:38

## 2020-03-14 RX ADMIN — SENNA PLUS 2 TABLET(S): 8.6 TABLET ORAL at 21:28

## 2020-03-14 RX ADMIN — Medication 50 MILLIGRAM(S): at 17:44

## 2020-03-14 RX ADMIN — Medication 325 MILLIGRAM(S): at 12:38

## 2020-03-14 RX ADMIN — Medication 50 MILLIGRAM(S): at 06:24

## 2020-03-14 RX ADMIN — ENOXAPARIN SODIUM 40 MILLIGRAM(S): 100 INJECTION SUBCUTANEOUS at 12:38

## 2020-03-14 RX ADMIN — Medication 0.1 MILLIGRAM(S): at 06:24

## 2020-03-14 RX ADMIN — Medication 225 MILLIGRAM(S): at 12:38

## 2020-03-14 RX ADMIN — BUPRENORPHINE AND NALOXONE 1 TABLET(S): 2; .5 TABLET SUBLINGUAL at 12:38

## 2020-03-14 RX ADMIN — ATORVASTATIN CALCIUM 10 MILLIGRAM(S): 80 TABLET, FILM COATED ORAL at 21:28

## 2020-03-14 NOTE — PROGRESS NOTE ADULT - ASSESSMENT
Ms Freeman is a 30 year-old female with significant psychiatric history and heroin abuse (on Suboxone), previous CVA with CA dissection/stenosis with residual deficits presented to [facility] on 2/21 after a fall, found to have R distal Tib/fib fracture, underwent ORIF on 2/21. Hospital course complicated by SVT controlled on AV marjan agents.   [facility]/Ortho  R Distal tibial/fibular fracture  - s/p ORIF   - NWB to RLE  - continue trilam splint R ankle/leg  - pain management per primary team  - Comprehensive Rehab Program of PT/OT per primary team  - orthostatic/ dizziness precautions  - orthopedic follow up on discharge. Will need to assess whether home is wheelchair accessible    Neurology  Dizziness/ lightheadedness   - likely related to multiple sedative meds/high clonidine dosing +/-SVT  - monitor vitals  - check orthostatics daily  CVA with mild LUE weakness  - secondary to R carotid artery dissection/stenosis. Evaluated by Vascular, repeat CTA with healed dissection  - continue ASA and Statin    Psych:   PTSD/ Generalized Anxiety/ Substance abuse (heroin)  - no SI/HI  - seen by psych inpatient; psych meds adjusted thought to be contributing to dizziness  - will defer to psychology for medication adjustments  - psychology supportive counseling    Pulmonary  Pneumonia (resolved)  - obtain CXR in 4-6 weeks for resolution    Cardiovascular  Supraventricular Tachycardia  - s/p adenosine. seen by cardiology. Patient reluctant to undergo ablation  - continue AV marjan agents: cardizem 30mg bid with hold parameters and Lopressor 50mg bid with hold parameters  - please obtain ECG  Hypertension  - as above  - bp borderline, please obtain orthostatics  - would be cautious in lowering regimen above as it can unmask SVT    Genitourinary  Urinary Tract infection (resolved)    Diet: per primary team  Pain management: per primary team  DVT ppx: lovenox  Case discussed with PMR resident  If a clinical question should arise regarding this patient, please do not hesitate to contact me at [phone] until 7pm on 3/14/2020

## 2020-03-14 NOTE — PROGRESS NOTE ADULT - SUBJECTIVE AND OBJECTIVE BOX
Patient seen and examined at bedside. No overnight events per nursing or chart review. Patient notes constipation with small BM today. 10 point ROS is otherwise negative    ALLERGIES:  Geodon (Other)  Zyprexa (Other)    MEDICATIONS  (STANDING):  ascorbic acid 500 milliGRAM(s) Oral two times a day  aspirin  chewable 81 milliGRAM(s) Oral daily  atorvastatin 10 milliGRAM(s) Oral at bedtime  buprenorphine 8 mG/naloxone 2 mG SL  Tablet 1 Tablet(s) SubLingual <User Schedule>  cloNIDine 0.1 milliGRAM(s) Oral two times a day  diltiazem    Tablet 30 milliGRAM(s) Oral two times a day  enoxaparin Injectable 40 milliGRAM(s) SubCutaneous every 24 hours  ferrous    sulfate 325 milliGRAM(s) Oral three times a day with meals  folic acid 1 milliGRAM(s) Oral daily  gabapentin 300 milliGRAM(s) Oral two times a day  gabapentin 600 milliGRAM(s) Oral at bedtime  influenza   Vaccine 0.5 milliLiter(s) IntraMuscular once  lamoTRIgine 25 milliGRAM(s) Oral two times a day  metoprolol tartrate 50 milliGRAM(s) Oral two times a day  multivitamin 1 Tablet(s) Oral daily  nystatin Powder 1 Application(s) Topical two times a day  polyethylene glycol 3350 17 Gram(s) Oral daily  QUEtiapine 50 milliGRAM(s) Oral two times a day  senna 2 Tablet(s) Oral at bedtime  venlafaxine XR. 225 milliGRAM(s) Oral daily    MEDICATIONS  (PRN):  acetaminophen   Tablet .. 650 milliGRAM(s) Oral every 6 hours PRN Temp greater or equal to 38C (100.4F), Mild Pain (1 - 3)  calcium carbonate    500 mG (Tums) Chewable 3 Tablet(s) Chew every 6 hours PRN Dyspepsia  clonazePAM  Tablet 0.5 milliGRAM(s) Oral every 12 hours PRN Anxiety  melatonin 3 milliGRAM(s) Oral at bedtime PRN Sleep  ondansetron    Tablet 4 milliGRAM(s) Oral every 6 hours PRN Nausea and/or Vomiting  traZODone 50 milliGRAM(s) Oral at bedtime PRN insomnia    Vital Signs Last 24 Hrs  T(F): 97.8 (14 Mar 2020 07:37), Max: 98.5 (13 Mar 2020 20:29)  HR: 82 (14 Mar 2020 07:37) (78 - 90)  BP: 94/57 (14 Mar 2020 07:37) (94/57 - 110/68)  RR: 14 (14 Mar 2020 07:37) (14 - 15)  SpO2: 98% (14 Mar 2020 07:37) (94% - 98%)  I&O's Summary      PHYSICAL EXAM:  Gen: nad, resting in bed  Neuro: aaox3, no focal deficits  Heent: eomi b/l, no jvd, no oral exudates  Pulm: cta b/l, no w/r/r  CV: +s1s2, no m/r/g  Ab: obese, soft, nt/nd, normoactive bs x 4  Extrem: no edema, pulses intact and equal

## 2020-03-14 NOTE — PROGRESS NOTE ADULT - SUBJECTIVE AND OBJECTIVE BOX
Cc: Gait dysfunction    HPI: Patient with no new medical issues today.  Pain controlled, no chest pain, no N/V, no Fevers/Chills. No other new ROS  Has been tolerating rehabilitation program.    acetaminophen   Tablet .. 650 milliGRAM(s) Oral every 6 hours PRN  ascorbic acid 500 milliGRAM(s) Oral two times a day  aspirin  chewable 81 milliGRAM(s) Oral daily  atorvastatin 10 milliGRAM(s) Oral at bedtime  buprenorphine 8 mG/naloxone 2 mG SL  Tablet 1 Tablet(s) SubLingual <User Schedule>  calcium carbonate    500 mG (Tums) Chewable 3 Tablet(s) Chew every 6 hours PRN  clonazePAM  Tablet 0.5 milliGRAM(s) Oral every 12 hours PRN  cloNIDine 0.1 milliGRAM(s) Oral two times a day  diltiazem    Tablet 30 milliGRAM(s) Oral two times a day  enoxaparin Injectable 40 milliGRAM(s) SubCutaneous every 24 hours  ferrous    sulfate 325 milliGRAM(s) Oral three times a day with meals  folic acid 1 milliGRAM(s) Oral daily  gabapentin 300 milliGRAM(s) Oral two times a day  gabapentin 600 milliGRAM(s) Oral at bedtime  influenza   Vaccine 0.5 milliLiter(s) IntraMuscular once  lamoTRIgine 25 milliGRAM(s) Oral two times a day  melatonin 3 milliGRAM(s) Oral at bedtime PRN  metoprolol tartrate 50 milliGRAM(s) Oral two times a day  multivitamin 1 Tablet(s) Oral daily  nystatin Powder 1 Application(s) Topical two times a day  ondansetron    Tablet 4 milliGRAM(s) Oral every 6 hours PRN  polyethylene glycol 3350 17 Gram(s) Oral daily  QUEtiapine 50 milliGRAM(s) Oral two times a day  senna 2 Tablet(s) Oral at bedtime  traZODone 50 milliGRAM(s) Oral at bedtime PRN  venlafaxine XR. 225 milliGRAM(s) Oral daily      T(C): 36.6 (03-14-20 @ 07:37), Max: 36.9 (03-13-20 @ 20:29)  HR: 82 (03-14-20 @ 07:37) (78 - 90)  BP: 94/57 (03-14-20 @ 07:37) (94/57 - 110/68)  RR: 14 (03-14-20 @ 07:37) (14 - 15)  SpO2: 98% (03-14-20 @ 07:37) (94% - 98%)    In NAD  HEENT- EOMI  Heart- RRR, S1S2  Lungs- CTA bl.  Abd- + BS, NT  Ext- No calf pain  Neuro- Exam unchanged    Imp: Patient with diagnosis of tib-fib fx admitted for comprehensive acute rehabilitation.    Plan:  - Continue therapies  - DVT prophylaxis- Lovenox  - Skin- Turn q2h, check skin daily  - Continue current medications; patient medically stable.   - Patient is stable to continue current rehabilitation program.

## 2020-03-15 PROCEDURE — 99232 SBSQ HOSP IP/OBS MODERATE 35: CPT

## 2020-03-15 RX ADMIN — Medication 325 MILLIGRAM(S): at 17:03

## 2020-03-15 RX ADMIN — BUPRENORPHINE AND NALOXONE 1 TABLET(S): 2; .5 TABLET SUBLINGUAL at 17:04

## 2020-03-15 RX ADMIN — ATORVASTATIN CALCIUM 10 MILLIGRAM(S): 80 TABLET, FILM COATED ORAL at 21:31

## 2020-03-15 RX ADMIN — QUETIAPINE FUMARATE 50 MILLIGRAM(S): 200 TABLET, FILM COATED ORAL at 17:05

## 2020-03-15 RX ADMIN — Medication 30 MILLIGRAM(S): at 17:05

## 2020-03-15 RX ADMIN — NYSTATIN CREAM 1 APPLICATION(S): 100000 CREAM TOPICAL at 05:46

## 2020-03-15 RX ADMIN — Medication 0.5 MILLIGRAM(S): at 05:44

## 2020-03-15 RX ADMIN — Medication 1 TABLET(S): at 12:33

## 2020-03-15 RX ADMIN — SENNA PLUS 2 TABLET(S): 8.6 TABLET ORAL at 21:31

## 2020-03-15 RX ADMIN — Medication 0.1 MILLIGRAM(S): at 05:45

## 2020-03-15 RX ADMIN — GABAPENTIN 600 MILLIGRAM(S): 400 CAPSULE ORAL at 21:31

## 2020-03-15 RX ADMIN — LAMOTRIGINE 25 MILLIGRAM(S): 25 TABLET, ORALLY DISINTEGRATING ORAL at 05:44

## 2020-03-15 RX ADMIN — Medication 50 MILLIGRAM(S): at 17:05

## 2020-03-15 RX ADMIN — Medication 325 MILLIGRAM(S): at 08:29

## 2020-03-15 RX ADMIN — Medication 30 MILLIGRAM(S): at 05:45

## 2020-03-15 RX ADMIN — Medication 225 MILLIGRAM(S): at 12:33

## 2020-03-15 RX ADMIN — Medication 500 MILLIGRAM(S): at 05:44

## 2020-03-15 RX ADMIN — ENOXAPARIN SODIUM 40 MILLIGRAM(S): 100 INJECTION SUBCUTANEOUS at 12:35

## 2020-03-15 RX ADMIN — LAMOTRIGINE 25 MILLIGRAM(S): 25 TABLET, ORALLY DISINTEGRATING ORAL at 17:05

## 2020-03-15 RX ADMIN — BUPRENORPHINE AND NALOXONE 1 TABLET(S): 2; .5 TABLET SUBLINGUAL at 05:44

## 2020-03-15 RX ADMIN — NYSTATIN CREAM 1 APPLICATION(S): 100000 CREAM TOPICAL at 19:00

## 2020-03-15 RX ADMIN — Medication 50 MILLIGRAM(S): at 05:45

## 2020-03-15 RX ADMIN — GABAPENTIN 300 MILLIGRAM(S): 400 CAPSULE ORAL at 05:44

## 2020-03-15 RX ADMIN — Medication 0.1 MILLIGRAM(S): at 17:04

## 2020-03-15 RX ADMIN — QUETIAPINE FUMARATE 50 MILLIGRAM(S): 200 TABLET, FILM COATED ORAL at 05:45

## 2020-03-15 RX ADMIN — Medication 325 MILLIGRAM(S): at 12:33

## 2020-03-15 RX ADMIN — Medication 81 MILLIGRAM(S): at 12:33

## 2020-03-15 RX ADMIN — GABAPENTIN 300 MILLIGRAM(S): 400 CAPSULE ORAL at 17:05

## 2020-03-15 RX ADMIN — Medication 500 MILLIGRAM(S): at 17:04

## 2020-03-15 RX ADMIN — Medication 0.5 MILLIGRAM(S): at 17:06

## 2020-03-15 RX ADMIN — Medication 1 MILLIGRAM(S): at 12:33

## 2020-03-15 RX ADMIN — BUPRENORPHINE AND NALOXONE 1 TABLET(S): 2; .5 TABLET SUBLINGUAL at 12:35

## 2020-03-15 NOTE — PROGRESS NOTE ADULT - SUBJECTIVE AND OBJECTIVE BOX
Cc: Gait dysfunction    HPI: Patient with no new medical issues today.  Anxiety at baseline.  Appreciate Hospitalist follow up.   Pain controlled, no chest pain, no N/V, no Fevers/Chills. No other new ROS  Has been tolerating rehabilitation program.    MEDICATIONS  (STANDING):  ascorbic acid 500 milliGRAM(s) Oral two times a day  aspirin  chewable 81 milliGRAM(s) Oral daily  atorvastatin 10 milliGRAM(s) Oral at bedtime  buprenorphine 8 mG/naloxone 2 mG SL  Tablet 1 Tablet(s) SubLingual <User Schedule>  cloNIDine 0.1 milliGRAM(s) Oral two times a day  diltiazem    Tablet 30 milliGRAM(s) Oral two times a day  enoxaparin Injectable 40 milliGRAM(s) SubCutaneous every 24 hours  ferrous    sulfate 325 milliGRAM(s) Oral three times a day with meals  folic acid 1 milliGRAM(s) Oral daily  gabapentin 300 milliGRAM(s) Oral two times a day  gabapentin 600 milliGRAM(s) Oral at bedtime  influenza   Vaccine 0.5 milliLiter(s) IntraMuscular once  lamoTRIgine 25 milliGRAM(s) Oral two times a day  metoprolol tartrate 50 milliGRAM(s) Oral two times a day  multivitamin 1 Tablet(s) Oral daily  nystatin Powder 1 Application(s) Topical two times a day  polyethylene glycol 3350 17 Gram(s) Oral daily  QUEtiapine 50 milliGRAM(s) Oral two times a day  senna 2 Tablet(s) Oral at bedtime  venlafaxine XR. 225 milliGRAM(s) Oral daily    MEDICATIONS  (PRN):  acetaminophen   Tablet .. 650 milliGRAM(s) Oral every 6 hours PRN Temp greater or equal to 38C (100.4F), Mild Pain (1 - 3)  calcium carbonate    500 mG (Tums) Chewable 3 Tablet(s) Chew every 6 hours PRN Dyspepsia  clonazePAM  Tablet 0.5 milliGRAM(s) Oral every 12 hours PRN Anxiety  melatonin 3 milliGRAM(s) Oral at bedtime PRN Sleep  ondansetron    Tablet 4 milliGRAM(s) Oral every 6 hours PRN Nausea and/or Vomiting  traZODone 50 milliGRAM(s) Oral at bedtime PRN insomnia    Vital Signs Last 24 Hrs  T(C): 37 (15 Mar 2020 08:39), Max: 37.1 (14 Mar 2020 20:28)  T(F): 98.6 (15 Mar 2020 08:39), Max: 98.7 (14 Mar 2020 20:28)  HR: 77 (15 Mar 2020 08:39) (72 - 88)  BP: 94/58 (15 Mar 2020 08:39) (94/58 - 124/76)  BP(mean): --  RR: 15 (15 Mar 2020 08:39) (15 - 15)  SpO2: 97% (15 Mar 2020 08:39) (94% - 97%)    In NAD  HEENT- EOMI  Heart- RRR, S1S2  Lungs- CTA bl.  Abd- + BS, NT  Ext- No calf pain  Neuro- Exam unchanged    Imp: Patient with diagnosis of tib-fib fx admitted for comprehensive acute rehabilitation.    Plan:  - Continue therapies  - DVT prophylaxis- Lovenox  - Skin- Turn q2h, check skin daily  - Continue current medications; patient medically stable.   - Patient is stable to continue current rehabilitation program.

## 2020-03-16 ENCOUNTER — TRANSCRIPTION ENCOUNTER (OUTPATIENT)
Age: 30
End: 2020-03-16

## 2020-03-16 VITALS
RESPIRATION RATE: 15 BRPM | HEART RATE: 73 BPM | SYSTOLIC BLOOD PRESSURE: 99 MMHG | TEMPERATURE: 98 F | DIASTOLIC BLOOD PRESSURE: 64 MMHG | OXYGEN SATURATION: 98 %

## 2020-03-16 LAB
ANION GAP SERPL CALC-SCNC: 7 MMOL/L — SIGNIFICANT CHANGE UP (ref 5–17)
BASOPHILS # BLD AUTO: 0.03 K/UL — SIGNIFICANT CHANGE UP (ref 0–0.2)
BASOPHILS NFR BLD AUTO: 0.4 % — SIGNIFICANT CHANGE UP (ref 0–2)
BUN SERPL-MCNC: 6 MG/DL — LOW (ref 7–23)
CALCIUM SERPL-MCNC: 8.9 MG/DL — SIGNIFICANT CHANGE UP (ref 8.4–10.5)
CHLORIDE SERPL-SCNC: 104 MMOL/L — SIGNIFICANT CHANGE UP (ref 96–108)
CO2 SERPL-SCNC: 31 MMOL/L — SIGNIFICANT CHANGE UP (ref 22–31)
CREAT SERPL-MCNC: 0.84 MG/DL — SIGNIFICANT CHANGE UP (ref 0.5–1.3)
EOSINOPHIL # BLD AUTO: 0.23 K/UL — SIGNIFICANT CHANGE UP (ref 0–0.5)
EOSINOPHIL NFR BLD AUTO: 3.2 % — SIGNIFICANT CHANGE UP (ref 0–6)
GLUCOSE SERPL-MCNC: 97 MG/DL — SIGNIFICANT CHANGE UP (ref 70–99)
HCT VFR BLD CALC: 37.2 % — SIGNIFICANT CHANGE UP (ref 34.5–45)
HGB BLD-MCNC: 11.5 G/DL — SIGNIFICANT CHANGE UP (ref 11.5–15.5)
IMM GRANULOCYTES NFR BLD AUTO: 0.4 % — SIGNIFICANT CHANGE UP (ref 0–1.5)
LYMPHOCYTES # BLD AUTO: 2.05 K/UL — SIGNIFICANT CHANGE UP (ref 1–3.3)
LYMPHOCYTES # BLD AUTO: 28.7 % — SIGNIFICANT CHANGE UP (ref 13–44)
MCHC RBC-ENTMCNC: 27.6 PG — SIGNIFICANT CHANGE UP (ref 27–34)
MCHC RBC-ENTMCNC: 30.9 GM/DL — LOW (ref 32–36)
MCV RBC AUTO: 89.4 FL — SIGNIFICANT CHANGE UP (ref 80–100)
MONOCYTES # BLD AUTO: 0.61 K/UL — SIGNIFICANT CHANGE UP (ref 0–0.9)
MONOCYTES NFR BLD AUTO: 8.5 % — SIGNIFICANT CHANGE UP (ref 2–14)
NEUTROPHILS # BLD AUTO: 4.19 K/UL — SIGNIFICANT CHANGE UP (ref 1.8–7.4)
NEUTROPHILS NFR BLD AUTO: 58.8 % — SIGNIFICANT CHANGE UP (ref 43–77)
NRBC # BLD: 0 /100 WBCS — SIGNIFICANT CHANGE UP (ref 0–0)
PLATELET # BLD AUTO: 325 K/UL — SIGNIFICANT CHANGE UP (ref 150–400)
POTASSIUM SERPL-MCNC: 4.1 MMOL/L — SIGNIFICANT CHANGE UP (ref 3.5–5.3)
POTASSIUM SERPL-SCNC: 4.1 MMOL/L — SIGNIFICANT CHANGE UP (ref 3.5–5.3)
RBC # BLD: 4.16 M/UL — SIGNIFICANT CHANGE UP (ref 3.8–5.2)
RBC # FLD: 15.1 % — HIGH (ref 10.3–14.5)
SODIUM SERPL-SCNC: 142 MMOL/L — SIGNIFICANT CHANGE UP (ref 135–145)
WBC # BLD: 7.14 K/UL — SIGNIFICANT CHANGE UP (ref 3.8–10.5)
WBC # FLD AUTO: 7.14 K/UL — SIGNIFICANT CHANGE UP (ref 3.8–10.5)

## 2020-03-16 PROCEDURE — 99232 SBSQ HOSP IP/OBS MODERATE 35: CPT | Mod: GC

## 2020-03-16 PROCEDURE — 99231 SBSQ HOSP IP/OBS SF/LOW 25: CPT

## 2020-03-16 RX ORDER — FOLIC ACID 0.8 MG
1 TABLET ORAL
Qty: 0 | Refills: 0 | DISCHARGE
Start: 2020-03-16

## 2020-03-16 RX ORDER — ATORVASTATIN CALCIUM 80 MG/1
1 TABLET, FILM COATED ORAL
Qty: 0 | Refills: 0 | DISCHARGE

## 2020-03-16 RX ORDER — GABAPENTIN 400 MG/1
1 CAPSULE ORAL
Qty: 60 | Refills: 0
Start: 2020-03-16 | End: 2020-04-14

## 2020-03-16 RX ORDER — ASPIRIN/CALCIUM CARB/MAGNESIUM 324 MG
1 TABLET ORAL
Qty: 0 | Refills: 0 | DISCHARGE

## 2020-03-16 RX ORDER — VENLAFAXINE HCL 75 MG
3 CAPSULE, EXT RELEASE 24 HR ORAL
Qty: 90 | Refills: 0
Start: 2020-03-16 | End: 2020-04-14

## 2020-03-16 RX ORDER — ATORVASTATIN CALCIUM 80 MG/1
1 TABLET, FILM COATED ORAL
Qty: 30 | Refills: 0
Start: 2020-03-16 | End: 2020-04-14

## 2020-03-16 RX ORDER — SENNA PLUS 8.6 MG/1
2 TABLET ORAL
Qty: 0 | Refills: 0 | DISCHARGE
Start: 2020-03-16

## 2020-03-16 RX ORDER — BUPRENORPHINE AND NALOXONE 2; .5 MG/1; MG/1
1 TABLET SUBLINGUAL
Qty: 0 | Refills: 0 | DISCHARGE

## 2020-03-16 RX ORDER — CLONAZEPAM 1 MG
1 TABLET ORAL
Qty: 14 | Refills: 0
Start: 2020-03-16 | End: 2020-03-22

## 2020-03-16 RX ORDER — FERROUS SULFATE 325(65) MG
1 TABLET ORAL
Qty: 30 | Refills: 0
Start: 2020-03-16 | End: 2020-04-14

## 2020-03-16 RX ORDER — METOPROLOL TARTRATE 50 MG
1 TABLET ORAL
Qty: 60 | Refills: 0
Start: 2020-03-16 | End: 2020-04-14

## 2020-03-16 RX ORDER — BUPRENORPHINE AND NALOXONE 2; .5 MG/1; MG/1
1 TABLET SUBLINGUAL
Qty: 0 | Refills: 0 | DISCHARGE
Start: 2020-03-16

## 2020-03-16 RX ORDER — ACETAMINOPHEN 500 MG
2 TABLET ORAL
Qty: 0 | Refills: 0 | DISCHARGE
Start: 2020-03-16

## 2020-03-16 RX ORDER — LANOLIN ALCOHOL/MO/W.PET/CERES
1 CREAM (GRAM) TOPICAL
Qty: 0 | Refills: 0 | DISCHARGE
Start: 2020-03-16

## 2020-03-16 RX ORDER — QUETIAPINE FUMARATE 200 MG/1
1 TABLET, FILM COATED ORAL
Qty: 60 | Refills: 0
Start: 2020-03-16 | End: 2020-04-14

## 2020-03-16 RX ORDER — POLYETHYLENE GLYCOL 3350 17 G/17G
17 POWDER, FOR SOLUTION ORAL
Qty: 0 | Refills: 0 | DISCHARGE
Start: 2020-03-16

## 2020-03-16 RX ORDER — LAMOTRIGINE 25 MG/1
1 TABLET, ORALLY DISINTEGRATING ORAL
Qty: 60 | Refills: 0
Start: 2020-03-16 | End: 2020-04-14

## 2020-03-16 RX ORDER — DILTIAZEM HCL 120 MG
1 CAPSULE, EXT RELEASE 24 HR ORAL
Qty: 60 | Refills: 0
Start: 2020-03-16 | End: 2020-04-14

## 2020-03-16 RX ORDER — ASCORBIC ACID 60 MG
1 TABLET,CHEWABLE ORAL
Qty: 0 | Refills: 0 | DISCHARGE
Start: 2020-03-16

## 2020-03-16 RX ORDER — TRAZODONE HCL 50 MG
1 TABLET ORAL
Qty: 30 | Refills: 0
Start: 2020-03-16 | End: 2020-04-14

## 2020-03-16 RX ORDER — CALCIUM CARBONATE 500(1250)
3 TABLET ORAL
Qty: 0 | Refills: 0 | DISCHARGE
Start: 2020-03-16

## 2020-03-16 RX ORDER — ASPIRIN/CALCIUM CARB/MAGNESIUM 324 MG
1 TABLET ORAL
Qty: 60 | Refills: 0
Start: 2020-03-16 | End: 2020-04-14

## 2020-03-16 RX ORDER — GABAPENTIN 400 MG/1
2 CAPSULE ORAL
Qty: 60 | Refills: 0
Start: 2020-03-16 | End: 2020-04-14

## 2020-03-16 RX ADMIN — Medication 1 MILLIGRAM(S): at 13:07

## 2020-03-16 RX ADMIN — ENOXAPARIN SODIUM 40 MILLIGRAM(S): 100 INJECTION SUBCUTANEOUS at 13:08

## 2020-03-16 RX ADMIN — Medication 30 MILLIGRAM(S): at 06:24

## 2020-03-16 RX ADMIN — BUPRENORPHINE AND NALOXONE 1 TABLET(S): 2; .5 TABLET SUBLINGUAL at 13:07

## 2020-03-16 RX ADMIN — BUPRENORPHINE AND NALOXONE 1 TABLET(S): 2; .5 TABLET SUBLINGUAL at 06:24

## 2020-03-16 RX ADMIN — Medication 1 TABLET(S): at 13:07

## 2020-03-16 RX ADMIN — Medication 0.1 MILLIGRAM(S): at 06:24

## 2020-03-16 RX ADMIN — Medication 325 MILLIGRAM(S): at 13:08

## 2020-03-16 RX ADMIN — GABAPENTIN 300 MILLIGRAM(S): 400 CAPSULE ORAL at 06:24

## 2020-03-16 RX ADMIN — QUETIAPINE FUMARATE 50 MILLIGRAM(S): 200 TABLET, FILM COATED ORAL at 06:23

## 2020-03-16 RX ADMIN — Medication 225 MILLIGRAM(S): at 13:07

## 2020-03-16 RX ADMIN — Medication 81 MILLIGRAM(S): at 13:07

## 2020-03-16 RX ADMIN — LAMOTRIGINE 25 MILLIGRAM(S): 25 TABLET, ORALLY DISINTEGRATING ORAL at 06:23

## 2020-03-16 RX ADMIN — Medication 50 MILLIGRAM(S): at 06:23

## 2020-03-16 RX ADMIN — NYSTATIN CREAM 1 APPLICATION(S): 100000 CREAM TOPICAL at 06:25

## 2020-03-16 RX ADMIN — Medication 0.5 MILLIGRAM(S): at 14:34

## 2020-03-16 RX ADMIN — Medication 325 MILLIGRAM(S): at 08:19

## 2020-03-16 RX ADMIN — Medication 500 MILLIGRAM(S): at 06:24

## 2020-03-16 NOTE — PROGRESS NOTE ADULT - REASON FOR ADMISSION
s/p fall and right tib/fib fx, s/p fixation NWB RLE

## 2020-03-16 NOTE — DISCHARGE NOTE PROVIDER - NSDCMRMEDTOKEN_GEN_ALL_CORE_FT
acetaminophen 325 mg oral tablet: 2 tab(s) orally every 6 hours, As needed, Temp greater or equal to 38C (100.4F), Mild Pain (1 - 3)  ascorbic acid 500 mg oral tablet: 1 tab(s) orally 2 times a day  aspirin 325 mg oral tablet: 1 tab(s) orally 2 times a day for dvt ppx until weight bearing   atorvastatin 10 mg oral tablet: 1 tab(s) orally once a day (at bedtime)  buprenorphine-naloxone 8 mg-2 mg sublingual tablet: 1 tab(s) sublingual 3 times a day at 6Am, 1PM and 6PM.  calcium carbonate 500 mg (200 mg elemental calcium) oral tablet, chewable: 3 tab(s) orally every 6 hours, As needed, Dyspepsia  cloNIDine 0.1 mg oral tablet: 1 tab(s) orally 2 times a day  dilTIAZem 30 mg oral tablet: 1 tab(s) orally 2 times a day  FeroSul 325 mg (65 mg elemental iron) oral tablet: 1 tab(s) orally once a day   folic acid 1 mg oral tablet: 1 tab(s) orally once a day  gabapentin 300 mg oral capsule: 1 cap(s) orally 2 times a day with breakfast and lunch  gabapentin 300 mg oral capsule: 2 cap(s) orally once a day (at bedtime)  lamoTRIgine 25 mg oral tablet: 1 tab(s) orally 2 times a day  melatonin 3 mg oral tablet: 1 tab(s) orally once a day (at bedtime), As needed, Sleep  metoprolol tartrate 50 mg oral tablet: 1 tab(s) orally 2 times a day  Multiple Vitamins oral tablet: 1 tab(s) orally once a day  polyethylene glycol 3350 oral powder for reconstitution: 17 gram(s) orally once a day  QUEtiapine 50 mg oral tablet: 1 tab(s) orally 2 times a day  senna oral tablet: 2 tab(s) orally once a day (at bedtime)  traZODone 50 mg oral tablet: 1 tab(s) orally once a day (at bedtime), As needed, insomnia  venlafaxine 75 mg oral capsule, extended release: 3 cap(s) orally once a day

## 2020-03-16 NOTE — PROGRESS NOTE ADULT - SUBJECTIVE AND OBJECTIVE BOX
Patient seen and examined at bedside. No overnight events per nursing or chart review. Patient notes anxiety that prevented her from sleeping. Discussed possibility of medication or neuropsych. She was not interested in either. She did not wish to discuss her anxiety further. Her 10 point ROS is otherwise unremarkable    ALLERGIES:  Geodon (Other)  Zyprexa (Other)    MEDICATIONS  (STANDING):  ascorbic acid 500 milliGRAM(s) Oral two times a day  aspirin  chewable 81 milliGRAM(s) Oral daily  atorvastatin 10 milliGRAM(s) Oral at bedtime  buprenorphine 8 mG/naloxone 2 mG SL  Tablet 1 Tablet(s) SubLingual <User Schedule>  cloNIDine 0.1 milliGRAM(s) Oral two times a day  diltiazem    Tablet 30 milliGRAM(s) Oral two times a day  enoxaparin Injectable 40 milliGRAM(s) SubCutaneous every 24 hours  ferrous    sulfate 325 milliGRAM(s) Oral three times a day with meals  folic acid 1 milliGRAM(s) Oral daily  gabapentin 300 milliGRAM(s) Oral two times a day  gabapentin 600 milliGRAM(s) Oral at bedtime  influenza   Vaccine 0.5 milliLiter(s) IntraMuscular once  lamoTRIgine 25 milliGRAM(s) Oral two times a day  metoprolol tartrate 50 milliGRAM(s) Oral two times a day  multivitamin 1 Tablet(s) Oral daily  nystatin Powder 1 Application(s) Topical two times a day  polyethylene glycol 3350 17 Gram(s) Oral daily  QUEtiapine 50 milliGRAM(s) Oral two times a day  senna 2 Tablet(s) Oral at bedtime  venlafaxine XR. 225 milliGRAM(s) Oral daily    MEDICATIONS  (PRN):  acetaminophen   Tablet .. 650 milliGRAM(s) Oral every 6 hours PRN Temp greater or equal to 38C (100.4F), Mild Pain (1 - 3)  calcium carbonate    500 mG (Tums) Chewable 3 Tablet(s) Chew every 6 hours PRN Dyspepsia  clonazePAM  Tablet 0.5 milliGRAM(s) Oral every 12 hours PRN Anxiety  melatonin 3 milliGRAM(s) Oral at bedtime PRN Sleep  ondansetron    Tablet 4 milliGRAM(s) Oral every 6 hours PRN Nausea and/or Vomiting  traZODone 50 milliGRAM(s) Oral at bedtime PRN insomnia    Vital Signs Last 24 Hrs  T(F): 98.2 (16 Mar 2020 07:50), Max: 98.7 (15 Mar 2020 20:40)  HR: 73 (16 Mar 2020 07:50) (73 - 84)  BP: 99/64 (16 Mar 2020 07:50) (99/64 - 125/82)  RR: 15 (16 Mar 2020 07:50) (15 - 16)  SpO2: 98% (16 Mar 2020 07:50) (98% - 98%)  I&O's Summary      PHYSICAL EXAM:  Gen: nad, resting in bed  Neuro: aaox3, no focal deficits  Heent: eomi b/l, no jvd, no oral exudates  Pulm: cta b/l, no w/r/r  CV: +s1s2, no m/r/g  Ab: soft, nt/nd, normoactive bs x 4  Extrem: no edema, pulses intact and equal, foot brace noted      LABS:                        11.5   7.14  )-----------( 325      ( 16 Mar 2020 05:35 )             37.2       03-16    142  |  104  |  6   ----------------------------<  97  4.1   |  31  |  0.84    Ca    8.9      16 Mar 2020 05:35       eGFR if Non : 93 mL/min/1.73M2 (03-16-20 @ 05:35)  eGFR if : 108 mL/min/1.73M2 (03-16-20 @ 05:35)                                     RADIOLOGY & ADDITIONAL TESTS:    Care Discussed with Consultants/Other Providers:

## 2020-03-16 NOTE — PROGRESS NOTE BEHAVIORAL HEALTH - OTHER
hair had been unkempt , unwashed, looked better groomed today. non weight bearing right lower extremity. see PMR notes

## 2020-03-16 NOTE — PROGRESS NOTE ADULT - SUBJECTIVE AND OBJECTIVE BOX
Patient is a 29y old  Female who presents with a chief complaint of s/p fall and right tib/fib fx, s/p fixation NWB RLE (16 Mar 2020 10:51)    HPI:  Patient is a 30 F with PMH PAF, depression/ anxiety/ PTSD, h/o heroin abuse now on suboxone, previous CVA (with residual L-sided deficits) R carotid A dissection/stenosis (on ASA and Statin),  who presented to the ED on 2/21/20 at [facility] with complaints of R leg pain and inability to ambulate after a fall.  Fall was secondary to lightheadedness/ dizziness, landing  onto her R leg. Denied presyncopal symptoms or LOC, chest pain or palpitations.  In the ED she had imaging of her R knee which demonstrated right tib/fib fracture (Comminuted intra-articular fracture of the distal tibia extending from the tibial plafond to the distal shaft; Displaced spiral fracture of the distal fibula without articular involvement).   Subsequently underwent external fixation on 2/21 without any complications.  She was seen by psychiatry who assisted with med titration as there was concern that psych meds were contributing to dizziness.  Post op hospital course was significant for episodes of SVT requiring management with adenosine.  EP was consulted to evaluate for ablation, however patient was reluctant to undergo ablation at this time. She was managed on AV marjan agents Cardizem and metoprolol at this time. Electrolytes remained in normal limits.  Recommend outpatient EP follow up.  She endorsed dysuria and was treated for UTI with 3 day course of antibiotics.  She remained hemodynamically stable and medically optimized for discharge to [place] [facility]hab on 3/6/20. NWB RLE (06 Mar 2020 15:13)    no dysuria or incontinence    Interval Events:  Patient seen and examined at bedside.    MEDICATIONS:  MEDICATIONS  (STANDING):  ascorbic acid 500 milliGRAM(s) Oral two times a day  aspirin  chewable 81 milliGRAM(s) Oral daily  atorvastatin 10 milliGRAM(s) Oral at bedtime  buprenorphine 8 mG/naloxone 2 mG SL  Tablet 1 Tablet(s) SubLingual <User Schedule>  cloNIDine 0.1 milliGRAM(s) Oral two times a day  diltiazem    Tablet 30 milliGRAM(s) Oral two times a day  enoxaparin Injectable 40 milliGRAM(s) SubCutaneous every 24 hours  ferrous    sulfate 325 milliGRAM(s) Oral three times a day with meals  folic acid 1 milliGRAM(s) Oral daily  gabapentin 300 milliGRAM(s) Oral two times a day  gabapentin 600 milliGRAM(s) Oral at bedtime  influenza   Vaccine 0.5 milliLiter(s) IntraMuscular once  lamoTRIgine 25 milliGRAM(s) Oral two times a day  metoprolol tartrate 50 milliGRAM(s) Oral two times a day  multivitamin 1 Tablet(s) Oral daily  nystatin Powder 1 Application(s) Topical two times a day  polyethylene glycol 3350 17 Gram(s) Oral daily  QUEtiapine 50 milliGRAM(s) Oral two times a day  senna 2 Tablet(s) Oral at bedtime  venlafaxine XR. 225 milliGRAM(s) Oral daily    MEDICATIONS  (PRN):  acetaminophen   Tablet .. 650 milliGRAM(s) Oral every 6 hours PRN Temp greater or equal to 38C (100.4F), Mild Pain (1 - 3)  calcium carbonate    500 mG (Tums) Chewable 3 Tablet(s) Chew every 6 hours PRN Dyspepsia  clonazePAM  Tablet 0.5 milliGRAM(s) Oral every 12 hours PRN Anxiety  melatonin 3 milliGRAM(s) Oral at bedtime PRN Sleep  ondansetron    Tablet 4 milliGRAM(s) Oral every 6 hours PRN Nausea and/or Vomiting  traZODone 50 milliGRAM(s) Oral at bedtime PRN insomnia      Allergies    Geodon (Other)  Zyprexa (Other)    Intolerances        T(C): 36.8 (03-16-20 @ 07:50), Max: 37.1 (03-14-20 @ 20:28)  T(F): 98.2 (03-16-20 @ 07:50), Max: 98.7 (03-14-20 @ 20:28)  HR: 73 (03-16-20 @ 07:50) (72 - 88)  BP: 99/64 (03-16-20 @ 07:50) (94/58 - 125/82)  RR: 15 (03-16-20 @ 07:50) (15 - 16)  SpO2: 98% (03-16-20 @ 07:50) (94% - 98%)            LABS:      CBC Full  -  ( 16 Mar 2020 05:35 )  WBC Count : 7.14 K/uL  RBC Count : 4.16 M/uL  Hemoglobin : 11.5 g/dL  Hematocrit : 37.2 %  Platelet Count - Automated : 325 K/uL  Mean Cell Volume : 89.4 fl  Mean Cell Hemoglobin : 27.6 pg  Mean Cell Hemoglobin Concentration : 30.9 gm/dL  Auto Neutrophil # : 4.19 K/uL  Auto Lymphocyte # : 2.05 K/uL  Auto Monocyte # : 0.61 K/uL  Auto Eosinophil # : 0.23 K/uL  Auto Basophil # : 0.03 K/uL  Auto Neutrophil % : 58.8 %  Auto Lymphocyte % : 28.7 %  Auto Monocyte % : 8.5 %  Auto Eosinophil % : 3.2 %  Auto Basophil % : 0.4 %    03-16    142  |  104  |  6<L>  ----------------------------<  97  4.1   |  31  |  0.84    Ca    8.9      16 Mar 2020 05:35                    Physical Exam    Constitutional: alert, no acute distress    Abdomen: soft, nontender, nondistended, no HSM    Genitourinary: no bladder distention

## 2020-03-16 NOTE — PROGRESS NOTE ADULT - ASSESSMENT
Ms Freeman is a 30 year-old female with significant psychiatric history and heroin abuse (on Suboxone), previous CVA with CA dissection/stenosis with residual deficits presented to [facility] on 2/21 after a fall, found to have R distal Tib/fib fracture, underwent ORIF on 2/21. Hospital course complicated by SVT controlled on AV marjan agents.   MSK/Ortho  R Distal tibial/fibular fracture  - s/p ORIF   - NWB to RLE  - continue trilam splint R ankle/leg  - pain management per primary team  - Comprehensive Rehab Program of PT/OT per primary team  - orthostatic/ dizziness precautions  - orthopedic follow up on discharge. Will need to assess whether home is wheelchair accessible    Neurology  Dizziness/ lightheadedness   - likely related to multiple sedative meds/high clonidine dosing +/-SVT  - monitor vitals  - check orthostatics daily  CVA with mild LUE weakness  - secondary to R carotid artery dissection/stenosis. Evaluated by Vascular, repeat CTA with healed dissection  - continue ASA and Statin    Psych:   PTSD/ Generalized Anxiety/ Substance abuse (heroin)  - no SI/HI  - seen by psych inpatient; psych meds adjusted thought to be contributing to dizziness  - will defer to psychology for medication adjustments  - psychology supportive counseling    Pulmonary  Pneumonia (resolved)  - obtain CXR in 4-6 weeks for resolution    Cardiovascular  Supraventricular Tachycardia  - s/p adenosine. seen by cardiology. Patient reluctant to undergo ablation  - continue AV marjan agents: cardizem 30mg bid with hold parameters and Lopressor 50mg bid with hold parameters  Hypertension  - as above  - bp borderline, please obtain orthostatics  - would be cautious in lowering regimen above as it can unmask SVT    Genitourinary  Urinary Tract infection (resolved)    Diet: per primary team  Pain management: per primary team  DVT ppx: lovenox  Case discussed with PMR resident

## 2020-03-16 NOTE — DISCHARGE NOTE PROVIDER - NSDCACTIVITY_GEN_ALL_CORE
Do not drive or operate machinery/Do not make important decisions/Stairs allowed/Walking - Indoors allowed/No heavy lifting/straining

## 2020-03-16 NOTE — DISCHARGE NOTE NURSING/CASE MANAGEMENT/SOCIAL WORK - NSDCPEEMAIL_GEN_ALL_CORE
[facility] email [email or web address]

## 2020-03-16 NOTE — PROGRESS NOTE BEHAVIORAL HEALTH - SECONDARY DX2
Opioid dependence on maintenance agonist therapy, no symptoms
Opioid dependence on maintenance agonist therapy, no symptoms

## 2020-03-16 NOTE — DISCHARGE NOTE PROVIDER - NSDCCPCAREPLAN_GEN_ALL_CORE_FT
PRINCIPAL DISCHARGE DIAGNOSIS  Diagnosis: S/P ORIF (open reduction internal fixation) fracture  Assessment and Plan of Treatment:   - Continue splint to right ankle/leg  - Continue non-weight bearing precautions to right leg.  - Continue Comprehensive Rehab Program of PT/OT, home therapy  - Orthopedic follow up on discharge  - Continue tylenol for pain relief  - Continue ascorbid acid 500mg twice daily  - Continue folic acid 1mg daily      SECONDARY DISCHARGE DIAGNOSES  Diagnosis: Insomnia secondary to anxiety  Assessment and Plan of Treatment: - Continue melatonin 3mg as needed at bedtime  - Continue trazodone 50mg as needed at bedtime    Diagnosis: Heroin dependence  Assessment and Plan of Treatment: - Continue Suboxone as prescribed.   - Follow up with your addiction specialist.    Diagnosis: H/O carotid artery stenosis  Assessment and Plan of Treatment: - Continue Aspirin 81mg daily  - Continue Atorvastatin 10mg at bedtime  - Follow up with your cardiologist    Diagnosis: Generalized anxiety disorder  Assessment and Plan of Treatment: - Continue Clonidine 0.1mg twice daily  - Continue Lamotrigine 25mg twice daily  - Continue Quetiapine 50mg twice daily  - Continue Venlafaxine XR 225mg daily  - Continue Clonazepam 0.5mg twice daily AS NEEDED   - Follow up with your psychiatrist.   - If you feel lightheaded, dizzy please call your psychiatrist right away. If your doctor is not available, or if you pass out/faint go to your nearest emergency department.    Diagnosis: SVT (supraventricular tachycardia)  Assessment and Plan of Treatment: - Continue Diltiazem 30mg twice daily  - Continue Metoprolol tartrate 50mg twice daily  - Follow up with cardiology on discharge

## 2020-03-16 NOTE — PROGRESS NOTE BEHAVIORAL HEALTH - SUMMARY
Patient is a 30 F with PMH PAF, depression/ anxiety/ PTSD, h/o heroin abuse now on suboxone, previous CVA (with residual L-sided deficits) R carotid A dissection/stenosis (on ASA and Statin),  who presented to the ED on 2/21/20 at [facility] with complaints of R leg pain and inability to ambulate after a fall.  Fall was secondary to lightheadedness/ dizziness, landing  onto her R leg. Denied presyncopal symptoms or LOC, chest pain or palpitations.  In the ED she had imaging of her R knee which demonstrated right tib/fib fracture (Comminuted intra-articular fracture of the distal tibia extending from the tibial plafond to the distal shaft; Displaced spiral fracture of the distal fibula without articular involvement).   Subsequently underwent external fixation on 2/21 without any complications.  She was seen by psychiatry who assisted with med titration as there was concern that psych meds were contributing to dizziness.  Post op hospital course was significant for episodes of SVT requiring management with adenosine.  EP was consulted to evaluate for ablation, however patient was reluctant to undergo ablation at this time. She was managed on AV marjan agents Cardizem and metoprolol at this time. Electrolytes remained in normal limits.  Recommend outpatient EP follow up.  She endorsed dysuria and was treated for UTI with 3 day course of antibiotics.  She remained hemodynamically stable and medically optimized for discharge to [place] [facility] on 3/6/20. ARON RAMIREZ (06 Mar 2020 15:13)    Medications unchanged since discharge from [facility] anxiety when present was either fleeting and did not need intervention, or was managed with extra 0.25 mg prn of Klonopin.

## 2020-03-16 NOTE — PROGRESS NOTE BEHAVIORAL HEALTH - NSBHFUPINTERVALHXFT_PSY_A_CORE
HPI:  Patient is a 30 F with PMH PAF, depression/ anxiety/ PTSD, h/o heroin abuse now on suboxone, previous CVA (with residual L-sided deficits) R carotid A dissection/stenosis (on ASA and Statin),  who presented to the ED on 2/21/20 at [facility] with complaints of R leg pain and inability to ambulate after a fall.  Fall was secondary to lightheadedness/ dizziness, landing  onto her R leg. Denied presyncopal symptoms or LOC, chest pain or palpitations.  In the ED she had imaging of her R knee which demonstrated right tib/fib fracture (Comminuted intra-articular fracture of the distal tibia extending from the tibial plafond to the distal shaft; Displaced spiral fracture of the distal fibula without articular involvement).   Subsequently underwent external fixation on 2/21 without any complications.  She was seen by psychiatry who assisted with med titration as there was concern that psych meds were contributing to dizziness.  Post op hospital course was significant for episodes of SVT requiring management with adenosine.  EP was consulted to evaluate for ablation, however patient was reluctant to undergo ablation at this time. She was managed on AV marjan agents Cardizem and metoprolol at this time. Electrolytes remained in normal limits.  Recommend outpatient EP follow up.  She endorsed dysuria and was treated for UTI with 3 day course of antibiotics.  She remained hemodynamically stable and medically optimized for discharge to [place] [facility] on 3/6/20. ARON RAMIREZ (06 Mar 2020 15:13)    Pt seen and evaluated today ( late entry - seen prior to discharge).  Writer asked to follow up due to anxiety, which appears vague and free floating. Pt did not appear visibly anxious- she stated all her medications had been adjusted ( downwards) prior to her admission. Discussed with her , her Klonopin dosage, and she was reassured that she was getting " extra" 0.25 mg if needed as a stat dose but not standing due to need to keep sedation to a minimum so that she could participate in rehab and also due to falls risk.   Pt states she would call her outpatient psychiatrist Dr. Redd to make a follow up appointment.   Writer had attempted to call her with updated list of discharge medications. Pt in good spirits, euthymic, awake, alert.   Pt has been reluctant to consider ablation states her heart rate has been normal on admission.

## 2020-03-16 NOTE — DISCHARGE NOTE NURSING/CASE MANAGEMENT/SOCIAL WORK - NSDCPEWEB_GEN_ALL_CORE
[facility] for Tobacco Control website --- [email or web address] website --- [email or web address]

## 2020-03-16 NOTE — PROGRESS NOTE BEHAVIORAL HEALTH - RISK ASSESSMENT
Pt with remote attempt, but is future oriented and in treatment with outpatient provider, today mood and affect euthymic and best since admission.

## 2020-03-16 NOTE — DISCHARGE NOTE NURSING/CASE MANAGEMENT/SOCIAL WORK - PATIENT PORTAL LINK FT
You can access the FollowMyHealth Patient Portal offered by [facility] by registering at the following website: [email or web address]. By joining [employer]s FollowMyHealth portal, you will also be able to view your health information using other applications (apps) compatible with our system.

## 2020-03-16 NOTE — DISCHARGE NOTE PROVIDER - PROVIDER TOKENS
PROVIDER:[TOKEN:[[id number]]],PROVIDER:[TOKEN:[[id number]]],PROVIDER:[TOKEN:[[id number]]],PROVIDER:[TOKEN:[[id number]]]

## 2020-03-16 NOTE — DISCHARGE NOTE PROVIDER - CARE PROVIDER_API CALL
Antwon Tyson (DO)  Orthopaedic Surgery  [address] [place] [address]  Phone: [phone]  Fax: [phone]  Follow Up Time:     Junior Marie)  Surgery  [address] [facility] [address] [facility], [address]nd [place] [address]  Phone: [phone]  Fax: [phone]  Follow Up Time:     Radha Dean)  Cardiovascular Disease; Internal Medicine  [address] [place] [address]  Phone: [phone]  Fax: [phone]  Follow Up Time:     Ruben Pinto)  Internal Medicine  [address] [place] [address]  Phone: [phone]  Fax: [phone]  Follow Up Time:

## 2020-03-16 NOTE — DISCHARGE NOTE PROVIDER - HOSPITAL COURSE
Hospital Course:    Patient is a 30 F with PMH PAF, depression/ anxiety/ PTSD, h/o heroin abuse now on suboxone, previous CVA (with residual L-sided deficits) R carotid A dissection/stenosis (on ASA and Statin),  who presented to the ED on 2/21/20 at [facility] with complaints of R leg pain and inability to ambulate after a fall.  Fall was secondary to lightheadedness/ dizziness, landing  onto her R leg. Denied presyncopal symptoms or LOC, chest pain or palpitations.  In the ED she had imaging of her R knee which demonstrated right tib/fib fracture (Comminuted intra-articular fracture of the distal tibia extending from the tibial plafond to the distal shaft; Displaced spiral fracture of the distal fibula without articular involvement).    Subsequently underwent external fixation on 2/21 without any complications.  She was seen by psychiatry who assisted with med titration as there was concern that psych meds were contributing to dizziness.  Post op hospital course was significant for episodes of SVT requiring management with adenosine.  EP was consulted to evaluate for ablation, however patient was reluctant to undergo ablation at this time. She was managed on AV marjan agents Cardizem and metoprolol at this time. Electrolytes remained in normal limits.  Recommend outpatient EP follow up.  She endorsed dysuria and was treated for UTI with 3 day course of antibiotics.  She remained hemodynamically stable and medically optimized for discharge [facility] [place] [facility]         Rehab Course:    Ms. Freeman was admitted to [facility] on 3/6/20 for acute rehabilitation. She participated in a comprehensive rehabilitation course consisting of physical and occupational making functional progress during her stay. Psychiatry was consulted for ongoing management of anxiety. Her anti-hypertensive medications were adjusted due to borderline-low readings. Urology was consulted for patient's subjective complaint of dysuria. A repeat UA was clear. Oxycodone was discontinued, patient remained on tylenol for pain management.         Discharged to:    Home with home care        T(C): 36.8 (03-16-20 @ 07:50), Max: 37.1 (03-15-20 @ 20:40)    HR: 73 (03-16-20 @ 07:50) (73 - 84)    BP: 99/64 (03-16-20 @ 07:50) (99/64 - 125/82)    RR: 15 (03-16-20 @ 07:50) (15 - 16)    SpO2: 98% (03-16-20 @ 07:50) (98% - 98%)        PHYSICAL EXAM    Constitutional - Seeing PT, mildly distracted, requires frequent cues to attend and for safety. Reduced insight and mental flexibility reduced processing speed    Chest - clear     Cardio -  RRR, no murmur    Abdomen -  Soft, NTND    Extremities - No peripheral edema, No calf tenderness, RLE splint present    no tightness proximal and distal ends of cast    wiggles toes, good capillary refill    no sensory deficits LT

## 2020-03-16 NOTE — PROGRESS NOTE BEHAVIORAL HEALTH - NSBHCHARTREVIEWLAB_PSY_A_CORE FT
03-16    142  |  104  |  6<L>  ----------------------------<  97  4.1   |  31  |  0.84    Ca    8.9      16 Mar 2020 05:35

## 2020-03-16 NOTE — DISCHARGE NOTE PROVIDER - CARE PROVIDERS DIRECT ADDRESSES
[email or web address]

## 2020-03-16 NOTE — PROGRESS NOTE BEHAVIORAL HEALTH - NSBHCHARTREVIEWVS_PSY_A_CORE FT
Vital Signs Last 24 Hrs  T(C): 36.8 (16 Mar 2020 07:50), Max: 37.1 (15 Mar 2020 20:40)  T(F): 98.2 (16 Mar 2020 07:50), Max: 98.7 (15 Mar 2020 20:40)  HR: 73 (16 Mar 2020 07:50) (73 - 84)  BP: 99/64 (16 Mar 2020 07:50) (99/64 - 103/65)  BP(mean): --  RR: 15 (16 Mar 2020 07:50) (15 - 16)  SpO2: 98% (16 Mar 2020 07:50) (98% - 98%)

## 2020-03-16 NOTE — PROGRESS NOTE ADULT - SUBJECTIVE AND OBJECTIVE BOX
DAILY PROGRESS NOTE:  HPI: Patient is a 30 F with PMH PAF, depression/ anxiety/ PTSD, h/o heroin abuse now on suboxone, previous CVA (with residual L-sided deficits) R carotid A dissection/stenosis (on ASA and Statin),  who presented to the ED on 20 at [facility] with complaints of R leg pain and inability to ambulate after a fall.  Fall was secondary to lightheadedness/ dizziness, landing  onto her R leg. Denied presyncopal symptoms or LOC, chest pain or palpitations.  In the ED she had imaging of her R knee which demonstrated right tib/fib fracture (Comminuted intra-articular fracture of the distal tibia extending from the tibial plafond to the distal shaft; Displaced spiral fracture of the distal fibula without articular involvement).  Subsequently underwent external fixation on  without any complications.  She was seen by psychiatry who assisted with med titration as there was concern that psych meds were contributing to dizziness.  Post op hospital course was significant for episodes of SVT requiring management with adenosine.  EP was consulted to evaluate for ablation, however patient was reluctant to undergo ablation at this time. She was managed on AV marjan agents Cardizem and metoprolol at this time. Electrolytes remained in normal limits.  Recommend outpatient EP follow up.  She endorsed dysuria and was treated for UTI with 3 day course of antibiotics.  She remained hemodynamically stable and medically optimized for discharge to [place] [facility] on 3/6/20. ARON RAMIREZ (06 Mar 2020 15:13)    Subjective: Patient seen and examined at bedside. Reports continued anxiety, didn't sleep well due to anxiety. Pain well controlled, participating in therapy.     ROS: Per HPI, otherwise negative except:  Psych:  No depression, + anxiety, + substance abuse history on saboxone    Physical Exam:  Vital Signs Last 24 Hrs  T(C): 36.8 (16 Mar 2020 07:50), Max: 37.1 (15 Mar 2020 20:40)  T(F): 98.2 (16 Mar 2020 07:50), Max: 98.7 (15 Mar 2020 20:40)  HR: 73 (16 Mar 2020 07:50) (73 - 84)  BP: 99/64 (16 Mar 2020 07:50) (99/64 - 125/82)  BP(mean): --  RR: 15 (16 Mar 2020 07:50) (15 - 16)  SpO2: 98% (16 Mar 2020 07:50) (98% - 98%)    PHYSICAL EXAM  Constitutional - Seeing PT, mildly distracted, requires frequent cues to attend and for safety. Reduced insight and mental flexibility reduced processing speed  Chest - clear   Cardio -  RRR, no murmur  Abdomen -  Soft, NTND  Extremities - No peripheral edema, No calf tenderness, RLE splint present  no tightness proximal and distal ends of cast  wiggles toes, good capillary refill  no sensory deficits LT     Recent Labs/Imagin.5   7.14  )-----------( 325      ( 16 Mar 2020 05:35 )             37.2   03-16    142  |  104  |  6<L>  ----------------------------<  97  4.1   |  31  |  0.84    Ca    8.9      16 Mar 2020 05:35    Urinalysis Basic - ( 10 Mar 2020 16:39 )  Color: Yellow / Appearance: Clear / S.010 / pH: x  Gluc: x / Ketone: Negative  / Bili: Negative / Urobili: Negative   Blood: x / Protein: Negative / Nitrite: Negative   Leuk Esterase: Negative / RBC: x / WBC x   Sq Epi: x / Non Sq Epi: x / Bacteria: x    MEDICATIONS  (STANDING):  ascorbic acid 500 milliGRAM(s) Oral two times a day  aspirin  chewable 81 milliGRAM(s) Oral daily  atorvastatin 10 milliGRAM(s) Oral at bedtime  buprenorphine 8 mG/naloxone 2 mG SL  Tablet 1 Tablet(s) SubLingual <User Schedule>  cloNIDine 0.1 milliGRAM(s) Oral two times a day  diltiazem    Tablet 30 milliGRAM(s) Oral two times a day  enoxaparin Injectable 40 milliGRAM(s) SubCutaneous every 24 hours  ferrous    sulfate 325 milliGRAM(s) Oral three times a day with meals  folic acid 1 milliGRAM(s) Oral daily  gabapentin 300 milliGRAM(s) Oral two times a day  gabapentin 600 milliGRAM(s) Oral at bedtime  influenza   Vaccine 0.5 milliLiter(s) IntraMuscular once  lamoTRIgine 25 milliGRAM(s) Oral two times a day  metoprolol tartrate 50 milliGRAM(s) Oral two times a day  multivitamin 1 Tablet(s) Oral daily  nystatin Powder 1 Application(s) Topical two times a day  polyethylene glycol 3350 17 Gram(s) Oral daily  QUEtiapine 50 milliGRAM(s) Oral two times a day  senna 2 Tablet(s) Oral at bedtime  venlafaxine XR. 225 milliGRAM(s) Oral daily    MEDICATIONS  (PRN):  acetaminophen   Tablet .. 650 milliGRAM(s) Oral every 6 hours PRN Temp greater or equal to 38C (100.4F), Mild Pain (1 - 3)  calcium carbonate    500 mG (Tums) Chewable 3 Tablet(s) Chew every 6 hours PRN Dyspepsia  clonazePAM  Tablet 0.5 milliGRAM(s) Oral every 12 hours PRN Anxiety  melatonin 3 milliGRAM(s) Oral at bedtime PRN Sleep  ondansetron    Tablet 4 milliGRAM(s) Oral every 6 hours PRN Nausea and/or Vomiting  traZODone 50 milliGRAM(s) Oral at bedtime PRN insomnia    ASSESSMENT/ PLAN:  30 F with significant psychiatric history and heroin abuse (on Suboxone), previous CVA with CA dissection/stenosis with residual deficits presented to [facility] on  after a fall, found to have R distal Tib/fib fracture, underwent ORIF on . Hospital course complicated by SVT controlled on AV marjan agents.   *s/p ORIF R distal tib/fib comminuted bimalleolar fxs & removal of external fixator NWB to RLE  - continue  splint R ankle/leg  - continue Comprehensive Rehab Program of PT/OT  - barriers for dc, need to performs stairs to access apartment, reviewed with patient. Patient's mother lives in ranch-style home, but when asked, patient adamantly states that living with her mother is not an option. Will need caregiver training with [employer] as patient is not able to do stairs independently due to cognitive and behavior issues - planned for today 3/16  - orthopedic follow up on discharge    *Dizziness/ lightheadedness   - likely related to multiple sedative meds/high clonidine dosing +/-SVT, anemia. Improved  - HR, BP controlled  - Hb 11.4 3/9--> 11.1 3/11    *SVT  - s/p adenosine. seen by cardiology. Patient reluctant to undergo ablation  - continue AV marjan agents: cardizem and Lopressor  - HR controlled    *Psych: PTSD/ Generalized Anxiety/ Substance abuse (heroin)  - clonidine  0.1 mg bid.  - effexor decreased from 375 to 225mg   - seroquel increased from 25 bid to 50 bid for anxiety.   - continue trazodone, gabapentin at current doses  - started lamictal 25mg bid 3/2/20, titrate up in 2 weeks. (Monday 3/16/20)  - psychology supportive counseling and cognitive evaluation  - psychiatry consult appreciated. klonopin increased from 0.25 to 0.5 PRN 3/12    *Dysuria  - s/p 3 day course cefuroxime (3/5-3/7)  - WBC normalized 8.12 3/9-->8.06 3/11  - patient with persistent dysuria but resolved leukocytosis and afebrile  - UA 3/11 NEGATIVE  - Per urology, continue to monitor, can consider myrbetriq or detrol. Patient and fiance refusing  - continue hygiene, toileting schedule. Bladder scan as patient now states there is no frequency or dysuria but only urinating once a day    *Vaginal pruritis:  - Nystatin powder    *h/o CVA with mild LUE weakness  - secondary to R carotid artery dissection/stenosis. Evaluated by Vascular, repeat CTA with healed dissection  - continue ASA and Statin    *HTN:  - now on bb and ccb  - controlled    *Pain Mgmt   - Tylenol PRN  - Gabapentin    GI/Bowel Mgmt  - Senna and miralax added standing for constipation 3/12    FEN   - Diet - Regular    DVT PPX:   - Continue Lovenox 40 mg SQ daily while in the hospital/Rehab then switch to  mg PO BID at time of discharge    #Case discussedv in IDT rounds 3/11:  Patient requires supervision bathing and UE dressing, min assist LE and tub transfers, min assist transfers. Reduced memory, requires VC for walker manipulation, ambulates 15 feet RW and min assist. Impulsive and cognitive deficits with reduced carryover  - goals: supervision shower, mod independent wheelchair propulsion long distances, mod independent bADLs on dc  - will need caregiver training for stairs. [employer] dc home 3/20 with home Pt and OT and caregiver support

## 2020-03-16 NOTE — PROGRESS NOTE BEHAVIORAL HEALTH - NSBHCHARTREVIEWINVESTIGATE_PSY_A_CORE FT
< from: 12 Lead ECG (03.14.20 @ 17:24) >    Ventricular Rate 77 BPM    Atrial Rate 77 BPM    P-R Interval 154 ms    QRS Duration 90 ms    Q-T Interval 390 ms    QTC Calculation(Bezet) 441 ms    P Axis 19 degrees    R Axis 35 degrees    T Axis 22 degrees    Diagnosis Line Normal sinus rhythm  Low voltage QRS  Borderline ECG  No previous ECGs available  Confirmed by BINA FABIAN, WEST RIZZO ([id number]) on 3/15/2020 9:51:58 AM    < end of copied text >

## 2020-03-18 ENCOUNTER — APPOINTMENT (OUTPATIENT)
Dept: INTERNAL MEDICINE | Facility: CLINIC | Age: 30
End: 2020-03-18
Payer: SELF-PAY

## 2020-03-18 PROCEDURE — 99499D: CUSTOM

## 2020-03-20 DIAGNOSIS — S82.841A DISPLACED BIMALLEOLAR FRACTURE OF RIGHT LOWER LEG, INITIAL ENCOUNTER FOR CLOSED FRACTURE: ICD-10-CM

## 2020-03-20 PROCEDURE — 97110 THERAPEUTIC EXERCISES: CPT

## 2020-03-20 PROCEDURE — 80048 BASIC METABOLIC PNL TOTAL CA: CPT

## 2020-03-20 PROCEDURE — 93005 ELECTROCARDIOGRAM TRACING: CPT

## 2020-03-20 PROCEDURE — 97530 THERAPEUTIC ACTIVITIES: CPT

## 2020-03-20 PROCEDURE — 97163 PT EVAL HIGH COMPLEX 45 MIN: CPT

## 2020-03-20 PROCEDURE — 81001 URINALYSIS AUTO W/SCOPE: CPT

## 2020-03-20 PROCEDURE — 97167 OT EVAL HIGH COMPLEX 60 MIN: CPT

## 2020-03-20 PROCEDURE — 80053 COMPREHEN METABOLIC PANEL: CPT

## 2020-03-20 PROCEDURE — 97535 SELF CARE MNGMENT TRAINING: CPT

## 2020-03-20 PROCEDURE — 97542 WHEELCHAIR MNGMENT TRAINING: CPT

## 2020-03-20 PROCEDURE — 85027 COMPLETE CBC AUTOMATED: CPT

## 2020-03-20 PROCEDURE — 36415 COLL VENOUS BLD VENIPUNCTURE: CPT

## 2020-03-20 PROCEDURE — 97116 GAIT TRAINING THERAPY: CPT

## 2020-03-30 ENCOUNTER — APPOINTMENT (OUTPATIENT)
Dept: ORTHOPEDIC SURGERY | Facility: CLINIC | Age: 30
End: 2020-03-30

## 2020-04-01 ENCOUNTER — APPOINTMENT (OUTPATIENT)
Dept: ORTHOPEDIC SURGERY | Facility: CLINIC | Age: 30
End: 2020-04-01
Payer: MEDICARE

## 2020-04-01 VITALS — TEMPERATURE: 98.1 F

## 2020-04-01 VITALS — TEMPERATURE: 97.9 F

## 2020-04-01 PROCEDURE — 73590 X-RAY EXAM OF LOWER LEG: CPT | Mod: RT

## 2020-04-01 PROCEDURE — 99024 POSTOP FOLLOW-UP VISIT: CPT

## 2020-04-01 PROCEDURE — 97760 ORTHOTIC MGMT&TRAING 1ST ENC: CPT | Mod: 58,GP

## 2020-04-01 PROCEDURE — 73610 X-RAY EXAM OF ANKLE: CPT | Mod: RT

## 2020-04-01 NOTE — ADDENDUM
[FreeTextEntry1] : I, David Strickland, acted solely as a scribe for Dr. Antwon Tyson on this date 04/01/2020 .\par All medical record entries made by the Scribe were at my, Dr. Antwon Tyson, direction and personally dictated by me on 04/01/2020 . I have reviewed the chart and agree that the record accurately reflects my personal performance of the history, physical exam, assessment and plan. I have also personally directed, reviewed, and agreed with the chart.

## 2020-04-01 NOTE — HISTORY OF PRESENT ILLNESS
Continue pancreatic enzymes    [___ Weeks Post Op] : [unfilled] weeks post op [Clean/Dry/Intact] : clean, dry and intact [Healed] : healed [Neuro Intact] : an unremarkable neurological exam [Vascular Intact] : ~T peripheral vascular exam normal [Negative Marely's] : maneuvers demonstrated a negative Marely's sign [Doing Well] : is doing well [Excellent Pain Control] : has excellent pain control [No Sign of Infection] : is showing no signs of infection [Sutures Removed] : sutures were removed [Chills] : no chills [Fever] : no fever [Nausea] : no nausea [Vomiting] : no vomiting [Erythema] : not erythematous [Discharge] : absent of discharge [Swelling] : not swollen [Dehiscence] : not dehisced [de-identified] : s/p 1. Hardware removal of right leg ankle, external fixation removal. 2. ORIF right distal tibia pilon fracture. 3. ORIF right distal fibula fracture.\par DOS 2/26/2020 [de-identified] : 29 year old female present in the office 5 weeks post op from 1. Hardware removal of right leg ankle, external fixation removal. 2. ORIF right distal tibia pilon fracture. 3. ORIF right distal fibula fracture. The patient's pain is noted to be a 3/10. the pain and swelling are noted to be the same. The patient presents ambulating in a wheelchair and has been non weight bearing. She is not currently taking any pain medication. She is taking aspirin twice per day for blood thinning purposes. No other complaints at this time.  [de-identified] : General: Alert and oriented x3. In no acute distress. Pleasant in nature with a normal affect. No apparent respiratory distress.\par The wound is intact. No evidence of any diastases or dehiscence. No surrounding erythema noted. No fluctuance. The area is warm and well perfused. The patient is able to wiggle their toes. Neurovascularly intact. \par \par Right Ankle\par Passive range of motion with 20 degree arc at the ankle. [de-identified] : 3V of the right ankle and 2V of the right tib-fib were ordered obtained and reviewed by me today, 04/01/2020 , revealed: Hardware in good position. Slight shift of anterior distal fragment compared to post op films. [de-identified] : Patient is a 29 year old female present in the office today 5 weeks s/p 1. Hardware removal of right leg ankle, external fixation removal. 2. ORIF right distal tibia pilon fracture. 3. ORIF right distal fibula fracture. The splint was taken down in the office today, and the sutures were removed. I advised the patient to continue taking aspirin twice per day as directed for blood thinning purposes. I recommend that the patient utilize a CAM boot. The patient was fitted with a CAM boot in the office today. The patient was educated about the boot wear pattern and utilization, as well as the timeframe to come out of the boot. She was also given full instructions for using the boot. I advised the patient that she can wash the wound, and to keep it clean and dry. I would like to see the patient back in the office in 4 weeks to reassess their condition and obtain new x-rays. I have addressed all the patient's concerns surrounding the pathology of their condition. The patient understood and verbally agreed to the treatment plan. All of their questions were answered and they were satisfied with the visit. The patient should call the office if they have any questions or experience worsening symptoms. \par \par ASA BID\par NWB\par Boot\par Wound instructions provided\par Compliance encouraged\par F/U 4 weeks for new xrays\par

## 2020-04-28 ENCOUNTER — APPOINTMENT (OUTPATIENT)
Dept: ORTHOPEDIC SURGERY | Facility: CLINIC | Age: 30
End: 2020-04-28
Payer: MEDICARE

## 2020-04-28 PROCEDURE — 99024 POSTOP FOLLOW-UP VISIT: CPT

## 2020-04-28 PROCEDURE — 73610 X-RAY EXAM OF ANKLE: CPT | Mod: RT

## 2020-04-28 NOTE — ADDENDUM
[FreeTextEntry1] : I, David Strickland, acted solely as a scribe for Dr. Antwon Tyson on this date 04/28/2020 .\par All medical record entries made by the Scribe were at my, Dr. Antwon Tyson, direction and personally dictated by me on 04/28/2020 . I have reviewed the chart and agree that the record accurately reflects my personal performance of the history, physical exam, assessment and plan. I have also personally directed, reviewed, and agreed with the chart.

## 2020-04-28 NOTE — HISTORY OF PRESENT ILLNESS
[___ Weeks Post Op] : [unfilled] weeks post op [Clean/Dry/Intact] : clean, dry and intact [Healed] : healed [Vascular Intact] : ~T peripheral vascular exam normal [Neuro Intact] : an unremarkable neurological exam [Negative Marely's] : maneuvers demonstrated a negative Marely's sign [Doing Well] : is doing well [No Sign of Infection] : is showing no signs of infection [Excellent Pain Control] : has excellent pain control [Chills] : no chills [Nausea] : no nausea [Fever] : no fever [Vomiting] : no vomiting [Erythema] : not erythematous [Swelling] : not swollen [Discharge] : absent of discharge [Dehiscence] : not dehisced [de-identified] : s/p 1. Hardware removal of right leg ankle, external fixation removal. 2. ORIF right distal tibia pilon fracture. 3. ORIF right distal fibula fracture.\par DOS 2/26/2020.  [de-identified] : 29 year old female present in the office 9 weeks post op from 1. Hardware removal of right leg ankle, external fixation removal. 2. ORIF right distal tibia pilon fracture. 3. ORIF right distal fibula fracture. The patient's pain is noted to be a 4/10.  The pain is noted to be worse compared to the previous visit, and the swelling is noted to be the same. The patient presents ambulating in a wheelchair. The patient presents wearing a CAM boot. She is not currently taking any pain medication. She is taking aspirin for blood thinning purposes. No other complaints at this time.  [de-identified] : General: Alert and oriented x3. In no acute distress. Pleasant in nature with a normal affect. No apparent respiratory distress.\par The wound is intact. No evidence of any diastases or dehiscence. No surrounding erythema noted. No fluctuance. The area is warm and well perfused. The patient is able to wiggle their toes. Neurovascularly intact. \par \par Right Ankle Exam\par Neutral degrees of dorsiflexion, 30 degrees of plantarflexion, 10 degrees of subtalar motion. Good active range of motion. [de-identified] : 3V of the right ankle were ordered obtained and reviewed by me today, 04/28/2020 , revealed: Hardware in good position. [de-identified] : Patient is a 29 year old female present in the office today 9 weeks s/p 1. Hardware removal of right leg ankle, external fixation removal. 2. ORIF right distal tibia pilon fracture. 3. ORIF right distal fibula fracture. I advised the patient to continue taking aspirin for blood thinning purposes twice per day as instructed. I recommend that the patient utilize 50,000 units of vitamin D. A prescription was given in the office today. I advised the patient to utilize ice and heat. I recommend the patient undergo a course of physical therapy for the right ankle 2-3 times a week for a total of 6-8 weeks. A prescription was given for the physical therapy today. I recommend that the patient receive a venus Doppler DVT test. The DVT test was ordered for the patient in the office today. I would like to see the patient back in the office in 4-6 weeks to reassess their condition. I have addressed all the patient's concerns surrounding the pathology of their condition. The patient understood and verbally agreed to the treatment plan. All of their questions were answered and they were satisfied with the visit. The patient should call the office if they have any questions or experience worsening symptoms.

## 2020-05-04 ENCOUNTER — APPOINTMENT (OUTPATIENT)
Dept: INTERNAL MEDICINE | Facility: CLINIC | Age: 30
End: 2020-05-04
Payer: SELF-PAY

## 2020-05-04 PROCEDURE — 99499D: CUSTOM

## 2020-05-07 ENCOUNTER — APPOINTMENT (OUTPATIENT)
Dept: ULTRASOUND IMAGING | Facility: CLINIC | Age: 30
End: 2020-05-07

## 2020-05-14 RX ORDER — VENLAFAXINE HYDROCHLORIDE 75 MG/1
75 CAPSULE, EXTENDED RELEASE ORAL DAILY
Refills: 0 | Status: ACTIVE | COMMUNITY
Start: 2018-01-03

## 2020-05-14 RX ORDER — NYSTATIN AND TRIAMCINOLONE ACETONIDE 100000; 1 MG/G; MG/G
100000-0.1 CREAM TOPICAL TWICE DAILY
Qty: 60 | Refills: 0 | Status: DISCONTINUED | COMMUNITY
Start: 2018-12-13 | End: 2020-05-14

## 2020-05-18 ENCOUNTER — TRANSCRIPTION ENCOUNTER (OUTPATIENT)
Age: 30
End: 2020-05-18

## 2020-05-18 ENCOUNTER — APPOINTMENT (OUTPATIENT)
Dept: ELECTROPHYSIOLOGY | Facility: CLINIC | Age: 30
End: 2020-05-18
Payer: MEDICARE

## 2020-05-18 PROCEDURE — 99205 OFFICE O/P NEW HI 60 MIN: CPT | Mod: 95

## 2020-05-18 PROCEDURE — 99214 OFFICE O/P EST MOD 30 MIN: CPT | Mod: 95

## 2020-05-18 NOTE — HISTORY OF PRESENT ILLNESS
[Home] : at home, [unfilled] , at the time of the visit. [Patient] : the patient [FreeTextEntry1] : 28 y/o F with a h/o depression, PTSD, generalized anxiety disorder, heroin\par abuse (now on suboxone), HTN, prior CVA with residual left sided weakness,\par right carotid artery dissection/stenosis, paroxysmal atrial tachycardia,\par admitted on 2/21 with right distal tib/fib fracture secondary to a syncopal episode. S/p ORIF on 2/26. She was treated with adenosine as inpatient and terminated SVT. She was discharged on metoprolol and cardizem, on telemetry she continued to have episodes of SVT and dizziness. \par Today she reports no recent dizzy episodes doing well. Continues PT for tib fracture.\par Ziopatch 4/2020 shows SR, rare PVC, no sustained arrhtyhmia, \par TTE 2/2020 shows nml LVEF

## 2020-05-18 NOTE — PHYSICAL EXAM
[Normal Appearance] : normal appearance [Skin Color & Pigmentation] : normal skin color and pigmentation [Oriented To Time, Place, And Person] : oriented to person, place, and time

## 2020-05-18 NOTE — DISCUSSION/SUMMARY
[FreeTextEntry1] : 28 y/o F with a h/o depression, PTSD, generalized anxiety disorder, heroin\par abuse (now on suboxone), HTN, prior CVA with residual weakness, syncope and recurrent SVT\par that terminated with adenosine, currently on metoprolol and cardizem and less symptoms recently. I recommended she undergoes ablation procedure as a more definitive treatment therapy for SVT and avoid longterm AV node blocking agents. Discussed with patient i/r/b/a or catheter ablation procedure; also discussed that it is reasonable to continue watchful waiting with medical therapy. \par When she plans to undergo ablation procedure will need to stop metoprolol and cardizem 5 days prior to planned procedure day\par

## 2020-06-05 ENCOUNTER — APPOINTMENT (OUTPATIENT)
Dept: INTERNAL MEDICINE | Facility: CLINIC | Age: 30
End: 2020-06-05
Payer: SELF-PAY

## 2020-06-05 PROCEDURE — 99499D: CUSTOM

## 2020-06-08 ENCOUNTER — APPOINTMENT (OUTPATIENT)
Dept: ORTHOPEDIC SURGERY | Facility: CLINIC | Age: 30
End: 2020-06-08
Payer: MEDICARE

## 2020-06-08 PROCEDURE — 99213 OFFICE O/P EST LOW 20 MIN: CPT

## 2020-06-08 PROCEDURE — 73610 X-RAY EXAM OF ANKLE: CPT | Mod: RT

## 2020-06-08 RX ORDER — VENLAFAXINE 75 MG/1
75 TABLET ORAL
Qty: 30 | Refills: 0 | Status: ACTIVE | COMMUNITY
Start: 2019-11-19

## 2020-06-08 RX ORDER — LAMOTRIGINE 100 MG/1
100 TABLET ORAL
Qty: 30 | Refills: 0 | Status: ACTIVE | COMMUNITY
Start: 2020-04-17

## 2020-06-08 RX ORDER — QUETIAPINE FUMARATE 25 MG/1
25 TABLET ORAL
Qty: 60 | Refills: 0 | Status: ACTIVE | COMMUNITY
Start: 2020-04-17

## 2020-06-08 RX ORDER — ATORVASTATIN CALCIUM 10 MG/1
10 TABLET, FILM COATED ORAL
Qty: 30 | Refills: 0 | Status: ACTIVE | COMMUNITY
Start: 2019-09-24

## 2020-06-08 NOTE — PHYSICAL EXAM
[de-identified] : General: Alert and oriented x3. In no acute distress. Pleasant in nature with a normal affect.  No apparent respiratory distress.\par \par Right ankle and foot exam\par \par Skin: Clean, dry, intact\par Inspection: No obvious malalignment, no swelling, no effusion; no lymphadenopathy\par Pulses: 2+ DP/PT pulses\par ROM: RIGHT 10 degrees of dorsiflexion, 40 degrees of plantarflexion, 10 degrees of subtalar motion. LEFT 10 degrees of dorsiflexion, 40 degrees of plantarflexion, 10 degrees of subtalar motion. \par Tenderness: No tenderness over the lateral malleolus, no CFL/ATFL/PTFL pain. No medial malleolus pain, no deltoid ligament pain. No proximal fibular pain. No heel pain.\par Stability: Negative anterior/posterior drawer.\par Strength: 5/5 TA/GS/EHL\par Neuro: In tact to light touch throughout\par Additional tests: Negative Mortons test, Negative syndesmosis squeeze test. \par \par \par  [de-identified] : Today in the office 3 views of the right ankle were obtained and reviewed by me. There is no acute fracture seen. The hardware is within acceptable alignment and the joint is maintained.

## 2020-06-08 NOTE — HISTORY OF PRESENT ILLNESS
[FreeTextEntry1] : The patient is here for followup regarding her right distal tibia and fibula surgical procedure. It is surgery February 26, 2020. She is doing well overall. No major complaints at this time. She states that her pain is a 3/10. She continues to be compliant with regards to her aspirin, 325 mg twice a day.

## 2020-06-08 NOTE — REASON FOR VISIT
[Follow-Up Visit] : a follow-up visit for [FreeTextEntry2] : s/p 1. Hardware removal of right leg ankle, external fixation removal. 2. ORIF right distal tibia pilon fracture. 3. ORIF right distal fibula fracture.\par DOS 2/26/2020.

## 2020-06-08 NOTE — BEHAVIORAL HEALTH ASSESSMENT NOTE - NS ED BHA MSE SPEECH ARTICULATION
[facility]  Report of Inpatient Wound Care Consultation     Yovany Hinojosa Patient Status:  Observation    1941 MRN [id number]   [facility] 8NE-A Attending Sharath Johnson MD   Hosp Day # 0 PCP Lucia Yepez MD     RE GRAFT N/A 7/8/2015    Performed by Canelo Rondon MD at [address] (EXTERNAL)  12/09/2019    PTA of the chronic occluded left mid to distal SFA, whcih was atherectomized , Dilated from 100% down  To Simran Monae, PT, MPT for \"Physical Therapy wound care evaluate and treat. \"  Patient seen bedside. RN here to visualize with dressing takedown. Feet warm, +dorsal pedal pulse noted, capillary refill at 4 seconds.    Observation:  Scattered Normal

## 2020-06-08 NOTE — DISCUSSION/SUMMARY
[de-identified] : At this point and would like for her to transition out of the CAM walking boot into an ASO brace while continuing with physical therapy. I provided her with a brace today. Instructions were provided. She'll continue with the anti-coagulation as discussed with aspirin 325 mg twice a day by mouth. Followup will be in 2-3 months theAll questions were answered and the patient verbalized understanding.  The patient is in agreement with this treatment plan.

## 2020-06-25 NOTE — PHYSICAL THERAPY INITIAL EVALUATION ADULT - HEALTH SCREEN CRITERIA
Cardiac device interrogation and/or reprogramming completed by industry representative, Kamryn Rothman; please refer to report located in the Cards Procedure tab.       
yes

## 2020-07-06 ENCOUNTER — APPOINTMENT (OUTPATIENT)
Dept: INTERNAL MEDICINE | Facility: CLINIC | Age: 30
End: 2020-07-06
Payer: SELF-PAY

## 2020-07-06 PROCEDURE — 99499D: CUSTOM

## 2020-07-29 NOTE — ED PROVIDER NOTE - OBJECTIVE STATEMENT
"Catie Nuñez is a 53 year old female who is being evaluated via a billable telephone visit.      The patient has been notified of following:     \"This telephone visit will be conducted via a call between you and your physician/provider. We have found that certain health care needs can be provided without the need for a physical exam.  This service lets us provide the care you need with a short phone conversation.  If a prescription is necessary we can send it directly to your pharmacy.  If lab work is needed we can place an order for that and you can then stop by our lab to have the test done at a later time.    Telephone visits are billed at different rates depending on your insurance coverage. During this emergency period, for some insurers they may be billed the same as an in-person visit.  Please reach out to your insurance provider with any questions.    If during the course of the call the physician/provider feels a telephone visit is not appropriate, you will not be charged for this service.\"    Patient has given verbal consent for Telephone visit?  Yes    What phone number would you like to be contacted at? [phone]     How would you like to obtain your AVS? Macarenahart    Subjective     Catie Nuñez is a 53 year old female who presents via phone visit today for the following health issues:    HPI    Chief Complaint   Patient presents with     Refill Request     Kimmie is requesting a tramadol refill.  She is doing acupuncture 1-2 times a week for neck and shoulder pain.  This is helping.  She is also doing stretching.  Appointment next week with Dr. Montiel.    Tramadol:  1/2-1 tablet daily as needed, 28 tablets is lasting her 28 days.  She wants to cut down on the tramadol but she does not want to cut down too quickly.    She realizes that her pain \"is more chronic.\"  She is looking for longer term ways to manage her pain.      Weight gain and weight shift.  Weight is up approximately 5 pounds.  Her " "activity level is down from her normal.  She is trying to walk more.  She feels like she is eating less.  Post menopausal x 6 years.    Patient Active Problem List   Diagnosis     CARDIOVASCULAR SCREENING; LDL GOAL LESS THAN 160     Adhesive capsulitis of shoulder     Scoliosis     Shoulder impingement     Cervicalgia     Generalized anxiety disorder     Pulmonary nodule     Migraine without aura and without status migrainosus, not intractable     Controlled substance agreement signed     ASCUS Pap + high risk HPV, + 16, + \"other\"      Papanicolaou smear of cervix with low grade squamous intraepithelial lesion (LGSIL)     Acute seasonal allergic rhinitis, unspecified trigger     Chronic pain syndrome     Cervical high risk human papillomavirus (HPV 16) DNA test positive     History reviewed. No pertinent surgical history.    Social History     Tobacco Use     Smoking status: Never Smoker     Smokeless tobacco: Never Used   Substance Use Topics     Alcohol use: Yes     Alcohol/week: 0.0 standard drinks     Comment: occ, rare      Family History   Problem Relation Age of Onset     Heart Disease Father      Asthma No family hx of      Diabetes No family hx of      Hypertension No family hx of      Cerebrovascular Disease No family hx of      Breast Cancer No family hx of          Current Outpatient Medications   Medication Sig Dispense Refill     ALPRAZolam (XANAX) 0.5 MG tablet TAKE 1/2 TABLET BY MOUTH DAILY AS NEEDED. 30 tablets to last 30 days. 30 tablet 2     Aspirin-Acetaminophen-Caffeine (EXCEDRIN PO) Take by mouth as needed       estradiol (ESTRACE) 1 MG tablet Take 0.5 tablets (0.5 mg) by mouth daily 90 tablet 3     fluticasone (FLONASE) 50 MCG/ACT nasal spray Spray 1-2 sprays into both nostrils daily 18 g 11     ibuprofen (ADVIL/MOTRIN) 600 MG tablet Take 600 mg by mouth Take 1 tablet by mouth 4 times daily if needed. Maximum of 3200 mg in 24 hours.       medroxyPROGESTERone (PROVERA) 2.5 MG tablet Take 1 " "tablet (2.5 mg) by mouth daily 90 tablet 3     Multiple Vitamins-Minerals (MULTIVITAMIN PO)        sertraline (ZOLOFT) 100 MG tablet Take 2 tablets (200 mg) by mouth daily 60 tablet 11     traMADol (ULTRAM) 50 MG tablet Take 1 tablet (50 mg) by mouth daily as needed for severe pain 28 tablets to last 30 days 28 tablet 0     Allergies   Allergen Reactions     Ondansetron Nausea and Vomiting     Sulfa Drugs Hives     Rash       Recent Labs   Lab Test 02/12/19  1642 06/18/18  1656 09/14/16  1102 02/19/15  1029 01/06/15  1120   *  --  122*  --  94   HDL 58  --  73  --  75   TRIG 66  --  111  --  63   ALT  --  21  --  20 14   CR  --  0.64  --  0.52 0.60   GFRESTIMATED  --  >90  --  >90  Non  GFR Calc   >90  Non  GFR Calc     GFRESTBLACK  --  >90  --  >90   GFR Calc   >90   GFR Calc     POTASSIUM  --  4.5  --  3.9 4.2   TSH  --   --   --   --  3.60      BP Readings from Last 3 Encounters:   03/04/20 100/70   02/07/20 126/60   01/10/20 130/88    Wt Readings from Last 3 Encounters:   07/29/20 53.5 kg (118 lb)   05/26/20 52.2 kg (115 lb)   03/04/20 52.6 kg (116 lb)           Reviewed and updated as needed this visit by Provider  Tobacco  Allergies  Meds  Problems  Med Hx  Surg Hx  Fam Hx         Review of Systems   Constitutional, HEENT, cardiovascular, pulmonary, GI, , musculoskeletal, neuro, skin, endocrine and psych systems are negative, except as otherwise noted.       Objective   Reported vitals:  Ht 1.651 m (5' 5\")   Wt 53.5 kg (118 lb)   LMP 09/01/2016   BMI 19.64 kg/m     healthy, alert and no distress  PSYCH: Alert and oriented times 3; coherent speech, normal   rate and volume, able to articulate logical thoughts, able   to abstract reason, no tangential thoughts, no hallucinations   or delusions  Her affect is normal  RESP: No cough, no audible wheezing, able to talk in full sentences  Remainder of exam unable to be completed due to " telephone visits    Diagnostic Test Results:  Labs reviewed in Epic        Assessment/Plan:    1. Midline low back pain without sciatica, unspecified chronicity  Chronic    - traMADol (ULTRAM) 50 MG tablet; Take 1 tablet (50 mg) by mouth daily as needed for severe pain 27 tablets to last 30 days  Dispense: 27 tablet; Refill: 0    Then it was a year ago.  She is admits that she still has some mental attachment to the medication, but she is using it much less than she was.  We are working diligently to titrate down on the number of tablets that she is using per month with the hope to get her to 0.  For now, she has been using 28 tablets/month and we cut her down by 1 tablet for the month.  I gave her a prescription for 27 tablets for 20-day supply.  She is to follow-up with me in 1 month.  She is going to keep her scheduled appoint with Dr. Campbell for her migraine management follow-up.  She is to follow-up with me sooner if any problems.  She is appreciative.  Megs mindset for taking her narcotic is much better    Return in about 4 weeks (around 8/26/2020) for Medication follow up.      Phone call duration:  12 minutes    Yasmin Velazquez, JEREMY CNP  This note was created using the Dragon Medical Dictating Software and therefore should be read with the understanding of the potential for subtle errors in transcription.       30 y/o female with a PMHx of anxiety, aneurysm of right internal carotid artery, breast cyst, transaminitis, CVA, depression, PTSD, substance abuse, presents to the ED sent by neurologist for further workup of stroke. Pt was seen for subacute stroke, had MRI and TTE pending. Pt AMA yesterday due to concern of Suboxone and Clonidine being held. Pt currently reports left hand, leg and facial weakness consistent with stroke sx. Denies any new symptoms or progression of current symptoms.

## 2020-08-05 ENCOUNTER — APPOINTMENT (OUTPATIENT)
Dept: INTERNAL MEDICINE | Facility: CLINIC | Age: 30
End: 2020-08-05
Payer: SELF-PAY

## 2020-08-05 PROCEDURE — 99499D: CUSTOM

## 2020-08-10 ENCOUNTER — APPOINTMENT (OUTPATIENT)
Dept: ORTHOPEDIC SURGERY | Facility: CLINIC | Age: 30
End: 2020-08-10

## 2020-08-27 ENCOUNTER — APPOINTMENT (OUTPATIENT)
Dept: ELECTROPHYSIOLOGY | Facility: CLINIC | Age: 30
End: 2020-08-27

## 2020-09-04 ENCOUNTER — APPOINTMENT (OUTPATIENT)
Dept: INTERNAL MEDICINE | Facility: CLINIC | Age: 30
End: 2020-09-04
Payer: SELF-PAY

## 2020-09-04 PROCEDURE — 99499D: CUSTOM

## 2020-09-09 ENCOUNTER — APPOINTMENT (OUTPATIENT)
Dept: ORTHOPEDIC SURGERY | Facility: CLINIC | Age: 30
End: 2020-09-09
Payer: MEDICARE

## 2020-09-09 DIAGNOSIS — M79.661 PAIN IN RIGHT LOWER LEG: ICD-10-CM

## 2020-09-09 DIAGNOSIS — F11.20 OPIOID DEPENDENCE, UNCOMPLICATED: ICD-10-CM

## 2020-09-09 PROCEDURE — 73610 X-RAY EXAM OF ANKLE: CPT | Mod: RT

## 2020-09-09 PROCEDURE — 99213 OFFICE O/P EST LOW 20 MIN: CPT

## 2020-09-09 RX ORDER — TRAZODONE HYDROCHLORIDE 100 MG/1
100 TABLET ORAL
Qty: 30 | Refills: 0 | Status: ACTIVE | COMMUNITY
Start: 2020-08-24

## 2020-09-09 NOTE — DISCUSSION/SUMMARY
[de-identified] : Today I had a lengthy discussion with the patient regarding their right ankle pain. I have addressed all the patient's concerns surrounding the pathology of their condition. I recommend that the patient utilize meloxicam with food once per day as instructed. A prescription for the meloxicam was ordered for the patient in the office today. I recommend the patient undergo a course of physical therapy for the right ankle 2-3 times a week for a total of 6-8 weeks. A prescription was given for the physical therapy today. I advised the patient to follow up with her primary care doctor/neurologist as well. I would like to see the patient back in the office in 2-3 months to reassess their condition. The patient understood and verbally agreed to the treatment plan. All of their questions were answered and they were satisfied with the visit. The patient should call the office if they have any questions or experience worsening symptoms. \par \par Answered questions brought in by patients mother on paper.

## 2020-09-09 NOTE — PHYSICAL EXAM
[de-identified] : General: Alert and oriented x3. In no acute distress. Pleasant in nature with a normal affect. No apparent respiratory distress.\par \par Right ankle and foot exam\par \par Skin: Clean, dry, intact\par Inspection: No obvious malalignment, no swelling, no effusion; no lymphadenopathy\par Pulses: 2+ DP/PT pulses\par ROM: RIGHT 10 degrees of dorsiflexion, 40 degrees of plantarflexion, 10 degrees of subtalar motion. LEFT 10 degrees of dorsiflexion, 40 degrees of plantarflexion, 10 degrees of subtalar motion. \par Tenderness: +tenderness over medial distal tibia. No tenderness over the lateral malleolus, no CFL/ATFL/PTFL pain. No medial malleolus pain, no deltoid ligament pain. No proximal fibular pain. No heel pain.\par Stability: Negative anterior/posterior drawer.\par Strength: 5/5 TA/GS/EHL\par Neuro: In tact to light touch throughout\par Additional tests: Negative Mortons test, Negative syndesmosis squeeze test.  [de-identified] : 3V of the right ankle were ordered obtained and reviewed by me today, 09/09/2020 , revealed: Hardware in good position.

## 2020-09-09 NOTE — HISTORY OF PRESENT ILLNESS
[FreeTextEntry1] : 30 year old female presenting with right ankle pain. The patient’s pain is noted to be a 7-8/10. She notes the pain is worse, and the swelling is the same compared to the previous visit. She notes about 1 month ago, she ran to her door and had increased pain. She reports having confusion since the surgery as well. The patient presents wearing a CAM boot. She notes she has been walking without the CAM boot at home, but has pain after walking long distances. She is currently taking Tylenol. No other complaints at this time. \par \par Fiance no longer in the picture\par

## 2020-09-09 NOTE — ADDENDUM
[FreeTextEntry1] : I, David Strickland, acted solely as a scribe for Dr. Antwon Tyson on this date 09/09/2020 .\par All medical record entries made by the Scribe were at my, Dr. Antwon Tyson, direction and personally dictated by me on 09/09/2020 . I have reviewed the chart and agree that the record accurately reflects my personal performance of the history, physical exam, assessment and plan. I have also personally directed, reviewed, and agreed with the chart.

## 2020-09-11 ENCOUNTER — APPOINTMENT (OUTPATIENT)
Dept: ELECTROPHYSIOLOGY | Facility: CLINIC | Age: 30
End: 2020-09-11

## 2020-10-06 ENCOUNTER — APPOINTMENT (OUTPATIENT)
Dept: INTERNAL MEDICINE | Facility: CLINIC | Age: 30
End: 2020-10-06
Payer: SELF-PAY

## 2020-10-06 PROCEDURE — 99499D: CUSTOM

## 2020-11-05 ENCOUNTER — APPOINTMENT (OUTPATIENT)
Dept: INTERNAL MEDICINE | Facility: CLINIC | Age: 30
End: 2020-11-05
Payer: SELF-PAY

## 2020-11-05 PROCEDURE — 99499D: CUSTOM

## 2020-11-13 ENCOUNTER — APPOINTMENT (OUTPATIENT)
Dept: ELECTROPHYSIOLOGY | Facility: CLINIC | Age: 30
End: 2020-11-13

## 2020-12-04 ENCOUNTER — APPOINTMENT (OUTPATIENT)
Dept: INTERNAL MEDICINE | Facility: CLINIC | Age: 30
End: 2020-12-04
Payer: SELF-PAY

## 2020-12-04 PROCEDURE — 99499D: CUSTOM

## 2021-01-05 ENCOUNTER — APPOINTMENT (OUTPATIENT)
Dept: INTERNAL MEDICINE | Facility: CLINIC | Age: 31
End: 2021-01-05
Payer: SELF-PAY

## 2021-01-05 PROCEDURE — 99499D: CUSTOM

## 2021-01-13 ENCOUNTER — APPOINTMENT (OUTPATIENT)
Dept: ORTHOPEDIC SURGERY | Facility: CLINIC | Age: 31
End: 2021-01-13
Payer: MEDICARE

## 2021-01-13 PROCEDURE — 99442: CPT | Mod: 95

## 2021-01-28 NOTE — ED PROVIDER NOTE - CHPI ED SYMPTOMS POS
1/28/2021       RE: Ladonna Sheriff  [id number]  [facility] [id number]     Dear Colleague,    Thank you for referring your patient, Ladonna Sheriff, to the [facility] [place] at [facility]. Please see a copy of my visit note below.    [facility] Dermatology Note  Encounter Date: Jan 28, 2021  Office Visit     Dermatology Problem List:  1. Eczematous rash, shave biopsy 1/28/2021, results pending   - tx: triamcinolone 0.1% PRN  2. Annular lesion on forehead  - tx: hydrocortisone 2.5% ointment, could consider biopsy     ____________________________________________    Assessment & Plan:     # eczematous rash on arms and legs  Differential diagnosis includes erythema multiforme vs drug eruption vs urticaria. Likely erythema multiforme based on the lesion appearance and association with the new Soliris medication.   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 Less likely atopic dermatitis based on appearance. Less likely tinea corporis based on appearance and symptoms  - hydrocortisone 2.5% ointment, follow up when following up for biopsy result above       Procedures Performed:   - Shave biopsy procedure note, location(s): left thigh. After discussion of benefits and risks including but not limited to bleeding, infection, scar, incomplete removal, recurrence, and non-diagnostic biopsy, written consent and photographs were obtained. The area was cleaned with isopropyl alcohol. 0.5mL of 1% lidocaine with epinephrine was injected to obtain adequate anesthesia of lesion(s). Shave biopsy at site(s) performed. Hemostasis was achieved with aluminium chloride. Petrolatum ointment and a sterile dressing were applied. The patient tolerated the procedure and no complications were noted. The patient was provided with verbal and written post  care instructions.     Follow-up: pending path results    Staff and Resident:     Ananda Hui MD PGY3,  resident     Staffed with: Dr. Eagle  ____________________________________________    CC: Skin Check (Ladonna is here today for a skin check. )    HPI:  Ms. Ladonna Sheriff is a(n) 73 year old female who presents today as a new patient for rash.    Per chart review, patient was seen in her neurology clinic and noted to have a rash so was referred to dermatology.     12/21 had new injection of Soliris (Ecluizumab). After the second infusion started to notice a rash about three weeks ago . Had two more injetions after the rash started. Her neurologist told her unsure if the rash is due to Soliris so was referred to dermatology. Rash on arms and legs.  Starts out as a hard bump, sometimes fluid filled. Once fluid goes away, left with crusty red rash. Spares the chest, abdomen, and back. Itchyy, has tried benadryl cream, just started oral benadryl which both help with itching but no other change to the rash. Solaris was the only new medication or life change at that time.     Also has a forehead rash. Has had for 2 years, slowly getting bigger. Comes and goes. She describes it as a red/pink itchy rash. If uses a face scrub it goes away and comes back within hours. Has tried topical hydrocortisone 1% cream without relief.     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 Diagnosed with with lupus after a lesion that she thought was reaction to the skin was biopsied.     Patient is otherwise feeling well, without additional concerns.    Labs:  NA    Physical Exam:  Vitals: There were no vitals taken for this visit.  SKIN: Focused examination of face, arms, and legs was performed.  - circular, erythematous, indurated, plaques ranging in size from 0.5 cm to 1 cm with central areas of white scaling in collarette appearance scattered across bilateral arms and legs   -  PAIN pink, shiny, patch on upper midline forehead.   - No other lesions of concern on areas examined.     Medications:  Current Outpatient Medications   Medication     acetaminophen (TYLENOL) 325 MG tablet     albuterol (PROAIR HFA/PROVENTIL HFA/VENTOLIN HFA) 108 (90 Base) MCG/ACT inhaler     amLODIPine (NORVASC) 5 MG tablet     apixaban ANTICOAGULANT (ELIQUIS) 5 MG tablet     B Complex-C (VITAMIN B COMPLEX W/VITAMIN C) TABS tablet     Calcium-Vitamin D 600-200 MG-UNIT TABS     FLUoxetine (PROZAC) 40 MG capsule     furosemide (LASIX) 20 MG tablet     hydrocortisone 2.5 % ointment     metoprolol succinate ER (TOPROL-XL) 25 MG 24 hr tablet     Multiple Vitamins-Minerals (MULTIVITAL PO)     potassium chloride ER (KLOR-CON M) 20 MEQ CR tablet     simvastatin (ZOCOR) 20 MG tablet     traZODone (DESYREL) 100 MG tablet     triamcinolone (KENALOG) 0.1 % external cream     trolamine salicylate (ASPERCREME) 10 % external cream     umeclidinium (INCRUSE ELLIPTA) 62.5 MCG/INH inhaler     vitamin C (ASCORBIC ACID) 500 MG tablet     Current Facility-Administered Medications   Medication     lidocaine 1% with EPINEPHrine 1:100,000 injection 3 mL      Past Medical History:   Patient Active Problem List   Diagnosis     Optic neuritis     Acute respiratory failure with hypoxia (H)     Pulmonary embolism (H)     Transverse myelitis (H)     Neuromyelitis optica (H)     Neuromyelitis optica (devic) (H)     HTN (hypertension)     Sleep disorder     Left bundle branch block     Chronic systolic heart failure (H)     Weakness     Neurological disorder     Past Medical History:   Diagnosis Date     Cerebral infarction (H)      CHF (congestive heart failure) (H)      Hypertension      Lupus (H)      Lupus (H)      Optic neuritis      Optic neuritis      Sjogren's syndrome (H)      Sjogren's syndrome (H)      Temporal arteritis (H)      TIA (transient ischemic attack)      Uncomplicated asthma        CC Caterina Franklin MD  [address] [place] [address]  [place] [address] on close of this encounter.    I talked with and examined Ladonna Sheriff and I agree with the assessment and the plan. I performed the biopsy  Procedure. Scattered red papular lesions some with interesting collarette scale.  RINA Eagle MD.

## 2021-02-06 ENCOUNTER — APPOINTMENT (OUTPATIENT)
Dept: INTERNAL MEDICINE | Facility: CLINIC | Age: 31
End: 2021-02-06
Payer: SELF-PAY

## 2021-02-06 PROCEDURE — 99499D: CUSTOM

## 2021-02-14 ENCOUNTER — OUTPATIENT (OUTPATIENT)
Dept: OUTPATIENT SERVICES | Facility: HOSPITAL | Age: 31
LOS: 1 days | End: 2021-02-14
Payer: MEDICARE

## 2021-02-14 DIAGNOSIS — Z20.828 CONTACT WITH AND (SUSPECTED) EXPOSURE TO OTHER VIRAL COMMUNICABLE DISEASES: ICD-10-CM

## 2021-02-14 DIAGNOSIS — Z98.890 OTHER SPECIFIED POSTPROCEDURAL STATES: Chronic | ICD-10-CM

## 2021-02-14 LAB — SARS-COV-2 RNA SPEC QL NAA+PROBE: SIGNIFICANT CHANGE UP

## 2021-02-14 PROCEDURE — C9803: CPT

## 2021-02-14 PROCEDURE — U0005: CPT

## 2021-02-14 PROCEDURE — U0003: CPT

## 2021-02-15 DIAGNOSIS — Z20.828 CONTACT WITH AND (SUSPECTED) EXPOSURE TO OTHER VIRAL COMMUNICABLE DISEASES: ICD-10-CM

## 2021-03-05 ENCOUNTER — APPOINTMENT (OUTPATIENT)
Dept: INTERNAL MEDICINE | Facility: CLINIC | Age: 31
End: 2021-03-05
Payer: SELF-PAY

## 2021-03-05 PROCEDURE — 99499D: CUSTOM

## 2021-03-05 RX ORDER — BUPRENORPHINE HYDROCHLORIDE 8 MG/1
8 TABLET SUBLINGUAL
Qty: 83 | Refills: 0 | Status: DISCONTINUED | COMMUNITY
Start: 2018-11-27 | End: 2021-03-05

## 2021-03-31 ENCOUNTER — RX RENEWAL (OUTPATIENT)
Age: 31
End: 2021-03-31

## 2021-04-01 NOTE — CONSULT NOTE ADULT - SUBJECTIVE AND OBJECTIVE BOX
Rn called Savannah Carbone &Y [phone] and left a voice message   Palmer Bone aware missed well visit  Mother Nely Arleen sent text message that she overslept and forgot about well visit  RN reminded mom that Maggie Brooke has 1:00 well visit  Nely Bacon going to call office and see if both can be seen at 1:00 or reschedule  RN will continue to follow  30 F with PMH PAF, depression/ anxiety/ PTSD, h/o heroin abuse now on suboxone, previous CVA (with residual L-sided deficits) R carotid A dissection/stenosis (on ASA and Statin),  who presented to [facility] with complaints of R leg pain and inability to ambulate after a fall secondary to lightheadedness/ dizziness. Xrays right tib/fib fracture comminuted intra-articular fracture of the distal tibia extending from the tibial plafond to the distal shaft; Displaced spiral fracture of the distal fibula without articular involvement and subsequently underwent external fixation on 2/21 without any complications.  Pt seen by psychiatry for med titration as there was concern that psych meds were contributing to dizziness.  Post op hospital course was significant for episodes of SVT requiring management with adenosine.  EP was consulted to evaluate for ablation, however patient was reluctant to undergo ablation at this time. She was managed on AV marjan agents Cardizem and metoprolol at this time. Electrolytes remained in normal limits.  Recommend outpatient EP follow up.  She endorsed dysuria and was treated for UTI with 3 day course of antibiotics.  She remained hemodynamically stable and medically optimized for discharge and arrived on acute rehab yesterday.  PAST MEDICAL & SURGICAL HISTORY:  Cyst, breast  Transaminitis  Aneurysm of right internal carotid artery  Cerebrovascular accident (CVA)  Substance abuse  Anxiety  Depression  PTSD (post-traumatic stress disorder)  H/O breast biopsy    MEDICATIONS  (STANDING):  ascorbic acid 500 milliGRAM(s) Oral two times a day  aspirin  chewable 81 milliGRAM(s) Oral daily  atorvastatin 10 milliGRAM(s) Oral at bedtime  buprenorphine 8 mG/naloxone 2 mG SL  Tablet 1 Tablet(s) SubLingual <User Schedule>  cefuroxime   Tablet 500 milliGRAM(s) Oral every 12 hours  cloNIDine 0.1 milliGRAM(s) Oral two times a day  diltiazem    Tablet 30 milliGRAM(s) Oral two times a day  enoxaparin Injectable 40 milliGRAM(s) SubCutaneous every 24 hours  ferrous    sulfate 325 milliGRAM(s) Oral three times a day with meals  folic acid 1 milliGRAM(s) Oral daily  gabapentin 300 milliGRAM(s) Oral two times a day  gabapentin 600 milliGRAM(s) Oral at bedtime  influenza   Vaccine 0.5 milliLiter(s) IntraMuscular once  lamoTRIgine 25 milliGRAM(s) Oral two times a day  metoprolol tartrate 75 milliGRAM(s) Oral two times a day  multivitamin 1 Tablet(s) Oral daily  QUEtiapine 50 milliGRAM(s) Oral two times a day  venlafaxine XR. 225 milliGRAM(s) Oral daily    MEDICATIONS  (PRN):  acetaminophen   Tablet .. 650 milliGRAM(s) Oral every 6 hours PRN Temp greater or equal to 38C (100.4F), Mild Pain (1 - 3)  calcium carbonate    500 mG (Tums) Chewable 3 Tablet(s) Chew every 6 hours PRN Dyspepsia  clonazePAM  Tablet 0.5 milliGRAM(s) Oral every 12 hours PRN Anxiety  melatonin 3 milliGRAM(s) Oral at bedtime PRN Sleep  ondansetron    Tablet 4 milliGRAM(s) Oral every 6 hours PRN Nausea and/or Vomiting  oxyCODONE    IR 5 milliGRAM(s) Oral every 6 hours PRN Moderate Pain (4 - 6)  senna 2 Tablet(s) Oral at bedtime PRN Constipation  traZODone 50 milliGRAM(s) Oral at bedtime PRN insomnia    Vital Signs Last 24 Hrs  T(C): 36.7 (06 Mar 2020 19:44), Max: 36.7 (06 Mar 2020 19:44)  T(F): 98.1 (06 Mar 2020 19:44), Max: 98.1 (06 Mar 2020 19:44)  HR: 85 (07 Mar 2020 06:12) (81 - 85)  BP: 95/66 (07 Mar 2020 06:12) (95/66 - 119/75)  BP(mean): --  RR: 14 (06 Mar 2020 19:44) (14 - 18)  SpO2: 97% (06 Mar 2020 19:44) (97% - 99%)    PHYSICAL EXAM:  GEN: NAD, responsive  HEENT: EOMI  Neck supple  Lungs clear  cor RSR  abd soft NT ND  no calf tenderness    (03-07 @ 05:30)                      10.8  7.38 )-----------( 327                 35.3    Neutrophils = 3.96 (53.8%)  Lymphocytes = 2.60 (35.2%)  Eosinophils = 0.18 (2.4%)  Basophils = 0.03 (0.4%)  Monocytes = 0.57 (7.7%)  Bands = --%    03-07    145  |  107  |  6<L>  ----------------------------<  118<H>  4.0   |  32<H>  |  0.70    Ca    8.5      07 Mar 2020 05:30    TPro  6.1  /  Alb  2.8<L>  /  TBili  0.2  /  DBili  x   /  AST  14  /  ALT  24  /  AlkPhos  99  03-07

## 2021-04-05 ENCOUNTER — APPOINTMENT (OUTPATIENT)
Dept: INTERNAL MEDICINE | Facility: CLINIC | Age: 31
End: 2021-04-05
Payer: SELF-PAY

## 2021-04-05 PROCEDURE — 99499D: CUSTOM

## 2021-04-07 ENCOUNTER — APPOINTMENT (OUTPATIENT)
Dept: ORTHOPEDIC SURGERY | Facility: CLINIC | Age: 31
End: 2021-04-07
Payer: MEDICARE

## 2021-04-14 ENCOUNTER — TRANSCRIPTION ENCOUNTER (OUTPATIENT)
Age: 31
End: 2021-04-14

## 2021-04-16 ENCOUNTER — APPOINTMENT (OUTPATIENT)
Dept: ORTHOPEDIC SURGERY | Facility: CLINIC | Age: 31
End: 2021-04-16
Payer: MEDICARE

## 2021-04-16 ENCOUNTER — TRANSCRIPTION ENCOUNTER (OUTPATIENT)
Age: 31
End: 2021-04-16

## 2021-04-16 PROCEDURE — 73610 X-RAY EXAM OF ANKLE: CPT | Mod: RT

## 2021-04-16 PROCEDURE — 99213 OFFICE O/P EST LOW 20 MIN: CPT

## 2021-04-16 RX ORDER — TRIAMCINOLONE ACETONIDE 0.25 MG/G
0.03 CREAM TOPICAL
Qty: 80 | Refills: 0 | Status: ACTIVE | COMMUNITY
Start: 2020-11-23

## 2021-04-16 NOTE — ADDENDUM
[FreeTextEntry1] : I, David Strickland, acted solely as a scribe for Dr. Antwon Tyson on this date 04/16/2021 .\par All medical record entries made by the Scribe were at my, Dr. Antwon Tyson, direction and personally dictated by me on 04/16/2021 . I have reviewed the chart and agree that the record accurately reflects my personal performance of the history, physical exam, assessment and plan. I have also personally directed, reviewed, and agreed with the chart.

## 2021-04-16 NOTE — HISTORY OF PRESENT ILLNESS
[FreeTextEntry1] : Lucía is a 31 yo female who presents with right ankle pain daily with walking and activities. The pain is a 6/10.  She does walk with a limp when her ankle is in pain.  She reports pain at the medial aspect of the ankle. She has tried meloxicam but when she takes it it does not agree with her and she feels "weird."  She has no other complaints.

## 2021-04-16 NOTE — DISCUSSION/SUMMARY
[de-identified] : Today I had a lengthy discussion with the patient regarding their right ankle pain. I have addressed all the patient's concerns surrounding the pathology of their condition. A discussion was had about a possible CT scan, and possible hardware removal surgery. The patient would like to hold off on hardware removal at this time. I recommend that the patient utilize NSAIDs PRN. I advised the patient to call the hospital regarding concerns about the anesthesia. I would like to see the patient back in the office PRN to reassess their condition. The patient understood and verbally agreed to the treatment plan. All of their questions were answered and they were satisfied with the visit. The patient should call the office if they have any questions or experience worsening symptoms.

## 2021-04-16 NOTE — PHYSICAL EXAM
[de-identified] : Right Ankle Exam\par \par General: Alert and oriented x3.  In no acute distress.  Pleasant in nature with a normal affect.  No apparent respiratory distress. \par Erythema, Warmth, Rubor: Negative\par Swelling: Positive\par \par ROM:\par 1. Dorsiflexion: 5 degrees\par 2. Plantarflexion: 40 degrees\par 3. Inversion: 10 degrees\par 4. Eversion: 10 degrees\par \par Tenderness to Palpation: \par 1. Lateral Malleolus: Negative\par 2. Medial Malleolus: Negative\par 3. Proximal Fibular Pain: Negative\par 4. Heel Pain: Negative\par *Positive pain along medial plate and anterior ankle.\par \par Ligament Pain:\par 1. ATFL/CFL/PTFL: Negative\par 2. Deltoid Ligaments: Negative\par \par Stability: \par 1. Anterior Drawer: Negative\par 2. Posterior Drawer: Negative\par \par Strength: 5/5 TA/GS/EHL\par \par Pulses: 2+ DP/PT Pulses\par \par Neuro: Intact motor and sensory\par \par Additional Test:\par 1. Dong's Test: Negative\par 2. Syndesmosis Squeeze Test: Negative [de-identified] : 3V of the right ankle were ordered obtained and reviewed by me today, 04/16/2021 , revealed: Hardware in good position. Bones are healed.

## 2021-05-03 ENCOUNTER — APPOINTMENT (OUTPATIENT)
Dept: INTERNAL MEDICINE | Facility: CLINIC | Age: 31
End: 2021-05-03
Payer: SELF-PAY

## 2021-05-03 PROCEDURE — 99499D: CUSTOM

## 2021-06-07 ENCOUNTER — APPOINTMENT (OUTPATIENT)
Dept: INTERNAL MEDICINE | Facility: CLINIC | Age: 31
End: 2021-06-07
Payer: SELF-PAY

## 2021-06-07 PROCEDURE — 99499D: CUSTOM

## 2021-06-08 ENCOUNTER — NON-APPOINTMENT (OUTPATIENT)
Age: 31
End: 2021-06-08

## 2021-06-09 NOTE — PROGRESS NOTE BEHAVIORAL HEALTH - NS ED BHA MSE SPEECH VOLUME
Quality 402: Tobacco Use And Help With Quitting Among Adolescents: Patient screened for tobacco and never smoked
Detail Level: Detailed
Quality 110: Preventive Care And Screening: Influenza Immunization: Influenza Immunization Ordered or Recommended, but not Administered due to system reason
Normal

## 2021-07-08 ENCOUNTER — APPOINTMENT (OUTPATIENT)
Dept: INTERNAL MEDICINE | Facility: CLINIC | Age: 31
End: 2021-07-08
Payer: SELF-PAY

## 2021-07-08 PROCEDURE — 99499D: CUSTOM

## 2021-08-19 ENCOUNTER — APPOINTMENT (OUTPATIENT)
Dept: ELECTROPHYSIOLOGY | Facility: CLINIC | Age: 31
End: 2021-08-19
Payer: MEDICARE

## 2021-08-19 ENCOUNTER — NON-APPOINTMENT (OUTPATIENT)
Age: 31
End: 2021-08-19

## 2021-08-19 VITALS
RESPIRATION RATE: 14 BRPM | SYSTOLIC BLOOD PRESSURE: 109 MMHG | DIASTOLIC BLOOD PRESSURE: 69 MMHG | WEIGHT: 190 LBS | BODY MASS INDEX: 28.14 KG/M2 | HEIGHT: 69 IN | OXYGEN SATURATION: 98 % | HEART RATE: 70 BPM

## 2021-08-19 PROCEDURE — 99215 OFFICE O/P EST HI 40 MIN: CPT

## 2021-08-19 PROCEDURE — 93000 ELECTROCARDIOGRAM COMPLETE: CPT

## 2021-08-19 RX ORDER — CEFDINIR 300 MG/1
300 CAPSULE ORAL
Qty: 20 | Refills: 0 | Status: DISCONTINUED | COMMUNITY
Start: 2020-11-20 | End: 2021-08-19

## 2021-08-19 RX ORDER — VENLAFAXINE HCL 75 MG
75 TABLET ORAL
Refills: 0 | Status: ACTIVE | COMMUNITY

## 2021-08-19 RX ORDER — LAMOTRIGINE 25 MG/1
25 TABLET ORAL
Qty: 30 | Refills: 0 | Status: DISCONTINUED | COMMUNITY
Start: 2020-04-03 | End: 2021-08-19

## 2021-08-19 RX ORDER — QUETIAPINE FUMARATE 50 MG/1
50 TABLET ORAL
Qty: 30 | Refills: 0 | Status: DISCONTINUED | COMMUNITY
Start: 2020-04-17 | End: 2021-08-19

## 2021-08-19 RX ORDER — LEVOFLOXACIN 500 MG/1
500 TABLET, FILM COATED ORAL
Qty: 5 | Refills: 0 | Status: DISCONTINUED | COMMUNITY
Start: 2020-11-14 | End: 2021-08-19

## 2021-08-19 RX ORDER — MELOXICAM 15 MG/1
15 TABLET ORAL
Qty: 30 | Refills: 2 | Status: DISCONTINUED | COMMUNITY
Start: 2020-09-09 | End: 2021-08-19

## 2021-08-19 RX ORDER — QUETIAPINE 50 MG/1
50 TABLET, FILM COATED ORAL
Refills: 0 | Status: ACTIVE | COMMUNITY

## 2021-08-19 RX ORDER — TRAZODONE HYDROCHLORIDE 50 MG/1
50 TABLET ORAL
Qty: 30 | Refills: 0 | Status: DISCONTINUED | COMMUNITY
Start: 2019-10-28 | End: 2021-08-19

## 2021-08-19 RX ORDER — BUPRENORPHINE AND NALOXONE 8; 2 MG/1; MG/1
8-2 TABLET SUBLINGUAL
Qty: 90 | Refills: 0 | Status: DISCONTINUED | COMMUNITY
Start: 2018-09-28 | End: 2021-08-19

## 2021-08-19 RX ORDER — ASPIRIN 81 MG
81 TABLET, DELAYED RELEASE (ENTERIC COATED) ORAL DAILY
Refills: 3 | Status: ACTIVE | COMMUNITY

## 2021-08-19 RX ORDER — MEDROXYPROGESTERONE ACETATE 10 MG/1
10 TABLET ORAL
Qty: 10 | Refills: 0 | Status: DISCONTINUED | COMMUNITY
Start: 2020-10-19 | End: 2021-08-19

## 2021-08-19 RX ORDER — QUETIAPINE FUMARATE 100 MG/1
100 TABLET ORAL
Qty: 30 | Refills: 0 | Status: DISCONTINUED | COMMUNITY
Start: 2021-01-05 | End: 2021-08-19

## 2021-08-19 NOTE — PHYSICAL EXAM
[Normal] : alert and oriented, normal memory [Normal Appearance] : normal appearance [Skin Color & Pigmentation] : normal skin color and pigmentation [Oriented To Time, Place, And Person] : oriented to person, place, and time

## 2021-08-27 ENCOUNTER — APPOINTMENT (OUTPATIENT)
Dept: INTERNAL MEDICINE | Facility: CLINIC | Age: 31
End: 2021-08-27
Payer: SELF-PAY

## 2021-08-27 PROCEDURE — 99499D: CUSTOM

## 2021-09-03 ENCOUNTER — RX RENEWAL (OUTPATIENT)
Age: 31
End: 2021-09-03

## 2021-09-13 NOTE — DISCUSSION/SUMMARY
[FreeTextEntry1] : 30 y/o F with a h/o depression, PTSD, generalized anxiety disorder, heroin abuse (now on suboxone), HTN, prior CVA with residual left sided weakness, right carotid artery disease stenosis, paroxysmal SVT terminated with adenosine when she was inpatient on 2/2020 for other treatments related to tib/fib fracture then. Pt had experienced dizzy spells during SVT. \par prior mcot shows no arrhythmia unclear if symptoms all related to SVT or anxiety component. Reasonable to consider EPS/possible ablation or to have ILR insertion to better monitor for arrhythmia symptoms correlation. Discussed i/r/b/a of treatment options and all questions were answered. Pt will discuss with her mom treatment options\par -if worsening palpitations come to ED \par Total length of time spent with this patient was 45 minutes and more then half of the time was spent face to face with the patient as well as counseling and coordination of care.\par

## 2021-09-13 NOTE — HISTORY OF PRESENT ILLNESS
[FreeTextEntry1] : 30 y/o F with a h/o depression, PTSD, generalized anxiety disorder, heroin abuse (now on suboxone), HTN, prior CVA with residual left sided weakness, right carotid artery stenosis, paroxysmal SVT terminated with adenosine when she was inpatient on 2/2020 for other treatments related to tib/fib fracture then. Pt had experienced dizzy spells during SVT. Today she presents in office for recurrent palpitations bothersome a/w dizziness, denies syncope. Last week she went to ED at [facility] for palpitations she was not admitted as she was in SR in ED. Pt takes metoprolol 50mg bid. \par ECG ordered in setting of SVT hx\par ECG 8/19/21 SR 65 bpm no wpw\par \par cardiac work up:\par Ziopatch 4/2020 shows SR, rare PVC, no sustained arrhythmia, \par TTE 2/2020 shows nml LVEF

## 2021-09-22 ENCOUNTER — APPOINTMENT (OUTPATIENT)
Dept: INTERNAL MEDICINE | Facility: CLINIC | Age: 31
End: 2021-09-22
Payer: SELF-PAY

## 2021-09-22 PROCEDURE — 99499D: CUSTOM

## 2021-10-08 NOTE — PATIENT PROFILE ADULT - FUNCTIONAL SCREEN CURRENT LEVEL: COMMUNICATION, MLM
Problem: Falls - Risk of:  Goal: Will remain free from falls  Description: Will remain free from falls  Outcome: Ongoing  Goal: Absence of physical injury  Description: Absence of physical injury  Outcome: Ongoing     Problem: Infection:  Goal: Will remain free from infection  Description: Will remain free from infection  Outcome: Ongoing     Problem: Safety:  Goal: Free from accidental physical injury  Description: Free from accidental physical injury  Outcome: Ongoing  Goal: Free from intentional harm  Description: Free from intentional harm  Outcome: Ongoing     Problem: Daily Care:  Goal: Daily care needs are met  Description: Daily care needs are met  Outcome: Ongoing     Problem: Pain:  Goal: Patient's pain/discomfort is manageable  Description: Patient's pain/discomfort is manageable  Outcome: Ongoing     Problem: Skin Integrity:  Goal: Skin integrity will stabilize  Description: Skin integrity will stabilize  Outcome: Ongoing     Problem: Discharge Planning:  Goal: Patients continuum of care needs are met  Description: Patients continuum of care needs are met  Outcome: Ongoing     Problem: Neurological  Goal: Maximum potential motor/sensory/cognitive function  Outcome: Ongoing     Problem: Nutrition  Goal: Optimal nutrition therapy  Outcome: Ongoing  Goal: Understanding of nutritional guidelines  Outcome: Ongoing     Problem: Musculor/Skeletal Functional Status  Goal: Highest potential functional level  Outcome: Ongoing  Goal: Absence of falls  Outcome: Ongoing 0 = understands/communicates without difficulty

## 2021-10-30 NOTE — PROGRESS NOTE BEHAVIORAL HEALTH - RECENT MEMORY
Implemented All Universal Safety Interventions:  [place] to call system. Call bell, personal items and telephone within reach. Instruct patient to call for assistance. Room bathroom lighting operational. Non-slip footwear when patient is off stretcher. Physically safe environment: no spills, clutter or unnecessary equipment. Stretcher in lowest position, wheels locked, appropriate side rails in place.
Normal

## 2021-11-05 ENCOUNTER — APPOINTMENT (OUTPATIENT)
Dept: INTERNAL MEDICINE | Facility: CLINIC | Age: 31
End: 2021-11-05
Payer: SELF-PAY

## 2021-11-05 PROCEDURE — 99499D: CUSTOM

## 2021-11-05 RX ORDER — BUPRENORPHINE AND NALOXONE 8; 2 MG/1; MG/1
8-2 TABLET SUBLINGUAL
Qty: 75 | Refills: 0 | Status: ACTIVE | COMMUNITY
Start: 2021-08-27 | End: 1900-01-01

## 2021-11-12 ENCOUNTER — APPOINTMENT (OUTPATIENT)
Dept: ELECTROPHYSIOLOGY | Facility: CLINIC | Age: 31
End: 2021-11-12

## 2021-12-30 ENCOUNTER — TRANSCRIPTION ENCOUNTER (OUTPATIENT)
Age: 31
End: 2021-12-30

## 2022-01-03 ENCOUNTER — TRANSCRIPTION ENCOUNTER (OUTPATIENT)
Age: 32
End: 2022-01-03

## 2022-01-03 NOTE — PHYSICAL THERAPY INITIAL EVALUATION ADULT - PLANNED THERAPY INTERVENTIONS, PT EVAL
10 Things You Can Do to Manage Your COVID-19 Symptoms at Home  If you have possible or confirmed COVID-19:  1. Stay home except to get medical care.  2. Monitor your symptoms carefully. If your symptoms get worse, call your healthcare provider immediately.  3. Get rest and stay hydrated.  4. If you have a medical appointment, call the healthcare provider ahead of time and tell them that you have or may have COVID-19.  5. For medical emergencies, call 911 and notify the dispatch personnel that you have or may have COVID-19.  6. Cover your cough and sneezes with a tissue or use the inside of your elbow.  7. Wash your hands often with soap and water for at least 20 seconds or clean your hands with an alcohol-based hand  that contains at least 60% alcohol.  8. As much as possible, stay in a specific room and away from other people in your home. Also, you should use a separate bathroom, if available. If you need to be around other people in or outside of the home, wear a mask.  9. Avoid sharing personal items with other people in your household, like dishes, towels, and bedding.  10. Clean all surfaces that are touched often, like counters, tabletops, and doorknobs. Use household cleaning sprays or wipes according to the label instructions.  cdc.gov/coronavirus  07/16/2021  This information is not intended to replace advice given to you by your health care provider. Make sure you discuss any questions you have with your health care provider.  Document Revised: 08/04/2021 Document Reviewed: 08/04/2021  Elsevier Patient Education © 2021 [employer] Inc.     today: gait training x 10 min

## 2022-01-28 ENCOUNTER — APPOINTMENT (OUTPATIENT)
Dept: ELECTROPHYSIOLOGY | Facility: CLINIC | Age: 32
End: 2022-01-28

## 2022-01-28 DIAGNOSIS — F41.9 ANXIETY DISORDER, UNSPECIFIED: ICD-10-CM

## 2022-01-28 DIAGNOSIS — I47.1 SUPRAVENTRICULAR TACHYCARDIA: ICD-10-CM

## 2022-01-31 ENCOUNTER — NON-APPOINTMENT (OUTPATIENT)
Age: 32
End: 2022-01-31

## 2022-01-31 NOTE — HISTORY OF PRESENT ILLNESS
[FreeTextEntry1] : 32 y/o F with a h/o depression, PTSD, generalized anxiety disorder, heroin abuse (now on suboxone), HTN, prior CVA with residual left sided weakness, right carotid artery stenosis, paroxysmal SVT terminated with adenosine when she was inpatient on 2/2020 for other treatments related to tib/fib fracture then. Pt had experienced dizzy spells during SVT. Today she presents in office for recurrent palpitations bothersome a/w dizziness, denies syncope. Last week she went to ED at [facility] for palpitations she was not admitted as she was in SR in ED. Pt takes metoprolol 50mg bid. \par ECG 8/19/21 SR 65 bpm no wpw\par \par cardiac work up:\par Ziopatch 4/2020 shows SR, rare PVC, no sustained arrhythmia, \par TTE 2/2020 shows nml LVEF

## 2022-02-18 ENCOUNTER — TRANSCRIPTION ENCOUNTER (OUTPATIENT)
Age: 32
End: 2022-02-18

## 2022-06-10 ENCOUNTER — APPOINTMENT (OUTPATIENT)
Dept: ORTHOPEDIC SURGERY | Facility: CLINIC | Age: 32
End: 2022-06-10
Payer: MEDICARE

## 2022-06-10 DIAGNOSIS — M72.2 PLANTAR FASCIAL FIBROMATOSIS: ICD-10-CM

## 2022-06-10 DIAGNOSIS — S82.831D OTHER FRACTURE OF UPPER AND LOWER END OF RIGHT FIBULA, SUBSEQUENT ENCOUNTER FOR CLOSED FRACTURE WITH ROUTINE HEALING: ICD-10-CM

## 2022-06-10 DIAGNOSIS — S82.871D DISPLACED PILON FRACTURE OF RIGHT TIBIA, SUBSEQUENT ENCOUNTER FOR CLOSED FRACTURE WITH ROUTINE HEALING: ICD-10-CM

## 2022-06-10 PROCEDURE — 99214 OFFICE O/P EST MOD 30 MIN: CPT

## 2022-06-10 PROCEDURE — 73630 X-RAY EXAM OF FOOT: CPT | Mod: 50

## 2022-06-10 PROCEDURE — 73610 X-RAY EXAM OF ANKLE: CPT | Mod: RT

## 2022-06-10 NOTE — ADDENDUM
[FreeTextEntry1] : I, Bety Roberto, acted solely as a scribe for Dr. Antwon Tyson on this date 06/10/2022.\par \par All medical record entries made by the Scribe were at my, Dr. Antwon Tyson, direction and personally dictated by me on 06/10/2022 . I have reviewed the chart and agree that the record accurately reflects my personal performance of the history, physical exam, assessment and plan. I have also personally directed, reviewed, and agreed with the chart.	\par

## 2022-06-10 NOTE — PHYSICAL EXAM
[de-identified] : General: Alert and oriented x3. In no acute distress. Pleasant in nature with a normal affect. No apparent respiratory distress.\par \par Right Ankle Exam\par Skin: Clean, dry, intact\par Inspection: Medial incisions with pain. No obvious malalignment, no swelling, no effusion; no lymphadenopathy\par Pulses: 2+ DP/PT pulses\par ROM: Neutral degrees of dorsiflexion, 40 degrees of plantarflexion, 5 degrees of subtalar motion\par Tenderness: No pain laterally. Pain to the medial incisions. No tenderness over the lateral malleolus, no CFL/ATFL/PTFL pain. No medial malleolus pain, no deltoid ligament pain. No proximal fibular pain. No heel pain.\par Stability: Negative anterior/posterior drawer.\par Strength: 5/5 TA/GS/EHL\par Neuro: In tact to light touch throughout\par Additional tests: Negative Mortons test, Negative syndesmosis squeeze test. [de-identified] : 3V of the right ankle were ordered, obtained, and reviewed by me today, 06/10/2022, revealed: Hardware in good position. Bones are healed.\par \par X-rays of the bilateral foot were ordered, obtained, and reviewed by me today, 06/10/2022, revealed: No acute fractures. \par

## 2022-06-10 NOTE — HISTORY OF PRESENT ILLNESS
[FreeTextEntry1] : The patient is a 31 year old female presenting for a follow-up evaluation of right ankle pain. She presents wearing flip-flops and is walking without assistance. She is here today due to continual pain daily with activities. She states that she is limited in her ADLs due to swelling and states that she remains in bed in the weekends. The patient reports swelling after a period of fifteen minutes. The patient cannot attribute their pain to any injury, fall, or trauma. She does also report swelling to the left foot in office today. No other complaints.

## 2022-06-27 ENCOUNTER — TRANSCRIPTION ENCOUNTER (OUTPATIENT)
Age: 32
End: 2022-06-27

## 2022-06-27 RX ORDER — ERGOCALCIFEROL 1.25 MG/1
1.25 MG CAPSULE, LIQUID FILLED ORAL
Qty: 6 | Refills: 1 | Status: ACTIVE | COMMUNITY
Start: 2021-09-03 | End: 1900-01-01

## 2022-07-18 ENCOUNTER — TRANSCRIPTION ENCOUNTER (OUTPATIENT)
Age: 32
End: 2022-07-18

## 2022-08-08 ENCOUNTER — APPOINTMENT (OUTPATIENT)
Dept: ELECTROPHYSIOLOGY | Facility: CLINIC | Age: 32
End: 2022-08-08

## 2022-09-21 ENCOUNTER — TRANSCRIPTION ENCOUNTER (OUTPATIENT)
Age: 32
End: 2022-09-21

## 2022-09-27 ENCOUNTER — TRANSCRIPTION ENCOUNTER (OUTPATIENT)
Age: 32
End: 2022-09-27

## 2022-09-28 RX ORDER — METOPROLOL TARTRATE 50 MG/1
50 TABLET, FILM COATED ORAL TWICE DAILY
Qty: 180 | Refills: 0 | Status: ACTIVE | COMMUNITY
Start: 2021-03-31 | End: 1900-01-01

## 2022-09-28 RX ORDER — DILTIAZEM HYDROCHLORIDE 30 MG/1
30 TABLET ORAL
Qty: 180 | Refills: 0 | Status: ACTIVE | COMMUNITY
Start: 2020-03-16 | End: 1900-01-01

## 2022-12-02 ENCOUNTER — TRANSCRIPTION ENCOUNTER (OUTPATIENT)
Age: 32
End: 2022-12-02

## 2022-12-02 RX ORDER — CHOLECALCIFEROL (VITAMIN D3) 1250 MCG
1.25 MG CAPSULE ORAL
Qty: 6 | Refills: 3 | Status: ACTIVE | COMMUNITY
Start: 2020-10-27 | End: 1900-01-01

## 2022-12-05 ENCOUNTER — TRANSCRIPTION ENCOUNTER (OUTPATIENT)
Age: 32
End: 2022-12-05

## 2023-02-10 NOTE — ED ADULT NURSE REASSESSMENT NOTE - NS ED NURSE REASSESS COMMENT FT1
pt received from previous RN Beulah. AOX3, sleepy, denies pain. vss. no s/s of acute distress noted. pt pending bed placements. POC reviewed with pt, verbalizes understanding. call bell in reach. 1L NS bolus currently infusing. as per Dr. Fry pt ready to go to tele floor. due for CT head in the morning. will continue to monitor Secured with \[facility] [place] [facility]\"" security

## 2023-02-20 NOTE — ED ADULT TRIAGE NOTE - WEIGHT IN KG
In order to best manage your pain of the injured area, please following the RICE guidelines:    Rest (first 24-48 hours, then use as tolerated). Do not over exert yourself  Ice the affected area for 20 min every 4-6 hours, this will help with swelling.   Compression..wear ace wrap during the day only. Do NOT wear to bed or while showering.   Elevation when in sitting or laying position.     You can also add anti-inflammatories such as naproxen or ibuprofen as directed on the bottle.  Please take this with food to avoid upsetting your stomach.    
104.3

## 2023-05-01 NOTE — BEHAVIORAL HEALTH ASSESSMENT NOTE - COMMENTS ON VIOLENCE RISK/PROTECTIVE FACTORS:
ANTICOAGULATION CLINIC REFERRAL RENEWAL REQUEST       An annual renewal order is required for all patients referred to [facility].?  Please review and sign the pended referral order for Kemi Soto.       ANTICOAGULATION SUMMARY      Warfarin indication(s)   Atrial Fibrillation    Mechanical heart valve present?  NO       Current goal range   INR: 2.0-3.0     Goal appropriate for indication? Goal INR 2-3, standard for indication(s) above     Time in Therapeutic Range (TTR)  (Goal > 60%) 35.6%       Office visit with referring provider's group within last year yes on 7/12//22       Melissa Hunt RN  [facility]     None

## 2023-07-13 NOTE — BEHAVIORAL HEALTH ASSESSMENT NOTE - SECONDARY DX2
Subjective   History of Present Illness  59-year-old female with no considerable past medical history presents to the ER due to concerns for significant increasing fatigue, malaise, shortness of breath, and generalized weakness.  Patient notes she has been feeling this way for the past several weeks.  However, patient is symptoms have worsened over the last 1 to 2 days.  Patient is obviously pale on arrival.  Patient notes no obvious concerns for fever or chills.  No cough or congestion.  However, hypoxia is noted on arrival as well.  Oxygen was applied.  Patient is awake, alert, oriented x3.  No chest pain.  No abdominal pain.  Symptoms aggravated with ambulation.  Relieved with some rest.  Vitals otherwise stable.    Review of Systems   Constitutional:  Positive for activity change, chills and fatigue.   Respiratory:  Positive for shortness of breath.    Neurological:  Positive for weakness.   All other systems reviewed and are negative.    No past medical history on file.    Allergies   Allergen Reactions    Codeine GI Intolerance       No past surgical history on file.    No family history on file.    Social History     Socioeconomic History    Marital status:            Objective   Physical Exam  Constitutional:       General: She is in acute distress.      Appearance: Normal appearance. She is ill-appearing.      Comments: Pale   HENT:      Head: Normocephalic and atraumatic.      Right Ear: External ear normal.      Left Ear: External ear normal.      Nose: Nose normal.      Mouth/Throat:      Mouth: Mucous membranes are moist.   Eyes:      Extraocular Movements: Extraocular movements intact.      Pupils: Pupils are equal, round, and reactive to light.   Cardiovascular:      Rate and Rhythm: Normal rate and regular rhythm.      Heart sounds: No murmur heard.  Pulmonary:      Effort: Pulmonary effort is normal. No respiratory distress.      Breath sounds: Normal breath sounds. Examination of the  right-lower field reveals decreased breath sounds. Examination of the left-lower field reveals decreased breath sounds. No wheezing.   Abdominal:      General: Abdomen is protuberant. Bowel sounds are normal.      Palpations: Abdomen is soft.      Tenderness: There is no abdominal tenderness.   Musculoskeletal:         General: No deformity or signs of injury. Normal range of motion.      Cervical back: Normal range of motion and neck supple.   Skin:     General: Skin is warm and dry.      Coloration: Skin is pale.      Findings: No erythema.   Neurological:      General: No focal deficit present.      Mental Status: She is alert and oriented to person, place, and time. Mental status is at baseline.      Cranial Nerves: No cranial nerve deficit.   Psychiatric:         Mood and Affect: Mood normal.         Behavior: Behavior normal.         Thought Content: Thought content normal.       Procedures           ED Course  ED Course as of 07/13/23 0152   Thu Jul 13, 2023   0014 EKG notes sinus tachycardia.  No acute ST elevation.  QTc 412.      Electronically signed by Yusuf Luo DO, 07/13/23, 12:15 AM EDT.   [SF]      ED Course User Index  [SF] Yusuf Luo DO      CT Abdomen Pelvis Without Contrast    Result Date: 7/13/2023  Impression:  1.  No bowel obstruction, free air, or abscess. 2.  Moderate ascites in the abdomen and pelvis. 3.  No evidence of acute colitis or diverticulitis. 4.  Biparietal renal calculi without hydronephrosis. 5.  Probable cholelithiasis. 6.  Small air in the nondependent portion of the urinary bladder, likely related to recent catheterization versus cystitis.  Correlate clinically.   This report was finalized on 7/13/2023 12:26 AM by Ricky Perkins MD.      CT Head Without Contrast    Result Date: 7/13/2023  Impression:  No acute intracranial abnormality.  This report was finalized on 7/13/2023 12:25 AM by Ricky Perkins MD.      CT Chest Without Contrast Diagnostic    Result Date:  7/13/2023  Impression:  1.  Marked cardiomegaly and small-to-moderate pericardial effusion with mild pulmonary vascular congestion. 2.  No focal infiltrate or pleural effusion.  This report was finalized on 7/13/2023 12:26 AM by Ricky Perkins MD.      XR Chest 1 View    Result Date: 7/12/2023  Impression:  Cardiomegaly.  Lungs clear.  This report was finalized on 7/12/2023 10:59 PM by Wilber Lopez MD.       Results for orders placed or performed during the hospital encounter of 07/12/23   COVID-19 and FLU A/B PCR - Swab, Nasopharynx    Specimen: Nasopharynx; Swab   Result Value Ref Range    COVID19 Not Detected Not Detected - Ref. Range    Influenza A PCR Not Detected Not Detected    Influenza B PCR Not Detected Not Detected   Blood Gas, Arterial With Co-Ox    Specimen: Arterial Blood   Result Value Ref Range    Site Left Radial     Dash's Test Positive     pH, Arterial 7.388 7.350 - 7.450 pH units    pCO2, Arterial 50.9 (H) 35.0 - 45.0 mm Hg    pO2, Arterial 45.8 (C) 83.0 - 108.0 mm Hg    HCO3, Arterial 30.6 (H) 20.0 - 26.0 mmol/L    Base Excess, Arterial 5.1 (H) 0.0 - 2.0 mmol/L    O2 Saturation, Arterial 79.5 (L) 94.0 - 99.0 %    Hemoglobin, Blood Gas 6.7 (C) 13.5 - 17.5 g/dL    Hematocrit, Blood Gas 20.4 (L) 38.0 - 51.0 %    Oxyhemoglobin 77.4 (L) 94 - 99 %    Methemoglobin 0.30 0.00 - 3.00 %    Carboxyhemoglobin 2.4 0 - 5 %    A-a DO2 39.2 0.0 - 300.0 mmHg    CO2 Content 32.2 22 - 33 mmol/L    Barometric Pressure for Blood Gas 725 mmHg    Modality Room Air     FIO2 21 %    Ventilator Mode NA     Note      Notified Who ER  AND RN     Notified By [id number]     Notified Time 07/12/2023 21:32     Collected by [id number]     pH, Temp Corrected      pCO2, Temperature Corrected      pO2, Temperature Corrected     Comprehensive Metabolic Panel    Specimen: Blood   Result Value Ref Range    Glucose 135 (H) 65 - 99 mg/dL    BUN 33 (H) 8 - 23 mg/dL    Creatinine 1.04 (H) 0.57 - 1.00 mg/dL    Sodium 150 (H) 136 - 145 mmol/L     Potassium 4.8 3.5 - 5.2 mmol/L    Chloride 112 (H) 98 - 107 mmol/L    CO2 30.8 (H) 22.0 - 29.0 mmol/L    Calcium 10.4 8.6 - 10.5 mg/dL    Total Protein 6.0 6.0 - 8.5 g/dL    Albumin 2.9 (L) 3.5 - 5.2 g/dL    ALT (SGPT) 35 (H) 1 - 33 U/L    AST (SGOT) 36 (H) 1 - 32 U/L    Alkaline Phosphatase 97 39 - 117 U/L    Total Bilirubin 0.4 0.0 - 1.2 mg/dL    Globulin 3.1 gm/dL    A/G Ratio 0.9 g/dL    BUN/Creatinine Ratio 31.7 (H) 7.0 - 25.0    Anion Gap 7.2 5.0 - 15.0 mmol/L    eGFR 61.3 >60.0 mL/min/1.73   Protime-INR    Specimen: Blood   Result Value Ref Range    Protime 17.6 (H) 12.1 - 14.7 Seconds    INR 1.38 (H) 0.90 - 1.10   aPTT    Specimen: Blood   Result Value Ref Range    PTT 30.6 26.5 - 34.5 seconds   Lactic Acid, Plasma    Specimen: Blood   Result Value Ref Range    Lactate 1.6 0.5 - 2.0 mmol/L   Procalcitonin    Specimen: Blood   Result Value Ref Range    Procalcitonin 0.29 (H) 0.00 - 0.25 ng/mL   High Sensitivity Troponin T    Specimen: Blood   Result Value Ref Range    HS Troponin T 26 (H) <10 ng/L   Urinalysis With Microscopic If Indicated (No Culture) - Straight Cath    Specimen: Straight Cath; Urine   Result Value Ref Range    Color, UA Yellow Yellow, Straw    Appearance, UA Cloudy (A) Clear    pH, UA 5.5 5.0 - 8.0    Specific Gravity, UA 1.019 1.005 - 1.030    Glucose, UA Negative Negative    Ketones, UA Negative Negative    Bilirubin, UA Negative Negative    Blood, UA Moderate (2+) (A) Negative    Protein, UA 30 mg/dL (1+) (A) Negative    Leuk Esterase, UA Large (3+) (A) Negative    Nitrite, UA Positive (A) Negative    Urobilinogen, UA 1.0 E.U./dL 0.2 - 1.0 E.U./dL   CBC Auto Differential    Specimen: Blood   Result Value Ref Range    WBC 20.79 (H) 3.40 - 10.80 10*3/mm3    RBC 3.69 (L) 3.77 - 5.28 10*6/mm3    Hemoglobin 6.4 (C) 12.0 - 15.9 g/dL    Hematocrit 27.0 (L) 34.0 - 46.6 %    MCV 73.2 (L) 79.0 - 97.0 fL    MCH 17.3 (L) 26.6 - 33.0 pg    MCHC 23.7 (L) 31.5 - 35.7 g/dL    RDW 24.4 (H) 12.3 - 15.4  %    RDW-SD 62.4 (H) 37.0 - 54.0 fl    MPV 10.0 6.0 - 12.0 fL    Platelets 418 140 - 450 10*3/mm3    Neutrophil % 86.5 (H) 42.7 - 76.0 %    Lymphocyte % 4.4 (L) 19.6 - 45.3 %    Monocyte % 7.7 5.0 - 12.0 %    Eosinophil % 0.0 (L) 0.3 - 6.2 %    Basophil % 0.1 0.0 - 1.5 %    Immature Grans % 1.3 (H) 0.0 - 0.5 %    Neutrophils, Absolute 17.96 (H) 1.70 - 7.00 10*3/mm3    Lymphocytes, Absolute 0.91 0.70 - 3.10 10*3/mm3    Monocytes, Absolute 1.61 (H) 0.10 - 0.90 10*3/mm3    Eosinophils, Absolute 0.00 0.00 - 0.40 10*3/mm3    Basophils, Absolute 0.03 0.00 - 0.20 10*3/mm3    Immature Grans, Absolute 0.28 (H) 0.00 - 0.05 10*3/mm3    nRBC 0.6 (H) 0.0 - 0.2 /100 WBC   Urinalysis, Microscopic Only - Straight Cath    Specimen: Straight Cath; Urine   Result Value Ref Range    RBC, UA 6-12 (A) None Seen, 0-2 /HPF    WBC, UA Too Numerous to Count (A) None Seen, 0-2 /HPF    Bacteria, UA 4+ (A) None Seen /HPF    Squamous Epithelial Cells, UA 3-6 (A) None Seen, 0-2 /HPF    Hyaline Casts, UA 3-6 None Seen /LPF    Methodology Automated Microscopy    Scan Slide    Specimen: Blood   Result Value Ref Range    Elliptocytes Slight/1+ None Seen    Hypochromia Mod/2+ None Seen    Microcytes Slight/1+ None Seen    Schistocytes Slight/1+ None Seen    Platelet Morphology Normal Normal   High Sensitivity Troponin T 2Hr    Specimen: Blood   Result Value Ref Range    HS Troponin T 24 (H) <10 ng/L    Troponin T Delta -2 >=-4 - <+4 ng/L   ECG 12 Lead Other; Sepsis   Result Value Ref Range    QT Interval 314 ms    QTC Interval 412 ms   Type & Screen    Specimen: Arm, Right; Blood   Result Value Ref Range    ABO Type O     RH type Positive     Antibody Screen Negative     T&S Expiration Date 7/15/2023 11:59:59 PM    ABO RH Specimen Verification    Specimen: Blood   Result Value Ref Range    ABO Type O     RH type Positive    Prepare RBC, 2 Units   Result Value Ref Range    Product Code Q6390L39     Unit Number [id number]     UNIT  ABO O     UNIT   RH POS     Crossmatch Interpretation Compatible     Dispense Status IS     Blood Expiration Date 202308112359     Blood Type Barcode 5100     Product Code T2334N27     Unit Number [id number]     UNIT  ABO O     UNIT  RH POS     Crossmatch Interpretation Compatible     Dispense Status XM     Blood Expiration Date 202308112359     Blood Type Barcode 5100    Green Top (Gel)   Result Value Ref Range    Extra Tube Hold for add-ons.    Lavender Top   Result Value Ref Range    Extra Tube hold for add-on    Gold Top - SST   Result Value Ref Range    Extra Tube Hold for add-ons.    Light Blue Top   Result Value Ref Range    Extra Tube Hold for add-ons.                                           Medical Decision Making  Screening positive.  Sepsis protocol initiated.  Fluids and antibiotics given.  Anemia noted on ABG.  Type and screen ordered.  Confirmed anemia on CBC with leukocytosis.  2 units of packed red blood cells ordered.  Broad-spectrum antibiotics given.  CMP unremarkable.  CT of the chest notes considerable cardiomegaly.  CT of the abdomen pelvis note ascites.  No other acute concerns noted.  CT of the head without contrast unremarkable.  Urinalysis concerning for UTI.  Likely source of sepsis.  Patient denied any concerns for blood loss such as hematemesis, hematochezia, or hematuria.  Concern for possible slight hematuria on urinalysis is noted however.    Recommend admission for further work up and treatment.  Hospitalist team consulted and made aware of the patient.  Consults and orders placed per hospitalist request.  Patient was agreeable to admission plan.  Vitals stable on admission.    Problems Addressed:  Acute UTI: complicated acute illness or injury  Sepsis with acute hypoxic respiratory failure without septic shock, due to unspecified organism: complicated acute illness or injury  Symptomatic anemia: complicated acute illness or injury    Amount and/or Complexity of Data Reviewed  Labs:  ordered.  Radiology: ordered.  ECG/medicine tests: ordered.    Risk  Prescription drug management.  Decision regarding hospitalization.        Final diagnoses:   Symptomatic anemia   Sepsis with acute hypoxic respiratory failure without septic shock, due to unspecified organism   Acute UTI       ED Disposition  ED Disposition       ED Disposition   Decision to Admit    Condition   --    Comment   Level of Care: Progressive Care [20]   Diagnosis: Symptomatic anemia [[id number]]   Admitting Physician: WAYNE MONAE [[id number]]   Certification: I Certify That Inpatient Hospital Services Are Medically Necessary For Greater Than 2 Midnights                 No follow-up provider specified.       Medication List      No changes were made to your prescriptions during this visit.            Yusuf Luo,   07/13/23 0152     Anxiety

## 2023-08-30 NOTE — BEHAVIORAL HEALTH ASSESSMENT NOTE - MUSCLE TONE / STRENGTH
Detail Level: Simple Detail Level: Generalized Detail Level: Detailed Detail Level: Zone Normal muscle tone/strength

## 2023-11-16 ENCOUNTER — TRANSCRIPTION ENCOUNTER (OUTPATIENT)
Age: 33
End: 2023-11-16

## 2024-01-09 NOTE — PROGRESS NOTE BEHAVIORAL HEALTH - NSBHATTESTSEENBY_PSY_A_CORE
Prophylactic measure
NP with telephonic supervision from Attending Psychiatrist
attending Psychiatrist without NP/Trainee
attending Psychiatrist without NP/Trainee

## 2024-02-23 NOTE — CONSULT NOTE ADULT - PROBLEM/RECOMMENDATION-1
Patient chose the date of 3/26/24. All surgery details were provided to his wife. Calendar updated. PAT ordered. Please submit case request    DISPLAY PLAN FREE TEXT

## 2024-02-28 ENCOUNTER — TRANSCRIPTION ENCOUNTER (OUTPATIENT)
Age: 34
End: 2024-02-28

## 2024-02-28 RX ORDER — CHOLECALCIFEROL (VITAMIN D3) 1250 MCG
1.25 MG CAPSULE ORAL
Qty: 6 | Refills: 1 | Status: ACTIVE | COMMUNITY
Start: 2020-04-28 | End: 1900-01-01

## 2024-04-03 ENCOUNTER — APPOINTMENT (OUTPATIENT)
Dept: NEUROLOGY | Facility: CLINIC | Age: 34
End: 2024-04-03

## 2024-04-03 NOTE — H&P ADULT - NSHPPOAPULMEMBOLUS_GEN_A_CORE
[Normal Growth] : growth [No Elimination Concerns] : elimination [Normal Development] : development [Normal Sleep Pattern] : sleep [Mother] : mother [No medication Changes] : No medication changes at this time [de-identified] : cont with drops to IH  [de-identified] : if conts to viomit up milk- go back t just formula and try again after 2 weeks  [FreeTextEntry9] : guidance handout given to parent [de-identified] : if wakes at night give water and not formula/milk  [FreeTextEntry1] : Transition to whole cow's milk. Continue table foods, 3 meals with 2-3 snacks per day. Incorporate up to 6 oz of  water daily in a sippy cup. Brush teeth twice a day with soft toothbrush. Recommend visit to dentist. When in car, keep child in rear-facing car seats until age 2, or until  the maximum height and weight for seat is reached. Put baby to sleep in own crib with no loose or soft bedding. Lower crib matress. Help baby to maintain consistent daily routines and sleep schedule. Recognize stranger and separation anxiety. Ensure home is safe since baby is increasingly mobile. Be within arm's reach of baby at all times. Use consistent, positive discipline. Avoid screen time. Read aloud to baby. Advised parent that labs done today in office are WNL offered MMR at this time- declined  [] : The components of the vaccine(s) to be administered today are listed in the plan of care. The disease(s) for which the vaccine(s) are intended to prevent and the risks have been discussed with the caretaker.  The risks are also included in the appropriate vaccination information statements which have been provided to the patient's caregiver.  The caregiver has given consent to vaccinate. no

## 2024-04-24 NOTE — CONSULT NOTE ADULT - CONSULT REQUESTED DATE/TIME
Arthrodesis Wrist (L) Operative Note     Date: 2024  OR Location: [facility] OR    Name: Sundar Gentile, : 1965, Age: 58 y.o., MRN: [id number], Sex: male    Diagnosis  Pre-op Diagnosis     * Post-traumatic arthritis of wrist, left [M19.132] Post-op Diagnosis     * Post-traumatic arthritis of wrist, left [M19.132]     Procedures  Arthrodesis Wrist  [id number] - IA ARTHRODESIS WRIST COMPLETE W/O BONE GRAFT      Surgeons   Dr. Mayur Williamson MD    Resident/Fellow/Other Assistant:  Shreyas Cosby, Haley Massey, Vijay Sanches    Procedure Summary  Anesthesia: General  ASA: II  Anesthesia Staff: Anesthesiologist: Mark Floyd DO  CRNA: Keiry Naylor, APRN-CRNA; June Jack APRN-CRNA, DNP; Jocelyn Kovacs, APRN-CRNA  Estimated Blood Loss: Less than 30 mL  Intra-op Medications:   Administrations occurring from  to  on 24:   Medication Name Total Dose   sodium chloride 0.9 % irrigation solution 1,000 mL   ceFAZolin in dextrose (iso-os) (Ancef) IVPB 2 g 2 g   HYDROmorphone (Dilaudid) injection 0.4 mg 1 mg              Anesthesia Record               Intraprocedure I/O Totals          Intake    Propofol Drip 0.00 mL    The total shown is the total volume documented since Anesthesia Start was filed.    LR infusion 1600.00 mL    ceFAZolin in dextrose (iso-os) (Ancef) IVPB 2 g 200.00 mL    Total Intake 1800 mL          Specimen:   ID Type Source Tests Collected by Time   1 : Left wrist extensor synovium Tissue [facility] Mayur Williamson MD 2024   2 : Left wrist implant Tissue [facility] EXAM Mayur Williamson MD 2024        Staff:   Circulator: Kalli Sheikh RN  Relief Circulator: Edita Castaneda RN  Relief Scrub: Blanca Noguera; Bunny HENDERSON Case  Scrub Person: Mark Chowdhury; Gabbi Couch           Indications: Sundar Gentile is an 58 y.o. male who is having surgery for Post-traumatic arthritis of wrist, left [M19.132].  He is  22-Feb-2020 15:00 identified preoperatively with his wife.  Risk benefits surgical invention once again reviewed with him.  Risks include anesthesia, infection, bleeding, injury to adjacent structures, paralysis, paresthesias, dysfunction, deformity, scarring, recurrence, nonresolution of symptoms, further dysfunction, further deformity, possible exacerbation of reflex sympathetic dystrophy/complex regional pain syndrome, possible complications of the placement, utilization, and/or need for removal of hardware, possible need for further surgical intervention among others.  We have had a very lengthy and significant discussion concerning his left wrist and his current clinical situation and treatment options.  He has had multiple surgical procedures on the left wrist with more recent previous proximal row carpectomy and some type of failed soft implant in the left wrist with now chronic pain, dysfunction, and deformity.  He also reports significant history of previous reflex sympathetic dystrophy/complex regional pain syndrome.  He understands this is a very complex clinical situation with increased risk of complications due to his multiple procedures and conditions.  We have discussed his current clinical situation and multiple treatment options at length.  He and his wife understand all of our discussions.  He wished to proceed with surgical intervention.  The appropriate consent is obtained.  The dorsal left wrist is identified and marked preoperatively with a marking pen.  He received preoperative IV antibiotics.  SCDs are in place.  The preoperative safety checklist is performed.  He is then taken the operating placed in supine position on the operating table.    The patient was seen in the preoperative area. The risks, benefits, complications, treatment options, non-operative alternatives, expected recovery and outcomes were discussed with the patient. The possibilities of reaction to medication, pulmonary aspiration, injury to  surrounding structures, bleeding, recurrent infection, the need for additional procedures, failure to diagnose a condition, and creating a complication requiring transfusion or operation were discussed with the patient. The patient concurred with the proposed plan, giving informed consent.  The site of surgery was properly noted/marked if necessary per policy. The patient has been actively warmed in preoperative area. Preoperative antibiotics have been ordered and given within 1 hours of incision. Venous thrombosis prophylaxis have been ordered including bilateral sequential compression devices    Procedure Details:    Preoperative diagnosis: Chronic pain, dysfunction, deformity left wrist with failed proximal row carpectomy and soft implant and history of reflex sympathetic dystrophy/complex regional pain syndrome    Postoperative diagnosis: Chronic pain, dysfunction, deformity left wrist with failed proximal row carpectomy and soft implant and history of reflex sympathetic dystrophy/complex regional pain syndrome with extensive scar tissue involving the skin and soft tissues and underlying structures including the extensor digitorum commonness tendons to the index middle ring and small finger as well as the extensor pollicis longus tendon with adhesions also involving the joint capsule, left wrist arthritis.    Operative procedure: Patient underwent exploration dorsal left wrist with extensor tenolysis from adhesions involving the EDC tendons in the fourth extensor compartment and the EPL tendon in the third extensor compartment, arthrotomy, removal of soft implant, left wrist arthrodesis/fusion with Synthes plate and screw system and autologous bone graft from distal radius.    Attending Surgeon Dr. Kel Williamson MD    Assistants: Shreyas Cosby, Haley Muhammad, Vijay Arellano    Anesthesia: General endotracheal    Estimated blood loss: Less than 30 cc    Complications: None    Condition: Stable    Specimen: Sent to  pathology for further evaluation    Implants: Synthes wrist fusion plate and screw system and 1/4 inch Penrose drain    Details of the procedure:  He is identified preoperatively with his wife.  Risk benefits surgical intervention once again reviewed with him at length.  Risks include anesthesia, infection, bleeding, injury to adjacent structures, paralysis, paresthesias, dysfunction, deformity, scarring, recurrence, nonresolution of symptoms, further dysfunction, further deformity, possible exacerbation of reflex sympathetic dystrophy/complex regional pain syndrome, possible complications of the placement, utilization, and/or need for removal of hardware, possible need for further surgical intervention among others.  We have had a very lengthy and significant discussion concerning his left wrist and his current clinical situation and treatment options.  He has had multiple surgical procedures on the left wrist with more recent previous proximal row carpectomy and some type of failed soft implant in the left wrist with now chronic pain, dysfunction, and deformity.  He also reports significant history of previous reflex sympathetic dystrophy/complex regional pain syndrome.  He understands this is a very complex clinical situation with increased risk of complications due to his multiple procedures and conditions.  We have discussed his current clinical situation and multiple treatment options at length.  He and his wife understand all of our discussions.  He wished to proceed with surgical intervention.  The appropriate consent is obtained.  The dorsal left wrist is identified and marked preoperatively with a marking pen.  He received preoperative IV antibiotics.  SCDs are in place.  The preoperative safety checklist is performed.  He is then taken the operating placed in supine position on the operating table.  After adequate induction of general endotracheal anesthesia patient's left upper extremity is cleaned  prepped and draped in usual sterile fashion a proximal upper extremity tourniquet applied.  The entire procedure performed under loupe magnification and with the intraoperative assistance mini C-arm fluoroscopic imaging.  The operating room staff and patient had appropriate lead gown and radiation badges in place.  The timeout procedure was performed.  Preintervention mini C-arm fluoroscopic images were obtained.  The left upper extremity was then elevated and the proximal upper extremity tourniquet was inflated.  The left hand and wrist were then repositioned on the hand table and palm down position.  The appropriate incision site dorsum of the left hand wrist and forearm is marked in longitudinal fashion incorporating the previous scar beginning at the distal third of the index middle finger interosseous space and proceeding proximally across the wrist and Demarco's tubercle and ending over the radius at the proximal border of the abductor pollicis longus muscle.  The incision is then made with a scalpel.  Subcutaneous tissue was dissected with tenotomy scissors.  Excellent hemostasis is maintained with electrocautery.  The appropriate skin flaps are dissected raised elevated and retracted.  A radially based skin skin and subcutaneous flap containing the dorsal sensory radial nerve and some branches is elevated off the extensor tendons and extensor retinaculum.  There is significant scar tissue in this area.  The flap was dissected raised elevated and retracted with large wheat [place] retractor.  The extensor retinaculum is demonstrates to be significantly encased in scar tissue and this involves the extensor digitorum commonness tendons as well as the EPL tendon involving the dorsal third and fourth extensor compartments.  Further dissection with tenotomy scissors is performed.  The third dorsal compartment is opened longitudinally and the extensor pollicis longus tendon and muscle are elevated and retracted  radially.  The sheath distal to the retinaculum is incised to allow adequate retraction of the EPL tendon.  The extensor digitorum commonness tendons in the fourth compartment are further identified and dissected with extensor tenolysis and excision of adhesions and extensor synovium.  This is individually dissected excised removed and passed off table to be sent to pathology for further evaluation.  The extensor tendons and the fourth compartment were then further dissected and retracted ulnarly exposing the dorsal wrist capsule.  The periosteum over the third metacarpal is incised in the dorsal aspect of the metacarpal was exposed subperiosteally.  The radial and ulnar borders of the metacarpal are left undisturbed.  An incision was then made through the dorsal wrist capsule and scar tissue and extended proximally to the distal radius along its dorsal surface.  Further dissection with tenotomy scissors and the scalpel was performed to dissect the joint capsule from the underlying capitate and distal radius and this allowed the second dorsal compartment to be elevated and radially retracted as well as further elevation and retraction of the fourth dorsal compartment ulnarly.  Some type of intentionally placed soft implant was identified in the wrist joint and this was circumferentially dissected and excised, removed, passed off table to be sent to pathology for further evaluation. The joint surfaces to be included in the fusion were then exposed and decorticated down to cancellous bone.  The carpometacarpal joint of the long finger and the capitate as well as the distal radial joint are included.  Further exposure to the base of the third metacarpal and carpometacarpal joint is accomplished with excision dorsal surface of the capitate and with osteotome and oscillating saw and these cortical cancellous shavings are collected and preserved in saline solution to be used later in the case.  The articular surface of  the distal radius is also decorticated with osteotomes and the oscillating saw as well as removal of the Demarco's tubercle.  A transverse slot was then cut and the base of the long finger metacarpal to allow appropriate positioning of the capitate.  The Synthes rep Gilberto is present throughout the entire case.  Once the appropriate position is prepared a standard issue Synthes wrist fusion plate is obtained and set into position.  The cancellous and saved autologous bone graft material is then packed into the space between the third metacarpal and the capitate as well as the space between the capitate and the distal radius.  The appropriate standard tissue Synthes wrist fusion plate was then chosen and positioned over the dorsal third metacarpal and the center of the distalmost hole marked on the bone.  The hole was drilled with a 2 mm drill and a 2.7 mm cortical screw is placed with the appropriate depth gauge measurement of 12.  This is performed and confirmed with mini C-arm fluoroscopic imaging.  The proximal plate was then aligned over the mid dorsal aspect of the distal radius.  The plate is secured to the radius by placing the screws in a neutral position on the plate being in the second most distal radius hole drilling with a 2.5 mm drill and drill guide the depth being measured and then appropriate 18 mm locking screw is placed without difficulty.  The remaining screw holes were drilled appropriately using 2 mm drill bit and guide and placing the distal screws in the third metacarpal and the capitate once again utilizing the appropriate drill, drill guide, and depth gauge the appropriate length screws were chosen and placed with locking screws in 14 mm, 18 mm, and 20 mm lengths.  Further screws were then placed in the proximal plate utilizing the 2.7 mm Drill and guide and placing the appropriate 3.5 mm diameter locking screws of 16 mm and 18 mm x 2.  At this point in the case the proximal upper extremity  "tourniquet was released at 120 minutes.  The entire hand wrist and forearm became pink, warm, capillary refill 1 to 2 seconds.  Excellent hemostasis is maintained with electrocautery.  Further copious irrigation with saline solution was performed.  This was performed under mini C-arm fluoroscopic imaging utilizing the appropriate Synthes equipment.  Excellent screw placement and fixation is accomplished and confirmed with mini C-arm fluoroscopic imaging.  The case proceeded utilizing osteotomes and further autologous bone graft was harvested from the dorsal aspect of the distal radius with a rectangle cut and the cortical \"cancellous bone graft from the distal aspect of the distal radius the bone graft to sit distally to fit into the slot previously cut in the base of the long finger as well as packed into the space between the capitate and distal radius and to fill the space under the plate and capitate area.  Once the tourniquet had been released for over 30 minutes the left upper extremity was elevated and the proximal upper extremity tourniquet was reinflated.  Further autologous cancellous bone graft is obtained and packed into these areas.  The joint capsule and periosteum were then closed over the plate and screws utilizing several figure-of-eight 3-0 Ethibond sutures for excellent coverage of the plate and screws.  The posterior interosseous nerve was not identified or encountered during the procedure.  The extensor pollicis longus tendon is left radially transposed.  The extensor retinaculum is reconstructed and repaired with a few figure-of-eight sutures of 3-0 Ethibond and 2-0 Ethibond.  The proximal upper extremity tourniquet was then released.  Total tourniquet time at this point was 59 minutes.  The entire hand wrist and forearm became pink, warm, capillary refill 1 to 2 seconds.  Excellent hemostasis is maintained with electrocautery.  Further copious irrigation with saline solution is performed.  " Excellent hemostasis is demonstrated.  The quarter-inch Penrose drain was then cut longitudinally shaved trimmed and fashioned and placed in the wound several simple erupted inverted deep dermal sutures of 4-0 Monocryl were placed to approximate the wound edges.  The skin edges then reapproximated with several simple erupted 4-0 nylon sutures.  Penrose drain was secured at the proximal and distal ends with the 4-0 nylon suture.  The operative site area was further cleaned and dried.  Excellent hemostasis is demonstrated.  The wrist joint area was injected in local circumferential fashion with approximately 8 cc of one-to-one mixture 2% plain lidocaine and 0.25% plain Marcaine for some pain relief.  The left hand wrist and forearm were further cleaned and dried.  Bacitracin, Xeroform, and the appropriate bulky protective immobilizing sterile dressing and plaster splint were then fashioned and applied.  Distal tips of all 5 digits remained pink, warm, capillary refill 1 to 2 seconds after dressing and splint application.  He tolerated procedure very well.    Before he awoke from anesthesia the anesthesia department then performed left axillary block by the anesthesiologist with out any difficulty.  He awoke from anesthesia without difficulty and is transferred to the recovery room in stable condition.        Complications:  None; patient tolerated the procedure well.    Disposition: PACU - hemodynamically stable.  Condition: stable         Drains and/or Catheters: Quarter-inch Penrose drain    Tourniquet Times:     Total Tourniquet Time Documented:  Arm - Upper (Left) - 59 minutes  Total: Arm - Upper (Left) - 59 minutes      Implants: Quarter-inch Penrose drain  Implants       Type Name Action Serial No.      Screw SCREW, CORTEX, SELF TAPPING, 3.5MM X 18MM - SNA - [id number] Implanted NA     Screw SCREW LOCKING 3.5 W/RECESS 16 - SNA - [id number] Implanted NA     Screw SCREW LOCKING 3.5 W/RECESS 18 - SNA - [id number]  Implanted NA     Screw SCREW, CORTEX SELF TAP W/T8 RECESS 2.7MM X 14MM, SS - SNA - [id number] Implanted NA     Screw SCREW, CORTEX SELF, 2.7MM X 12MM - SNA - [id number] Implanted NA     Screw SCREW, LOCKING 2.7 X 14 SELF TAP - SNA - [id number] Implanted NA     Screw SCREW, LOCKING 2.7 X 18 SELF TAP - SNA - [id number] Implanted NA     Screw SCREW, LOCKING 2.7 X 20 SELF TAP - SNA - [id number] Implanted NA     Plate lcp wrist fusion standard bend plate Implanted NA              Findings: Significant scar tissue involving skin, soft tissue, extensor tendons, wrist capsule and other underlying structures, wrist arthritis and some type of soft implant.  Additional Details: Axillary block performed by anesthesia while the patient is still under anesthesia without difficulty in the operating room    Attending Attestation: I was present and scrubbed for the entire procedure.    Mayur Williamson  Phone Number: [phone]

## 2024-04-29 ENCOUNTER — TRANSCRIPTION ENCOUNTER (OUTPATIENT)
Age: 34
End: 2024-04-29

## 2024-08-06 NOTE — PROGRESS NOTE BEHAVIORAL HEALTH - NS ED [facility] MED ROS EYES
----- Message from Herbie Rivero MD sent at 8/6/2024  5:58 PM CDT -----  UA today:   No proteinuria or microscopic hematuria.  There is still pyuria with 6-10 leukocytes, but this is less than the prior UA  which had 11-25 leukocytes.  Since patient was asymptomatic at the office visit today, await culture results.   No complaints

## 2024-11-30 NOTE — ED ADULT NURSE NOTE - CHIEF COMPLAINT QUOTE
pt sent in by micheal ortega  for stroke ( MRI this morning) dr. bhatt symptoms have been for two months, nothing new today pt just went for an MRI, left sided facial numbness and left hand numbness . Pt has slowed speech 2* psych medications Effexor, lamictal, and neurotin 6 (moderate pain)

## 2025-01-03 NOTE — BEHAVIORAL HEALTH ASSESSMENT NOTE - RISK ASSESSMENT
age(85 years old or older)/other IV intact Low Acute Suicide Risk LOW RISK     ACUTE RISK FACTORS: acute medical comorbidities    CHRONIC RISK FACTORS: anxiety, panic, PTSD, trauma, depression, prior in-patient hospitalization    PROTECTIVE FACTORS: no suicidal ideation/intent/plan, future oriented, supportive family, access to healthcare, no suicide attempt, medication compliance, motivation for out-patient treatment.    Patient symptoms not indicating imminent risk for harm to self; not warranting involuntary in-patient hospitalization.

## 2025-05-12 NOTE — BEHAVIORAL HEALTH ASSESSMENT NOTE - NS ED [facility] REVIEW OF ED CHART VITAL SIGNS REVIEWED
Subjective   Patient ID: Judi is a 74 year old female who presents for her Subsequent Annual Medicare Wellness Visit.    Chief Complaint   Patient presents with    Office Visit    Medicare Wellness Visit       Patient Answered Medicare HRA Screening Questions  1.) Do you have an Advance directive, living will, or power of  for health care document that contains your wishes for end of life care? No    2.) Would you like additional information on advance directives? Yes    3.) During the past 4 weeks, how would you rate your health? Good    4.) During the past 4 weeks, what was the hardest physical activity you could do for at least 2 minutes? Moderate    5.) Do you do moderate to strenuous exercise (brisk walk) for about 20 minutes for 3 or more days per week? No, but I am active with housework or yard work (vacuuming, raking, etc.)    6.) How many servings of the following would you typically eat in a day?  Fruits and Vegetables (1 serving = 1 piece of fruit, 1/2 cup fruits or vegetables) 2-3 per day  High Fiber / Whole Grain Foods (1 serving = 1 cup cold cereal, 1/2 cup cooked cereal, 1 slice bread) 2-3 per day  Fried or High Fat Foods (1 serving = 1 Noguera, French Fries, chips, doughnut, fried chicken/fish) 1 per day  Sugar Sweetened Beverages (1 serving = 1 can or 12 oz cup of soda or juice) 1 per day    7.) Have you had a fall two or more times in the past year? No    8.) During the past 4 weeks, has your physical and emotional health limited your social activities with family, friends, neighbors, or other groups? Not at all    9.) Do you feel safe at home? Yes    10.) How often do you have trouble taking medicines the way you have been told to take them? I always take my prescribed medications    11.) Over the past 4 weeks how often have you experienced the following?  Bladder Control problems - (urine leaking)Sometimes  Bowel control problems - Sometimes  Teeth or Denture Problems - Seldom  Bodily  pain - Seldom  Tiredness or Fatigue - Sometimes  Feeling stressed or overwhelmed - Seldom  Anger or frustration - Seldom  Problems with your hearing - Seldom  Problems using the telephone - Never  Problems with your balance - Never  Driven/Ridden in a car without wearing your seatbelt - Never  Sexual Problems - Seldom    12.) Do you need help with any of the following activities (bathing, grooming, feeding, toileting, getting out of bed/chairs)? None of these apply to me    13.) Do you need help with any of the following activities (going to places outside of walking distance, shopping, housework/laundry, preparing meals, handling own money)? None of these apply to me    14.) During the past 4 weeks, was someone available to help if you needed and wanted help? Yes, as much as I wanted    15.) How confident are you that you can control and manage most of your health problems? Very confident    Judi has a past medical history of Acute pain of right knee (06/08/2022), Cataract, bilateral (06/19/2018), Dermatitis (06/08/2022), Dry eye syndrome, Elevated hemoglobin A1c (10/30/2017), Essential (primary) hypertension, Glaucoma (increased eye pressure), Hearing loss of both ears due to cerumen impaction (01/13/2021), Medicare annual wellness visit, subsequent (02/06/2021), Renal insufficiency (02/09/2022), Thyroid disease, and Weight loss (09/09/2022).    She has no past medical history of Allergy, Anemia, Anxiety, Arthritis, COPD (chronic obstructive pulmonary disease)  (CMD), Depressive disorder, Diabetes mellitus  (CMD), HIV disease  (CMD), Meningitis (CMD), Neuromuscular disorder  (CMD), Osteoporosis, Sickle cell anemia  (CMD), or Tuberculosis.    Judi has Elevated hemoglobin A1c; Acquired hypothyroidism; Morbid obesity with BMI of 40.0-44.9, adult  (CMD); Cataract, bilateral; Chronic hip pain, bilateral; Other specified glaucoma; Benign hypertension with chronic kidney disease, stage II; Former smoker; Medicare  annual wellness visit, subsequent; Tubular adenoma; Influenza vaccine needed; Stage 2 chronic kidney disease; Numbness and tingling of right leg; Numbness of fingers of both hands; and Needs flu shot on their problem list.    Judi has a past surgical history that includes Thyroid surgery (01/01/1987) and foot/toes surgery proc unlisted.    Her family history includes Bilateral breast cancer in her mother; Cancer in her mother; Diabetes in her mother; Hypertension in her brother; Thyroid in her sister.    Judi reports that she quit smoking about 8 years ago. Her smoking use included cigarettes. She has never used smokeless tobacco. She reports current alcohol use of about 1.0 standard drink of alcohol per week. She reports that she does not use drugs.    Judi is allergic to lisinopril, tetracycline, and tetracyclines & related.    Judi   Current Outpatient Medications   Medication Sig Dispense Refill    amLODIPine (NORVASC) 5 MG tablet Take 1 tablet by mouth daily. 90 tablet 3    losartan (COZAAR) 50 MG tablet Take 1 tablet by mouth daily. 90 tablet 3    hydroCHLOROthiazide 25 MG tablet Take 1 tablet by mouth daily. 90 tablet 3    levothyroxine 150 MCG tablet Take 1 tablet by mouth daily. 90 tablet 3    fluticasone (FLONASE) 50 MCG/ACT nasal spray Spray 2 sprays in each nostril daily. 16 mL 5    dorzolamide-timolol (COSOPT) 22.3-6.8 MG/ML ophthalmic solution Place 1 drop into both eyes in the morning and 1 drop in the evening.       No current facility-administered medications for this visit.       [facility]/pharmacy #[id number] - [facility] [address] [place] [address]  [address] [place] [id number]  Phone: [phone] Fax: [phone]    [facility] #[id number] - [place], [place] - [address] [facility]  [address] [facility] [address]  Phone: [phone] Fax: [phone]      Patient Care Team:  Apryl Sanabria MD as PCP - General    Screenings  ADLs  ADL Before Admission:  Independent  ADL Needs Assist: No  ADL Score: 12    Short of Breath or Fatigue with ADL's: No  Recent Decline in ADL's: No    Mobility Assist Devices: None  Sensory Support Devices: Eyeglasses, Dentures        Home Safety: Home safety measures discussed    Depression PHQ2/9:  Little interest or pleasure in activity?: (Patient-Rptd) (P) Several days  Feeling down, depressed or hopeless?: (Patient-Rptd) (P) Not at all  Initial depression screening score:: (Patient-Rptd) (P) 1        Depression assessment/plan: Depression screening is negative no further plan needed.     Cognitive/Functional Status:  Because of a physical, mental, or emotional condition, do you have serious difficulty concentrating, remembering or making decisions? : No  Do you have serious difficulty walking or climbing stairs?: No  Do you have difficulty dressing or bathing?: No  Because of a physical, mental, or emotional condition, do you have difficulty doing errands alone?: No  Patient was given repeat back words from version:: 1 - Banana Loyall Chair  Patient able to fill in the clock face with 10 minutes past 11 o'clock?: Yes, clock is correct  Cognitive Assessment: No evidence of cognitive dysfunction by direct observation.    STEADI-Fall Risk  Assessment of Fall Risk (STEADI) for Patients equal/greater than 18 Years of Age: Yes  I have fallen in the past year: No  I Use or Have Been Advised to Use a Cane or Walker to Get Around Safely: No  Sometimes I Feel Unsteady When I am Walking: No  I Steady Myself by Holding Onto Furniture When Walking at Home: No  I am Worried About Falling: Yes  I Need to Push With My Hands to Stand Up from a Chair: No  I Have Some Trouble Stepping Up Onto a Curb: No  I Often Have to Rush to the Toilet: No  I Have Lost Some Feeling in My Feet: No  I Take Medicine That Sometimes Makes Me Feel Lightheaded or More Tired Than Usual: No  I Take Medicine to Help Me Sleep or Improve My Mood: No  I Often Feel Sad or  Depressed: No  STEADI Fall Score: 2  STEADI Score Equal To/Greater Than 4: Yes    Hearing Impairment:    Vision/Hearing Screening:   Vision Screening    Right eye Left eye Both eyes   Without correction      With correction   20/30   Hearing Screening - Comments:: Passed finger rub test in R ear     Advanced care planning: Discussed with patient. Patient understand need and will complete forms.  Forms provided    Needed Screening/Treatment:   Immunizations reviewed and patient needs: COVID-19, Herpes Zoster, and TDAP     Needed follow up:  None    [facility]  For annual Medicare wellness subsequent visit  Single , lives alone and totally idependant ,and drives  has no children advance directive sister Betty Og , niece Stone Aguirre .  Power of  form not done yet.   Had urinary urgency   Has tingling in the hands   Review of Systems   Constitutional:  Negative for activity change, appetite change, fatigue, fever and unexpected weight change.   HENT:  Negative for congestion, ear pain, hearing loss, nosebleeds, postnasal drip, sinus pain, sneezing, sore throat, tinnitus, trouble swallowing and voice change.    Eyes:  Negative for pain and visual disturbance.   Respiratory:  Negative for cough, shortness of breath and wheezing.    Cardiovascular:  Negative for chest pain, palpitations and leg swelling.   Gastrointestinal:  Negative for abdominal pain, blood in stool, constipation, diarrhea, nausea and vomiting.   Endocrine: Negative for cold intolerance, heat intolerance, polydipsia, polyphagia and polyuria.   Genitourinary:  Positive for frequency. Negative for decreased urine volume, difficulty urinating, dysuria, hematuria and urgency.   Musculoskeletal:  Negative for arthralgias, gait problem and myalgias.   Skin:  Positive for color change. Negative for rash.   Allergic/Immunologic: Negative for environmental allergies and food allergies.   Neurological:  Positive for numbness. Negative for dizziness,  weakness and headaches.   Hematological:  Negative for adenopathy. Does not bruise/bleed easily.   Psychiatric/Behavioral:  Negative for behavioral problems, dysphoric mood, hallucinations and sleep disturbance. The patient is not nervous/anxious.        Objective   Vitals: /70 (BP Location: LUE - Left upper extremity, Patient Position: Sitting, Cuff Size: Large Adult)   Pulse 68   Temp 97.2 °F (36.2 °C) (Oral)   Resp 18   Ht 5' 2\" (1.575 m)   Wt 106.8 kg (235 lb 5.5 oz)   BMI 43.04 kg/m²   BSA 2.05 m²   Body mass index is 43.04 kg/m².  BMI ASSESSMENT/PLAN:  BMI is in obese range.    Caloric restriction and Low carbohydrate diet        Physical Exam  Vitals:    05/12/25 0808   BP: 134/70   BP Location: LUE - Left upper extremity   Patient Position: Sitting   Cuff Size: Large Adult   Pulse: 68   Resp: 18   Temp: 97.2 °F (36.2 °C)   TempSrc: Oral   SpO2: 96%   Weight: 106.8 kg (235 lb 5.5 oz)   Height: 5' 2\" (1.575 m)   PainSc:  0     Nursing note and vitals reviewed  Constitutional:  oriented to person, place, and time.  appears well-developed and well-nourished.   HENT:   Head: Normocephalic and atraumatic.   Ears: External ears normal. No cerumen impaction, tympanic membrane normal  Nose: Nose normal.  No hypertrophy or edema of turbinates.  No sinus tenderness  Mouth/Throat: Oropharynx is clear and moist.   Eyes: EOM are normal. Pupils are equal, round, and reactive to light.   Neck: Neck supple. No JVD present. No tracheal deviation present. No thyromegaly present.   Lungs: Clear with good air entry, no wheezes or rales.  Cardiovascular: Normal rate, regular rhythm and normal heart sounds. Exam reveals no gallop.   No murmur heard.  Abdomen: Not distended, no tenderness, no mass, bowel sounds normal.  Musculoskeletal: Normal range of motion. no extremity edema.   Neurological:  oriented to person, place, and time.   Speech normal , gait normal  No sensory or motor deficit noted   Skin: Skin is dry.  No rash.  Has a linear raised hyperpigmented keloid like nodular skin lesion present on right side of lower chest.  Psychiatric:has a normal mood and affect.     Assessment   Problem List Items Addressed This Visit          Medium    Benign hypertension with chronic kidney disease, stage II  Blood pressure controlled  Low-sodium diet advised  Avoid nonsteroidal anti-inflammatory medications  Reduce caffeine intake  Regular exercise advised  Monitor blood pressure at home  Continue current medications       Stage 2 chronic kidney disease  Avoid nonsteroidal anti-inflammatory medications  Low-sodium diet advised  Better blood pressure control advised  Will repeat lab       Relevant Orders    CBC with Automated Differential       Low    Elevated hemoglobin A1c  Diet, exercise and weight reduction discussed.    Acquired hypothyroidism  Continue present manage treatment, will monitor TSH labs as ordered    Morbid obesity with BMI of 40.0-44.9, adult  (CMD)  Detailed nutritional and exercise counseling done and encouraged weight reduction    Medicare annual wellness visit, subsequent  Plan:   Advance Care Planning Addressed: Advance care planning and need for advance Directives, and DNR discussed with the patient.  Power of  form given   Total time spent discussing Advance Care Planning 5 minutes.   Health Maintenance (discussed and/or reviewed):   - Care Coordination: Done  - Patient Education: Done.    - Multiple ER/Acute Admissions: Discussed.    - Transportation Needs: Not needed, discussed options.   - Fall Risks Identified: None, all precautions discussed.  - Evaluate for respiratory program: Not applicable.    - Medication needs: Addressed.    - Social Concerns: None    Pain Screening (discussion and follow-up).   - Patient Education done, continue current medication.  Options were discussed, regarding pain management.  Patient Education (taught and/or recommended):.   - Patient educated on disease process,  etiology & prognosis.    - Proper usage and side effects of medications reviewed & discussed.    - Medical compliance with plan discussed and risks of non-compliance reviewed.    - Return to clinic as clinically indicated as discussed with patient who verbalized understanding of & agreement with the plan.     Nutrition and exercise:  Detailed nutritional and exercise counseling done, patient handout given      Numbness of fingers of both hands  EMG ordered    Relevant Orders    EMG/Nerve Conduction Study    Vitamin B12 And Folate     Other Visit Diagnoses         Encounter for preventive care    -  Primary  Immunization discussed  Home Safety measures discussed  Weight goal and exercise recommendations discussed   Decrease caffeine intake  Oral hydration, balanced heart healthy diet and exercise discussed  Multivitamin and vitamin D discussed  Self breast exam taught and recommended monthly  Risk of alcohol smoking and recreational drug use discussed      Relevant Orders    [address] - 65+ Follow up preventive, established patient (Completed)      Numbness of both lower extremities      B12 and EMG ordered    Relevant Orders    EMG/Nerve Conduction Study    Vitamin B12 And Folate      Overactive bladder     GYN referral given, has urgency incontinence    Relevant Orders    Urinalysis With Microscopy & Culture If Indicated    SERVICE TO OB GYN      Skin lesion  Dermatology referral given    Relevant Orders    SERVICE TO DERMATOLOGY            Schedule follow up: in 3 months    Screening schedule/checklist for next 5-10 years:  Health Maintenance Due   Topic Date Due    Colonoscopy  Never done    DTaP/Tdap/Td Vaccine (1 - Tdap) Never done    Shingles Vaccine (1 of 2) Never done    Microalbumin Ratio  09/20/2024    Medicare Advantage- Medicare Wellness Visit  01/01/2025    COVID-19 Vaccine (7 - 2024-25 season) 04/21/2025    GFR  05/06/2025      Total time spent with patient 1 hour, more than 50% of time spent counseling  A  written education, counseling, referral, and plan for obtaining appropriate screening services has been given to patient. See patient instructions.    Yes